# Patient Record
Sex: MALE | Race: WHITE | Employment: OTHER | URBAN - METROPOLITAN AREA
[De-identification: names, ages, dates, MRNs, and addresses within clinical notes are randomized per-mention and may not be internally consistent; named-entity substitution may affect disease eponyms.]

---

## 2017-01-03 ENCOUNTER — OFFICE VISIT (OUTPATIENT)
Dept: INTERNAL MEDICINE CLINIC | Age: 57
End: 2017-01-03

## 2017-01-03 VITALS
OXYGEN SATURATION: 98 % | WEIGHT: 166 LBS | RESPIRATION RATE: 16 BRPM | HEIGHT: 73 IN | SYSTOLIC BLOOD PRESSURE: 137 MMHG | HEART RATE: 88 BPM | BODY MASS INDEX: 22 KG/M2 | TEMPERATURE: 97.5 F | DIASTOLIC BLOOD PRESSURE: 82 MMHG

## 2017-01-03 DIAGNOSIS — E78.5 DYSLIPIDEMIA: ICD-10-CM

## 2017-01-03 DIAGNOSIS — Z12.11 SCREENING FOR COLON CANCER: ICD-10-CM

## 2017-01-03 DIAGNOSIS — R91.8 LUNG NODULES: ICD-10-CM

## 2017-01-03 DIAGNOSIS — N43.2 OTHER HYDROCELE: ICD-10-CM

## 2017-01-03 DIAGNOSIS — J44.9 CHRONIC OBSTRUCTIVE PULMONARY DISEASE, UNSPECIFIED COPD TYPE (HCC): ICD-10-CM

## 2017-01-03 DIAGNOSIS — L08.9 INFECTION OF SKIN OF FINGER: ICD-10-CM

## 2017-01-03 DIAGNOSIS — K40.20 BILATERAL INGUINAL HERNIA WITHOUT OBSTRUCTION OR GANGRENE, RECURRENCE NOT SPECIFIED: ICD-10-CM

## 2017-01-03 DIAGNOSIS — R63.4 WEIGHT LOSS, ABNORMAL: Primary | ICD-10-CM

## 2017-01-03 RX ORDER — SULFAMETHOXAZOLE AND TRIMETHOPRIM 800; 160 MG/1; MG/1
1 TABLET ORAL 2 TIMES DAILY
Qty: 20 TAB | Refills: 0 | Status: SHIPPED | OUTPATIENT
Start: 2017-01-03 | End: 2017-01-13

## 2017-01-03 NOTE — MR AVS SNAPSHOT
Visit Information Date & Time Provider Department Dept. Phone Encounter #  
 1/3/2017  3:45 PM Wu Medina DO [facility] [employer] [phone] Follow-up Instructions Return for 3 month follow up. Upcoming Health Maintenance Date Due Hepatitis C Screening 1960 Pneumococcal 19-64 Medium Risk (1 of 1 - PPSV23) 10/22/1978 DTaP/Tdap/Td series (1 - Tdap) 10/22/1980 FOBT Q 1 YEAR AGE 50-75 10/22/2009 INFLUENZA AGE 9 TO ADULT 8/1/2016 Allergies as of 1/3/2017  Review Complete On: 1/3/2017 By: Ailyn Dominique LPN No Known Allergies Current Immunizations  Never Reviewed No immunizations on file. Not reviewed this visit You Were Diagnosed With   
  
 Codes Comments Weight loss, abnormal    -  Primary ICD-10-CM: R63.4 ICD-9-CM: 783.21 Screening for colon cancer     ICD-10-CM: Z12.11 ICD-9-CM: V76.51 Chronic obstructive pulmonary disease, unspecified [facility] ([facility]     ICD-10-CM: J44.9 ICD-9-CM: 247 Bilateral inguinal hernia without obstruction or gangrene, recurrence not specified     ICD-10-CM: K40.20 ICD-9-CM: 550.92 Other hydrocele     ICD-10-CM: N43.2 ICD-9-CM: 603.8 Infection of skin of finger     ICD-10-CM: L08.9 ICD-9-CM: 178. 9 Dyslipidemia     ICD-10-CM: E78.5 ICD-9-CM: 272.4 Vitals BP Pulse Temp Resp Height(growth percentile) Weight(growth percentile) 137/82 88 97.5 °F (36.4 °C) (Oral) 16 6' 1\" (1.854 m) 166 lb (75.3 kg) SpO2 BMI Smoking Status 98% 21.9 kg/m2 Former Smoker Vitals History BMI and BSA Data Body Mass Index Body Surface Area  
 21.9 kg/m 2 1.97 m 2 Preferred Pharmacy Pharmacy Name Phone [facility] - [address] [phone] Your Updated Medication List  
  
   
This list is accurate as of: 1/3/17  4:34 PM.  Always use your most recent med list.  
  
  
  
  
 aspirin 81 mg tablet Take 81 mg by mouth two (2) times a day. atorvastatin 20 mg tablet Commonly known as:  LIPITOR Take  by mouth daily. hydroCHLOROthiazide 25 mg tablet Commonly known as:  HYDRODIURIL Take 25 mg by mouth daily. Take 1 tab daily  
  
 metFORMIN 1,000 mg tablet Commonly known as:  GLUCOPHAGE Take 1,000 mg by mouth daily. metoprolol tartrate 50 mg tablet Commonly known as:  LOPRESSOR Take  by mouth two (2) times a day. multivitamin tablet Commonly known as:  ONE A DAY Take 1 Tab by mouth daily. nitroglycerin 0.4 mg SL tablet Commonly known as:  NITROSTAT  
1 Tab by SubLINGual route every five (5) minutes as needed for Chest Pain. PriLOSEC 20 mg capsule Generic drug:  omeprazole Take 40 mg by mouth daily. trimethoprim-sulfamethoxazole 160-800 mg per tablet Commonly known as:  BACTRIM DS, SEPTRA DS Take 1 Tab by mouth two (2) times a day for 10 days. Prescriptions Sent to Pharmacy Refills  
 trimethoprim-sulfamethoxazole (BACTRIM DS, SEPTRA DS) 160-800 mg per tablet 0 Sig: Take 1 Tab by mouth two (2) times a day for 10 days. Class: Normal  
 Pharmacy: [id number] [place] [address] #: [phone] Route: Oral  
  
Follow-up Instructions Return for 3 month follow up. To-Do List   
 02/03/2017 Lab:  CBC WITH AUTOMATED DIFF   
  
 02/03/2017 Lab:  LIPID PANEL   
  
 02/03/2017 Lab:  METABOLIC PANEL, COMPREHENSIVE   
  
 02/03/2017 Lab:  TSH 3RD GENERATION Referral Information Referral ID Referred By Referred To  
  
 [id number] Luiz NAVARRO Not Available Visits Status Start Date End Date 1 New Request 1/3/17 1/3/18 If your referral has a status of pending review or denied, additional information will be sent to support the outcome of this decision. Referral ID Referred By Referred To [id number] Pam NAVARRO Not Available Visits Status Start Date End Date 1 New Request 1/3/17 1/3/18 If your referral has a status of pending review or denied, additional information will be sent to support the outcome of this decision. Patient Instructions A Healthy Lifestyle: Care Instructions Your Care Instructions A healthy lifestyle can help you feel good, stay at a healthy weight, and have plenty of energy for both work and play. A healthy lifestyle is something you can share with your whole family. A healthy lifestyle also can lower your risk for serious health problems, such as high blood pressure, heart disease, and diabetes. You can follow a few steps listed below to improve your health and the health of your family. Follow-up care is a key part of your treatment and safety. Be sure to make and go to all appointments, and call your doctor if you are having problems. Its also a good idea to know your test results and keep a list of the medicines you take. How can you care for yourself at home? · Do not eat too much sugar, fat, or fast foods. You can still have dessert and treats now and then. The goal is moderation. · Start small to improve your eating habits. Pay attention to portion sizes, drink less juice and soda pop, and eat more fruits and vegetables. ¨ Eat a healthy amount of food. A 3-ounce serving of meat, for example, is about the size of a deck of cards. Fill the rest of your plate with vegetables and whole grains. ¨ Limit the amount of soda and sports drinks you have every day. Drink more water when you are thirsty. ¨ Eat at least 5 servings of fruits and vegetables every day. It may seem like a lot, but it is not hard to reach this goal. A serving or helping is 1 piece of fruit, 1 cup of vegetables, or 2 cups of leafy, raw vegetables.  Have an apple or some carrot sticks as an afternoon snack instead of a candy bar. Try to have fruits and/or vegetables at every meal. 
· Make exercise part of your daily routine. You may want to start with simple activities, such as walking, bicycling, or slow swimming. Try to be active 30 to 60 minutes every day. You do not need to do all 30 to 60 minutes all at once. For example, you can exercise 3 times a day for 10 or 20 minutes. Moderate exercise is safe for most people, but it is always a good idea to talk to your doctor before starting an exercise program. 
· Keep moving. Leatha Boys the lawn, work in the garden, or [employer]. Take the stairs instead of the elevator at work. · If you smoke, quit. People who smoke have an increased risk for heart attack, stroke, cancer, and other lung illnesses. Quitting is hard, but there are ways to boost your chance of quitting tobacco for good. ¨ Use nicotine gum, patches, or lozenges. ¨ Ask your doctor about stop-smoking programs and medicines. ¨ Keep trying. In addition to reducing your risk of diseases in the future, you will notice some benefits soon after you stop using tobacco. If you have shortness of breath or asthma symptoms, they will likely get better within a few weeks after you quit. · Limit how much alcohol you drink. Moderate amounts of alcohol (up to 2 drinks a day for men, 1 drink a day for women) are okay. But drinking too much can lead to liver problems, high blood pressure, and other health problems. Family health If you have a family, there are many things you can do together to improve your health. · Eat meals together as a family as often as possible. · Eat healthy foods. This includes fruits, vegetables, lean meats and dairy, and whole grains. · Include your family in your fitness plan. Most people think of activities such as jogging or tennis as the way to fitness, but there are many ways you and your family can be more active.  Anything that makes you breathe hard and gets your heart pumping is exercise. Here are some tips: 
¨ Walk to do errands or to take your child to school or the bus. ¨ Go for a family bike ride after dinner instead of watching TV. Where can you learn more? Go to http://ramos-meghan.info/. Enter L383 in the search box to learn more about \"A Healthy Lifestyle: Care Instructions. \" Current as of: July 26, 2016 Content Version: 11.1 © 2203-4074 [employer]. Care instructions adapted under license by [employer] (which disclaims liability or warranty for this information). If you have questions about a medical condition or this instruction, always ask your healthcare professional. [address] any warranty or liability for your use of this information. Introducing [facility]! Kelvin Cordero introduces angelcam patient portal. Now you can access parts of your medical record, email your doctor's office, and request medication refills online. 1. In your internet browser, go to [email or web address] 2. Click on the First Time User? Click Here link in the Sign In box. You will see the New Member Sign Up page. 3. Enter your angelcam Access Code exactly as it appears below. You will not need to use this code after youve completed the sign-up process. If you do not sign up before the expiration date, you must request a new code. · angelcam Access Code: [email or web address] Expires: 3/16/2017 11:39 AM 
 
4. Enter the last four digits of your Social Security Number (xxxx) and Date of Birth (mm/dd/yyyy) as indicated and click Submit. You will be taken to the next sign-up page. 5. Create a angelcam ID. This will be your angelcam login ID and cannot be changed, so think of one that is secure and easy to remember. 6. Create a angelcam password. You can change your password at any time. 7. Enter your Password Reset Question and Answer.  This can be used at a later time if you forget your password. 8. Enter your e-mail address. You will receive e-mail notification when new information is available in [address] 9. Click Sign Up. You can now view and download portions of your medical record. 10. Click the Download Summary menu link to download a portable copy of your medical information. If you have questions, please visit the Frequently Asked Questions section of the [employer] website. Remember, [employer] is NOT to be used for urgent needs. For medical emergencies, dial 911. Now available from your iPhone and Android! Please provide this summary of care documentation to your next provider. Your primary care clinician is listed as Linda Arreola. If you have any questions after today's visit, please call [phone].

## 2017-01-03 NOTE — PROGRESS NOTES
Pt is here for 2 week  follow up on CT of lower abdomen. Do you have an advance directive no  Request Pt bring a copy of advance directive for scanning. Do you want information on an advance directive no    Pt  mychart activation pending. 1. Have you been to the ER, urgent care clinic since your last visit? Hospitalized since your last visit? No    2. Have you seen or consulted any other health care providers outside of the [address] [place] [address] since your last visit? Include any pap smears or colon screening. No      Called [facility] & canceled Bactrim & placed Pt's requested drug store [facility] [address].

## 2017-01-03 NOTE — PATIENT INSTRUCTIONS

## 2017-01-06 ENCOUNTER — TELEPHONE (OUTPATIENT)
Dept: INTERNAL MEDICINE CLINIC | Age: 57
End: 2017-01-06

## 2017-01-06 NOTE — PROGRESS NOTES
HISTORY OF PRESENT ILLNESS  Judge Reddy is a 61 y.o. male. HPI  61year old established female patient in today today to follow up CT     He also has a new concern of right 2nd digit hit by a hammer, now swollen with yellow drainage. Patient lanced injury himself to facilitate drainage    He denies F/C/S    Reviewd CT Chest  with patient: \"Virtually complete resolution of the left lingular findings. No change focal  residual scar/fibrosis posteromedial right upper lobe from the previous  cavitary lesion or the small subpleural nodules in the anterior left lower  lobe. The lungs are otherwise clear. There is no change in the prominent  right hilar lymph node.     Hepatic steatosis without focal abnormality. The remainder of the study is  unremarkable and unchanged. \"    Reviewed CT abd and pelvis with patient: \"1. There are small indirect inguinal hernias bilaterally containing fat without  incarceration. There is a small left-sided hydrocele. 2. Atrophic right kidney. Parenchymal scarring is present involving both  kidneys. No renal stones are seen. There is no hydronephrosis. 3. Arterial atherosclerotic disease. Additional chronic changes as described  Above. \"     Recap from 12/16/16:  His wife is present and give some hx. He reports hx of arthritis, DM, HTN, kidney stones and pna    His main concern today was significant weight loss per him and his wife. He reports >40 lbs weight loss in the past year    He also reports concerns regarding testicular size/tenderness and possible b/l inguinal enlargement/ hernia    he is taking his medications with no adverse side effects, he is requesting refills today. He denies CP, SOB, dyspnea, edema, N/T or myalgias.       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 on 1/26/06    Arthritis      in hands    ASHD (arteriosclerotic heart disease)     Benign hypertensive heart disease without heart failure     CAD (coronary artery disease)      chronic underlying    Calculus of kidney     Chronic cough      coughing up sputum    COPD (chronic obstructive pulmonary disease) ([facility])      moderate to severe    Diabetes ([facility]     GARCIA (dyspnea on exertion)     GERD (gastroesophageal reflux disease)     H/O: pneumonia     Heartburn     Hypertension      essential systemic    Indigestion     Normal nuclear stress test 08/30/2006     No ischemia or infarct. EF 59%. Negative max. stress test.    Pneumonia 8/12     \"walking\"    Pure hypercholesterolemia     S/P cardiac cath 01/26/2006     LM mild. RCA mild. LAD tortuous. CX mild. CX angela 100%. S/P balloon angioplasty of CX angela. Residual 40%. LVEDP 20.  EF 65%.  Tobacco abuse        Family History   Problem Relation Age of Onset    COPD Mother      cause of death    Asthma Father     Cancer Father     Colon Polyps Father        Social History   Substance Use Topics    Smoking status: Former Smoker     Packs/day: 1.25     Years: 20.00     Quit date: 1/1/2014    Smokeless tobacco: Never Used    Alcohol use Yes      Comment: on special occasions only        Current Outpatient Prescriptions   Medication Sig    trimethoprim-sulfamethoxazole (BACTRIM DS, SEPTRA DS) 160-800 mg per tablet Take 1 Tab by mouth two (2) times a day for 10 days.  metFORMIN (GLUCOPHAGE) 1,000 mg tablet Take 1,000 mg by mouth daily.  atorvastatin (LIPITOR) 20 mg tablet Take  by mouth daily.  hydroCHLOROthiazide (HYDRODIURIL) 25 mg tablet Take 25 mg by mouth daily. Take 1 tab daily    multivitamin (ONE A DAY) tablet Take 1 Tab by mouth daily.  metoprolol tartrate (LOPRESSOR) 50 mg tablet Take  by mouth two (2) times a day.  omeprazole (PRILOSEC) 20 mg capsule Take 40 mg by mouth daily.     aspirin 81 mg tablet Take 81 mg by mouth two (2) times a day.  nitroglycerin (NITROSTAT) 0.4 mg SL tablet 1 Tab by SubLINGual route every five (5) minutes as needed for Chest Pain. No current facility-administered medications for this visit. Past Surgical History   Procedure Laterality Date    Pr tongue to lip surgery  04/04    Hx ptca       of anomalous circumflex marginal artery    Pr tongue and mouth surg unlisted  04/04     tongue surgery    Hx heart catheterization  01/26/06       Review of Systems   Constitutional: Negative for chills and fever. C/o night cramps   Respiratory:        C/o chronic cough, SOB and GARCIA. He says he sees cardiology   Gastrointestinal:        C/o early satiety and loss of appetite and change in stools on metformin   Genitourinary:        C/o decrease in urine flow, urgency, hesitancy, incomplete bladder emptying and frequency   Musculoskeletal: Positive for back pain and neck pain. C/o muscle weakness   Neurological: Positive for tingling. Visit Vitals    /82    Pulse 88    Temp 97.5 °F (36.4 °C) (Oral)    Resp 16    Ht 6' 1\" (1.854 m)    Wt 166 lb (75.3 kg)    SpO2 98%    BMI 21.9 kg/m2     Physical Exam   Constitutional: he is oriented to person, place, and time and well-developed, well-nourished, and in no distress. Head: Normocephalic and atraumatic. Musculoskeletal: right 2nd digit with erythema, swelling, warmth and yellow thin discharge. Normal range of motion. he exhibits no edema. Neurological: he is alert and oriented to person, place, and time. Gait normal.   Skin: Skin is warm and dry. Psychiatric: Mood, memory, affect and judgment normal.     ASSESSMENT and PLAN    ICD-10-CM ICD-9-CM    1.  Weight loss, abnormal R63.4 783.21 CBC WITH AUTOMATED DIFF      METABOLIC PANEL, COMPREHENSIVE      TSH 3RD GENERATION   2. Screening for colon cancer Z12.11 V76.51 REFERRAL FOR COLONOSCOPY   3. Chronic obstructive pulmonary disease, unspecified COPD type (HCC) J44.9 496 REFERRAL TO PULMONARY DISEASE   4. Bilateral inguinal hernia without obstruction or gangrene, recurrence not specified K40.20 550.92    5. Other hydrocele N43.2 603.8 REFERRAL TO UROLOGY   6. Infection of skin of finger L08.9 686.9 trimethoprim-sulfamethoxazole (BACTRIM DS, SEPTRA DS) 160-800 mg per tablet   7. Dyslipidemia E78.5 272.4 LIPID PANEL   8. Lung nodules R91.8 793.19 REFERRAL TO PULMONARY DISEASE     -Findings of CT reviewed, morbidity and mortality for hernias reviewed. Also provided education on hydrocele, patient desires specialist follow up.  -Hx CAD with aortic arthrosclerosis seen on CT, strongly recommend compliance with cardiology appointment, statin, ASA and plavix  -Lung CT shows scarring and nodule, patient with hx of smoking and weight loss, will refer to pulm for management. -RTC 3 months  -Advised close follow up    Additional Instructions: The patient understands that they should contact the office at any time if any questions or concerns develop. They are also aware that they can call our main office number at [phone] at any time if they would like to address any concerns with the physician. They also understand that they should dial 911 if any acute emergency arises. The patient understands that they should give us a minimum of 48 hours to complete prescription refills once they are requested. The patient has also been instructed to contact us by calling the main office number if they have not received feedback within 2 weeks of having any tests completed. The patient is a aware that they should read all package insert information when picking up the medications and that they should consult the pharmacist of a physician if they have any questions or concerns regarding the prescribed medications. Discussed with the patient new medications given and patient instructed to read pharmacy literature regarding side effects and drug interactions.   Instructions for taking the medications were provided to the patient and the consequences of not taking it. Follow-up Disposition:   Return if symptoms worsen or fail to improve. Risk and benefits of new medication discussed in detail when indicated, patient was given the opportunity to ask questions   AVS provided  reviewed diet, exercise and weight control when indicated  Alarm signals discussed. ER precautions reviewed when indicated  Plan of care reviewed with patient. Understanding verbalized and they are in agreement with plan of care.      Irene Menesesr, DO

## 2017-01-06 NOTE — TELEPHONE ENCOUNTER
Pt calling had OV 01/03/2017.  He thought Dr Nila Casey said she was sending Celebrex to the pharmacy to replace Lipitor    Please advise    Pharmacy is [address] [facility], [address] [facility]

## 2017-01-09 RX ORDER — ROSUVASTATIN CALCIUM 20 MG/1
20 TABLET, COATED ORAL
Qty: 30 TAB | Refills: 2 | Status: SHIPPED | OUTPATIENT
Start: 2017-01-09 | End: 2017-05-08 | Stop reason: SDUPTHER

## 2017-01-16 DIAGNOSIS — J44.9 CHRONIC OBSTRUCTIVE PULMONARY DISEASE, UNSPECIFIED COPD TYPE (HCC): Primary | ICD-10-CM

## 2017-01-16 NOTE — PROGRESS NOTES
Verbal Order with read back per Dr. Bob Aranda MD  For PFT smart panel. AMB POC PFT complete w/ bronchodilator  AMB POC PFT complete w/o bronchodilator    Dr. Bob Aranda MD will co-sign the orders.

## 2017-01-23 ENCOUNTER — CLINICAL SUPPORT (OUTPATIENT)
Dept: PULMONOLOGY | Age: 57
End: 2017-01-23

## 2017-01-23 VITALS — BODY MASS INDEX: 22.4 KG/M2 | HEIGHT: 73 IN | WEIGHT: 169 LBS

## 2017-01-23 DIAGNOSIS — J44.9 CHRONIC OBSTRUCTIVE PULMONARY DISEASE, UNSPECIFIED COPD TYPE (HCC): ICD-10-CM

## 2017-01-23 RX ORDER — ATORVASTATIN CALCIUM 20 MG/1
TABLET, FILM COATED ORAL DAILY
COMMUNITY
End: 2017-01-30

## 2017-01-24 ENCOUNTER — OFFICE VISIT (OUTPATIENT)
Dept: UROLOGY | Age: 57
End: 2017-01-24

## 2017-01-24 ENCOUNTER — HOSPITAL ENCOUNTER (OUTPATIENT)
Dept: LAB | Age: 57
Discharge: HOME | End: 2017-01-24
Payer: SELF-PAY

## 2017-01-24 VITALS
WEIGHT: 169 LBS | SYSTOLIC BLOOD PRESSURE: 121 MMHG | OXYGEN SATURATION: 96 % | TEMPERATURE: 97.4 F | HEART RATE: 73 BPM | DIASTOLIC BLOOD PRESSURE: 76 MMHG | HEIGHT: 73 IN | BODY MASS INDEX: 22.4 KG/M2

## 2017-01-24 DIAGNOSIS — R35.1 BPH ASSOCIATED WITH NOCTURIA: ICD-10-CM

## 2017-01-24 DIAGNOSIS — N40.1 BPH ASSOCIATED WITH NOCTURIA: ICD-10-CM

## 2017-01-24 DIAGNOSIS — F52.4 PREMATURE EJACULATION: ICD-10-CM

## 2017-01-24 DIAGNOSIS — N52.02 CORPORO-VENOUS OCCLUSIVE ERECTILE DYSFUNCTION: ICD-10-CM

## 2017-01-24 DIAGNOSIS — R10.30 LOWER ABDOMINAL PAIN: ICD-10-CM

## 2017-01-24 DIAGNOSIS — N45.1 CHRONIC EPIDIDYMITIS: Primary | ICD-10-CM

## 2017-01-24 DIAGNOSIS — N43.3 HYDROCELE, UNSPECIFIED HYDROCELE TYPE: ICD-10-CM

## 2017-01-24 LAB
BILIRUB UR QL STRIP: NEGATIVE
GLUCOSE UR-MCNC: NEGATIVE MG/DL
KETONES P FAST UR STRIP-MCNC: NEGATIVE MG/DL
PH UR STRIP: 6.5 [PH] (ref 4.6–8)
PROT UR QL STRIP: NEGATIVE MG/DL
PSA SERPL-MCNC: 0.9 NG/ML (ref 0–4)
SP GR UR STRIP: 1.01 (ref 1–1.03)
UA UROBILINOGEN AMB POC: NORMAL (ref 0.2–1)
URINALYSIS CLARITY POC: CLEAR
URINALYSIS COLOR POC: YELLOW
URINE BLOOD POC: NEGATIVE
URINE LEUKOCYTES POC: NEGATIVE
URINE NITRITES POC: NEGATIVE

## 2017-01-24 PROCEDURE — 84153 ASSAY OF PSA TOTAL: CPT | Performed by: UROLOGY

## 2017-01-24 RX ORDER — HYDROCODONE BITARTRATE AND ACETAMINOPHEN 10; 300 MG/1; MG/1
TABLET ORAL
COMMUNITY
End: 2017-01-30

## 2017-01-24 RX ORDER — CIPROFLOXACIN 500 MG/1
500 TABLET ORAL 2 TIMES DAILY
Qty: 42 TAB | Refills: 0 | Status: SHIPPED | OUTPATIENT
Start: 2017-01-24 | End: 2017-01-30

## 2017-01-24 NOTE — MR AVS SNAPSHOT
Visit Information Date & Time Provider Department Dept. Phone Encounter #  
 1/24/2017  3:00 PM Dank Vásquez, [facility] [phone] Your Appointments 1/31/2017  1:40 PM  
Follow Up with Alem Li DO [facility] ([address]) Appt Note: CT ABDOMEN AND PELVIS WITHOUT CONTRAST/ ABN/ refer by Kaye Blanchard to see [place]/appt. made with the pt's spouse/ see CC; $ 75. copay/ $0 balance; wife requested earlier appt. Ni Sorenson [phone] Leonardo Zarate  
  
    
 2/9/2017 10:00 AM  
New Patient with Larry Reyes MD  
[address] ([address]) Appt Note: Dr Chaparro Ryan/copd, lung nodule; ct mv 12/23;crista 1/18; last seen vap 2013; .  
 [address] [place] [address], [address] [address]  
[phone]  
  
   
 [address] [place] [address],  [address] [address]  
  
    
 4/4/2017  8:10 AM  
LAB with Linda Arreola DO [facility] (--) Appt Note: 3 mo f/u lab [address] [address]  
[phone]  
  
   
 [address],[facility] [address] [address] 4/11/2017  3:45 PM  
ROUTINE CARE with Linda Arreola DO [facility] (--) Appt Note: 3 mo f/u  
 [address] [address]  
[phone]  
  
   
 [address],[facility] [address] [address] Upcoming Health Maintenance Date Due Hepatitis C Screening 1960 Pneumococcal 19-64 Medium Risk (1 of 1 - PPSV23) 10/22/1978 DTaP/Tdap/Td series (1 - Tdap) 10/22/1980 FOBT Q 1 YEAR AGE 50-75 10/22/2009 INFLUENZA AGE 9 TO ADULT 8/1/2016 Allergies as of 1/24/2017  Review Complete On: 1/24/2017 By: Dank Vásquez MD  
 No Known Allergies Current Immunizations  Never Reviewed No immunizations on file. Not reviewed this visit You Were Diagnosed With   
  
 Codes Comments Chronic epididymitis    -  Primary ICD-10-CM: N45.1 ICD-9-CM: 604.90 Hydrocele, unspecified hydrocele type     ICD-10-CM: N43.3 ICD-9-CM: 603.9 Lower abdominal pain     ICD-10-CM: R10.30 ICD-9-CM: 789.09   
 BPH associated with nocturia     ICD-10-CM: N40.1, R35.1 ICD-9-CM: 600.01, 788.43 Vitals BP Pulse Temp Height(growth percentile) Weight(growth percentile) SpO2  
 121/76 (BP 1 Location: Left arm, BP Patient Position: Sitting) 73 97.4 °F (36.3 °C) 6' 1\" (1.854 m) 169 lb (76.7 kg) 96% BMI Smoking Status 22.3 kg/m2 Former Smoker Vitals History BMI and BSA Data Body Mass Index Body Surface Area  
 22.3 kg/m 2 1.99 m 2 Preferred Pharmacy Pharmacy Name [facility], [address] [place] [address],[address] [place] [address] [phone] Your Updated Medication List  
  
   
This list is accurate as of: 1/24/17  3:59 PM.  Always use your most recent med list.  
  
  
  
  
 aspirin 81 mg tablet Take 81 mg by mouth two (2) times a day. atorvastatin 20 mg tablet Commonly known as:  LIPITOR Take  by mouth daily. ciprofloxacin HCl 500 mg tablet Commonly known as:  CIPRO Take 1 Tab by mouth two (2) times a day for 10 days. hydroCHLOROthiazide 25 mg tablet Commonly known as:  HYDRODIURIL Take 25 mg by mouth daily. Take 1 tab daily  
  
 metFORMIN 1,000 mg tablet Commonly known as:  GLUCOPHAGE Take 1,000 mg by mouth daily. metoprolol tartrate 50 mg tablet Commonly known as:  LOPRESSOR Take  by mouth two (2) times a day. multivitamin tablet Commonly known as:  ONE A DAY Take 1 Tab by mouth daily. nitroglycerin 0.4 mg SL tablet Commonly known as:  NITROSTAT  
1 Tab by SubLINGual route every five (5) minutes as needed for Chest Pain. PriLOSEC 20 mg capsule Generic drug:  omeprazole Take 40 mg by mouth daily. rosuvastatin 20 mg tablet Commonly known as:  CRESTOR Take 1 Tab by mouth nightly. VICODIN HP  mg Tab per tablet Generic drug:  HYDROcodone-acetaminophen Take  by mouth. Prescriptions Sent to Pharmacy Refills  
 ciprofloxacin HCl (CIPRO) 500 mg tablet 0 Sig: Take 1 Tab by mouth two (2) times a day for 10 days. Class: Normal  
 [facility] [address] #: [phone] Route: Oral  
  
We Performed the Following AMB POC URINALYSIS DIP STICK AUTO W/O MICRO [57121 CPT(R)] Patient Instructions [employer] Activation Thank you for requesting access to [employer]. Please follow the instructions below to securely access and download your online medical record. [employer] allows you to send messages to your doctor, view your test results, renew your prescriptions, schedule appointments, and more. How Do I Sign Up? 1. In your internet browser, go to [email or web address] 
2. Click on the First Time User? Click Here link in the Sign In box. You will be redirect to the New Member Sign Up page. 3. Enter your [employer] Access Code exactly as it appears below. You will not need to use this code after youve completed the sign-up process. If you do not sign up before the expiration date, you must request a new code. [employer] Access Code: [email or web address] Expires: 3/16/2017 11:39 AM (This is the date your [employer] access code will ) 4. Enter the last four digits of your Social Security Number (xxxx) and Date of Birth (mm/dd/yyyy) as indicated and click Submit. You will be taken to the next sign-up page. 5. Create a [employer] ID. This will be your [employer] login ID and cannot be changed, so think of one that is secure and easy to remember. 6. Create a [employer] password. You can change your password at any time. 7. Enter your Password Reset Question and Answer. This can be used at a later time if you forget your password. 8. Enter your e-mail address. You will receive e-mail notification when new information is available in [address] 9. Click Sign Up. You can now view and download portions of your medical record. 10. Click the Download Summary menu link to download a portable copy of your medical information. Additional Information If you have questions, please visit the Frequently Asked Questions section of the [email or web address] website at [email or web address] Remember, [email or web address] is NOT to be used for urgent needs. For medical emergencies, dial 911. Urine Test: About This Test 
What is it? A urine test checks the color, clarity (clear or cloudy), odor, concentration, and acidity (pH) of your urine. It also checks your levels of protein, sugar, blood cells, or other substances in your urine. This test is sometimes called a urinalysis. Why is this test done? A urine test may be done: · To check for a disease or infection of the urinary tract. The urinary tract includes the kidneys, the tubes that carry urine from the kidneys to the bladder (ureters), and the bladder. It also includes the tube that carries urine from the bladder to outside the body (urethra). · To check the treatment of conditions such as diabetes, kidney stones, a urinary tract infection (UTI), high blood pressure, or some kidney or liver diseases. How can you prepare for the test? 
· Before the test, don't eat foods that can change the color of your urine. Examples of these include blackberries, beets, and rhubarb. · Don't do heavy exercise before the test. 
· Tell your doctor if you are menstruating or close to starting your period. Your doctor may want to wait to do the test. 
· Tell your doctor about all the nonprescription and prescription medicines and herbs or other supplements you take.  Some of these can affect the results of this test. 
What happens during the test? 
A urine test can be done in your doctor's office, clinic, or lab. Or you may be asked to collect a urine sample at home. Then you can take it to the office or lab for testing. Clean-catch midstream urine collection · Wash your hands before you start. · If the cup you are given has a lid, remove it carefully. Set it down with the inner surface up. Don't touch the inside of the cup with your fingers. · Clean the area around your genitals. ¨ For men: Pull back the foreskin, if present. Clean the head of your penis with medicated towelettes or swabs. ¨ For women: Spread open the genital folds of skin with one hand. Then use medicated towelettes or swabs in your other hand to clean the area where urine comes out (the urethra). Wipe the area from front to back. · Start urinating into the toilet or urinal. A woman should hold apart the genital folds of skin while she urinates. · After the urine has flowed for several seconds, place the cup into the urine stream. Collect about 2 ounces of urine without stopping your flow of urine. · Don't touch the rim of the cup to your genital area. Don't get toilet paper, pubic hair, stool (feces), menstrual blood, or anything else in the urine sample. · Finish urinating into the toilet or urinal. 
· Carefully replace and tighten the lid on the cup, and then return it to the lab. If you are collecting the urine at home and can't get it to the lab in an hour, refrigerate it. Double-voided urine sample collection This method collects the urine your body is making right now. · Urinate into the toilet or urinal. Don't collect any of this urine. · Drink a large glass of water, and wait about 30 to 40 minutes. · Then get a urine sample. Follow the instructions above for collecting a clean-catch urine sample. · Take the urine sample to the lab.  If you are collecting the urine at home and can't get it to the lab in an hour, refrigerate it. Follow-up care is a key part of your treatment and safety. Be sure to make and go to all appointments, and call your doctor if you are having problems. It's also a good idea to keep a list of the medicines you take. Ask your doctor when you can expect to have your test results. Where can you learn more? Go to http://ramos-meghan.info/. Enter R266 in the search box to learn more about \"Urine Test: About This Test.\" Current as of: February 19, 2016 Content Version: 11.1 © 2187-5314 AUTOFACT. Care instructions adapted under license by Oculus VR (which disclaims liability or warranty for this information). If you have questions about a medical condition or this instruction, always ask your healthcare professional. [address] any warranty or liability for your use of this information. Introducing [facility]! Shantell Gutiérrez introduces Pegastech patient portal. Now you can access parts of your medical record, email your doctor's office, and request medication refills online. 1. In your internet browser, go to [email or web address] 2. Click on the First Time User? Click Here link in the Sign In box. You will see the New Member Sign Up page. 3. Enter your Pegastech Access Code exactly as it appears below. You will not need to use this code after youve completed the sign-up process. If you do not sign up before the expiration date, you must request a new code. · Peg[employer]ch Access Code: [email or web address] Expires: 3/16/2017 11:39 AM 
 
4. Enter the last four digits of your Social Security Number (xxxx) and Date of Birth (mm/dd/yyyy) as indicated and click Submit. You will be taken to the next sign-up page. 5. Create a Pegastech ID. This will be your Pegastech login ID and cannot be changed, so think of one that is secure and easy to remember. 6. Create a Notable Limited password. You can change your password at any time. 7. Enter your Password Reset Question and Answer. This can be used at a later time if you forget your password. 8. Enter your e-mail address. You will receive e-mail notification when new information is available in [address] 9. Click Sign Up. You can now view and download portions of your medical record. 10. Click the Download Summary menu link to download a portable copy of your medical information. If you have questions, please visit the Frequently Asked Questions section of the Notable Limited website. Remember, Not[employer] Limited is NOT to be used for urgent needs. For medical emergencies, dial 911. Now available from your iPhone and Android! Please provide this summary of care documentation to your next provider. Your primary care clinician is listed as Johny Nicholas. If you have any questions after today's visit, please call [phone].

## 2017-01-24 NOTE — PATIENT INSTRUCTIONS
"[employer] Activation    Thank you for requesting access to "[employer]. Please follow the instructions below to securely access and download your online medical record. "[employer] allows you to send messages to your doctor, view your test results, renew your prescriptions, schedule appointments, and more. How Do I Sign Up? 1. In your internet browser, go to [email or web address]  2. Click on the First Time User? Click Here link in the Sign In box. You will be redirect to the New Member Sign Up page. 3. Enter your "[employer] Access Code exactly as it appears below. You will not need to use this code after youve completed the sign-up process. If you do not sign up before the expiration date, you must request a new code. "[employer] Access Code: [email or web address]  Expires: 3/16/2017 11:39 AM (This is the date your "[employer] access code will )    4. Enter the last four digits of your Social Security Number (xxxx) and Date of Birth (mm/dd/yyyy) as indicated and click Submit. You will be taken to the next sign-up page. 5. Create a "[employer] ID. This will be your "[employer] login ID and cannot be changed, so think of one that is secure and easy to remember. 6. Create a "[employer] password. You can change your password at any time. 7. Enter your Password Reset Question and Answer. This can be used at a later time if you forget your password. 8. Enter your e-mail address. You will receive e-mail notification when new information is available in [address] 9. Click Sign Up. You can now view and download portions of your medical record. 10. Click the Download Summary menu link to download a portable copy of your medical information. Additional Information    If you have questions, please visit the Frequently Asked Questions section of the "[employer] website at [email or web address] Remember, "[employer] is NOT to be used for urgent needs. For medical emergencies, dial 911.           Urine Test: About This Test  What is it? A urine test checks the color, clarity (clear or cloudy), odor, concentration, and acidity (pH) of your urine. It also checks your levels of protein, sugar, blood cells, or other substances in your urine. This test is sometimes called a urinalysis. Why is this test done? A urine test may be done:  · To check for a disease or infection of the urinary tract. The urinary tract includes the kidneys, the tubes that carry urine from the kidneys to the bladder (ureters), and the bladder. It also includes the tube that carries urine from the bladder to outside the body (urethra). · To check the treatment of conditions such as diabetes, kidney stones, a urinary tract infection (UTI), high blood pressure, or some kidney or liver diseases. How can you prepare for the test?  · Before the test, don't eat foods that can change the color of your urine. Examples of these include blackberries, beets, and rhubarb. · Don't do heavy exercise before the test.  · Tell your doctor if you are menstruating or close to starting your period. Your doctor may want to wait to do the test.  · Tell your doctor about all the nonprescription and prescription medicines and herbs or other supplements you take. Some of these can affect the results of this test.  What happens during the test?  A urine test can be done in your doctor's office, clinic, or lab. Or you may be asked to collect a urine sample at home. Then you can take it to the office or lab for testing. Clean-catch midstream urine collection  · Wash your hands before you start. · If the cup you are given has a lid, remove it carefully. Set it down with the inner surface up. Don't touch the inside of the cup with your fingers. · Clean the area around your genitals. ¨ For men: Pull back the foreskin, if present. Clean the head of your penis with medicated towelettes or swabs. ¨ For women: Spread open the genital folds of skin with one hand.  Then use medicated towelettes or swabs in your other hand to clean the area where urine comes out (the urethra). Wipe the area from front to back. · Start urinating into the toilet or urinal. A woman should hold apart the genital folds of skin while she urinates. · After the urine has flowed for several seconds, place the cup into the urine stream. Collect about 2 ounces of urine without stopping your flow of urine. · Don't touch the rim of the cup to your genital area. Don't get toilet paper, pubic hair, stool (feces), menstrual blood, or anything else in the urine sample. · Finish urinating into the toilet or urinal.  · Carefully replace and tighten the lid on the cup, and then return it to the lab. If you are collecting the urine at home and can't get it to the lab in an hour, refrigerate it. Double-voided urine sample collection  This method collects the urine your body is making right now. · Urinate into the toilet or urinal. Don't collect any of this urine. · Drink a large glass of water, and wait about 30 to 40 minutes. · Then get a urine sample. Follow the instructions above for collecting a clean-catch urine sample. · Take the urine sample to the lab. If you are collecting the urine at home and can't get it to the lab in an hour, refrigerate it. Follow-up care is a key part of your treatment and safety. Be sure to make and go to all appointments, and call your doctor if you are having problems. It's also a good idea to keep a list of the medicines you take. Ask your doctor when you can expect to have your test results. Where can you learn more? Go to http://ramos-meghan.info/. Enter R266 in the search box to learn more about \"Urine Test: About This Test.\"  Current as of: February 19, 2016  Content Version: 11.1  © 7167-9667 RefferedAgent.com. Care instructions adapted under license by Bizak (which disclaims liability or warranty for this information).  If you have questions about a medical condition or this instruction, always ask your healthcare professional. [place] [address] any warranty or liability for your use of this information.

## 2017-01-24 NOTE — PROGRESS NOTES
Mr. Osiel Walls has a reminder for a \"due or due soon\" health maintenance. I have asked that he contact his primary care provider for follow-up on this health maintenance. RBV per Dr. Antonio Elizabeth blood drawn in office today for PSA for BPH with Nocturia.

## 2017-01-25 NOTE — PROGRESS NOTES
Sherry Primrose 61 y.o. male     Mr. Matt Smith seen today for evaluation of pain and swelling in left scrotum referred as a hydrocele-patient also complains of erectile dysfunction and premature ejaculation  No history of  tract disease, trauma, or surgery- +symptoms of irritable bladder without  dysuria-no episodes of hematuria-  Patient complains of sudden loss of turgidity erections prior to ejaculation-also has episodes of premature ejaculation with loss of erection following that event  Patient has had positive response to erectile rigidity when taking [place] Viagra but still experiences sudden loss of erection    Review of Systems:   CNS: No seizures syncope headaches dizziness or visual changes  Respiratory: No wheezing no shortness of breath  Cardiovascular: Hypertension-no chest pain no palpitations  Intestinal: No dyspepsia diarrhea or constipation  Urinary: Urinary urgency frequency  Skeletal: Large joint arthritis  Endocrine: Diabetes  Other:    Allergies: No Known Allergies   Medications:    Current Outpatient Prescriptions   Medication Sig Dispense Refill    HYDROcodone-acetaminophen (VICODIN HP)  mg tab per tablet Take  by mouth.  ciprofloxacin HCl (CIPRO) 500 mg tablet Take 1 Tab by mouth two (2) times a day for 10 days. 42 Tab 0    rosuvastatin (CRESTOR) 20 mg tablet Take 1 Tab by mouth nightly. 30 Tab 2    metFORMIN (GLUCOPHAGE) 1,000 mg tablet Take 1,000 mg by mouth daily.  hydroCHLOROthiazide (HYDRODIURIL) 25 mg tablet Take 25 mg by mouth daily. Take 1 tab daily      multivitamin (ONE A DAY) tablet Take 1 Tab by mouth daily.  metoprolol tartrate (LOPRESSOR) 50 mg tablet Take  by mouth two (2) times a day.  omeprazole (PRILOSEC) 20 mg capsule Take 40 mg by mouth daily.  aspirin 81 mg tablet Take 81 mg by mouth two (2) times a day.  atorvastatin (LIPITOR) 20 mg tablet Take  by mouth daily.       nitroglycerin (NITROSTAT) 0.4 mg SL tablet 1 Tab by SubLINGual route every five (5) minutes as needed for Chest Pain. 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/06    Arthritis      in hands    ASHD (arteriosclerotic heart disease)     Benign hypertensive heart disease without heart failure     CAD (coronary artery disease)      chronic underlying    Calculus of kidney     Chronic cough      coughing up sputum    Chronic lung disease     COPD (chronic obstructive pulmonary disease) (formerly [facility])      moderate to severe    Coronary artery disease     [facility] [address]     Diabetes mellitus ([facility] [address]     GARCIA (dyspnea on exertion)     GERD (gastroesophageal reflux disease)     H/O: pneumonia     Heartburn     Hypertension      essential systemic    Indigestion     Normal nuclear stress test 08/30/2006     No ischemia or infarct. EF 59%. Negative max. stress test.    Pneumonia 8/12     \"walking\"    Pure hypercholesterolemia     S/P cardiac cath 01/26/2006     LM mild. RCA mild. LAD tortuous. CX mild. CX angela 100%. S/P balloon angioplasty of CX angela. Residual 40%. LVEDP 20.  EF 65%.     Tobacco abuse       Past Surgical History   Procedure Laterality Date    Pr tongue to lip surgery  04/04    Hx ptca       of anomalous circumflex marginal artery    Pr tongue and mouth surg unlisted  04/04     tongue surgery    Hx heart catheterization  01/26/06     Family History   Problem Relation Age of Onset    COPD Mother      cause of death    Asthma Father     Cancer Father     Colon Polyps Father       Physical Examination: Well-nourished mature male in no apparent distress    Abdomen is nontender no palpable masses no organomegaly  Back-no percussion CVA tenderness on either side  No inguinal hernia or adenopathy  Penis is normal  Testes: Large indurated tender left epididymis-this is palpably normal/testes are normal in size shape and consistency and nontender  Spermatic  enlarged and indurated left spermatic cord  Scrotum is normal  Prostate by PROSPER is rounded, smooth, benign in consistency and nontender-no induration no nodularity  No rectal masses tenderness or induration    CT scan imaging of the abdomen and pelvis on 23 December 2016 shows atrophy of the right kidney stones or urinary tract obstruction evident on either side. Small left hydrocele is evident-images have reviewed on PACS today    Urinalysis: Negative dipstick/nitrite negativePhysical Examination:          Impression: Chronic prostatitis with left epididymitis                        -Erectile dysfunction-venous leak mechanism                        -Premature ejaculation      Plan: Cipro 500 mg twice daily ×3 weeks    Discussed prospect of ED treatment by vacuum device-also discussed measures for minimizing premature ejaculation-implantation of penile prosthesis described as treatment most likely to correct erectile dysfunction but not likely to favorably reflect premature ejaculation    rtc 4 weeks-ultrasound imaging of the scrotum      More than 1/2 of this 40 minute visit was spent in counselling and coordination of care, as described above. Annie Johnson MD  -electronically signed-    PLEASE NOTE:  This document has been produced using voice recognition software. Unrecognized errors in transcription may be present.

## 2017-01-30 ENCOUNTER — APPOINTMENT (OUTPATIENT)
Dept: GENERAL RADIOLOGY | Age: 57
End: 2017-01-30
Attending: EMERGENCY MEDICINE
Payer: SELF-PAY

## 2017-01-30 ENCOUNTER — HOSPITAL ENCOUNTER (EMERGENCY)
Age: 57
Discharge: HOME | End: 2017-01-30
Attending: EMERGENCY MEDICINE
Payer: SELF-PAY

## 2017-01-30 VITALS
DIASTOLIC BLOOD PRESSURE: 84 MMHG | BODY MASS INDEX: 22.75 KG/M2 | TEMPERATURE: 97.3 F | HEART RATE: 80 BPM | SYSTOLIC BLOOD PRESSURE: 145 MMHG | HEIGHT: 72 IN | OXYGEN SATURATION: 99 % | WEIGHT: 168 LBS | RESPIRATION RATE: 18 BRPM

## 2017-01-30 DIAGNOSIS — L03.115 CELLULITIS OF RIGHT LOWER EXTREMITY: Primary | ICD-10-CM

## 2017-01-30 LAB
ANION GAP BLD CALC-SCNC: 6 MMOL/L (ref 3–18)
BASOPHILS # BLD AUTO: 0.1 K/UL (ref 0–0.06)
BASOPHILS # BLD: 1 % (ref 0–2)
BUN SERPL-MCNC: 15 MG/DL (ref 7–18)
BUN/CREAT SERPL: 16 (ref 12–20)
CALCIUM SERPL-MCNC: 9.6 MG/DL (ref 8.5–10.1)
CHLORIDE SERPL-SCNC: 99 MMOL/L (ref 100–108)
CO2 SERPL-SCNC: 34 MMOL/L (ref 21–32)
CREAT SERPL-MCNC: 0.93 MG/DL (ref 0.6–1.3)
DIFFERENTIAL METHOD BLD: ABNORMAL
EOSINOPHIL # BLD: 0.1 K/UL (ref 0–0.4)
EOSINOPHIL NFR BLD: 1 % (ref 0–5)
ERYTHROCYTE [DISTWIDTH] IN BLOOD BY AUTOMATED COUNT: 13.3 % (ref 11.6–14.5)
GLUCOSE SERPL-MCNC: 109 MG/DL (ref 74–99)
HCT VFR BLD AUTO: 39.6 % (ref 36–48)
HGB BLD-MCNC: 12.6 G/DL (ref 13–16)
LYMPHOCYTES # BLD AUTO: 30 % (ref 21–52)
LYMPHOCYTES # BLD: 3.3 K/UL (ref 0.9–3.6)
MCH RBC QN AUTO: 28.8 PG (ref 24–34)
MCHC RBC AUTO-ENTMCNC: 31.8 G/DL (ref 31–37)
MCV RBC AUTO: 90.4 FL (ref 74–97)
MONOCYTES # BLD: 0.8 K/UL (ref 0.05–1.2)
MONOCYTES NFR BLD AUTO: 7 % (ref 3–10)
NEUTS SEG # BLD: 6.8 K/UL (ref 1.8–8)
NEUTS SEG NFR BLD AUTO: 61 % (ref 40–73)
PLATELET # BLD AUTO: 343 K/UL (ref 135–420)
PMV BLD AUTO: 9.1 FL (ref 9.2–11.8)
POTASSIUM SERPL-SCNC: 3.3 MMOL/L (ref 3.5–5.5)
RBC # BLD AUTO: 4.38 M/UL (ref 4.7–5.5)
SODIUM SERPL-SCNC: 139 MMOL/L (ref 136–145)
WBC # BLD AUTO: 11 K/UL (ref 4.6–13.2)

## 2017-01-30 PROCEDURE — 93971 EXTREMITY STUDY: CPT

## 2017-01-30 PROCEDURE — 80048 BASIC METABOLIC PNL TOTAL CA: CPT

## 2017-01-30 PROCEDURE — 74011250636 HC RX REV CODE- 250/636: Performed by: PHYSICIAN ASSISTANT

## 2017-01-30 PROCEDURE — 74011250637 HC RX REV CODE- 250/637: Performed by: PHYSICIAN ASSISTANT

## 2017-01-30 PROCEDURE — 99283 EMERGENCY DEPT VISIT LOW MDM: CPT

## 2017-01-30 PROCEDURE — 85025 COMPLETE CBC W/AUTO DIFF WBC: CPT

## 2017-01-30 PROCEDURE — 73564 X-RAY EXAM KNEE 4 OR MORE: CPT

## 2017-01-30 PROCEDURE — 74011000258 HC RX REV CODE- 258: Performed by: PHYSICIAN ASSISTANT

## 2017-01-30 PROCEDURE — 96365 THER/PROPH/DIAG IV INF INIT: CPT

## 2017-01-30 RX ORDER — HYDROCODONE BITARTRATE AND ACETAMINOPHEN 5; 325 MG/1; MG/1
1 TABLET ORAL
Qty: 12 TAB | Refills: 0 | Status: SHIPPED | OUTPATIENT
Start: 2017-01-30 | End: 2017-01-31 | Stop reason: ALTCHOICE

## 2017-01-30 RX ORDER — HYDROCODONE BITARTRATE AND ACETAMINOPHEN 5; 325 MG/1; MG/1
1 TABLET ORAL
Status: COMPLETED | OUTPATIENT
Start: 2017-01-30 | End: 2017-01-30

## 2017-01-30 RX ORDER — CEPHALEXIN 500 MG/1
500 CAPSULE ORAL 4 TIMES DAILY
Qty: 28 CAP | Refills: 0 | Status: SHIPPED | OUTPATIENT
Start: 2017-01-30 | End: 2017-02-01 | Stop reason: SINTOL

## 2017-01-30 RX ORDER — SULFAMETHOXAZOLE AND TRIMETHOPRIM 800; 160 MG/1; MG/1
1 TABLET ORAL 2 TIMES DAILY
Qty: 14 TAB | Refills: 0 | Status: SHIPPED | OUTPATIENT
Start: 2017-01-30 | End: 2017-02-01

## 2017-01-30 RX ADMIN — CEFTRIAXONE 1 G: 1 INJECTION, POWDER, FOR SOLUTION INTRAMUSCULAR; INTRAVENOUS at 12:58

## 2017-01-30 RX ADMIN — HYDROCODONE BITARTRATE AND ACETAMINOPHEN 1 TABLET: 5; 325 TABLET ORAL at 11:56

## 2017-01-30 NOTE — ED PROVIDER NOTES
Patient is a 61 y.o. male presenting with knee pain. The history is provided by the patient. Knee Pain    This is a new problem. The current episode started more than 1 week ago (01/18/2017). The problem occurs constantly. The problem has been gradually worsening. The pain is present in the right knee, right lower leg and right ankle. The quality of the pain is described as aching. The pain is at a severity of 10/10. Associated symptoms include numbness. Pertinent negatives include full range of motion, no stiffness, no tingling, no itching, no back pain and no neck pain. Associated symptoms comments: Per pt he has had bilateral LE numbness ongoing since DM dx. The symptoms are aggravated by contact, movement and palpation. He has tried nothing for the symptoms. There has been no history of extremity trauma. 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 on 1/26/06    Arthritis      in hands    ASHD (arteriosclerotic heart disease)     Benign hypertensive heart disease without heart failure     CAD (coronary artery disease)      chronic underlying    Calculus of kidney     Chronic cough      coughing up sputum    Chronic lung disease     COPD (chronic obstructive pulmonary disease) (HCC)      moderate to severe    Coronary artery disease     Diabetes ([address]     Diabetes mellitus ([address]     GARCIA (dyspnea on exertion)     GERD (gastroesophageal reflux disease)     H/O: pneumonia     Heartburn     Hypertension      essential systemic    Indigestion     Normal nuclear stress test 08/30/2006     No ischemia or infarct. EF 59%. Negative max. stress test.    Pneumonia 8/12     \"walking\"    Pure hypercholesterolemia     S/P cardiac cath 01/26/2006     LM mild. RCA mild. LAD tortuous. CX mild. CX angela 100%.   S/P balloon angioplasty of CX angela. Residual 40%. LVEDP 20.  EF 65%.  Tobacco abuse        Past Surgical History:   Procedure Laterality Date    Pr tongue to lip surgery  04/04    Hx ptca       of anomalous circumflex marginal artery    Pr tongue and mouth surg unlisted  04/04     tongue surgery    Hx heart catheterization  01/26/06         Family History:   Problem Relation Age of Onset    COPD Mother      cause of death    Asthma Father     Cancer Father     Colon Polyps Father        Social History     Social History    Marital status:      Spouse name: N/A    Number of children: N/A    Years of education: N/A     Occupational History    Not on file. Social History Main Topics    Smoking status: Former Smoker     Packs/day: 1.25     Years: 20.00     Types: Cigarettes     Quit date: 1/1/2014    Smokeless tobacco: Never Used    Alcohol use Yes      Comment: on special occasions only    Drug use: No    Sexual activity: Yes     Other Topics Concern    Not on file     Social History Narrative         ALLERGIES: Review of patient's allergies indicates no known allergies. Review of Systems   Constitutional: Negative for chills and fever. HENT: Negative for ear pain, rhinorrhea and sore throat. Eyes: Negative for pain and redness. Respiratory: Negative for cough and shortness of breath. Cardiovascular: Negative for chest pain. Gastrointestinal: Negative for abdominal pain, constipation, diarrhea, nausea and vomiting. Genitourinary: Negative for dysuria. Musculoskeletal: Positive for arthralgias, gait problem, joint swelling and myalgias. Negative for back pain, neck pain, neck stiffness and stiffness. Skin: Negative. Negative for itching. Neurological: Positive for numbness. Negative for dizziness, tingling, light-headedness and headaches. Psychiatric/Behavioral: Negative.         Vitals:    01/30/17 1010 01/30/17 1012   BP: 145/84    Pulse: 80    Resp: 18    Temp: 97.3 °F (36.3 °C)    SpO2: 99%    Weight:  76.2 kg (168 lb)   Height:  6' (1.829 m)            Physical Exam   Constitutional: He is oriented to person, place, and time. He appears well-developed and well-nourished. No distress. HENT:   Head: Normocephalic and atraumatic. Right Ear: Tympanic membrane, external ear and ear canal normal.   Left Ear: Tympanic membrane, external ear and ear canal normal.   Nose: Nose normal.   Mouth/Throat: Oropharynx is clear and moist and mucous membranes are normal.   Eyes: Conjunctivae and EOM are normal. Pupils are equal, round, and reactive to light. Right eye exhibits no discharge. Left eye exhibits no discharge. Neck: Normal range of motion. Cardiovascular: Normal rate, regular rhythm, normal heart sounds and intact distal pulses. Exam reveals no gallop and no friction rub. No murmur heard. Pulmonary/Chest: Effort normal and breath sounds normal. No respiratory distress. He has no wheezes. He has no rales. He exhibits no tenderness. Abdominal: Soft. Bowel sounds are normal. There is no tenderness. Musculoskeletal:        Right knee: He exhibits decreased range of motion, swelling and erythema. He exhibits no effusion, no ecchymosis, no deformity, no laceration, normal alignment, no LCL laxity, normal patellar mobility, no bony tenderness, normal meniscus and no MCL laxity. Tenderness found. Right ankle: He exhibits swelling. He exhibits normal range of motion, no ecchymosis, no deformity, no laceration and normal pulse. No tenderness. Achilles tendon exhibits no pain, no defect and normal Cifuentes's test results. Right lower leg: He exhibits tenderness and swelling. He exhibits no bony tenderness, no edema, no deformity and no laceration. Right foot: There is swelling. There is normal range of motion, no tenderness, no bony tenderness, normal capillary refill, no crepitus, no deformity and no laceration.    Diffuse tenderness to palpation in right knee, lower leg, ankle. Lymphadenopathy:     He has no cervical adenopathy. Neurological: He is alert and oriented to person, place, and time. Skin: Skin is warm and dry. He is not diaphoretic. Psychiatric: He has a normal mood and affect. His behavior is normal. Judgment and thought content normal.   Nursing note and vitals reviewed. MDM  Number of Diagnoses or Management Options  Diagnosis management comments: DDx: knee strain/sprain, tear/strain (ACL/PCL , med/lat collateral ligament), DVT, med/lat meniscus tear, patella dislocation/Fx, septic knee, gout, arthritis, osteomyelitis, Osgood-Schlatter's dz, Baker's cyst, chondromalacia patella, bursitis (prepatellar/superficial infrapatellar, deep infrapatellar and anserine), overuse injury, cellulitis, neuropathy    X-ray unremarkable. Given swelling, will order labs and PVL. Kaya Valiente PA-C .12:06 PM     No elevated WBC. Preliminary PVL results do not show DVT. Will give one dose of IV antibiotics and discharge with oral antibiotics. Kaya Valiente PA-C 1:41 PM     IMPRESSION AND MEDICAL DECISION MAKING:  Based upon the patient's presentation with noted HPI and PE, along with the work up done in the emergency department, I believe that the patient is having noted cellulitis. Will treat with antibiotics which include coverage for possible MRSA. DIAGNOSIS:  1. Cellulitis. SPECIFIC PATIENT INSTRUCTIONS FROM THE PHYSICIAN WHO TREATED YOU IN THE ER TODAY:  1. Return if any concerns or worsening of condition(s)  2. Keflex and Bactrim DS as prescribed until finished. 3. IF Norco was prescribed, take the vicodin for pain not controlled by over the counter ibuprofen. 4. Follow up with your primary doctor in 2 days or reevaluation in the ER in 48 hours. Pt results have been reviewed with them. They have been counseled regarding diagnosis, treatment, and plan.  Pt verbally conveys understanding and agreement of the signs, symptoms, diagnosis, treatment and prognosis and additionally agrees to follow up as discussed. Pt also agrees with the care-plan and conveys that all of their questions have been answered. I have also provided discharge instructions for them that include: educational information regarding their diagnosis and treatment, and list of reasons why they would want to return to the ED prior to their follow-up appointment, should their condition change. Maria De Jesus Gonzalez PA-C 1:41 PM            Amount and/or Complexity of Data Reviewed  Clinical lab tests: ordered and reviewed  Tests in the radiology section of CPT®: ordered and reviewed  Tests in the medicine section of CPT®: ordered and reviewed  Discussion of test results with the performing providers: yes  Decide to obtain previous medical records or to obtain history from someone other than the patient: yes  Obtain history from someone other than the patient: yes  Review and summarize past medical records: yes  Discuss the patient with other providers: yes  Independent visualization of images, tracings, or specimens: yes    Risk of Complications, Morbidity, and/or Mortality  Presenting problems: moderate  Diagnostic procedures: moderate  Management options: moderate    Patient Progress  Patient progress: stable    ED Course       Procedures    Labs Reviewed   CBC WITH AUTOMATED DIFF - Abnormal; Notable for the following:        Result Value    RBC 4.38 (*)     HGB 12.6 (*)     MPV 9.1 (*)     ABS. BASOPHILS 0.1 (*)     All other components within normal limits   METABOLIC PANEL, BASIC - Abnormal; Notable for the following:     Potassium 3.3 (*)     Chloride 99 (*)     CO2 34 (*)     Glucose 109 (*)     All other components within normal limits     Diagnosis:   1.  Cellulitis of right lower extremity          Disposition: home    Follow-up Information     Follow up With Details Comments [place] [id number] EMERGENCY DEPT  As needed, If symptoms worsen [address] [facility] [address] [id number]  [address] Selena Clancy, DO In 2 days  [address]  [phone]            Patient's Medications   Start Taking    CEPHALEXIN (KEFLEX) 500 MG CAPSULE    Take 1 Cap by mouth four (4) times daily for 7 days. HYDROCODONE-ACETAMINOPHEN (NORCO) 5-325 MG PER TABLET    Take 1 Tab by mouth every four (4) hours as needed for Pain. Max Daily Amount: 6 Tabs. TRIMETHOPRIM-SULFAMETHOXAZOLE (BACTRIM DS) 160-800 MG PER TABLET    Take 1 Tab by mouth two (2) times a day for 7 days. Continue Taking    ASPIRIN 81 MG TABLET    Take 81 mg by mouth two (2) times a day. HYDROCHLOROTHIAZIDE (HYDRODIURIL) 25 MG TABLET    Take 25 mg by mouth daily. Take 1 tab daily    METFORMIN (GLUCOPHAGE) 1,000 MG TABLET    Take 1,000 mg by mouth daily. METOPROLOL TARTRATE (LOPRESSOR) 50 MG TABLET    Take  by mouth two (2) times a day. MULTIVITAMIN (ONE A DAY) TABLET    Take 1 Tab by mouth daily. NITROGLYCERIN (NITROSTAT) 0.4 MG SL TABLET    1 Tab by SubLINGual route every five (5) minutes as needed for Chest Pain. OMEPRAZOLE (PRILOSEC) 20 MG CAPSULE    Take 40 mg by mouth daily. ROSUVASTATIN (CRESTOR) 20 MG TABLET    Take 1 Tab by mouth nightly. These Medications have changed    No medications on file   Stop Taking    ATORVASTATIN (LIPITOR) 20 MG TABLET    Take  by mouth daily. CIPROFLOXACIN HCL (CIPRO) 500 MG TABLET    Take 1 Tab by mouth two (2) times a day for 10 days. HYDROCODONE-ACETAMINOPHEN (VICODIN HP)  MG TAB PER TABLET    Take  by mouth.

## 2017-01-30 NOTE — PROCEDURES
[facility]  *** FINAL REPORT ***    Name: Sara Dominique  MRN: [id number]  : 22 Oct 1960  HIS Order #: [id number]  [address] [facility] Visit #: [id number]  Date: 2017    TYPE OF TEST: Peripheral Venous Testing    REASON FOR TEST  Limb swelling    Right Leg:-  Deep venous thrombosis:           No  Superficial venous thrombosis:    No  Deep venous insufficiency:        Not examined  Superficial venous insufficiency: Not examined      INTERPRETATION/FINDINGS  Duplex images were obtained using 2-D gray scale, color flow, and  spectral Doppler analysis. Right leg :  1. Deep vein(s) visualized include the common femoral, proximal  femoral, mid femoral, distal femoral, popliteal(above knee),  popliteal(fossa), popliteal(below knee), posterior tibial and peroneal   veins. 2. No evidence of deep venous thrombosis detected in the veins  visualized. 3. No evidence of deep vein thrombosis in the contralateral common  femoral vein. 4. Superficial vein(s) visualized include the great saphenous vein. 5. No evidence of superficial thrombosis detected. ADDITIONAL COMMENTS    I have personally reviewed the data relevant to the interpretation of  this  study.     TECHNOLOGIST: Sergei Victoria, [facility], RVT/  Signed: 2017 12:13 PM    PHYSICIAN: Suha Walton MD  Signed: 2017 03:09 PM

## 2017-01-30 NOTE — Clinical Note
SPECIFIC PATIENT INSTRUCTIONS FROM THE PHYSICIAN WHO TREATED YOU IN THE ER TODAY: 
1. Return if any concerns or worsening of condition(s) 2. Keflex and Bactrim DS as prescribed until finished. 3. IF Norco was prescribed, take the vicodin for pain not c ontrolled by over the counter ibuprofen. 4. Follow up with your primary doctor in 2 days or reevaluation in the ER in 48 hours.

## 2017-01-30 NOTE — ED NOTES
I have reviewed discharge instructions with the patient. The patient verbalized understanding. Current Discharge Medication List      START taking these medications    Details   trimethoprim-sulfamethoxazole (BACTRIM DS) 160-800 mg per tablet Take 1 Tab by mouth two (2) times a day for 7 days. Qty: 14 Tab, Refills: 0      cephALEXin (KEFLEX) 500 mg capsule Take 1 Cap by mouth four (4) times daily for 7 days. Qty: 28 Cap, Refills: 0      HYDROcodone-acetaminophen (NORCO) 5-325 mg per tablet Take 1 Tab by mouth every four (4) hours as needed for Pain. Max Daily Amount: 6 Tabs. Qty: 12 Tab, Refills: 0         CONTINUE these medications which have NOT CHANGED    Details   rosuvastatin (CRESTOR) 20 mg tablet Take 1 Tab by mouth nightly. Qty: 30 Tab, Refills: 2      metFORMIN (GLUCOPHAGE) 1,000 mg tablet Take 1,000 mg by mouth daily. hydroCHLOROthiazide (HYDRODIURIL) 25 mg tablet Take 25 mg by mouth daily. Take 1 tab daily      multivitamin (ONE A DAY) tablet Take 1 Tab by mouth daily. metoprolol tartrate (LOPRESSOR) 50 mg tablet Take  by mouth two (2) times a day. omeprazole (PRILOSEC) 20 mg capsule Take 40 mg by mouth daily. aspirin 81 mg tablet Take 81 mg by mouth two (2) times a day. nitroglycerin (NITROSTAT) 0.4 mg SL tablet 1 Tab by SubLINGual route every five (5) minutes as needed for Chest Pain.   Qty: 25 Tab, Refills: 3         Patient armband removed and shredded

## 2017-01-30 NOTE — DISCHARGE INSTRUCTIONS

## 2017-01-31 ENCOUNTER — HOSPITAL ENCOUNTER (OUTPATIENT)
Dept: GENERAL RADIOLOGY | Age: 57
Discharge: HOME | End: 2017-01-31
Payer: SELF-PAY

## 2017-01-31 ENCOUNTER — OFFICE VISIT (OUTPATIENT)
Dept: CARDIOLOGY CLINIC | Age: 57
End: 2017-01-31

## 2017-01-31 VITALS
BODY MASS INDEX: 22.89 KG/M2 | WEIGHT: 169 LBS | SYSTOLIC BLOOD PRESSURE: 178 MMHG | HEART RATE: 75 BPM | OXYGEN SATURATION: 95 % | HEIGHT: 72 IN | DIASTOLIC BLOOD PRESSURE: 94 MMHG

## 2017-01-31 DIAGNOSIS — R06.09 DOE (DYSPNEA ON EXERTION): ICD-10-CM

## 2017-01-31 DIAGNOSIS — I11.9 BENIGN HYPERTENSIVE HEART DISEASE WITHOUT HEART FAILURE: Primary | ICD-10-CM

## 2017-01-31 DIAGNOSIS — R63.4 WEIGHT LOSS, NON-INTENTIONAL: ICD-10-CM

## 2017-01-31 DIAGNOSIS — R01.1 HEART MURMUR, SYSTOLIC: ICD-10-CM

## 2017-01-31 DIAGNOSIS — I25.10 ATHEROSCLEROSIS OF NATIVE CORONARY ARTERY OF NATIVE HEART WITHOUT ANGINA PECTORIS: ICD-10-CM

## 2017-01-31 DIAGNOSIS — E78.5 DYSLIPIDEMIA: ICD-10-CM

## 2017-01-31 DIAGNOSIS — L03.115 CELLULITIS OF RIGHT LOWER EXTREMITY: ICD-10-CM

## 2017-01-31 PROCEDURE — 71020 XR CHEST PA LAT: CPT

## 2017-01-31 RX ORDER — CIPROFLOXACIN 500 MG/1
500 TABLET ORAL 2 TIMES DAILY
COMMUNITY
End: 2017-05-08 | Stop reason: ALTCHOICE

## 2017-01-31 RX ORDER — HYDROCODONE BITARTRATE AND ACETAMINOPHEN 5; 325 MG/1; MG/1
1 TABLET ORAL
COMMUNITY
End: 2017-02-01 | Stop reason: ALTCHOICE

## 2017-01-31 NOTE — PROGRESS NOTES
1. Have you been to the ER, urgent care clinic since your last visit? Hospitalized since your last visit? No     2. Have you seen or consulted any other health care providers outside of the [address] [place] [address] since your last visit? Include any pap smears or colon screening.   No

## 2017-01-31 NOTE — PROGRESS NOTES
Review of Systems   Constitutional: Positive for weight loss. Negative for chills, diaphoresis, fever and malaise/fatigue. Respiratory: Negative. Cardiovascular: Positive for claudication, leg swelling and PND. Negative for chest pain, palpitations and orthopnea. Gastrointestinal: Positive for diarrhea and heartburn. Negative for abdominal pain, blood in stool, melena, nausea and vomiting. Musculoskeletal: Positive for back pain, joint pain, myalgias and neck pain. Negative for falls. Neurological: Negative for weakness.

## 2017-01-31 NOTE — MR AVS SNAPSHOT
Visit Information Date & Time Provider Department Dept. Phone Encounter #  
 1/31/2017  1:40 PM Herve Quiroz DO [facility] [address] [id number] Follow-up Instructions Return in about 1 month (around 2/28/2017), or if symptoms worsen or fail to improve. Your Appointments 2/1/2017  2:45 PM  
Office Visit with Yehuda Cooper DO [facility] (--) Appt Note: POST ER FU FOR CELLULITIS, PT DISCHARGED FROM 1/30/17  
 [address] [address]  
[phone]  
  
   
 [address] [place] [address]  
  
    
 2/9/2017 10:00 AM  
New Patient with Maritza Santa MD  
[address] ([facility]) Appt Note: Dr Yissel Ryan/copd, lung nodule; ct mv 12/23;crista 1/18; last seen vap 2013; .  
 [address] [place] [address], [address] [address]  
[phone]  
  
   
 [address] [place] [address], [address] [place] [address],[address]  [address] [address]  
  
    
 2/21/2017  3:00 PM  
Office Visit with Ivon Caba MD  
[facility]) Appt Note: check up [address] [facility] [address] [address]  
[phone] [address] [phone]  
  
    
 4/4/2017  8:10 AM  
LAB with Yehuda Cooper DO [facility] (--) Appt Note: 3 mo f/u lab [address] [address]  
[phone]  
  
   
 [address] [place] [address] 4/11/2017  3:45 PM  
ROUTINE CARE with Yehuda Cooper DO [facility] (--) Appt Note: 3 mo f/u  
 [address] [address]  
[phone]  
  
   
 [address] [place] [address] Upcoming Health Maintenance Date Due Hepatitis C Screening 1960 Pneumococcal 19-64 Medium Risk (1 of 1 - PPSV23) 10/22/1978 DTaP/Tdap/Td series (1 - Tdap) 10/22/1980 FOBT Q 1 YEAR AGE 50-75 10/22/2009 INFLUENZA AGE 9 TO ADULT 8/1/2016 Allergies as of 1/31/2017  Review Complete On: 1/31/2017 By: Natalie Manzanares DO No Known Allergies Current Immunizations  Never Reviewed No immunizations on file. Not reviewed this visit You Were Diagnosed With   
  
 Codes Comments Benign hypertensive heart disease without heart failure    -  Primary ICD-10-CM: I11.9 ICD-9-CM: 402.10 Atherosclerosis of native coronary artery of native heart without angina pectoris     ICD-10-CM: I25.10 ICD-9-CM: 414.01 Dyslipidemia     ICD-10-CM: E78.5 ICD-9-CM: 272.4 GARCIA (dyspnea on exertion)     ICD-10-CM: R06.09 
ICD-9-CM: 786.09 S/P cardiac catheterization     ICD-10-CM: Z98.890 ICD-9-CM: V45.89 Vitals BP Pulse Height(growth percentile) Weight(growth percentile) SpO2 BMI  
 (!) 178/94 75 6' (1.829 m) 169 lb (76.7 kg) 95% 22.92 kg/m2 Smoking Status Former Smoker Vitals History BMI and BSA Data Body Mass Index Body Surface Area  
 22.92 kg/m 2 1.97 m 2 Preferred Pharmacy Pharmacy Name [facility] [address] [place] [address],[address] [phone] Your Updated Medication List  
  
   
This list is accurate as of: 1/31/17  3:38 PM.  Always use your most recent med list.  
  
  
  
  
 aspirin 81 mg tablet Take 81 mg by mouth two (2) times a day. cephALEXin 500 mg capsule Commonly known as:  Tarry Jefferson Take 1 Cap by mouth four (4) times daily for 7 days. CIPRO 500 mg tablet Generic drug:  ciprofloxacin HCl Take 500 mg by mouth two (2) times a day. hydroCHLOROthiazide 25 mg tablet Commonly known as:  HYDRODIURIL Take 25 mg by mouth daily. Take 1 tab daily  
  
 metFORMIN 1,000 mg tablet Commonly known as:  GLUCOPHAGE Take 1,000 mg by mouth daily. metoprolol tartrate 50 mg tablet Commonly known as:  LOPRESSOR Take 50 mg by mouth two (2) times a day. multivitamin tablet Commonly known as:  ONE A DAY Take 1 Tab by mouth daily. nitroglycerin 0.4 mg SL tablet Commonly known as:  NITROSTAT  
1 Tab by SubLINGual route every five (5) minutes as needed for Chest Pain. NORCO 5-325 mg per tablet Generic drug:  HYDROcodone-acetaminophen Take 1 Tab by mouth every four (4) hours as needed for Pain. PriLOSEC 20 mg capsule Generic drug:  omeprazole Take 40 mg by mouth daily. rosuvastatin 20 mg tablet Commonly known as:  CRESTOR Take 1 Tab by mouth nightly. trimethoprim-sulfamethoxazole 160-800 mg per tablet Commonly known as:  BACTRIM DS Take 1 Tab by mouth two (2) times a day for 7 days. We Performed the Following AMB POC EKG ROUTINE W/ 12 LEADS, INTER & REP [25253 CPT(R)] Follow-up Instructions Return in about 1 month (around 2/28/2017), or if symptoms worsen or fail to improve. To-Do List   
 01/31/2017 Imaging:  XR CHEST PA LAT Introducing [facility]! [facility] introduces [email or web address] patient portal. Now you can access parts of your medical record, email your doctor's office, and request medication refills online. 1. In your internet browser, go to [email or web address]. [email or web address] 2. Click on the First Time User? Click Here link in the Sign In box. You will see the New Member Sign Up page. 3. Enter your [email or web address] Access Code exactly as it appears below. You will not need to use this code after youve completed the sign-up process. If you do not sign up before the expiration date, you must request a new code. · [email or web address] Access Code: [email or web address] Expires: 3/16/2017 11:39 AM 
 
4. Enter the last four digits of your Social Security Number (xxxx) and Date of Birth (mm/dd/yyyy) as indicated and click Submit. You will be taken to the next sign-up page. 5. Create a Tap[employer] ID. This will be your [employer] login ID and cannot be changed, so think of one that is secure and easy to remember. 6. Create a [employer] password. You can change your password at any time. 7. Enter your Password Reset Question and Answer. This can be used at a later time if you forget your password. 8. Enter your e-mail address. You will receive e-mail notification when new information is available in [address] 9. Click Sign Up. You can now view and download portions of your medical record. 10. Click the Download Summary menu link to download a portable copy of your medical information. If you have questions, please visit the Frequently Asked Questions section of the [employer] website. Remember, [employer] is NOT to be used for urgent needs. For medical emergencies, dial 911. Now available from your iPhone and Android! Please provide this summary of care documentation to your next provider. Your primary care clinician is listed as Miguel Adan. If you have any questions after today's visit, please call [phone].

## 2017-01-31 NOTE — PROGRESS NOTES
HPI: I saw Nannette Nieto in my office today in cardiovascular evaluation regarding his chronic underlying coronary artery disease in the setting of past cigarette abuse and marked weight loss recently. Mr. Daron Manuel is a pleasant 61year-old  male who presented to [facility] with chest tightness and acute coronary syndrome, for which we stabilized him medically and did an urgent cardiac catheterization on January 26, 2006. 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 This was opened with a wire and balloon, but we were unable to stent it. There was a residual stenosis of 40% with JB grade 3 flow following the procedure. Left ventricular function at that time was grossly normal with an ejection fraction in the 60% range.     He has done well on medical therapy over the years without any recurrent cardiovascular issues. He unfortunately continued to smoke 2 packs of cigarettes per day over the years and has developed moderately severe COPD with chronic shortness of breath. Fortunately, he finally did quit smoking completely in January of 2014 and has been able to stay off cigarettes now for 4 years. He comes in today for the first time in over 3 years and relates that he has really had significant problem with weight loss in the last 6 months and he is down on his bathroom scale from 209 to as low as 157. He has had some problems with greenish sputum production, but denies any other pulmonary issues although he is also complaining of large clots of blood coming from his right nares at times. He denies any chest pain but he has developed some swelling in his right leg particularly and some cellulitis which is now being treated after recent ER visit. He is also complaining of some pain in his right leg.   He really does not have any orthopnea but seems to have occasional PND. He is also been off of his blood pressure medication his blood pressure is somewhat elevated today. Encounter Diagnoses   Name Primary?  Weight loss, marked non-intentional     GARCIA (dyspnea on exertion)     Hypertensive heart disease  Yes    Atherosclerosis of native coronary artery of native heart without angina pectoris     Dyslipidemia     Heart murmur, systolic     Cellulitis of right lower extremity         Discussion: This patient appears to be doing reasonably well clinically except for his rather marked unintentional weight loss of over 40 pounds in the past 6 months according to his scales and certainly this is quite concerning for either a chronic infection or more likely an occult malignancy. He has not had a chest x-ray in some time so I am going to get that completed. He does tell me that he did have a pulmonary function test recently and he is supposed to be seeing Dr. Telma Horton early in February 2017. He does have a rather harsh systolic heart murmur which is certainly much louder than the murmur I heard a few years ago so I would consider getting an echocardiogram particularly if his chest x-ray does not show any significant abnormalities to point to a reason for his weight loss issues. He is to be getting a lipid profile in the near future and I will get a copy of that for my records. He has been on Crestor 20 mg daily for over a month and hopefully his cholesterol when checked in a few weeks will be well-controlled. His blood pressure is quite high today. However, he tells me that it has not been significantly elevated recently and in fact was only mildly elevated when completed yesterday and was normal the day before in the records so I would have him attempt to check his blood pressure himself if possible and I am going to try to contact him to be sure he does that and then follows up with Dr. Alba Aponte.     He is on Keflex for his cellulitis and I will certainly leave management of that problem to Dr. Idalia Bustos with plans on seeing the patient within a month to be sure that we have completed any cardiac workup necessary to sort out his weight loss issues. PCP: Steve Earl MD      Past Medical History   Diagnosis Date    Acute coronary syndrome [facility])      with non-ST-elevation myocardial infarction, s/p direct angioplasty of anomolous circumflex marginal coming off the right coronary cusp on 1/26/06    Arthritis      in hands    ASHD (arteriosclerotic heart disease)     Benign hypertensive heart disease without heart failure     CAD (coronary artery disease)      chronic underlying    Calculus of kidney     Chronic cough      coughing up sputum    Chronic lung disease     COPD (chronic obstructive pulmonary disease) (HCC)      moderate to severe    Coronary artery disease     Diabetes ([address]     Diabetes mellitus ([address]     GARCIA (dyspnea on exertion)     GERD (gastroesophageal reflux disease)     H/O: pneumonia     Heartburn     Hypertension      essential systemic    Indigestion     Normal nuclear stress test 08/30/2006     No ischemia or infarct. EF 59%. Negative max. stress test.    Pneumonia 8/12     \"walking\"    Pure hypercholesterolemia     S/P cardiac cath 01/26/2006     LM mild. RCA mild. LAD tortuous. CX mild. CX angela 100%. S/P balloon angioplasty of CX angela. Residual 40%. LVEDP 20.  EF 65%.  Tobacco abuse          Past Surgical History   Procedure Laterality Date    Pr tongue to lip surgery  04/04    Hx ptca       of anomalous circumflex marginal artery    Pr tongue and mouth surg unlisted  04/04     tongue surgery    Hx heart catheterization  01/26/06         Current Outpatient Rx   Name  Route  Sig  Dispense  Refill    omeprazole (PRILOSEC) 20 mg capsule    Oral    Take 40 mg by mouth daily.               metformin (GLUCOPHAGE) 500 mg tablet    Oral    Take 500 mg by mouth three (3) times daily (with meals).  hydrochlorothiazide (HYDRODIURIL) 25 mg tablet    Oral    Take 12.5 mg by mouth two (2) times a day.  HYDROcodone-acetaminophen (LORTAB)  mg per tablet    Oral    Take 1 Tab by mouth three (3) times daily.  budesonide-formoterol (SYMBICORT) 160-4.5 mcg/actuation HFA inhaler    Inhalation    Take 2 Puffs by inhalation two (2) times a day. Using PRM              albuterol (PROAIR HFA) 90 mcg/actuation inhaler    Inhalation    Take 1 Puff by inhalation as needed.  nitroglycerin (NITROSTAT) 0.4 mg SL tablet    SubLINGual    1 Tab by SubLINGual route every five (5) minutes as needed for Chest Pain. 25 Tab    3      lisinopril (PRINIVIL, ZESTRIL) 5 mg tablet    Oral    Take 40 mg by mouth daily.  aspirin 81 mg tablet    Oral    Take 162 mg by mouth daily.  clopidogrel (PLAVIX) 75 mg tablet    Oral    Take 75 mg by mouth daily.  metoprolol (LOPRESSOR) 100 mg tablet    Oral    Take 50 mg by mouth two (2) times a day.  atorvastatin (LIPITOR) 40 mg tablet    Oral    Take 40 mg by mouth daily. No Known Allergies    Social History:   Social History   Substance Use Topics    Smoking status: Former Smoker     Packs/day: 1.25     Years: 20.00     Types: Cigarettes     Quit date: 1/1/2014    Smokeless tobacco: Never Used    Alcohol use Yes      Comment: on special occasions only           Family history: family history includes Asthma in his father; COPD in his mother; Cancer in his father; Colon Polyps in his father. Review of Systems:   Constitutional: Positive for weight loss. Negative for chills, diaphoresis, fever and malaise/fatigue. Respiratory: Negative. Cardiovascular: Positive for claudication, leg swelling and PND. Negative for chest pain, palpitations and orthopnea. Gastrointestinal: Positive for diarrhea and heartburn.  Negative for abdominal pain, blood in stool, melena, nausea and vomiting. Musculoskeletal: Positive for back pain, joint pain, myalgias and neck pain. Negative for falls. Neurological: Negative for weakness. Physical Exam:   The patient is an alert, oriented, well developed, well nourished 61 y.o.  male who was in no acute distress at the time of my examination. Visit Vitals    BP (!) 178/94    Pulse 75    Ht 6' (1.829 m)    Wt 76.7 kg (169 lb)    SpO2 95%    BMI 22.92 kg/m2        BP Readings from Last 3 Encounters:   01/31/17 (!) 178/94   01/30/17 145/84   01/24/17 121/76        Wt Readings from Last 3 Encounters:   01/31/17 76.7 kg (169 lb)   01/30/17 76.2 kg (168 lb)   01/24/17 76.7 kg (169 lb)       HEENT: Conjunctivae white, mucosa moist, no pallor or cyanosis. Neck: Supple without masses, tenderness or thyromegaly. No jugular venous distention. Carotid upstrokes are full bilaterally with soft bilateral bruits. Cardiovascular: Chest is symmetrical with good excursion. [place] is not displaced. No lifts, heaves or thrills. S1 and S2 are normal, without appreciable rubs, clicks or gallops. There is a grade III/VI apical systolic murmur with poor radiation, without diastolic murmurs. Lungs: Moderate to severe decreased breath sounds throughout with a lot of diffuse expiratory wheezes throughout. Abdomen: Soft. No masses, tenderness or organomegaly. Extremities: 1 + edema with erythema on the right leg and no edema on the left with 1-2 + peripheral pulses. Review of Data:  See PMH and Cardiology and Imaging sections for cardiac testing      Results for orders placed or performed in visit on 01/31/17   AMB POC EKG ROUTINE W/ 12 LEADS, INTER & REP     Status: None    Narrative    Normal sinus rhythm, rate 75. Complete right bundle branch block. One PAC on the tracing. Compared to the EKG of September 15, 2014 there was no significant interval change.            Kurtis Farley D.O., [facility]. [facility]  [facility] [place]  [address]    PLEASE NOTE:  This document has been produced using voice recognition software. Unrecognized errors in transcription may be present.

## 2017-02-01 ENCOUNTER — OFFICE VISIT (OUTPATIENT)
Dept: INTERNAL MEDICINE CLINIC | Age: 57
End: 2017-02-01

## 2017-02-01 VITALS
BODY MASS INDEX: 22.75 KG/M2 | RESPIRATION RATE: 16 BRPM | WEIGHT: 168 LBS | TEMPERATURE: 97.7 F | SYSTOLIC BLOOD PRESSURE: 133 MMHG | OXYGEN SATURATION: 98 % | DIASTOLIC BLOOD PRESSURE: 87 MMHG | HEIGHT: 72 IN | HEART RATE: 78 BPM

## 2017-02-01 DIAGNOSIS — L03.119 CELLULITIS AND ABSCESS OF LOWER EXTREMITY: Primary | ICD-10-CM

## 2017-02-01 DIAGNOSIS — L02.419 CELLULITIS AND ABSCESS OF LOWER EXTREMITY: Primary | ICD-10-CM

## 2017-02-01 RX ORDER — CLINDAMYCIN HYDROCHLORIDE 300 MG/1
300 CAPSULE ORAL 3 TIMES DAILY
Qty: 30 CAP | Refills: 0 | Status: SHIPPED | OUTPATIENT
Start: 2017-02-01 | End: 2017-02-11

## 2017-02-01 RX ORDER — OXYCODONE AND ACETAMINOPHEN 5; 325 MG/1; MG/1
1 TABLET ORAL
Qty: 60 TAB | Refills: 0 | Status: SHIPPED | OUTPATIENT
Start: 2017-02-01 | End: 2017-02-06 | Stop reason: DRUGHIGH

## 2017-02-01 RX ORDER — CEPHALEXIN 500 MG/1
500 CAPSULE ORAL 4 TIMES DAILY
Qty: 28 CAP | Refills: 0 | COMMUNITY
Start: 2017-02-01 | End: 2017-05-08 | Stop reason: ALTCHOICE

## 2017-02-01 NOTE — PATIENT INSTRUCTIONS

## 2017-02-01 NOTE — MR AVS SNAPSHOT
Visit Information Date & Time Provider Department Dept. Phone Encounter #  
 2/1/2017  2:45 PM Evan Nash DO [facility] [id number] Follow-up Instructions Return if symptoms worsen or fail to improve. Your Appointments 2/9/2017 10:00 AM  
New Patient with Rg Stubbs MD  
[address] ([facility] Appt Note: Dr Antonio Ryan/copd, lung nodule; ct mv 12/23;crista 1/18; last seen vap 2013; .  
 [address] [facility], [address] [address]  
[phone]  
  
   
 [address] [place] [address], [address] [facility],[address] [place] [address]  
  
    
 2/21/2017  1:40 PM  
Any with Candace Zarate DO [facility] ([facility] [place]) [place] [id number] [address] [address] [phone] [address] [place] [address]  
  
    
 2/21/2017  3:00 PM  
Office Visit with Geovanna Armendariz MD  
[facility]) Appt Note: check up [address] [facility] [address] [address]  
[phone] [address] [id number]  
  
    
 4/4/2017  8:10 AM  
LAB with Evan Nash DO [facility] (--) Appt Note: 3 mo f/u lab [address] [address]  
[phone]  
  
   
 [address] [address] 4/11/2017  3:45 PM  
ROUTINE CARE with Evan Nash DO [facility] (--) Appt Note: 3 mo f/u  
 [address] [address]  
[phone]  
  
   
 [address] [address] Upcoming Health Maintenance Date Due Hepatitis C Screening 1960 Pneumococcal 19-64 Medium Risk (1 of 1 - PPSV23) 10/22/1978 DTaP/Tdap/Td series (1 - Tdap) 10/22/1980 FOBT Q 1 YEAR AGE 50-75 10/22/2009 INFLUENZA AGE 9 TO ADULT 8/1/2016 Allergies as of 2/1/2017  Review Complete On: 2/1/2017 By: Stephanie Currie, DO No Known Allergies Current Immunizations  Never Reviewed No immunizations on file. Not reviewed this visit You Were Diagnosed With   
  
 Codes Comments Cellulitis and abscess of lower extremity    -  Primary ICD-10-CM: L02.419, L03.119 ICD-9-CM: 713. 6 Vitals BP Pulse Temp Resp Height(growth percentile) Weight(growth percentile) 133/87 (BP 1 Location: Left arm) 78 97.7 °F (36.5 °C) (Oral) 16 6' (1.829 m) 168 lb (76.2 kg) SpO2 BMI Smoking Status 98% 22.78 kg/m2 Former Smoker Vitals History BMI and BSA Data Body Mass Index Body Surface Area 22.78 kg/m 2 1.97 m 2 Preferred Pharmacy Pharmacy Name [facility]  [address] [facility], [address] [place] [address],[address] [place] [address] [phone] Your Updated Medication List  
  
   
This list is accurate as of: 2/1/17  4:04 PM.  Always use your most recent med list.  
  
  
  
  
 aspirin 81 mg tablet Take 81 mg by mouth two (2) times a day. cephALEXin 500 mg capsule Commonly known as:  Larayne Elizabeth Take 1 Cap by mouth four (4) times daily. CIPRO 500 mg tablet Generic drug:  ciprofloxacin HCl Take 500 mg by mouth two (2) times a day. clindamycin 300 mg capsule Commonly known as:  CLEOCIN Take 1 Cap by mouth three (3) times daily for 10 days. hydroCHLOROthiazide 25 mg tablet Commonly known as:  HYDRODIURIL Take 25 mg by mouth daily. Take 1 tab daily  
  
 metFORMIN 1,000 mg tablet Commonly known as:  GLUCOPHAGE Take 1,000 mg by mouth daily. metoprolol tartrate 50 mg tablet Commonly known as:  LOPRESSOR Take 50 mg by mouth two (2) times a day. multivitamin tablet Commonly known as:  ONE A DAY Take 1 Tab by mouth daily. nitroglycerin 0.4 mg SL tablet Commonly known as:  NITROSTAT  
1 Tab by SubLINGual route every five (5) minutes as needed for Chest Pain. oxyCODONE-acetaminophen 5-325 mg per tablet Commonly known as:  PERCOCET Take 1 Tab by mouth every four (4) hours as needed for Pain. Max Daily Amount: 6 Tabs. PriLOSEC 20 mg capsule Generic drug:  omeprazole Take 40 mg by mouth daily. rosuvastatin 20 mg tablet Commonly known as:  CRESTOR Take 1 Tab by mouth nightly. Prescriptions Printed Refills  
 oxyCODONE-acetaminophen (PERCOCET) 5-325 mg per tablet 0 Sig: Take 1 Tab by mouth every four (4) hours as needed for Pain. Max Daily Amount: 6 Tabs. Class: Print Route: Oral  
  
Prescriptions Sent to Pharmacy Refills  
 clindamycin (CLEOCIN) 300 mg capsule 0 Sig: Take 1 Cap by mouth three (3) times daily for 10 days. Class: Normal  
 Pharmacy: [facility] [address] #: [phone] Route: Oral  
  
Follow-up Instructions Return if symptoms worsen or fail to improve. Patient Instructions Cellulitis: Care Instructions Your Care Instructions Cellulitis is a skin infection. It often occurs after a break in the skin from a scrape, cut, bite, or puncture, or after a rash. The doctor has checked you carefully, but problems can develop later. If you notice any problems or new symptoms, get medical treatment right away. Follow-up care is a key part of your treatment and safety. Be sure to make and go to all appointments, and call your doctor if you are having problems. It's also a good idea to know your test results and keep a list of the medicines you take. How can you care for yourself at home? · Take your antibiotics as directed. Do not stop taking them just because you feel better. You need to take the full course of antibiotics. · Prop up the infected area on pillows to reduce pain and swelling. Try to keep the area above the level of your heart as often as you can. · If your doctor told you how to care for your wound, follow your doctor's instructions. If you did not get instructions, follow this general advice: ¨ Wash the wound with clean water 2 times a day. Don't use hydrogen peroxide or alcohol, which can slow healing. ¨ You may cover the wound with a thin layer of petroleum jelly, such as Vaseline, and a nonstick bandage. ¨ Apply more petroleum jelly and replace the bandage as needed. · Be safe with medicines. Take pain medicines exactly as directed. ¨ If the doctor gave you a prescription medicine for pain, take it as prescribed. ¨ If you are not taking a prescription pain medicine, ask your doctor if you can take an over-the-counter medicine. To prevent cellulitis in the future · Try to prevent cuts, scrapes, or other injuries to your skin. Cellulitis most often occurs where there is a break in the skin. · If you get a scrape, cut, mild burn, or bite, wash the wound with clean water as soon as you can to help avoid infection. Don't use hydrogen peroxide or alcohol, which can slow healing. · If you have swelling in your legs (edema), support stockings and good skin care may help prevent leg sores and cellulitis. · Take care of your feet, especially if you have diabetes or other conditions that increase the risk of infection. Wear shoes and socks. Do not go barefoot. If you have athlete's foot or other skin problems on your feet, talk to your doctor about how to treat them. When should you call for help? Call your doctor now or seek immediate medical care if: 
· You have signs that your infection is getting worse, such as: 
¨ Increased pain, swelling, warmth, or redness. ¨ Red streaks leading from the area. ¨ Pus draining from the area. ¨ A fever. · You get a rash. Watch closely for changes in your health, and be sure to contact your doctor if: 
· You are not getting better after 1 day (24 hours). · You do not get better as expected. Where can you learn more? Go to http://ramos-meghan.info/. Smith Nilaygodwin in the search box to learn more about \"Cellulitis: Care Instructions. \" Current as of: February 5, 2016 Content Version: 11.1 © 0205-6157 [employer]. Care instructions adapted under license by [employer] (which disclaims liability or warranty for this information). If you have questions about a medical condition or this instruction, always ask your healthcare professional. [address] any warranty or liability for your use of this information. Introducing [facility]! Kelvin Cordero introduces [employer] patient portal. Now you can access parts of your medical record, email your doctor's office, and request medication refills online. 1. In your internet browser, go to [email or web address] 2. Click on the First Time User? Click Here link in the Sign In box. You will see the New Member Sign Up page. 3. Enter your [employer] Access Code exactly as it appears below. You will not need to use this code after youve completed the sign-up process. If you do not sign up before the expiration date, you must request a new code. · [employer] Access Code: [email or web address] Expires: 3/16/2017 11:39 AM 
 
4. Enter the last four digits of your Social Security Number (xxxx) and Date of Birth (mm/dd/yyyy) as indicated and click Submit. You will be taken to the next sign-up page. 5. Create a [employer] ID. This will be your [employer] login ID and cannot be changed, so think of one that is secure and easy to remember. 6. Create a [employer] password. You can change your password at any time. 7. Enter your Password Reset Question and Answer. This can be used at a later time if you forget your password. 8. Enter your e-mail address. You will receive e-mail notification when new information is available in [address] 9. Click Sign Up. You can now view and download portions of your medical record. 10. Click the Download Summary menu link to download a portable copy of your medical information. If you have questions, please visit the Frequently Asked Questions section of the [employer] website. Remember, [employer] is NOT to be used for urgent needs. For medical emergencies, dial 911. Now available from your iPhone and Android! Please provide this summary of care documentation to your next provider. Your primary care clinician is listed as Stephanie Currie. If you have any questions after today's visit, please call [phone].

## 2017-02-01 NOTE — PROGRESS NOTES
Per your last note \" This patient appears to be doing reasonably well clinically except for his rather marked unintentional weight loss of over 40 pounds in the past 6 months according to his scales and certainly this is quite concerning for either a chronic infection or more likely an occult malignancy. He has not had a chest x-ray in some time so I am going to get that completed. He does tell me that he did have a pulmonary function test recently and he is supposed to be seeing Dr. Jonathan Winter early in February 2017.     He does have a rather harsh systolic heart murmur which is certainly much louder than the murmur I heard a few years ago so I would consider getting an echocardiogram particularly if his chest x-ray does not show any significant abnormalities to point to a reason for his weight loss issues.

## 2017-02-01 NOTE — PROGRESS NOTES
Pt is here for ED f/u from [facility] ED on 1/30/17 for cellultis on R leg. BP yesterday at Dr Clary Dawson office was 178/94  Do you have an advance directive no  Request Pt bring a copy of advance directive for scanning. Do you want information on an advance directive no    Pt  mychart activation is pending. 1. Have you been to the ER, urgent care clinic since your last visit? Hospitalized since your last visit? Yes [facility] ED 1/30/17 R leg cellulitis. 2. Have you seen or consulted any other health care providers outside of the [address] [place] [address] since your last visit? Include any pap smears or colon screening.  No

## 2017-02-03 ENCOUNTER — TELEPHONE (OUTPATIENT)
Dept: INTERNAL MEDICINE CLINIC | Age: 57
End: 2017-02-03

## 2017-02-03 NOTE — TELEPHONE ENCOUNTER
Pt spouse Kaylene Dominguez called to notify doc that she will be taking the patient to [facility] today for the celluitis on his leg. Ms. Kaylene Dominguez did mention that she's not going to [facility] because she feels he may need to be admitted, because it's not getting any better.

## 2017-02-03 NOTE — TELEPHONE ENCOUNTER
Thank you for the update, I agree that patient should be seen emergently as he has failed 3 po abx for cellulitis

## 2017-02-05 NOTE — PROGRESS NOTES
Please let this gentleman know that he is chest x-ray essentially normal, so the reason for his weight loss remains unknown. He did have a rather loud heart murmur and plan had been to go ahead and get an echocardiogram for completeness but I do not think that was ordered and I think you can go ahead and get that completed at this time if he is willing to proceed with that procedure.   ES

## 2017-02-06 ENCOUNTER — OFFICE VISIT (OUTPATIENT)
Dept: INTERNAL MEDICINE CLINIC | Age: 57
End: 2017-02-06

## 2017-02-06 VITALS
HEIGHT: 72 IN | TEMPERATURE: 98.1 F | WEIGHT: 168 LBS | SYSTOLIC BLOOD PRESSURE: 147 MMHG | BODY MASS INDEX: 22.75 KG/M2 | DIASTOLIC BLOOD PRESSURE: 88 MMHG | RESPIRATION RATE: 16 BRPM | OXYGEN SATURATION: 97 %

## 2017-02-06 DIAGNOSIS — M79.89 LEG SWELLING: ICD-10-CM

## 2017-02-06 DIAGNOSIS — Z11.59 NEED FOR HEPATITIS C SCREENING TEST: ICD-10-CM

## 2017-02-06 DIAGNOSIS — L03.115 CELLULITIS OF RIGHT LOWER EXTREMITY: Primary | ICD-10-CM

## 2017-02-06 RX ORDER — POTASSIUM CHLORIDE 750 MG/1
20 TABLET, EXTENDED RELEASE ORAL
COMMUNITY
Start: 2017-02-03 | End: 2017-02-08

## 2017-02-06 RX ORDER — OXYCODONE AND ACETAMINOPHEN 5; 325 MG/1; MG/1
TABLET ORAL
COMMUNITY
Start: 2017-02-03 | End: 2017-02-06 | Stop reason: DRUGHIGH

## 2017-02-06 RX ORDER — MUPIROCIN 20 MG/G
OINTMENT TOPICAL 3 TIMES DAILY
COMMUNITY
Start: 2017-02-03 | End: 2017-05-08 | Stop reason: ALTCHOICE

## 2017-02-06 RX ORDER — OXYCODONE AND ACETAMINOPHEN 7.5; 325 MG/1; MG/1
1 TABLET ORAL
Qty: 60 TAB | Refills: 0 | Status: SHIPPED | OUTPATIENT
Start: 2017-02-06 | End: 2017-05-08 | Stop reason: SDUPTHER

## 2017-02-06 RX ORDER — PREDNISONE 5 MG/1
TABLET ORAL
Qty: 21 TAB | Refills: 0 | Status: SHIPPED | OUTPATIENT
Start: 2017-02-06 | End: 2017-05-08 | Stop reason: ALTCHOICE

## 2017-02-06 NOTE — MR AVS SNAPSHOT
Visit Information Date & Time Provider Department Dept. Phone Encounter #  
 2/6/2017 11:00 AM Estefania Douglas DO [facility] [place] [address] [phone] Follow-up Instructions Return if symptoms worsen or fail to improve. Your Appointments 2/9/2017 10:00 AM  
New Patient with Nga Howell MD  
[address] ([address]) Appt Note: Dr Dorie Ryan/copd, lung nodule; ct mv 12/23;crista 1/18; last seen vap 2013; .  
 [address] [facility], [address] [address]  
[phone]  
  
   
 [address] [place] [address], [address] [facility],[address] [place] [address]  
  
    
 2/21/2017  1:40 PM  
Any with Clare Villa DO [facility] ([address]) [address] [address]  
[phone] [address] [place] [address]  
  
    
 2/21/2017  3:00 PM  
Office Visit with Rafaela Alcala MD  
[facility] [address]) Appt Note: check up [address] [facility] Jaleel A [address] [address]  
[phone] [address] [phone]  
  
    
 2/24/2017  9:00 AM  
COLON SCREEN with [facility] NURSE VISIT [place] [address] ([address]) Appt Note: Dr Dionte Woodson referring [address] [place] [address] [address] [place] [address]  
  
    
 4/4/2017  8:10 AM  
LAB with Estefania Douglas DO [facility] [place] [address] (--) Appt Note: 3 mo f/u lab [address] [id number] [phone]  
  
   
 [address] [place] [phone] 4/11/2017  3:45 PM  
ROUTINE CARE with Estefania Douglas DO [facility] [place] [address] (--) Appt Note: 3 mo f/u  
 [address] [address]  
[phone] [address] . Branden Pinzon [address] [address] Upcoming Health Maintenance Date Due Hepatitis C Screening 1960 Pneumococcal 19-64 Medium Risk (1 of 1 - PPSV23) 10/22/1978 DTaP/Tdap/Td series (1 - Tdap) 10/22/1980 FOBT Q 1 YEAR AGE 50-75 10/22/2009 INFLUENZA AGE 9 TO ADULT 8/1/2016 Allergies as of 2/6/2017  Review Complete On: 2/6/2017 By: Emily Duke,  No Known Allergies Current Immunizations  Never Reviewed No immunizations on file. Not reviewed this visit You Were Diagnosed With   
  
 Codes Comments Cellulitis of right lower extremity    -  Primary ICD-10-CM: F59.176 ICD-9-CM: 682.6 Need for hepatitis C screening test     ICD-10-CM: Z11.59 
ICD-9-CM: V73.89 Leg swelling     ICD-10-CM: M79.89 ICD-9-CM: 729.81 Vitals BP Temp Resp Height(growth percentile) Weight(growth percentile) SpO2  
 147/88 (BP 1 Location: Right arm) 98.1 °F (36.7 °C) (Oral) 16 6' (1.829 m) 168 lb (76.2 kg) 97% BMI Smoking Status 22.78 kg/m2 Former Smoker Vitals History BMI and BSA Data Body Mass Index Body Surface Area 22.78 kg/m 2 1.97 m 2 Preferred Pharmacy Pharmacy Name [facility] [address] [place] [address],[address] [place] [address] [phone] Your Updated Medication List  
  
   
This list is accurate as of: 2/6/17 12:13 PM.  Always use your most recent med list.  
  
  
  
  
 aspirin 81 mg tablet Take 81 mg by mouth two (2) times a day. BACTROBAN 2 % ointment Generic drug:  mupirocin Apply  to affected area three (3) times daily. 2/3/17  
  
 cephALEXin 500 mg capsule Commonly known as:  Olivia Nageotte Take 1 Cap by mouth four (4) times daily. CIPRO 500 mg tablet Generic drug:  ciprofloxacin HCl Take 500 mg by mouth two (2) times a day. clindamycin 300 mg capsule Commonly known as:  CLEOCIN Take 1 Cap by mouth three (3) times daily for 10 days. hydroCHLOROthiazide 25 mg tablet Commonly known as:  HYDRODIURIL Take 25 mg by mouth daily. Take 1 tab daily  
  
 metFORMIN 1,000 mg tablet Commonly known as:  GLUCOPHAGE Take 1,000 mg by mouth daily. metoprolol tartrate 50 mg tablet Commonly known as:  LOPRESSOR Take 50 mg by mouth two (2) times a day. multivitamin tablet Commonly known as:  ONE A DAY Take 1 Tab by mouth daily. nitroglycerin 0.4 mg SL tablet Commonly known as:  NITROSTAT  
1 Tab by SubLINGual route every five (5) minutes as needed for Chest Pain. oxyCODONE-acetaminophen 7.5-325 mg per tablet Commonly known as:  PERCOCET 7.5 Take 1 Tab by mouth every four (4) hours as needed for Pain. Max Daily Amount: 6 Tabs. potassium chloride 10 mEq tablet Commonly known as:  K-DUR, KLOR-CON 20 mEq. 2/3/17  
  
 predniSONE 5 mg dose pack Commonly known as:  STERAPRED See administration instruction per 5mg dose pack PriLOSEC 20 mg capsule Generic drug:  omeprazole Take 40 mg by mouth daily. rosuvastatin 20 mg tablet Commonly known as:  CRESTOR Take 1 Tab by mouth nightly. Prescriptions Printed Refills  
 oxyCODONE-acetaminophen (PERCOCET 7.5) 7.5-325 mg per tablet 0 Sig: Take 1 Tab by mouth every four (4) hours as needed for Pain. Max Daily Amount: 6 Tabs. Class: Print Route: Oral  
  
Prescriptions Sent to Pharmacy Refills  
 predniSONE (STERAPRED) 5 mg dose pack 0 Sig: See administration instruction per 5mg dose pack Class: Normal  
 Pharmacy: [facility] Ph #: [phone] We Performed the Following REFERRAL TO INFECTIOUS DISEASE [REF37 Custom] Follow-up Instructions Return if symptoms worsen or fail to improve. To-Do List   
 02/06/2017 Lab:  HEPATITIS C AB Referral Information Referral ID Referred By Referred To [id number] Not Available Visits Status Start Date End Date 1 New Request 2/6/17 2/6/18 If your referral has a status of pending review or denied, additional information will be sent to support the outcome of this decision. Patient Instructions A Healthy Lifestyle: Care Instructions Your Care Instructions A healthy lifestyle can help you feel good, stay at a healthy weight, and have plenty of energy for both work and play. A healthy lifestyle is something you can share with your whole family. A healthy lifestyle also can lower your risk for serious health problems, such as high blood pressure, heart disease, and diabetes. You can follow a few steps listed below to improve your health and the health of your family. Follow-up care is a key part of your treatment and safety. Be sure to make and go to all appointments, and call your doctor if you are having problems. Its also a good idea to know your test results and keep a list of the medicines you take. How can you care for yourself at home? · Do not eat too much sugar, fat, or fast foods. You can still have dessert and treats now and then. The goal is moderation. · Start small to improve your eating habits. Pay attention to portion sizes, drink less juice and soda pop, and eat more fruits and vegetables. ¨ Eat a healthy amount of food. A 3-ounce serving of meat, for example, is about the size of a deck of cards. Fill the rest of your plate with vegetables and whole grains. ¨ Limit the amount of soda and sports drinks you have every day. Drink more water when you are thirsty. ¨ Eat at least 5 servings of fruits and vegetables every day. It may seem like a lot, but it is not hard to reach this goal. A serving or helping is 1 piece of fruit, 1 cup of vegetables, or 2 cups of leafy, raw vegetables.  Have an apple or some carrot sticks as an afternoon snack instead of a candy bar. Try to have fruits and/or vegetables at every meal. 
· Make exercise part of your daily routine. You may want to start with simple activities, such as walking, bicycling, or slow swimming. Try to be active 30 to 60 minutes every day. You do not need to do all 30 to 60 minutes all at once. For example, you can exercise 3 times a day for 10 or 20 minutes. Moderate exercise is safe for most people, but it is always a good idea to talk to your doctor before starting an exercise program. 
· Keep moving. Sania Parody the lawn, work in the garden, or [employer]. Take the stairs instead of the elevator at work. · If you smoke, quit. People who smoke have an increased risk for heart attack, stroke, cancer, and other lung illnesses. Quitting is hard, but there are ways to boost your chance of quitting tobacco for good. ¨ Use nicotine gum, patches, or lozenges. ¨ Ask your doctor about stop-smoking programs and medicines. ¨ Keep trying. In addition to reducing your risk of diseases in the future, you will notice some benefits soon after you stop using tobacco. If you have shortness of breath or asthma symptoms, they will likely get better within a few weeks after you quit. · Limit how much alcohol you drink. Moderate amounts of alcohol (up to 2 drinks a day for men, 1 drink a day for women) are okay. But drinking too much can lead to liver problems, high blood pressure, and other health problems. Family health If you have a family, there are many things you can do together to improve your health. · Eat meals together as a family as often as possible. · Eat healthy foods. This includes fruits, vegetables, lean meats and dairy, and whole grains. · Include your family in your fitness plan. Most people think of activities such as jogging or tennis as the way to fitness, but there are many ways you and your family can be more active.  Anything that makes you breathe hard and gets your heart pumping is exercise. Here are some tips: 
¨ Walk to do errands or to take your child to school or the bus. ¨ Go for a family bike ride after dinner instead of watching TV. Where can you learn more? Go to http://ramos-meghan.info/. Enter Q145 in the search box to learn more about \"A Healthy Lifestyle: Care Instructions. \" Current as of: July 26, 2016 Content Version: 11.1 © 3108-8771 [employer]ed. Care instructions adapted under license by [employer] (which disclaims liability or warranty for this information). If you have questions about a medical condition or this instruction, always ask your healthcare professional. [address] any warranty or liability for your use of this information. Cellulitis: Care Instructions Your Care Instructions Cellulitis is a skin infection. It often occurs after a break in the skin from a scrape, cut, bite, or puncture, or after a rash. The doctor has checked you carefully, but problems can develop later. If you notice any problems or new symptoms, get medical treatment right away. Follow-up care is a key part of your treatment and safety. Be sure to make and go to all appointments, and call your doctor if you are having problems. It's also a good idea to know your test results and keep a list of the medicines you take. How can you care for yourself at home? · Take your antibiotics as directed. Do not stop taking them just because you feel better. You need to take the full course of antibiotics. · Prop up the infected area on pillows to reduce pain and swelling. Try to keep the area above the level of your heart as often as you can. · If your doctor told you how to care for your wound, follow your doctor's instructions. If you did not get instructions, follow this general advice: ¨ Wash the wound with clean water 2 times a day.  Don't use hydrogen peroxide or alcohol, which can slow healing. ¨ You may cover the wound with a thin layer of petroleum jelly, such as Vaseline, and a nonstick bandage. ¨ Apply more petroleum jelly and replace the bandage as needed. · Be safe with medicines. Take pain medicines exactly as directed. ¨ If the doctor gave you a prescription medicine for pain, take it as prescribed. ¨ If you are not taking a prescription pain medicine, ask your doctor if you can take an over-the-counter medicine. To prevent cellulitis in the future · Try to prevent cuts, scrapes, or other injuries to your skin. Cellulitis most often occurs where there is a break in the skin. · If you get a scrape, cut, mild burn, or bite, wash the wound with clean water as soon as you can to help avoid infection. Don't use hydrogen peroxide or alcohol, which can slow healing. · If you have swelling in your legs (edema), support stockings and good skin care may help prevent leg sores and cellulitis. · Take care of your feet, especially if you have diabetes or other conditions that increase the risk of infection. Wear shoes and socks. Do not go barefoot. If you have athlete's foot or other skin problems on your feet, talk to your doctor about how to treat them. When should you call for help? Call your doctor now or seek immediate medical care if: 
· You have signs that your infection is getting worse, such as: 
¨ Increased pain, swelling, warmth, or redness. ¨ Red streaks leading from the area. ¨ Pus draining from the area. ¨ A fever. · You get a rash. Watch closely for changes in your health, and be sure to contact your doctor if: 
· You are not getting better after 1 day (24 hours). · You do not get better as expected. Where can you learn more? Go to http://ramos-meghan.info/. Analy Cruz in the search box to learn more about \"Cellulitis: Care Instructions. \" Current as of: February 5, 2016 Content Version: 11.1 © 0239-5935 Healthwise, Incorporated. Care instructions adapted under license by Future Domain (which disclaims liability or warranty for this information). If you have questions about a medical condition or this instruction, always ask your healthcare professional. [address] any warranty or liability for your use of this information. Introducing [facility]! Derek Ivan introduces [employer] patient portal. Now you can access parts of your medical record, email your doctor's office, and request medication refills online. 1. In your internet browser, go to https://[employer]. [employer] 2. Click on the First Time User? Click Here link in the Sign In box. You will see the New Member Sign Up page. 3. Enter your [employer] Access Code exactly as it appears below. You will not need to use this code after youve completed the sign-up process. If you do not sign up before the expiration date, you must request a new code. · [employer] Access Code: [email or web address] Expires: 3/16/2017 11:39 AM 
 
4. Enter the last four digits of your Social Security Number (xxxx) and Date of Birth (mm/dd/yyyy) as indicated and click Submit. You will be taken to the next sign-up page. 5. Create a [employer] ID. This will be your [employer] login ID and cannot be changed, so think of one that is secure and easy to remember. 6. Create a [employer] password. You can change your password at any time. 7. Enter your Password Reset Question and Answer. This can be used at a later time if you forget your password. 8. Enter your e-mail address. You will receive e-mail notification when new information is available in [address] 9. Click Sign Up. You can now view and download portions of your medical record. 10. Click the Download Summary menu link to download a portable copy of your medical information.  
 
If you have questions, please visit the Frequently Asked Questions section of the [employer]. Remember, [employer] is NOT to be used for urgent needs. For medical emergencies, dial 911. Now available from your iPhone and Android! Please provide this summary of care documentation to your next provider. Your primary care clinician is listed as Ag Santa. If you have any questions after today's visit, please call [phone].

## 2017-02-06 NOTE — PATIENT INSTRUCTIONS
A Healthy Lifestyle: Care Instructions  Your Care Instructions  A healthy lifestyle can help you feel good, stay at a healthy weight, and have plenty of energy for both work and play. A healthy lifestyle is something you can share with your whole family. A healthy lifestyle also can lower your risk for serious health problems, such as high blood pressure, heart disease, and diabetes. You can follow a few steps listed below to improve your health and the health of your family. Follow-up care is a key part of your treatment and safety. Be sure to make and go to all appointments, and call your doctor if you are having problems. Its also a good idea to know your test results and keep a list of the medicines you take. How can you care for yourself at home? · Do not eat too much sugar, fat, or fast foods. You can still have dessert and treats now and then. The goal is moderation. · Start small to improve your eating habits. Pay attention to portion sizes, drink less juice and soda pop, and eat more fruits and vegetables. ¨ Eat a healthy amount of food. A 3-ounce serving of meat, for example, is about the size of a deck of cards. Fill the rest of your plate with vegetables and whole grains. ¨ Limit the amount of soda and sports drinks you have every day. Drink more water when you are thirsty. ¨ Eat at least 5 servings of fruits and vegetables every day. It may seem like a lot, but it is not hard to reach this goal. A serving or helping is 1 piece of fruit, 1 cup of vegetables, or 2 cups of leafy, raw vegetables. Have an apple or some carrot sticks as an afternoon snack instead of a candy bar. Try to have fruits and/or vegetables at every meal.  · Make exercise part of your daily routine. You may want to start with simple activities, such as walking, bicycling, or slow swimming. Try to be active 30 to 60 minutes every day. You do not need to do all 30 to 60 minutes all at once.  For example, you can exercise 3 times a day for 10 or 20 minutes. Moderate exercise is safe for most people, but it is always a good idea to talk to your doctor before starting an exercise program.  · Keep moving. Ramesh Lyn the lawn, work in the garden, or [employer].. Take the stairs instead of the elevator at work. · If you smoke, quit. People who smoke have an increased risk for heart attack, stroke, cancer, and other lung illnesses. Quitting is hard, but there are ways to boost your chance of quitting tobacco for good. ¨ Use nicotine gum, patches, or lozenges. ¨ Ask your doctor about stop-smoking programs and medicines. ¨ Keep trying. In addition to reducing your risk of diseases in the future, you will notice some benefits soon after you stop using tobacco. If you have shortness of breath or asthma symptoms, they will likely get better within a few weeks after you quit. · Limit how much alcohol you drink. Moderate amounts of alcohol (up to 2 drinks a day for men, 1 drink a day for women) are okay. But drinking too much can lead to liver problems, high blood pressure, and other health problems. Family health  If you have a family, there are many things you can do together to improve your health. · Eat meals together as a family as often as possible. · Eat healthy foods. This includes fruits, vegetables, lean meats and dairy, and whole grains. · Include your family in your fitness plan. Most people think of activities such as jogging or tennis as the way to fitness, but there are many ways you and your family can be more active. Anything that makes you breathe hard and gets your heart pumping is exercise. Here are some tips:  ¨ Walk to do errands or to take your child to school or the bus. ¨ Go for a family bike ride after dinner instead of watching TV. Where can you learn more? Go to http://ramos-meghan.info/. Enter S123 in the search box to learn more about \"A Healthy Lifestyle: Care Instructions. \"  Current as of: July 26, 2016  Content Version: 11.1  © 2190-2160 [employer]. Care instructions adapted under license by [employer] (which disclaims liability or warranty for this information). If you have questions about a medical condition or this instruction, always ask your healthcare professional. [address] any warranty or liability for your use of this information. Cellulitis: Care Instructions  Your Care Instructions    Cellulitis is a skin infection. It often occurs after a break in the skin from a scrape, cut, bite, or puncture, or after a rash. The doctor has checked you carefully, but problems can develop later. If you notice any problems or new symptoms, get medical treatment right away. Follow-up care is a key part of your treatment and safety. Be sure to make and go to all appointments, and call your doctor if you are having problems. It's also a good idea to know your test results and keep a list of the medicines you take. How can you care for yourself at home? · Take your antibiotics as directed. Do not stop taking them just because you feel better. You need to take the full course of antibiotics. · Prop up the infected area on pillows to reduce pain and swelling. Try to keep the area above the level of your heart as often as you can. · If your doctor told you how to care for your wound, follow your doctor's instructions. If you did not get instructions, follow this general advice:  ¨ Wash the wound with clean water 2 times a day. Don't use hydrogen peroxide or alcohol, which can slow healing. ¨ You may cover the wound with a thin layer of petroleum jelly, such as Vaseline, and a nonstick bandage. ¨ Apply more petroleum jelly and replace the bandage as needed. · Be safe with medicines. Take pain medicines exactly as directed. ¨ If the doctor gave you a prescription medicine for pain, take it as prescribed.   ¨ If you are not taking a prescription pain medicine, ask your doctor if you can take an over-the-counter medicine. To prevent cellulitis in the future  · Try to prevent cuts, scrapes, or other injuries to your skin. Cellulitis most often occurs where there is a break in the skin. · If you get a scrape, cut, mild burn, or bite, wash the wound with clean water as soon as you can to help avoid infection. Don't use hydrogen peroxide or alcohol, which can slow healing. · If you have swelling in your legs (edema), support stockings and good skin care may help prevent leg sores and cellulitis. · Take care of your feet, especially if you have diabetes or other conditions that increase the risk of infection. Wear shoes and socks. Do not go barefoot. If you have athlete's foot or other skin problems on your feet, talk to your doctor about how to treat them. When should you call for help? Call your doctor now or seek immediate medical care if:  · You have signs that your infection is getting worse, such as:  ¨ Increased pain, swelling, warmth, or redness. ¨ Red streaks leading from the area. ¨ Pus draining from the area. ¨ A fever. · You get a rash. Watch closely for changes in your health, and be sure to contact your doctor if:  · You are not getting better after 1 day (24 hours). · You do not get better as expected. Where can you learn more? Go to http://ramos-meghan.info/. Matthew Car in the search box to learn more about \"Cellulitis: Care Instructions. \"  Current as of: February 5, 2016  Content Version: 11.1  © 6457-3661 [employer]u. Care instructions adapted under license by [employer] (which disclaims liability or warranty for this information). If you have questions about a medical condition or this instruction, always ask your healthcare professional. [address] any warranty or liability for your use of this information.

## 2017-02-06 NOTE — PROGRESS NOTES
Pt is here for ED follow up Shanel White for cellulitis R leg 2/3/17    Do you have an advance directive no  Request Pt bring a copy of advance directive for scanning. Do you want information on an advance directive no    Pt  mychart activation pending. 1. Have you been to the ER, urgent care clinic since your last visit? Hospitalized since your last visit? Yes [facility] ED 2/3/16 R leg cellultis. 2. Have you seen or consulted any other health care providers outside of the [address] [place] [address] since your last visit? Include any pap smears or colon screening.  No

## 2017-02-07 NOTE — PROGRESS NOTES
HISTORY OF PRESENT ILLNESS  Unique Reyes is a 61 y.o. male. HPI   61year old established male patient in today for ED follow up. He was seen at the ED on 1/30/17 and dx with cellulitis and was started on bactrim and keflex. Per him he has had no improvement in his symptoms. He is also on cipro for his prostate. He tells me he has had a difficult time with the abx (keflex) since it made him sick to his stomach. 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 on 1/26/06    Arthritis      in hands    ASHD (arteriosclerotic heart disease)     Benign hypertensive heart disease without heart failure     CAD (coronary artery disease)      chronic underlying    Calculus of kidney     Chronic cough      coughing up sputum    Chronic lung disease     [facility])      moderate to severe    Coronary artery disease     Diabetes ([address]     Diabetes mellitus ([address]     GARCIA (dyspnea on exertion)     GERD (gastroesophageal reflux disease)     H/O: pneumonia     Heartburn     Hypertension      essential systemic    Indigestion     Normal nuclear stress test 08/30/2006     No ischemia or infarct. EF 59%. Negative max. stress test.    Pneumonia 8/12     \"walking\"    Pure hypercholesterolemia     S/P cardiac cath 01/26/2006     LM mild. RCA mild. LAD tortuous. CX mild. CX angela 100%. S/P balloon angioplasty of CX angela. Residual 40%. LVEDP 20.  EF 65%.     Tobacco abuse        Family History   Problem Relation Age of Onset    COPD Mother      cause of death   [facility] Asthma Father     Cancer Father     Colon Polyps Father        Social History   Substance Use Topics    Smoking status: Former Smoker     Packs/day: 1.25     Years: 20.00     Types: Cigarettes     Quit date: 1/1/2014    Smokeless tobacco: Never Used    Alcohol use Yes      Comment: on special occasions only        Past Surgical History   Procedure Laterality Date    Pr tongue to lip surgery  04/04    Hx ptca       of anomalous circumflex marginal artery    Pr tongue and mouth surg unlisted  04/04     tongue surgery    Hx heart catheterization  01/26/06       ROS  See HPI  Visit Vitals    /87 (BP 1 Location: Left arm)    Pulse 78    Temp 97.7 °F (36.5 °C) (Oral)    Resp 16    Ht 6' (1.829 m)    Wt 168 lb (76.2 kg)    SpO2 98%    BMI 22.78 kg/m2     Physical Exam   Skin:   Right knee with erythema, warmth, swelling with extension in to the right lower extremity. Multiple right lateral healed abrasions     ASSESSMENT and PLAN    ICD-10-CM ICD-9-CM    1. Cellulitis and abscess of lower extremity L02.419 682.6 clindamycin (CLEOCIN) 300 mg capsule    L03.119  cephALEXin (KEFLEX) 500 mg capsule      DISCONTINUED: oxyCODONE-acetaminophen (PERCOCET) 5-325 mg per tablet     -Emergent ED precautions provided  -RTC prn    Additional Instructions: The patient understands that they should contact the office at any time if any questions or concerns develop. They are also aware that they can call our main office number at [phone] at any time if they would like to address any concerns with the physician. They also understand that they should dial 911 if any acute emergency arises. The patient understands that they should give us a minimum of 48 hours to complete prescription refills once they are requested. The patient has also been instructed to contact us by calling the main office number if they have not received feedback within 2 weeks of having any tests completed.   The patient is a aware that they should read all package insert information when picking up the medications and that they should consult the pharmacist of a physician if they have any questions or concerns regarding the prescribed medications. Discussed with the patient new medications given and patient instructed to read pharmacy literature regarding side effects and drug interactions. Instructions for taking the medications were provided to the patient and the consequences of not taking it. Follow-up Disposition:   Return if symptoms worsen or fail to improve. Risk and benefits of new medication discussed in detail when indicated, patient was given the opportunity to ask questions   AVS provided  reviewed diet, exercise and weight control when indicated  Alarm signals discussed. ER precautions reviewed when indicated  Plan of care reviewed with patient. Understanding verbalized and they are in agreement with plan of care.      Darrell Srinivasan DO

## 2017-02-08 NOTE — PROGRESS NOTES
HISTORY OF PRESENT ILLNESS  Peace Arvizu is a 61 y.o. male. HPI   61year old established male patient in today for follow up on cellulitis    Patient in reports some improvement in leg swelling and redness. Pain remains. He denies fevers, chills and sweats    He has been seen at [facility] recently and has blood cx pending. He was given IV rocephen    Recap 2/1/17: He was seen at the ED on 1/30/17 and dx with cellulitis and was started on bactrim and keflex. Per him he has had no improvement in his symptoms. He is also on cipro for his prostate. He tells me he has had a difficult time with the abx (keflex) since it made him sick to his stomach. 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 on 1/26/06    Arthritis      in hands    ASHD (arteriosclerotic heart disease)     Benign hypertensive heart disease without heart failure     CAD (coronary artery disease)      chronic underlying    Calculus of kidney     Chronic cough      coughing up sputum    Chronic lung disease     [facility]) ([facility])      moderate to severe    Coronary artery disease     Diabetes ([address]     Diabetes mellitus ([address]     GARCIA (dyspnea on exertion)     GERD (gastroesophageal reflux disease)     H/O: pneumonia     Heartburn     Hypertension      essential systemic    Indigestion     Normal nuclear stress test 08/30/2006     No ischemia or infarct. EF 59%. Negative max. stress test.    Pneumonia 8/12     \"walking\"    Pure hypercholesterolemia     S/P cardiac cath 01/26/2006     LM mild. RCA mild. LAD tortuous. CX mild. CX angela 100%. S/P balloon angioplasty of CX angela. Residual 40%.   LVEDP 20.  EF 65%.  Tobacco abuse        Family History   Problem Relation Age of Onset    COPD Mother      cause of death    Asthma Father     Cancer Father     Colon Polyps Father        Social History   Substance Use Topics    Smoking status: Former Smoker     Packs/day: 1.25     Years: 20.00     Types: Cigarettes     Quit date: 1/1/2014    Smokeless tobacco: Never Used    Alcohol use Yes      Comment: on special occasions only        Past Surgical History   Procedure Laterality Date    Pr tongue to lip surgery  04/04    Hx ptca       of anomalous circumflex marginal artery    Pr tongue and mouth surg unlisted  04/04     tongue surgery    Hx heart catheterization  01/26/06       ROS  See HPI    Visit Vitals    /88 (BP 1 Location: Right arm)    Temp 98.1 °F (36.7 °C) (Oral)    Resp 16    Ht 6' (1.829 m)    Wt 168 lb (76.2 kg)    SpO2 97%    BMI 22.78 kg/m2     Physical Exam   Skin:   Right knee with erythema, warmth, swelling with extension in to the right lower extremity. Multiple right lateral healed abrasions     ASSESSMENT and PLAN    ICD-10-CM ICD-9-CM    1. Cellulitis of right lower extremity L03.115 682.6 REFERRAL TO INFECTIOUS DISEASE   2. Need for hepatitis C screening test Z11.59 V73.89 HEPATITIS C AB   3. Leg swelling M79.89 729.81 predniSONE (STERAPRED) 5 mg dose pack     -Emergent ED precautions provided  -RTC prn    Additional Instructions: The patient understands that they should contact the office at any time if any questions or concerns develop. They are also aware that they can call our main office number at [phone] at any time if they would like to address any concerns with the physician. They also understand that they should dial 911 if any acute emergency arises. The patient understands that they should give us a minimum of 48 hours to complete prescription refills once they are requested.   The patient has also been instructed to contact us by calling the main office number if they have not received feedback within 2 weeks of having any tests completed. The patient is a aware that they should read all package insert information when picking up the medications and that they should consult the pharmacist of a physician if they have any questions or concerns regarding the prescribed medications. Discussed with the patient new medications given and patient instructed to read pharmacy literature regarding side effects and drug interactions. Instructions for taking the medications were provided to the patient and the consequences of not taking it. Follow-up Disposition:   Return if symptoms worsen or fail to improve. Risk and benefits of new medication discussed in detail when indicated, patient was given the opportunity to ask questions   AVS provided  reviewed diet, exercise and weight control when indicated  Alarm signals discussed. ER precautions reviewed when indicated  Plan of care reviewed with patient. Understanding verbalized and they are in agreement with plan of care.      Esthela Roche,

## 2017-02-09 ENCOUNTER — OFFICE VISIT (OUTPATIENT)
Dept: PULMONOLOGY | Age: 57
End: 2017-02-09

## 2017-02-09 VITALS
HEART RATE: 98 BPM | TEMPERATURE: 97.8 F | DIASTOLIC BLOOD PRESSURE: 100 MMHG | SYSTOLIC BLOOD PRESSURE: 184 MMHG | WEIGHT: 164 LBS | BODY MASS INDEX: 22.21 KG/M2 | RESPIRATION RATE: 16 BRPM | HEIGHT: 72 IN | OXYGEN SATURATION: 98 %

## 2017-02-09 DIAGNOSIS — R91.1 LUNG NODULE: ICD-10-CM

## 2017-02-09 DIAGNOSIS — R63.4 WEIGHT LOSS OF MORE THAN 10% BODY WEIGHT: ICD-10-CM

## 2017-02-09 DIAGNOSIS — J44.1 COPD EXACERBATION (HCC): Primary | ICD-10-CM

## 2017-02-09 RX ORDER — FLUTICASONE FUROATE AND VILANTEROL 100; 25 UG/1; UG/1
1 POWDER RESPIRATORY (INHALATION) DAILY
Qty: 2 INHALER | Refills: 0 | Status: SHIPPED | COMMUNITY
Start: 2017-02-09 | End: 2019-05-20

## 2017-02-09 NOTE — PATIENT INSTRUCTIONS
COPD and Asthma: Care Instructions  Your Care Instructions  Some people who have chronic obstructive pulmonary disease (COPD) also have asthma. Both of these problems can damage your lungs. This makes it very important to control them. Asthma causes the airways that lead to the lungs to swell and become narrow. This makes it hard to breathe. You may wheeze or cough. If you have a bad attack, you may need emergency care. There are two parts to treating asthma. · Controlling asthma over the long term. · Treating attacks when they occur. You and your doctor can make an asthma treatment plan that will help. This plan tells you the medicines you take every day to reduce the swelling in your airways and prevent attacks. It also tells you what to do if you have an asthma attack. Follow-up care is a key part of your treatment and safety. Be sure to make and go to all appointments, and call your doctor if you are having problems. It's also a good idea to know your test results and keep a list of the medicines you take. How can you care for yourself at home? To control asthma over the long term  Medicines  Controller medicines reduce swelling in your lungs. They also prevent asthma attacks. Take your controller medicine exactly as prescribed. Talk to your doctor if you have any problems with your medicine. · Inhaled corticosteroid is a common and effective controller medicine. Using it the right way can prevent or reduce most side effects. · Take your controller medicine every day, not just when you have symptoms. This helps prevent problems before they occur. · Always bring your asthma medicine with you when you travel. · Your doctor may prescribe long-acting medicine that combines a corticosteroid with a beta2-agonist. Follow your doctor's instructions exactly about how to take a long-acting medicine. Examples include:  ¨ Fluticasone and salmeterol (Advair). ¨ Budesonide and formoterol (Symbicort).   · Do not depend on your controller medicines to stop an asthma attack that has already started. They do not work fast enough to help. · Your doctor may also prescribe anticholinergic inhalers. These include ipratropium (Atrovent) and tiotropium (Spiriva). Education  · Learn what sets off an asthma attack. Avoid these triggers when you can. Common triggers include smoke, air pollution, pollen, animal dander, colds, stress, and cold air. · Do not smoke. Smoking can make COPD and asthma worse. If you need help quitting, talk to your doctor about stop-smoking programs and medicines. These can increase your chances of quitting for good. · Check yourself for symptoms to know which step to follow in your action plan. Watch for things like being short of breath, having chest tightness, coughing, and wheezing. Also notice if symptoms wake you up at night or if you get tired quickly when you exercise. · You may want to learn how to use a peak flow meter. This measures how open your airways are. It may help you know when you will have an asthma attack. To treat attacks when they occur  Use your asthma action plan when you have an attack. Your quick-relief medicine, such as albuterol, will stop an asthma attack. It relaxes the muscles that get tight around the airways. · Take your quick-relief medicine exactly as prescribed. Talk with your doctor if you have any problems with your medicine. · Keep this medicine with you at all times. · You may need to use this medicine before you exercise. If your doctor prescribed corticosteroid pills to use during an attack, take them as directed. They may take hours to work, but they may shorten the attack and help you breathe better. When should you call for help? Call 911 anytime you think you may need emergency care. For example, call if:  · You have severe trouble breathing.   Call your doctor now or seek immediate medical care if:  · You have new or worse shortness of breath. · You are coughing more deeply or more often, especially if you notice more mucus or a change in the color of your mucus. · You cough up blood. · You have new or increased swelling in your legs or belly. · You have a fever. · You have used your quick-relief medicine but you are still short of breath. Watch closely for changes in your health, and be sure to contact your doctor if you have any problems. Where can you learn more? Go to http://ramos-meghan.info/. Enter A350 in the search box to learn more about \"COPD and Asthma: Care Instructions. \"  Current as of: May 23, 2016  Content Version: 11.1  © 9421-1486 [employer]. Care instructions adapted under license by [employer] (which disclaims liability or warranty for this information). If you have questions about a medical condition or this instruction, always ask your healthcare professional. [address] any warranty or liability for your use of this information.

## 2017-02-09 NOTE — MR AVS SNAPSHOT
Visit Information Date & Time Provider Department Dept. Phone Encounter #  
 2/9/2017 10:00 AM Lucía Kowalski MD [facility] [address] [id number] Follow-up Instructions Return in about 4 weeks (around 3/9/2017). Your Appointments 2/21/2017  1:40 PM  
Any with Arvilla Lefort, DO [facility] ([facility]) [place] [phone] [address]  
  
    
 2/21/2017  3:00 PM  
Office Visit with Sandra Wolf MD  
[facility]) Appt Note: check up [address] [facility] Jaleel A [address] [address]  
[phone] [phone]  
  
    
 2/24/2017  9:00 AM  
COLON SCREEN with TSS HBV NURSE VISIT [place] [address] ([facility]) Appt Note: Dr Martha Zacarias referring [address] [facility] [address] [place] [address]  
  
    
 4/4/2017  8:10 AM  
LAB with Anup Valdez DO [facility] [employer] (--) Appt Note: 3 mo f/u lab [address] [address]  
[phone]  
  
   
 [address] [address] 4/11/2017  3:45 PM  
ROUTINE CARE with Anup Valdez DO [facility] [employer] (--) Appt Note: 3 mo f/u  
 [address] [id number] [phone]  
  
   
 [address] [phone] Upcoming Health Maintenance Date Due Hepatitis C Screening 1960 DTaP/Tdap/Td series (1 - Tdap) 10/22/1980 FOBT Q 1 YEAR AGE 50-75 10/22/2009 INFLUENZA AGE 9 TO ADULT 8/1/2016 Pneumococcal 19-64 Medium Risk (1 of 1 - PPSV23) 3/9/2017* *Topic was postponed. The date shown is not the original due date. Allergies as of 2/9/2017  Review Complete On: 2/9/2017 By: Enio Arriola RN No Known Allergies Current Immunizations  Never Reviewed No immunizations on file. Not reviewed this visit You Were Diagnosed With   
  
 Codes Comments COPD exacerbation ([facility]    -  Primary ICD-10-CM: J44.1 ICD-9-CM: 491.21 Lung nodule     ICD-10-CM: R91.1 ICD-9-CM: 793.11 Weight loss of more than 10% body weight     ICD-10-CM: R63.4 ICD-9-CM: 783.21 Vitals BP Pulse Temp Resp Height(growth percentile) Weight(growth percentile) (!) 184/100 (BP 1 Location: Left arm, BP Patient Position: Sitting) 98 97.8 °F (36.6 °C) (Oral) 16 6' (1.829 m) 164 lb (74.4 kg) SpO2 BMI Smoking Status 98% 22.24 kg/m2 Former Smoker Vitals History BMI and BSA Data Body Mass Index Body Surface Area  
 22.24 kg/m 2 1.94 m 2 Preferred Pharmacy Pharmacy Name [facility] [address] [place] [address], [address] [place] [address],[address] [place] [address] [phone] Your Updated Medication List  
  
   
This list is accurate as of: 2/9/17 10:39 AM.  Always use your most recent med list.  
  
  
  
  
 aspirin 81 mg tablet Take 81 mg by mouth two (2) times a day. BACTROBAN 2 % ointment Generic drug:  mupirocin Apply  to affected area three (3) times daily. 2/3/17  
  
 cephALEXin 500 mg capsule Commonly known as:  Sean Fogo Take 1 Cap by mouth four (4) times daily. CIPRO 500 mg tablet Generic drug:  ciprofloxacin HCl Take 500 mg by mouth two (2) times a day. clindamycin 300 mg capsule Commonly known as:  CLEOCIN Take 1 Cap by mouth three (3) times daily for 10 days. fluticasone-vilanterol 100-25 mcg/dose inhaler Commonly known as:  BREO ELLIPTA Take 1 Puff by inhalation daily. hydroCHLOROthiazide 25 mg tablet Commonly known as:  HYDRODIURIL  
 Take 25 mg by mouth daily. Take 1 tab daily  
  
 metFORMIN 1,000 mg tablet Commonly known as:  GLUCOPHAGE Take 1,000 mg by mouth daily. metoprolol tartrate 50 mg tablet Commonly known as:  LOPRESSOR Take 50 mg by mouth two (2) times a day. multivitamin tablet Commonly known as:  ONE A DAY Take 1 Tab by mouth daily. nitroglycerin 0.4 mg SL tablet Commonly known as:  NITROSTAT  
1 Tab by SubLINGual route every five (5) minutes as needed for Chest Pain. oxyCODONE-acetaminophen 7.5-325 mg per tablet Commonly known as:  PERCOCET 7.5 Take 1 Tab by mouth every four (4) hours as needed for Pain. Max Daily Amount: 6 Tabs. predniSONE 5 mg dose pack Commonly known as:  STERAPRED See administration instruction per 5mg dose pack PriLOSEC 20 mg capsule Generic drug:  omeprazole Take 40 mg by mouth daily. rosuvastatin 20 mg tablet Commonly known as:  CRESTOR Take 1 Tab by mouth nightly. Follow-up Instructions Return in about 4 weeks (around 3/9/2017). To-Do List   
 02/09/2017 Imaging:  CT CHEST W CONT   
  
 02/09/2017 Lab:  T3 UPTAKE PROFILE   
  
 02/09/2017 Lab:  T4   
  
 02/09/2017 Lab:  TSH 3RD GENERATION Patient Instructions COPD and Asthma: Care Instructions Your Care Instructions Some people who have chronic obstructive pulmonary disease (COPD) also have asthma. Both of these problems can damage your lungs. This makes it very important to control them. Asthma causes the airways that lead to the lungs to swell and become narrow. This makes it hard to breathe. You may wheeze or cough. If you have a bad attack, you may need emergency care. There are two parts to treating asthma. · Controlling asthma over the long term. · Treating attacks when they occur. You and your doctor can make an asthma treatment plan that will help.  This plan tells you the medicines you take every day to reduce the swelling in your airways and prevent attacks. It also tells you what to do if you have an asthma attack. Follow-up care is a key part of your treatment and safety. Be sure to make and go to all appointments, and call your doctor if you are having problems. It's also a good idea to know your test results and keep a list of the medicines you take. How can you care for yourself at home? To control asthma over the long term Medicines Controller medicines reduce swelling in your lungs. They also prevent asthma attacks. Take your controller medicine exactly as prescribed. Talk to your doctor if you have any problems with your medicine. · Inhaled corticosteroid is a common and effective controller medicine. Using it the right way can prevent or reduce most side effects. · Take your controller medicine every day, not just when you have symptoms. This helps prevent problems before they occur. · Always bring your asthma medicine with you when you travel. · Your doctor may prescribe long-acting medicine that combines a corticosteroid with a beta2-agonist. Follow your doctor's instructions exactly about how to take a long-acting medicine. Examples include: ¨ Fluticasone and salmeterol (Advair). ¨ Budesonide and formoterol (Symbicort). · Do not depend on your controller medicines to stop an asthma attack that has already started. They do not work fast enough to help. · Your doctor may also prescribe anticholinergic inhalers. These include ipratropium (Atrovent) and tiotropium (Spiriva). Education · Learn what sets off an asthma attack. Avoid these triggers when you can. Common triggers include smoke, air pollution, pollen, animal dander, colds, stress, and cold air. · Do not smoke. Smoking can make COPD and asthma worse. If you need help quitting, talk to your doctor about stop-smoking programs and medicines. These can increase your chances of quitting for good. · Check yourself for symptoms to know which step to follow in your action plan. Watch for things like being short of breath, having chest tightness, coughing, and wheezing. Also notice if symptoms wake you up at night or if you get tired quickly when you exercise. · You may want to learn how to use a peak flow meter. This measures how open your airways are. It may help you know when you will have an asthma attack. To treat attacks when they occur Use your asthma action plan when you have an attack. Your quick-relief medicine, such as albuterol, will stop an asthma attack. It relaxes the muscles that get tight around the airways. · Take your quick-relief medicine exactly as prescribed. Talk with your doctor if you have any problems with your medicine. · Keep this medicine with you at all times. · You may need to use this medicine before you exercise. If your doctor prescribed corticosteroid pills to use during an attack, take them as directed. They may take hours to work, but they may shorten the attack and help you breathe better. When should you call for help? Call 911 anytime you think you may need emergency care. For example, call if: 
· You have severe trouble breathing. Call your doctor now or seek immediate medical care if: 
· You have new or worse shortness of breath. · You are coughing more deeply or more often, especially if you notice more mucus or a change in the color of your mucus. · You cough up blood. · You have new or increased swelling in your legs or belly. · You have a fever. · You have used your quick-relief medicine but you are still short of breath. Watch closely for changes in your health, and be sure to contact your doctor if you have any problems. Where can you learn more? Go to http://ramos-meghan.info/. Enter A350 in the search box to learn more about \"COPD and Asthma: Care Instructions. \" 
 Current as of: May 23, 2016 Content Version: 11.1 © 2859-5572 [employer]. Care instructions adapted under license by [employer] (which disclaims liability or warranty for this information). If you have questions about a medical condition or this instruction, always ask your healthcare professional. [address] any warranty or liability for your use of this information. Introducing \[facility]\"" & HEALTH SERVICES! [employer] introduces [employer] patient portal. Now you can access parts of your medical record, email your doctor's office, and request medication refills online. 1. In your internet browser, go to [email or web address] 2. Click on the First Time User? Click Here link in the Sign In box. You will see the New Member Sign Up page. 3. Enter your [employer] Access Code exactly as it appears below. You will not need to use this code after youve completed the sign-up process. If you do not sign up before the expiration date, you must request a new code. · [employer] Access Code: [email or web address] Expires: 3/16/2017 11:39 AM 
 
4. Enter the last four digits of your Social Security Number (xxxx) and Date of Birth (mm/dd/yyyy) as indicated and click Submit. You will be taken to the next sign-up page. 5. Create a [employer] ID. This will be your [employer] login ID and cannot be changed, so think of one that is secure and easy to remember. 6. Create a [employer] password. You can change your password at any time. 7. Enter your Password Reset Question and Answer. This can be used at a later time if you forget your password. 8. Enter your e-mail address. You will receive e-mail notification when new information is available in [address] 9. Click Sign Up. You can now view and download portions of your medical record. 10. Click the Download Summary menu link to download a portable copy of your medical information. If you have questions, please visit the Frequently Asked Questions section of the [employer] website. Remember, [employer] is NOT to be used for urgent needs. For medical emergencies, dial 911. Now available from your iPhone and Android! Please provide this summary of care documentation to your next provider. Your primary care clinician is listed as Brandy Wilcox. If you have any questions after today's visit, please call [phone].

## 2017-02-09 NOTE — PROGRESS NOTES
[facility]  Pulmonary, Critical Care, and Sleep Medicine      Pulmonary Office Initial Consultation    Name: Noe Suarez     : 1960     Date: 2017        Subjective:     Patient is a 61 y.o. male is here for evaluation for COPD. He comes in today for the first time in over 3 years and relates that he has really had significant problem with weight loss in the last 6 months and he is down on his bathroom scale from 209 to as low as 157. He has had some problems with greenish sputum production, but denies any other pulmonary issues although he is also complaining of large clots of blood coming from his right nares at times. denies any hemoptysis. Denies any SOB or limitation in his ADL's or work from SOB. He denies any chest pain but he has developed some swelling in his right leg particularly and some cellulitis which is now being treated after recent ER visit. He is also complaining of some pain in his right leg. He really does not have any orthopnea but seems to have occasional PND. He reports chronic diarrhea since starting Metformin- he was on 2000 mg which he cut back to 1000 mg and still has diarrhea. He has a GI evaluation upcoming. Mr. 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 This was opened with a wire and balloon, but we were unable to stent it. There was a residual stenosis of 40% with JB grade 3 flow following the procedure. Left ventricular function at that time was grossly normal with an ejection fraction in the 60% range. He has done well on medical therapy over the years without any recurrent cardiovascular issues.     He did quit smoking completely in 2014 and has been able to stay off cigarettes now for 4 years.           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)      moderate to severe    Coronary artery disease     Diabetes ([facility]     Diabetes mellitus ([facility]     GARCIA (dyspnea on exertion)     GERD (gastroesophageal reflux disease)     H/O: pneumonia     Heartburn     Hypertension      essential systemic    Indigestion     Normal nuclear stress test 08/30/2006     No ischemia or infarct. EF 59%. Negative max. stress test.    Pneumonia 8/12     \"walking\"    Pure hypercholesterolemia     S/P cardiac cath 01/26/2006     LM mild. RCA mild. LAD tortuous. CX mild. CX angela 100%. S/P balloon angioplasty of CX angela. Residual 40%. LVEDP 20.  EF 65%.     Tobacco abuse        Past Surgical History   Procedure Laterality Date    Pr tongue to lip surgery  04/04    Hx ptca       of anomalous circumflex marginal artery    Pr tongue and mouth surg unlisted  04/04     tongue surgery    Hx heart catheterization  01/26/06       Social History     Social History    Marital status:      Spouse name: N/A    Number of children: N/A    Years of education: N/A     Social History Main Topics    Smoking status: Former Smoker     Packs/day: 1.25     Years: 20.00     Types: Cigarettes     Quit date: 1/1/2014    Smokeless tobacco: Never Used    Alcohol use Yes      Comment: on special occasions only    Drug use: No    Sexual activity: Yes     Other Topics Concern    None     Social History Narrative       Family History   Problem Relation Age of Onset    COPD Mother      cause of death   [facility] Asthma Father     Cancer Father     Colon Polyps Father        No Known Allergies    . Current Outpatient Prescriptions   Medication Sig Dispense Refill    fluticasone-vilanterol (BREO ELLIPTA) 100-25 mcg/dose inhaler Take 1 Puff by inhalation daily. 2 Inhaler 0    mupirocin (BACTROBAN) 2 % ointment Apply  to affected area three (3) times daily. 2/3/17      predniSONE (STERAPRED) 5 mg dose pack See administration instruction per 5mg dose pack 21 Tab 0    oxyCODONE-acetaminophen (PERCOCET 7.5) 7.5-325 mg per tablet Take 1 Tab by mouth every four (4) hours as needed for Pain. Max Daily Amount: 6 Tabs. 60 Tab 0    clindamycin (CLEOCIN) 300 mg capsule Take 1 Cap by mouth three (3) times daily for 10 days. 30 Cap 0    cephALEXin (KEFLEX) 500 mg capsule Take 1 Cap by mouth four (4) times daily. 28 Cap 0    ciprofloxacin HCl (CIPRO) 500 mg tablet Take 500 mg by mouth two (2) times a day.  rosuvastatin (CRESTOR) 20 mg tablet Take 1 Tab by mouth nightly. 30 Tab 2    metFORMIN (GLUCOPHAGE) 1,000 mg tablet Take 1,000 mg by mouth daily.  hydroCHLOROthiazide (HYDRODIURIL) 25 mg tablet Take 25 mg by mouth daily. Take 1 tab daily      multivitamin (ONE A DAY) tablet Take 1 Tab by mouth daily.  metoprolol tartrate (LOPRESSOR) 50 mg tablet Take 50 mg by mouth two (2) times a day.  omeprazole (PRILOSEC) 20 mg capsule Take 40 mg by mouth daily.  nitroglycerin (NITROSTAT) 0.4 mg SL tablet 1 Tab by SubLINGual route every five (5) minutes as needed for Chest Pain. 25 Tab 3    aspirin 81 mg tablet Take 81 mg by mouth two (2) times a day.            Review of Systems:  HEENT: No epistaxis, no nasal drainage, no difficulty in swallowing, no redness in eyes  Respiratory: as above  Cardiovascular: no chest pain, no palpitations, no chronic leg edema, no syncope  Gastrointestinal: no abd pain, no vomiting, no diarrhea, no bleeding symptoms  Genitourinary: No urinary symptoms or hematuria  Integument/breast: No ulcers or rashes  Musculoskeletal:Neg  Neurological: No focal weakness, no seizures, no headaches  Behvioral/Psych: No anxiety, no depression  Constitutional: No fever, no chills, no weight loss, no night sweats     Objective:     Visit Vitals    BP (!) 184/100 (BP 1 Location: Left arm, BP Patient Position: Sitting)  Comment: pain    Pulse 98    Temp 97.8 °F (36.6 °C) (Oral)    Resp 16    Ht 6' (1.829 m)    Wt 74.4 kg (164 lb)    SpO2 98%    BMI 22.24 kg/m2        Physical Exam:   General: comfortable, no acute distress  HEENT: pupils reactive, sclera anicteric, EOM intact  Neck: No adenopathy or thyroid swelling, no lymphadenopathy or JVD, supple  CVS: S1S2 no murmurs  RS: Mod AE bilaterally, no tactile fremitus or egophony, no accessory muscle use  Abd: soft, non tender, no hepatosplenomegaly  Neuro: non focal, awake, alert  Extrm: no leg edema, clubbing or cyanosis  Skin: no rash    Data review:   PFT 1/23/2017:  Flows:  Maximal Mid Expiratory Flow rate is reduced to 23 % predicted  Forced Expiratory Volume in one second is reduced to 45 % predicted  FEV 1% is reduced  Flow Volume Loop:  Nonspecific obstructive pattern in Flow Volume Loop  Bronchodilator:  Significant improvement with bronchodilator  Impression:  Moderate to Severe obstructive defect    Imaging:  I have personally reviewed the patients radiographs and have reviewed the reports:  CXR: 1/31/2017:  COMPARISON: May 22, 2009.     FINDINGS: PA and lateral radiographs of the chest demonstrate clear lungs. The  cardiac and mediastinal contours and pulmonary vascularity are normal. The  bones and soft tissues are within normal limits.      IMPRESSION: No acute cardiopulmonary process    CT abd/pelvis: 12/23/2016:    1. There are small indirect inguinal hernias bilaterally containing fat without  incarceration.  There is a small left-sided hydrocele. 2. Atrophic right kidney. Parenchymal scarring is present involving both  kidneys. No renal stones are seen. There is no hydronephrosis. 3. Arterial atherosclerotic disease. Additional chronic changes as described  above. CT scan of chest :12/2013  Virtually complete resolution of the left lingular findings. No change focal  residual scar/fibrosis posteromedial right upper lobe from the previous  cavitary lesion or the small subpleural nodules in the anterior left lower  lobe. The lungs are otherwise clear. There is no change in the prominent  right hilar lymph node.     Hepatic steatosis without focal abnormality. The remainder of the study is  unremarkable and unchanged. IMPRESSION:   · COPD/asthma: FEV1 45% predicted. GOLD 3 and Functional class B  · Unexplained weight loss- 40 lbs without obvious cause so far. ? Metformin/diabetes/enteropathy- diarrhea. Concern for occult malignancy ? Lung , colon. ? Hyperthyroid. · H/o Abnormal Ct scan of chest- lingular pneumonia with clinical and radiologic resolution. Presented in 2009 with hemoptysis- had bronchoscopy. · Ex- smoker- quit 4 years back  · CAD  · HTN  · GERD  · Cellulitis , no DVT      RECOMMENDATIONS:   · Will initiate treatment for COPD- GOLD 3 with functional class-B  · Start Breo ellipta - samples provided  · Will obtain CT scan of chest- concern for bronchogenic carcinoma  · Needs further evaluation for etiology of unexplained weight loss- GI evaluation pending  · Thyroid profile ordered  · Will follow up after Ct scan completed  · Will follow for COPD management  · Discussed with patient and spouse. Health maintenance screens deferred to Primary care provider.      Kat Elizabeth MD

## 2017-02-13 NOTE — TELEPHONE ENCOUNTER
Requested Prescriptions     Pending Prescriptions Disp Refills    oxyCODONE-acetaminophen (PERCOCET 7.5) 7.5-325 mg per tablet 60 Tab 0     Sig: Take 1 Tab by mouth every four (4) hours as needed for Pain. Max Daily Amount: 6 Tabs.       Out of medication

## 2017-02-13 NOTE — TELEPHONE ENCOUNTER
Requested Prescriptions     Pending Prescriptions Disp Refills    oxyCODONE-acetaminophen (PERCOCET 7.5) 7.5-325 mg per tablet 60 Tab 0     Sig: Take 1 Tab by mouth every four (4) hours as needed for Pain. Max Daily Amount: 6 Tabs.        Last office visit was  2/6/17  Next office visit is     4/11/17    60 tabs prescribed 2/6/17  Please assist.

## 2017-02-16 ENCOUNTER — TELEPHONE (OUTPATIENT)
Dept: INTERNAL MEDICINE CLINIC | Age: 57
End: 2017-02-16

## 2017-02-16 RX ORDER — OXYCODONE AND ACETAMINOPHEN 7.5; 325 MG/1; MG/1
1 TABLET ORAL
Qty: 60 TAB | Refills: 0 | OUTPATIENT
Start: 2017-02-16

## 2017-02-16 NOTE — TELEPHONE ENCOUNTER
Dr. Katy Polo  is the name of his Orthopedist and he's with his [address] [phone]. Patient also notified that you would like to see him for a follow up appointment in two weeks. Patient says he sees Dr. Juan Lizama on 2/20 and he will call us after that to schedule his follow up appointment.

## 2017-02-16 NOTE — TELEPHONE ENCOUNTER
Patient says he was getting prescriptions filled at [facility]. Patient says the Orthopedist cut his leg to cut the infection out and he was in a lot of pain from that but the pain medicine wasn't working. Patient wanted you to know. The pharmacist switched to a different  to see if that would help based off of the prescription that the Orthopedist just wrote. Patient no longer needs prescription from you but wants to know if you need to see him.

## 2017-02-16 NOTE — TELEPHONE ENCOUNTER
Niece called [facility] drug center and verified patient gets his scripts filled there. Last controlled substance script filled today, prescribed by ortho. Patient to follow up with ortho is pain persists.

## 2017-02-16 NOTE — TELEPHONE ENCOUNTER
Called patient today to review which pharmacy he gets his controlled substances filled at.  shows no med refills this year.   Will call pharmacy to verify

## 2017-02-20 ENCOUNTER — HOSPITAL ENCOUNTER (EMERGENCY)
Age: 57
Discharge: HOME | End: 2017-02-20
Attending: EMERGENCY MEDICINE
Payer: SELF-PAY

## 2017-02-20 VITALS
WEIGHT: 154 LBS | SYSTOLIC BLOOD PRESSURE: 131 MMHG | RESPIRATION RATE: 18 BRPM | HEIGHT: 73 IN | HEART RATE: 95 BPM | OXYGEN SATURATION: 96 % | TEMPERATURE: 98.1 F | DIASTOLIC BLOOD PRESSURE: 83 MMHG | BODY MASS INDEX: 20.41 KG/M2

## 2017-02-20 DIAGNOSIS — L03.115 CELLULITIS OF LEG, RIGHT: Primary | ICD-10-CM

## 2017-02-20 PROCEDURE — 93971 EXTREMITY STUDY: CPT

## 2017-02-20 PROCEDURE — 99282 EMERGENCY DEPT VISIT SF MDM: CPT

## 2017-02-20 NOTE — ED PROVIDER NOTES
HPI Comments:   5:19 PM   61 y.o. male presents to ED C/O right leg pain. Patient has a  HX of COPD, pneumonia, CAD, diabetes, acute coronary syndrome, heart cathertization. Patient reports 6 days ago his knee cap was cut and infection was drained out of the region. Dr Jose Jade with orthopedics saw patient for a follow-up today when packing was removed. Patient's orthopedist is concerned that patient's leg swelling has not improved. Patient reports lower leg swelling x 1 month, no improvement after infection treatment. Orthopedist is concerned that swelling has not improved, he is concerned that patient may have developed a DVT, sent in for stat PVL study. Former smoker. Patient continues to take clindamycin QID. Pt denies any other sxs or complaints. Written by Martha BAIRD      The history is provided by the patient. History limited by: No language barrier. 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 RCA mild. LAD tortuous. CX mild. CX angela 100%. S/P balloon angioplasty of CX angela. Residual 40%. LVEDP 20.  EF 65%.  Cardiac nuclear imaging test 08/30/2006     No ischemia or infarct. EF 59%. Negative max.  stress test.    Chronic cough      coughing up sputum    Chronic lung disease     COPD (chronic obstructive pulmonary disease) (HCC)      moderate to severe    Coronary artery disease     Diabetes ([address]     Diabetes mellitus ([address]     GARCIA (dyspnea on exertion)     GERD (gastroesophageal reflux disease)     H/O: pneumonia     Heartburn     Hypertension      essential systemic    Indigestion     Pneumonia 8/12     \"walking\"    Pure hypercholesterolemia     Tobacco abuse        Past Surgical History:   Procedure Laterality Date    Pr tongue to lip surgery  04/04    Hx ptca       of anomalous circumflex marginal artery    Pr tongue and mouth surg unlisted  04/04     tongue surgery    Hx heart catheterization  01/26/06         Family History:   Problem Relation Age of Onset    COPD Mother      cause of death    Asthma Father     Cancer Father     Colon Polyps Father        Social History     Social History    Marital status:      Spouse name: N/A    Number of children: N/A    Years of education: N/A     Occupational History    Not on file. Social History Main Topics    Smoking status: Former Smoker     Packs/day: 1.25     Years: 20.00     Types: Cigarettes     Quit date: 1/1/2014    Smokeless tobacco: Never Used    Alcohol use Yes      Comment: on special occasions only    Drug use: No    Sexual activity: Yes     Other Topics Concern    Not on file     Social History Narrative         ALLERGIES: Review of patient's allergies indicates no known allergies. Review of Systems   Constitutional: Negative for activity change, appetite change, chills, fatigue and fever. HENT: Negative for congestion, dental problem, ear pain, rhinorrhea, sinus pressure, sneezing, sore throat, trouble swallowing and voice change. Eyes: Negative for pain, discharge, redness and itching. Respiratory: Negative for cough, chest tightness, shortness of breath and wheezing. Cardiovascular: Positive for leg swelling. Negative for chest pain and palpitations. Gastrointestinal: Negative for abdominal distention, abdominal pain, blood in stool, constipation, diarrhea, nausea, rectal pain and vomiting. Endocrine: Negative for polyuria. Genitourinary: Negative for discharge, dysuria, flank pain, hematuria, penile pain and testicular pain. Musculoskeletal: Negative for arthralgias, back pain, joint swelling, neck pain and neck stiffness. Skin: Positive for color change. Negative for rash and wound. Allergic/Immunologic: Negative for immunocompromised state. Neurological: Negative for dizziness, weakness, light-headedness, numbness and headaches. Hematological: Negative for adenopathy. Psychiatric/Behavioral: Negative for agitation and behavioral problems. The patient is not nervous/anxious. Vitals:    02/20/17 1556   BP: 131/83   Pulse: 95   Resp: 18   Temp: 98.1 °F (36.7 °C)   SpO2: 96%   Weight: 69.9 kg (154 lb)   Height: 6' 1\" (1.854 m)            Physical Exam   Constitutional: He is oriented to person, place, and time. He appears well-developed and well-nourished. No distress. HENT:   Head: Normocephalic. Right Ear: External ear normal.   Left Ear: External ear normal.   Nose: Nose normal.   Eyes: Conjunctivae and EOM are normal.   Cardiovascular: Normal rate, regular rhythm and normal heart sounds. Pulses:       Dorsalis pedis pulses are 2+ on the right side, and 2+ on the left side. +2 nonpitting edema right lower leg, entire leg distal to knee, including ankle and foot. Pulmonary/Chest: Effort normal and breath sounds normal. No respiratory distress. He has no wheezes. He has no rales. Musculoskeletal: He exhibits edema. Diffuse swelling to right lower leg, TTP, mildly erythema and warmth to palpation. No decrease in rom of right foot toes, ankle or knee, increased right ankle pain with flexion and extension. Neurological: He is alert and oriented to person, place, and time. He exhibits normal muscle tone. Coordination normal.   Skin: He is not diaphoretic. There is erythema. 2 horizontal mattress sutures right knee cap, no drainage or erythema at site. Psychiatric: He has a normal mood and affect. His behavior is normal. Judgment and thought content normal.   Nursing note and vitals reviewed. MDM  Number of Diagnoses or Management Options  Cellulitis of leg, right:   Diagnosis management comments: DDX:  Cellulitis, DVT    MDM:  Plan - duplex study  5:49 PM patient to vascular for study  Duplex study - Right Leg:-  Deep venous thrombosis: No  Superficial venous thrombosis: No  Deep venous insufficiency: Not examined  Superficial venous insufficiency: Not examined   6:25 PM patient informed of results, no evidence of DVT. Patient is already taking clindamycin for cellulitis, educated to continue medication at home and follow-up with PCP and orthopedics. Patient educated to return to the ED for any new or worsening symptoms. patient denies questions. Amount and/or Complexity of Data Reviewed  Tests in the radiology section of CPT®: ordered and reviewed      ED Course       Procedures             RESULTS:    DUPLEX LOWER EXT VENOUS RIGHT             Labs Reviewed - No data to display    No results found for this or any previous visit (from the past 12 hour(s)). PROGRESS NOTE:   5:19 PM   Initial assessment completed. Written by Olvin BAIRD     DISCHARGE NOTE:  6:27 PM   Oc Haynes  results have been reviewed with him. He has been counseled regarding his diagnosis, treatment, and plan. He verbally conveys understanding and agreement of the signs, symptoms, diagnosis, treatment and prognosis and additionally agrees to follow up as discussed. He also agrees with the care-plan and conveys that all of his questions have been answered. I have also provided discharge instructions for him that include: educational information regarding their diagnosis and treatment, and list of reasons why they would want to return to the ED prior to their follow-up appointment, should his condition change. CLINICAL IMPRESSION:    1.  Cellulitis of leg, right        AFTER VISIT PLAN:    Current Discharge Medication List      CONTINUE these medications which have NOT CHANGED    Details fluticasone-vilanterol (BREO ELLIPTA) 100-25 mcg/dose inhaler Take 1 Puff by inhalation daily. Qty: 2 Inhaler, Refills: 0      mupirocin (BACTROBAN) 2 % ointment Apply  to affected area three (3) times daily. 2/3/17      predniSONE (STERAPRED) 5 mg dose pack See administration instruction per 5mg dose pack  Qty: 21 Tab, Refills: 0    Associated Diagnoses: Leg swelling      oxyCODONE-acetaminophen (PERCOCET 7.5) 7.5-325 mg per tablet Take 1 Tab by mouth every four (4) hours as needed for Pain. Max Daily Amount: 6 Tabs. Qty: 60 Tab, Refills: 0      cephALEXin (KEFLEX) 500 mg capsule Take 1 Cap by mouth four (4) times daily. Qty: 28 Cap, Refills: 0    Associated Diagnoses: Cellulitis and abscess of lower extremity      ciprofloxacin HCl (CIPRO) 500 mg tablet Take 500 mg by mouth two (2) times a day. rosuvastatin (CRESTOR) 20 mg tablet Take 1 Tab by mouth nightly. Qty: 30 Tab, Refills: 2      metFORMIN (GLUCOPHAGE) 1,000 mg tablet Take 1,000 mg by mouth daily. hydroCHLOROthiazide (HYDRODIURIL) 25 mg tablet Take 25 mg by mouth daily. Take 1 tab daily      multivitamin (ONE A DAY) tablet Take 1 Tab by mouth daily. metoprolol tartrate (LOPRESSOR) 50 mg tablet Take 50 mg by mouth two (2) times a day. omeprazole (PRILOSEC) 20 mg capsule Take 40 mg by mouth daily. nitroglycerin (NITROSTAT) 0.4 mg SL tablet 1 Tab by SubLINGual route every five (5) minutes as needed for Chest Pain. Qty: 25 Tab, Refills: 3      aspirin 81 mg tablet Take 81 mg by mouth two (2) times a day.               Follow-up Information     Follow up With Details Comments [address], DO Schedule an appointment as soon as possible for a visit in 2 days Further evaluation [address]  [phone]       Schedule an appointment as soon as possible for a visit in 3 days Further evaluation Dr Guillermo Jasso Call in 1 day Further evaluation [address]  [phone]           Written by Virgil BAIRD

## 2017-02-20 NOTE — PROCEDURES
[facility]  *** FINAL REPORT ***    Name: Evangelina Cee  MRN: [id number]  : 22 Oct 1960  HIS Order #: [id number]  [address] [facility] Visit #: [id number]  Date: 2017    TYPE OF TEST: Peripheral Venous Testing    REASON FOR TEST  Pain in limb    Right Leg:-  Deep venous thrombosis:           No  Superficial venous thrombosis:    No  Deep venous insufficiency:        Not examined  Superficial venous insufficiency: Not examined      INTERPRETATION/FINDINGS  Right leg :  Duplex images were obtained using 2-D gray scale, color flow, and  spectral Doppler analysis. 1. No evidence of deep venous thrombosis detected in the veins  visualized. 2. Deep veins visualized include the common femoral, femoral,  popliteal, posterior tibial and peroneal veins. 3. No evidence of superficial thrombosis detected. 4. Superficial veins visualized include the proximal great saphenous  vein. 5. No evidence of deep vein thrombosis in the contralateral common  femoral vein. ADDITIONAL COMMENTS  No change when compared to previous exam on 17. I have personally reviewed the data relevant to the interpretation of  this  study.     TECHNOLOGIST: Samara Olson RVT  Signed: 2017 06:18 PM    PHYSICIAN: Hafsa Mancini MD  Signed: 2017 07:47 AM

## 2017-02-20 NOTE — ED NOTES
Aminah Schilling is a 61 y.o. male that was discharged in stable. Pt was accompanied by spouse. Pt is not driving. The patients diagnosis, condition and treatment were explained to  patient and aftercare instructions were given. The patient verbalized understanding. Patient armband removed and shredded.

## 2017-02-20 NOTE — DISCHARGE INSTRUCTIONS
Cellulitis: Care Instructions  Your Care Instructions    Cellulitis is a skin infection. It often occurs after a break in the skin from a scrape, cut, bite, or puncture, or after a rash. The doctor has checked you carefully, but problems can develop later. If you notice any problems or new symptoms, get medical treatment right away. Follow-up care is a key part of your treatment and safety. Be sure to make and go to all appointments, and call your doctor if you are having problems. It's also a good idea to know your test results and keep a list of the medicines you take. How can you care for yourself at home? · Take your antibiotics as directed. Do not stop taking them just because you feel better. You need to take the full course of antibiotics. · Prop up the infected area on pillows to reduce pain and swelling. Try to keep the area above the level of your heart as often as you can. · If your doctor told you how to care for your wound, follow your doctor's instructions. If you did not get instructions, follow this general advice:  ¨ Wash the wound with clean water 2 times a day. Don't use hydrogen peroxide or alcohol, which can slow healing. ¨ You may cover the wound with a thin layer of petroleum jelly, such as Vaseline, and a nonstick bandage. ¨ Apply more petroleum jelly and replace the bandage as needed. · Be safe with medicines. Take pain medicines exactly as directed. ¨ If the doctor gave you a prescription medicine for pain, take it as prescribed. ¨ If you are not taking a prescription pain medicine, ask your doctor if you can take an over-the-counter medicine. To prevent cellulitis in the future  · Try to prevent cuts, scrapes, or other injuries to your skin. Cellulitis most often occurs where there is a break in the skin. · If you get a scrape, cut, mild burn, or bite, wash the wound with clean water as soon as you can to help avoid infection.  Don't use hydrogen peroxide or alcohol, which can slow healing. · If you have swelling in your legs (edema), support stockings and good skin care may help prevent leg sores and cellulitis. · Take care of your feet, especially if you have diabetes or other conditions that increase the risk of infection. Wear shoes and socks. Do not go barefoot. If you have athlete's foot or other skin problems on your feet, talk to your doctor about how to treat them. When should you call for help? Call your doctor now or seek immediate medical care if:  · You have signs that your infection is getting worse, such as:  ¨ Increased pain, swelling, warmth, or redness. ¨ Red streaks leading from the area. ¨ Pus draining from the area. ¨ A fever. · You get a rash. Watch closely for changes in your health, and be sure to contact your doctor if:  · You are not getting better after 1 day (24 hours). · You do not get better as expected. Where can you learn more? Go to http://ramos-meghan.info/. Jamin Suresh in the search box to learn more about \"Cellulitis: Care Instructions. \"  Current as of: February 5, 2016  Content Version: 11.1  © 8198-3765 [employer]. Care instructions adapted under license by [employer] (which disclaims liability or warranty for this information). If you have questions about a medical condition or this instruction, always ask your healthcare professional. [address] any warranty or liability for your use of this information. WeatherBug Activation    Thank you for requesting access to WeatherBug. Please follow the instructions below to securely access and download your online medical record. WeatherBug allows you to send messages to your doctor, view your test results, renew your prescriptions, schedule appointments, and more. How Do I Sign Up? 1. In your internet browser, go to [email or web address]  2. Click on the First Time User? Click Here link in the Sign In box.  You will be redirect to the New Member Sign Up page. 3. Enter your Sounder Access Code exactly as it appears below. You will not need to use this code after youve completed the sign-up process. If you do not sign up before the expiration date, you must request a new code. Sounder Access Code: [email or web address]  Expires: 3/16/2017 11:39 AM (This is the date your Sounder access code will )    4. Enter the last four digits of your Social Security Number (xxxx) and Date of Birth (mm/dd/yyyy) as indicated and click Submit. You will be taken to the next sign-up page. 5. Create a inContactt ID. This will be your Sounder login ID and cannot be changed, so think of one that is secure and easy to remember. 6. Create a Sounder password. You can change your password at any time. 7. Enter your Password Reset Question and Answer. This can be used at a later time if you forget your password. 8. Enter your e-mail address. You will receive e-mail notification when new information is available in [address] 9. Click Sign Up. You can now view and download portions of your medical record. 10. Click the Download Summary menu link to download a portable copy of your medical information. Additional Information    If you have questions, please visit the Frequently Asked Questions section of the Sounder website at [email or web address] Remember, Sounder is NOT to be used for urgent needs. For medical emergencies, dial 911.

## 2017-02-24 ENCOUNTER — OFFICE VISIT (OUTPATIENT)
Dept: SURGERY | Age: 57
End: 2017-02-24

## 2017-02-24 VITALS
WEIGHT: 164 LBS | TEMPERATURE: 98.2 F | HEIGHT: 73 IN | HEART RATE: 92 BPM | BODY MASS INDEX: 21.74 KG/M2 | OXYGEN SATURATION: 92 % | RESPIRATION RATE: 17 BRPM

## 2017-02-24 DIAGNOSIS — Z12.11 COLON CANCER SCREENING: Primary | ICD-10-CM

## 2017-02-24 RX ORDER — CLINDAMYCIN HYDROCHLORIDE 300 MG/1
300 CAPSULE ORAL 3 TIMES DAILY
COMMUNITY
End: 2017-05-08 | Stop reason: ALTCHOICE

## 2017-02-24 NOTE — MR AVS SNAPSHOT
Visit Information Date & Time Provider Department Dept. Phone Encounter #  
 2/24/2017  9:00 AM TSS [facility] NURSE VISIT [facility] [phone] [id number] Your Appointments 3/24/2017  1:45 PM  
Follow Up with Caterina Victoria MD  
[address] ([facility]) Appt Note: 4WK FU FROM 2/9/17 W/CT PRIOR  
 [address] [place] [address], [address] [address]  
[phone]  
  
   
 [address] [place] [address], [address] [address]  
  
    
 4/4/2017  8:10 AM  
LAB with Lara Parmar DO [facility] (--) Appt Note: 3 mo f/u lab [address] [address]  
[phone]  
  
   
 [address] [address] 4/11/2017  3:45 PM  
ROUTINE CARE with Lara Parmar DO [facility] (--) Appt Note: 3 mo f/u  
 [address] [id number] [phone]  
  
   
 [address] [address]  
  
    
 4/26/2017  9:20 AM  
Follow Up with Andrew Andersen DO [facility] ([facility]) Appt Note: 1 month f/up; 2/20 LMOM to confirm appt. ..sb; 1 month f/up [place] [address] [address] [address] [phone] [address] [facility] [address] Upcoming Health Maintenance Date Due Hepatitis C Screening 1960 DTaP/Tdap/Td series (1 - Tdap) 10/22/1980 FOBT Q 1 YEAR AGE 50-75 10/22/2009 INFLUENZA AGE 9 TO ADULT 8/1/2016 Pneumococcal 19-64 Medium Risk (1 of 1 - PPSV23) 3/9/2017* *Topic was postponed. The date shown is not the original due date. Allergies as of 2/24/2017  Review Complete On: 2/24/2017 By: Constance Schneider. Early, LPN No Known Allergies Current Immunizations  Never Reviewed No immunizations on file. Not reviewed this visit Vitals Pulse 92       
  
BMI and BSA Data Body Mass Index Body Surface Area  
 21.64 kg/m 2 1.96 m 2 Preferred Pharmacy Pharmacy Name [facility], [address] [place] [address] [phone] Your Updated Medication List  
  
   
This list is accurate as of: 2/24/17  9:12 AM.  Always use your most recent med list.  
  
  
  
  
 aspirin 81 mg tablet Take 81 mg by mouth two (2) times a day. BACTROBAN 2 % ointment Generic drug:  mupirocin Apply  to affected area three (3) times daily. 2/3/17  
  
 cephALEXin 500 mg capsule Commonly known as:  Frankie Frohlich Take 1 Cap by mouth four (4) times daily. CIPRO 500 mg tablet Generic drug:  ciprofloxacin HCl Take 500 mg by mouth two (2) times a day. clindamycin 300 mg capsule Commonly known as:  CLEOCIN Take 300 mg by mouth three (3) times daily. fluticasone-vilanterol 100-25 mcg/dose inhaler Commonly known as:  BREO ELLIPTA Take 1 Puff by inhalation daily. hydroCHLOROthiazide 25 mg tablet Commonly known as:  HYDRODIURIL Take 25 mg by mouth daily. Take 1 tab daily  
  
 metFORMIN 1,000 mg tablet Commonly known as:  GLUCOPHAGE Take 1,000 mg by mouth daily. metoprolol tartrate 50 mg tablet Commonly known as:  LOPRESSOR Take 50 mg by mouth two (2) times a day. multivitamin tablet Commonly known as:  ONE A DAY Take 1 Tab by mouth daily. nitroglycerin 0.4 mg SL tablet Commonly known as:  NITROSTAT  
1 Tab by SubLINGual route every five (5) minutes as needed for Chest Pain. oxyCODONE-acetaminophen 7.5-325 mg per tablet Commonly known as:  PERCOCET 7.5 Take 1 Tab by mouth every four (4) hours as needed for Pain. Max Daily Amount: 6 Tabs. predniSONE 5 mg dose pack Commonly known as:  STERAPRED See administration instruction per 5mg dose pack PriLOSEC 20 mg capsule Generic drug:  omeprazole Take 40 mg by mouth daily. rosuvastatin 20 mg tablet Commonly known as:  CRESTOR Take 1 Tab by mouth nightly. To-Do List   
 03/06/2017 6:30 PM  
  Appointment with HBV CT RM 1 at [facility] RAD CT ([phone]) GENERAL INSTRUCTIONS  If you were given medications to take for a contrast allergy prior to having this study, please arrange to have someone drive you to your appointment. If you have had a creatinine level drawn within the past 30 days, please bring most recent results to your appt. This study does not require you to drink contrast prior to your study. MEDICATIONS  Bring a complete list of all medications you are currently taking to include prescriptions, over-the-counter meds, herbals, vitamins & any dietary supplements. OUTSIDE FILMS  Bring outside films, CDs, and reports related to the study with you on the day of your exam.  QUESTIONS  Notify the CT Department if you have any questions concerning your study. Mauro Sleek - [phone] [facility] - [phone] Introducing [facility]! Daniella Garnica introduces Jiujiuweikang patient portal. Now you can access parts of your medical record, email your doctor's office, and request medication refills online. 1. In your internet browser, go to [email or web address] Market 2. Click on the First Time User? Click Here link in the Sign In box. You will see the New Member Sign Up page. 3. Enter your Jiujiuweikang Access Code exactly as it appears below. You will not need to use this code after youve completed the sign-up process. If you do not sign up before the expiration date, you must request a new code. · Jiujiuweikang Access Code: [email or web address] Expires: 3/16/2017 11:39 AM 
 
4. Enter the last four digits of your Social Security Number (xxxx) and Date of Birth (mm/dd/yyyy) as indicated and click Submit. You will be taken to the next sign-up page. 5. Create a [employer] ID. This will be your [employer] login ID and cannot be changed, so think of one that is secure and easy to remember. 6. Create a [employer] password. You can change your password at any time. 7. Enter your Password Reset Question and Answer. This can be used at a later time if you forget your password. 8. Enter your e-mail address. You will receive e-mail notification when new information is available in [address] 9. Click Sign Up. You can now view and download portions of your medical record. 10. Click the Download Summary menu link to download a portable copy of your medical information. If you have questions, please visit the Frequently Asked Questions section of the [employer] website. Remember, [employer] is NOT to be used for urgent needs. For medical emergencies, dial 911. Now available from your iPhone and Android! Please provide this summary of care documentation to your next provider. Your primary care clinician is listed as Abiodun Shanks. If you have any questions after today's visit, please call [phone].

## 2017-02-24 NOTE — PROGRESS NOTES
Review of Systems   Constitutional: Positive for fever and weight loss. Negative for chills, diaphoresis and malaise/fatigue. HENT: Negative. Eyes: Negative. Respiratory: Negative. Cardiovascular: Positive for leg swelling. Negative for chest pain, palpitations, orthopnea, claudication and PND. Right knee   Gastrointestinal: Negative. Loose stool due to metformin   Genitourinary: Negative. Musculoskeletal: Positive for back pain, joint pain and neck pain. Negative for falls and myalgias. Right knee and leg   Skin: Negative. Neurological: Positive for tingling. Negative for dizziness, tremors, sensory change, speech change, focal weakness, seizures, loss of consciousness and weakness. Tingling and numbness in feet   Endo/Heme/Allergies: Negative for environmental allergies and polydipsia. Bruises/bleeds easily. Psychiatric/Behavioral: Negative for depression, hallucinations, memory loss, substance abuse and suicidal ideas. The patient is nervous/anxious and has insomnia. Colon Screen    Patient: Joycelyn Baca MRN: [id number]  SSN: [id number]    YOB: 1960  Age: 61 y.o. Sex: male        Subjective:   Joycelyn Baca was referred by his PCP, Aaliyah Robin DO. Patient referred for colonoscopy for   Screening colonoscopy. Patient denies rectal pain or bleeding. Abdominal surgeries as described below, specifically none. Family history as described below, specifically none. Patient has never had a colonoscopy. He is being treated now for MRSA and cellulitis in his right leg with antibiotics and is seeing infectious disease. He will be given a 2 day prep due to his daily use of narcotics for this issue. Hopefully his leg will be healed by the time his colonoscopy is done.     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 RCA mild. LAD tortuous. CX mild. CX angela 100%. S/P balloon angioplasty of CX angela. Residual 40%. LVEDP 20.  EF 65%.  Cardiac nuclear imaging test 08/30/2006    No ischemia or infarct. EF 59%. Negative max. stress test.    Chronic cough     coughing up sputum    Chronic lung disease     COPD (chronic obstructive pulmonary disease) ([facility])     moderate to severe    Coronary artery disease     Diabetes ([address]     Diabetes mellitus ([address]     GARCIA (dyspnea on exertion)     GERD (gastroesophageal reflux disease)     H/O: pneumonia     Heartburn     Hypertension     essential systemic    Indigestion     Pneumonia 8/12    \"walking\"    Pure hypercholesterolemia     Tobacco abuse      Past Surgical History:   Procedure Laterality Date    HX HEART CATHETERIZATION  01/26/06    HX ORTHOPAEDIC  1989    fracture of bilateral hands     HX PTCA      of anomalous circumflex marginal artery    TONGUE AND MOUTH SURG UNLISTED  04/04    tongue surgery    TONGUE TO LIP SURGERY  04/04      Family History   Problem Relation Age of Onset    COPD Mother      cause of death    Asthma Father     Cancer Father     Colon Polyps Father      Social History   Substance Use Topics    Smoking status: Former Smoker     Packs/day: 1.25     Years: 20.00     Types: Cigarettes     Quit date: 1/1/2014    Smokeless tobacco: Never Used    Alcohol use Yes      Comment: on special occasions only      Prior to Admission medications    Medication Sig Start Date End Date Taking? Authorizing Provider   clindamycin (CLEOCIN) 300 mg capsule Take 300 mg by mouth three (3) times daily.    Yes Historical Provider oxyCODONE-acetaminophen (PERCOCET 7.5) 7.5-325 mg per tablet Take 1 Tab by mouth every four (4) hours as needed for Pain. Max Daily Amount: 6 Tabs. 2/6/17  Yes Jose Mercado DO   rosuvastatin (CRESTOR) 20 mg tablet Take 1 Tab by mouth nightly. 1/9/17  Yes Jose Mercado DO   metFORMIN (GLUCOPHAGE) 1,000 mg tablet Take 1,000 mg by mouth daily. Yes Historical Provider   hydroCHLOROthiazide (HYDRODIURIL) 25 mg tablet Take 25 mg by mouth daily. Take 1 tab daily   Yes Historical Provider   multivitamin (ONE A DAY) tablet Take 1 Tab by mouth daily. Yes Historical Provider   omeprazole (PRILOSEC) 20 mg capsule Take 40 mg by mouth daily. Yes Historical Provider   aspirin 81 mg tablet Take 81 mg by mouth two (2) times a day. Yes Historical Provider   fluticasone-vilanterol (BREO ELLIPTA) 100-25 mcg/dose inhaler Take 1 Puff by inhalation daily. 2/9/17   Kat Elizabeth MD   mupirocin (BACTROBAN) 2 % ointment Apply  to affected area three (3) times daily. 2/3/17 2/3/17   Historical Provider   predniSONE (STERAPRED) 5 mg dose pack See administration instruction per 5mg dose pack 2/6/17   Jose Mercado DO   cephALEXin [facility]) 500 mg capsule Take 1 Cap by mouth four (4) times daily. 2/1/17   Jose Mercado DO   ciprofloxacin HCl (CIPRO) 500 mg tablet Take 500 mg by mouth two (2) times a day. Historical Provider   metoprolol tartrate (LOPRESSOR) 50 mg tablet Take 50 mg by mouth two (2) times a day. Historical Provider   nitroglycerin (NITROSTAT) 0.4 mg SL tablet 1 Tab by SubLINGual route every five (5) minutes as needed for Chest Pain. 8/25/11   Nichol Link DO          Review of Systems:      Risks colonoscopy described- colon injury, missed lesion, anesthesia problems, bleeding       Rosi Arevalo, IZABELAN  February 24, 0505  9:21 AM

## 2017-03-06 ENCOUNTER — HOSPITAL ENCOUNTER (OUTPATIENT)
Dept: CT IMAGING | Age: 57
Discharge: HOME | End: 2017-03-06
Attending: INTERNAL MEDICINE
Payer: SELF-PAY

## 2017-03-06 ENCOUNTER — HOSPITAL ENCOUNTER (OUTPATIENT)
Dept: LAB | Age: 57
Discharge: HOME | End: 2017-03-06
Attending: INTERNAL MEDICINE
Payer: SELF-PAY

## 2017-03-06 DIAGNOSIS — R91.1 LUNG NODULE: ICD-10-CM

## 2017-03-06 DIAGNOSIS — J44.1 COPD EXACERBATION (HCC): ICD-10-CM

## 2017-03-06 LAB
CREAT UR-MCNC: 1.2 MG/DL (ref 0.6–1.3)
T4 SERPL-MCNC: 10.2 UG/DL (ref 4.5–10.9)
TSH SERPL DL<=0.05 MIU/L-ACNC: 0.65 UIU/ML (ref 0.36–3.74)

## 2017-03-06 PROCEDURE — 84436 ASSAY OF TOTAL THYROXINE: CPT | Performed by: INTERNAL MEDICINE

## 2017-03-06 PROCEDURE — 84443 ASSAY THYROID STIM HORMONE: CPT | Performed by: INTERNAL MEDICINE

## 2017-03-06 PROCEDURE — 71260 CT THORAX DX C+: CPT

## 2017-03-06 PROCEDURE — 82565 ASSAY OF CREATININE: CPT

## 2017-03-06 PROCEDURE — 36415 COLL VENOUS BLD VENIPUNCTURE: CPT | Performed by: INTERNAL MEDICINE

## 2017-03-06 PROCEDURE — 74011636320 HC RX REV CODE- 636/320: Performed by: INTERNAL MEDICINE

## 2017-03-06 RX ADMIN — IOPAMIDOL 80 ML: 612 INJECTION, SOLUTION INTRAVENOUS at 16:00

## 2017-03-24 ENCOUNTER — OFFICE VISIT (OUTPATIENT)
Dept: PULMONOLOGY | Age: 57
End: 2017-03-24

## 2017-03-24 VITALS
BODY MASS INDEX: 21.74 KG/M2 | SYSTOLIC BLOOD PRESSURE: 140 MMHG | TEMPERATURE: 98 F | RESPIRATION RATE: 16 BRPM | HEART RATE: 89 BPM | HEIGHT: 73 IN | DIASTOLIC BLOOD PRESSURE: 80 MMHG | OXYGEN SATURATION: 96 % | WEIGHT: 164 LBS

## 2017-03-24 DIAGNOSIS — J44.9 COPD WITH ASTHMA (HCC): Primary | ICD-10-CM

## 2017-03-24 DIAGNOSIS — R63.4 WEIGHT LOSS, NON-INTENTIONAL: ICD-10-CM

## 2017-03-24 RX ORDER — FLUTICASONE FUROATE AND VILANTEROL 100; 25 UG/1; UG/1
1 POWDER RESPIRATORY (INHALATION) DAILY
Qty: 3 INHALER | Refills: 0 | Status: SHIPPED | COMMUNITY
Start: 2017-03-24 | End: 2017-11-03 | Stop reason: SDUPTHER

## 2017-03-24 NOTE — PROGRESS NOTES
[facility]  Pulmonary, Critical Care, and Sleep Medicine      Pulmonary Office visit    Name: Joycelyn Baca     : 1960     Date: 3/24/2017        Subjective:     Patient is a 61 y.o. male is here for evaluation for COPD.  17   Feels better after starting Breo and having his knee infection treated. Appetite better and has gained 10 lbs. Awaiting colonoscopy. Had Thyroid function and CT scan completed. Denies any new concerns. HPI:  Seen after 3 years and relates that he has really had significant problem with weight loss in the last 6 months and he is down on his bathroom scale from 209 to as low as 157. He has had some problems with greenish sputum production, but denies any other pulmonary issues although he is also complaining of large clots of blood coming from his right nares at times. denies any hemoptysis. Denies any SOB or limitation in his ADL's or work from SOB. He denies any chest pain but he has developed some swelling in his right leg particularly and some cellulitis which is now being treated after recent ER visit. He is also complaining of some pain in his right leg. He really does not have any orthopnea but seems to have occasional PND. He reports chronic diarrhea since starting Metformin- he was on 2000 mg which he cut back to 1000 mg and still has diarrhea. He has a GI evaluation upcoming. Mr. 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 This was opened with a wire and balloon, but we were unable to stent it. There was a residual stenosis of 40% with JB grade 3 flow following the procedure. Left ventricular function at that time was grossly normal with an ejection fraction in the 60% range.   He has done well on medical therapy over the years without any recurrent cardiovascular issues. He did quit smoking completely in January of 2014 and has been able to stay off cigarettes now for 4 years.     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 RCA mild. LAD tortuous. CX mild. CX angela 100%. S/P balloon angioplasty of CX angela. Residual 40%. LVEDP 20.  EF 65%.  Cardiac nuclear imaging test 08/30/2006    No ischemia or infarct. EF 59%. Negative max.  stress test.    Chronic cough     coughing up sputum    Chronic lung disease     COPD [facility] obstructive [facility]) ([facility])     moderate to severe    Coronary artery disease     Diabetes ([address]     Diabetes mellitus ([address]     GARCIA (dyspnea on exertion)     GERD (gastroesophageal reflux disease)     H/O: pneumonia     Heartburn     Hypertension     essential systemic    Indigestion     Pneumonia 8/12    \"walking\"    Pure hypercholesterolemia     Tobacco abuse        Past Surgical History:   Procedure Laterality Date    HX HEART CATHETERIZATION  01/26/06    HX ORTHOPAEDIC  1989    fracture of bilateral hands     HX PTCA      of anomalous circumflex marginal artery    TONGUE AND MOUTH SURG UNLISTED  04/04    tongue surgery    TONGUE TO LIP SURGERY  04/04       Social History     Social History    Marital status:      Spouse name: N/A    Number of children: N/A    Years of education: N/A     Social History Main Topics    Smoking status: Former Smoker     Packs/day: 1.25     Years: 20.00     Types: Cigarettes     Quit date: 1/1/2014  Smokeless tobacco: Never Used    Alcohol use Yes      Comment: on special occasions only    Drug use: No    Sexual activity: Yes     Other Topics Concern    None     Social History Narrative       Family History   Problem Relation Age of Onset    COPD Mother      cause of death   [facility] Asthma Father     Cancer Father     Colon Polyps Father        No Known Allergies    . Current Outpatient Prescriptions   Medication Sig Dispense Refill    clindamycin (CLEOCIN) 300 mg capsule Take 300 mg by mouth three (3) times daily.  fluticasone-vilanterol (BREO ELLIPTA) 100-25 mcg/dose inhaler Take 1 Puff by inhalation daily. 2 Inhaler 0    mupirocin (BACTROBAN) 2 % ointment Apply  to affected area three (3) times daily. 2/3/17      predniSONE (STERAPRED) 5 mg dose pack See administration instruction per 5mg dose pack 21 Tab 0    oxyCODONE-acetaminophen (PERCOCET 7.5) 7.5-325 mg per tablet Take 1 Tab by mouth every four (4) hours as needed for Pain. Max Daily Amount: 6 Tabs. 60 Tab 0    cephALEXin (KEFLEX) 500 mg capsule Take 1 Cap by mouth four (4) times daily. 28 Cap 0    ciprofloxacin HCl (CIPRO) 500 mg tablet Take 500 mg by mouth two (2) times a day.  rosuvastatin (CRESTOR) 20 mg tablet Take 1 Tab by mouth nightly. 30 Tab 2    metFORMIN (GLUCOPHAGE) 1,000 mg tablet Take 1,000 mg by mouth daily.  hydroCHLOROthiazide (HYDRODIURIL) 25 mg tablet Take 25 mg by mouth daily. Take 1 tab daily      multivitamin (ONE A DAY) tablet Take 1 Tab by mouth daily.  metoprolol tartrate (LOPRESSOR) 50 mg tablet Take 50 mg by mouth two (2) times a day.  omeprazole (PRILOSEC) 20 mg capsule Take 40 mg by mouth daily.  nitroglycerin (NITROSTAT) 0.4 mg SL tablet 1 Tab by SubLINGual route every five (5) minutes as needed for Chest Pain. 25 Tab 3    aspirin 81 mg tablet Take 81 mg by mouth two (2) times a day.            Review of Systems:  HEENT: No epistaxis, no nasal drainage, no difficulty in swallowing, no redness in eyes  Respiratory: as above  Cardiovascular: no chest pain, no palpitations, no chronic leg edema, no syncope  Gastrointestinal: no abd pain, no vomiting, no diarrhea, no bleeding symptoms  Genitourinary: No urinary symptoms or hematuria  Integument/breast: No ulcers or rashes  Musculoskeletal:Neg  Neurological: No focal weakness, no seizures, no headaches  Behvioral/Psych: No anxiety, no depression  Constitutional: No fever, no chills, no weight loss, no night sweats     Objective:     Visit Vitals    /80 (BP 1 Location: Left arm, BP Patient Position: Sitting)    Pulse 89    Temp 98 °F (36.7 °C) (Oral)    Resp 16    Ht 6' 1\" (1.854 m)    Wt 74.4 kg (164 lb)    SpO2 96%    BMI 21.64 kg/m2        Physical Exam:   General: comfortable, no acute distress  HEENT: pupils reactive, sclera anicteric, EOM intact  Neck: No adenopathy or thyroid swelling, no lymphadenopathy or JVD, supple  CVS: S1S2 no murmurs  RS: Mod AE bilaterally, no tactile fremitus or egophony, no accessory muscle use  Abd: soft, non tender, no hepatosplenomegaly  Neuro: non focal, awake, alert  Extrm: no leg edema, clubbing or cyanosis  Skin: no rash    Data review:   PFT 1/23/2017:  Flows:  Maximal Mid Expiratory Flow rate is reduced to 23 % predicted  Forced Expiratory Volume in one second is reduced to 45 % predicted  FEV 1% is reduced  Flow Volume Loop:  Nonspecific obstructive pattern in Flow Volume Loop  Bronchodilator:  Significant improvement with bronchodilator  Impression:  Moderate to Severe obstructive defect    Imaging:  I have personally reviewed the patients radiographs and have reviewed the reports:  CT scan of chest: 3/6/2017  1. Mild emphysema. 2. No suspicious pulmonary nodules or mass. 3. Chronic medial right upper lobe parenchymal scarring. 4. Decrease in size of right hilar mildly enlarged lymph node.     CXR: 1/31/2017:  COMPARISON: May 22, 2009.     FINDINGS: PA and lateral radiographs of the chest demonstrate clear lungs. The  cardiac and mediastinal contours and pulmonary vascularity are normal. The  bones and soft tissues are within normal limits.      IMPRESSION: No acute cardiopulmonary process    CT abd/pelvis: 12/23/2016:    1. There are small indirect inguinal hernias bilaterally containing fat without  incarceration. There is a small left-sided hydrocele. 2. Atrophic right kidney. Parenchymal scarring is present involving both  kidneys. No renal stones are seen. There is no hydronephrosis. 3. Arterial atherosclerotic disease. Additional chronic changes as described  above. CT scan of chest :12/2013  Virtually complete resolution of the left lingular findings. No change focal  residual scar/fibrosis posteromedial right upper lobe from the previous  cavitary lesion or the small subpleural nodules in the anterior left lower  lobe. The lungs are otherwise clear. There is no change in the prominent  right hilar lymph node.     Hepatic steatosis without focal abnormality. The remainder of the study is  unremarkable and unchanged. IMPRESSION:   · COPD/asthma: FEV1 45% predicted. GOLD 3 and Functional class B  · Unexplained weight loss- 40 lbs without obvious cause so far. ? Metformin/diabetes/enteropathy- diarrhea. Concern for occult malignancy - CT Lung no evident lesions. , colon. ? Awaiting colonoscopy  · H/o Abnormal Ct scan of chest- lingular pneumonia with clinical and radiologic resolution. Presented in 2009 with hemoptysis- had bronchoscopy.   · Ex- smoker- quit 4 years back  · CAD  · HTN  · GERD  · Cellulitis and knee infection, no DVT      RECOMMENDATIONS:   · Continue treatment for COPD- GOLD 3 with functional class-B  · Breo ellipta - samples provided  · CT scan of chest- discussed results with patient  · Needs further evaluation for etiology of unexplained weight loss- GI evaluation pending  · Thyroid profile normal  · Will follow for COPD management  · Discussed with patient and spouse. Health maintenance screens deferred to Primary care provider.      Cleo Hinojosa MD

## 2017-03-24 NOTE — PROGRESS NOTES
Chief Complaint   Patient presents with    Lung Nodule    COPD     Patient here for follow up for Lung nodule and COPD. Patient had CT 3/6/17.

## 2017-03-24 NOTE — MR AVS SNAPSHOT
Visit Information Date & Time Provider Department Dept. Phone Encounter #  
 3/24/2017  1:45 PM Steve Sahni MD [facility] [place]  Follow-up Instructions Return in about 6 months (around 9/24/2017). Your Appointments 4/4/2017  8:10 AM  
LAB with Kim Garber DO [facility] (--) Appt Note: 3 mo f/u lab [address] [id number] [phone]  
  
   
 [address] [phone] 4/11/2017  3:45 PM  
ROUTINE CARE with Kim Garber DO [facility] (--) Appt Note: 3 mo f/u  
 [address] [address]  
[phone]  
  
   
 [address] [phone]  
  
    
 4/26/2017  9:20 AM  
Follow Up with Justyna Lane DO [facility] ([facility]) Appt Note: 1 month f/up; 2/20 LMOM to confirm appt. ..sb; 1 month f/up [address] [place] [id number] [phone] [address] [place] [address] [address] Upcoming Health Maintenance Date Due Hepatitis C Screening 1960 Pneumococcal 19-64 Medium Risk (1 of 1 - PPSV23) 10/22/1978 DTaP/Tdap/Td series (1 - Tdap) 10/22/1980 FOBT Q 1 YEAR AGE 50-75 10/22/2009 INFLUENZA AGE 9 TO ADULT 8/1/2016 Allergies as of 3/24/2017  Review Complete On: 3/24/2017 By: Belinda Flores LPN No Known Allergies Current Immunizations  Never Reviewed No immunizations on file. Not reviewed this visit Vitals BP Pulse Temp Resp Height(growth percentile) Weight(growth percentile) 140/80 (BP 1 Location: Left arm, BP Patient Position: Sitting) 89 98 °F (36.7 °C) (Oral) 16 6' 1\" (1.854 m) 164 lb (74.4 kg) SpO2 BMI Smoking Status 96% 21.64 kg/m2 Former Smoker BMI and BSA Data Body Mass Index Body Surface Area  
 21.64 kg/m 2 1.96 m 2 Preferred Pharmacy Pharmacy Name [facility] - [place] Pham, [address] [place] [address] [phone] Your Updated Medication List  
  
   
This list is accurate as of: 3/24/17  2:17 PM.  Always use your most recent med list.  
  
  
  
  
 aspirin 81 mg tablet Take 81 mg by mouth two (2) times a day. BACTROBAN 2 % ointment Generic drug:  mupirocin Apply  to affected area three (3) times daily. 2/3/17  
  
 cephALEXin 500 mg capsule Commonly known as:  Shi Alvarez Take 1 Cap by mouth four (4) times daily. CIPRO 500 mg tablet Generic drug:  ciprofloxacin HCl Take 500 mg by mouth two (2) times a day. clindamycin 300 mg capsule Commonly known as:  CLEOCIN Take 300 mg by mouth three (3) times daily. fluticasone-vilanterol 100-25 mcg/dose inhaler Commonly known as:  BREO ELLIPTA Take 1 Puff by inhalation daily. hydroCHLOROthiazide 25 mg tablet Commonly known as:  HYDRODIURIL Take 25 mg by mouth daily. Take 1 tab daily  
  
 metFORMIN 1,000 mg tablet Commonly known as:  GLUCOPHAGE Take 1,000 mg by mouth daily. metoprolol tartrate 50 mg tablet Commonly known as:  LOPRESSOR Take 50 mg by mouth two (2) times a day. multivitamin tablet Commonly known as:  ONE A DAY Take 1 Tab by mouth daily. nitroglycerin 0.4 mg SL tablet Commonly known as:  NITROSTAT  
1 Tab by SubLINGual route every five (5) minutes as needed for Chest Pain. oxyCODONE-acetaminophen 7.5-325 mg per tablet Commonly known as:  PERCOCET 7.5 Take 1 Tab by mouth every four (4) hours as needed for Pain. Max Daily Amount: 6 Tabs. predniSONE 5 mg dose pack Commonly known as:  STERAPRED See administration instruction per 5mg dose pack PriLOSEC 20 mg capsule Generic drug:  omeprazole Take 40 mg by mouth daily. rosuvastatin 20 mg tablet Commonly known as:  CRESTOR Take 1 Tab by mouth nightly. Follow-up Instructions Return in about 6 months (around 9/24/2017). Introducing [facility]! Vincenzo Livingston introduces [employer] patient portal. Now you can access parts of your medical record, email your doctor's office, and request medication refills online. 1. In your internet browser, go to [email or web address] 2. Click on the First Time User? Click Here link in the Sign In box. You will see the New Member Sign Up page. 3. Enter your [employer] Access Code exactly as it appears below. You will not need to use this code after youve completed the sign-up process. If you do not sign up before the expiration date, you must request a new code. · [employer] Access Code: [email or web address] Expires: 6/17/2017  8:52 AM 
 
4. Enter the last four digits of your Social Security Number (xxxx) and Date of Birth (mm/dd/yyyy) as indicated and click Submit. You will be taken to the next sign-up page. 5. Create a [employer] ID. This will be your [employer] login ID and cannot be changed, so think of one that is secure and easy to remember. 6. Create a [employer] password. You can change your password at any time. 7. Enter your Password Reset Question and Answer. This can be used at a later time if you forget your password. 8. Enter your e-mail address. You will receive e-mail notification when new information is available in [address] 9. Click Sign Up. You can now view and download portions of your medical record. 10. Click the Download Summary menu link to download a portable copy of your medical information. If you have questions, please visit the Frequently Asked Questions section of the [employer] website. Remember, [employer] is NOT to be used for urgent needs. For medical emergencies, dial 911. Now available from your iPhone and Android! Please provide this summary of care documentation to your next provider. Your primary care clinician is listed as Antoinette Alcala. If you have any questions after today's visit, please call [phone].

## 2017-04-04 ENCOUNTER — HOSPITAL ENCOUNTER (OUTPATIENT)
Dept: LAB | Age: 57
Discharge: HOME | End: 2017-04-04
Payer: SELF-PAY

## 2017-04-04 ENCOUNTER — LAB ONLY (OUTPATIENT)
Dept: INTERNAL MEDICINE CLINIC | Age: 57
End: 2017-04-04

## 2017-04-04 DIAGNOSIS — Z11.59 NEED FOR HEPATITIS C SCREENING TEST: ICD-10-CM

## 2017-04-04 DIAGNOSIS — E78.5 DYSLIPIDEMIA: ICD-10-CM

## 2017-04-04 DIAGNOSIS — R63.4 WEIGHT LOSS, ABNORMAL: ICD-10-CM

## 2017-04-04 LAB
ALBUMIN SERPL BCP-MCNC: 4.1 G/DL (ref 3.4–5)
ALBUMIN/GLOB SERPL: 1.2 {RATIO} (ref 0.8–1.7)
ALP SERPL-CCNC: 91 U/L (ref 45–117)
ALT SERPL-CCNC: 21 U/L (ref 16–61)
ANION GAP BLD CALC-SCNC: 14 MMOL/L (ref 3–18)
AST SERPL W P-5'-P-CCNC: 18 U/L (ref 15–37)
BASOPHILS # BLD AUTO: 0.1 K/UL (ref 0–0.06)
BASOPHILS # BLD: 1 % (ref 0–2)
BILIRUB SERPL-MCNC: 0.4 MG/DL (ref 0.2–1)
BUN SERPL-MCNC: 13 MG/DL (ref 7–18)
BUN/CREAT SERPL: 13 (ref 12–20)
CALCIUM SERPL-MCNC: 9.5 MG/DL (ref 8.5–10.1)
CHLORIDE SERPL-SCNC: 100 MMOL/L (ref 100–108)
CHOLEST SERPL-MCNC: 157 MG/DL
CO2 SERPL-SCNC: 25 MMOL/L (ref 21–32)
CREAT SERPL-MCNC: 1.02 MG/DL (ref 0.6–1.3)
DIFFERENTIAL METHOD BLD: ABNORMAL
EOSINOPHIL # BLD: 0.1 K/UL (ref 0–0.4)
EOSINOPHIL NFR BLD: 1 % (ref 0–5)
ERYTHROCYTE [DISTWIDTH] IN BLOOD BY AUTOMATED COUNT: 14.8 % (ref 11.6–14.5)
GLOBULIN SER CALC-MCNC: 3.3 G/DL (ref 2–4)
GLUCOSE SERPL-MCNC: 145 MG/DL (ref 74–99)
HCT VFR BLD AUTO: 43.6 % (ref 36–48)
HDLC SERPL-MCNC: 65 MG/DL (ref 40–60)
HDLC SERPL: 2.4 {RATIO} (ref 0–5)
HGB BLD-MCNC: 13.6 G/DL (ref 13–16)
LDLC SERPL CALC-MCNC: 62.2 MG/DL (ref 0–100)
LIPID PROFILE,FLP: ABNORMAL
LYMPHOCYTES # BLD AUTO: 27 % (ref 21–52)
LYMPHOCYTES # BLD: 2.6 K/UL (ref 0.9–3.6)
MCH RBC QN AUTO: 29.6 PG (ref 24–34)
MCHC RBC AUTO-ENTMCNC: 31.2 G/DL (ref 31–37)
MCV RBC AUTO: 95 FL (ref 74–97)
MONOCYTES # BLD: 0.5 K/UL (ref 0.05–1.2)
MONOCYTES NFR BLD AUTO: 5 % (ref 3–10)
NEUTS SEG # BLD: 6.4 K/UL (ref 1.8–8)
NEUTS SEG NFR BLD AUTO: 66 % (ref 40–73)
PLATELET # BLD AUTO: 330 K/UL (ref 135–420)
PMV BLD AUTO: 9 FL (ref 9.2–11.8)
POTASSIUM SERPL-SCNC: 3.7 MMOL/L (ref 3.5–5.5)
PROT SERPL-MCNC: 7.4 G/DL (ref 6.4–8.2)
RBC # BLD AUTO: 4.59 M/UL (ref 4.7–5.5)
SODIUM SERPL-SCNC: 139 MMOL/L (ref 136–145)
TRIGL SERPL-MCNC: 149 MG/DL (ref ?–150)
VLDLC SERPL CALC-MCNC: 29.8 MG/DL
WBC # BLD AUTO: 9.6 K/UL (ref 4.6–13.2)

## 2017-04-04 PROCEDURE — 80061 LIPID PANEL: CPT | Performed by: FAMILY MEDICINE

## 2017-04-04 PROCEDURE — 80053 COMPREHEN METABOLIC PANEL: CPT | Performed by: FAMILY MEDICINE

## 2017-04-04 PROCEDURE — 36415 COLL VENOUS BLD VENIPUNCTURE: CPT | Performed by: FAMILY MEDICINE

## 2017-04-04 PROCEDURE — 85025 COMPLETE CBC W/AUTO DIFF WBC: CPT | Performed by: FAMILY MEDICINE

## 2017-04-04 PROCEDURE — 86803 HEPATITIS C AB TEST: CPT | Performed by: FAMILY MEDICINE

## 2017-04-05 LAB
HCV AB SER IA-ACNC: 0.1 INDEX
HCV AB SERPL QL IA: NEGATIVE
HCV COMMENT,HCGAC: NORMAL

## 2017-04-26 ENCOUNTER — OFFICE VISIT (OUTPATIENT)
Dept: CARDIOLOGY CLINIC | Age: 57
End: 2017-04-26

## 2017-04-26 VITALS
HEART RATE: 80 BPM | DIASTOLIC BLOOD PRESSURE: 80 MMHG | SYSTOLIC BLOOD PRESSURE: 140 MMHG | BODY MASS INDEX: 21.6 KG/M2 | HEIGHT: 73 IN | OXYGEN SATURATION: 96 % | WEIGHT: 163 LBS

## 2017-04-26 DIAGNOSIS — I11.9 BENIGN HYPERTENSIVE HEART DISEASE WITHOUT HEART FAILURE: ICD-10-CM

## 2017-04-26 DIAGNOSIS — E78.5 DYSLIPIDEMIA: ICD-10-CM

## 2017-04-26 DIAGNOSIS — I25.10 ATHEROSCLEROSIS OF NATIVE CORONARY ARTERY OF NATIVE HEART WITHOUT ANGINA PECTORIS: Primary | ICD-10-CM

## 2017-04-26 DIAGNOSIS — J44.9 CHRONIC OBSTRUCTIVE PULMONARY DISEASE, UNSPECIFIED COPD TYPE (HCC): ICD-10-CM

## 2017-04-26 NOTE — PROGRESS NOTES
Review of Systems   Constitutional: Positive for weight loss. Negative for chills, diaphoresis, fever and malaise/fatigue. Respiratory: Negative. Cardiovascular: Positive for claudication, leg swelling and PND. Negative for chest pain, palpitations and orthopnea. Gastrointestinal: Negative. Musculoskeletal: Positive for back pain, joint pain, myalgias and neck pain. Negative for falls. Neurological: Negative for weakness.

## 2017-04-26 NOTE — PROGRESS NOTES
1. Have you been to the ER, urgent care clinic since your last visit? Hospitalized since your last visit? No     2. Have you seen or consulted any other health care providers outside of the [address] [place] [address] since your last visit? Include any pap smears or colon screening.  No

## 2017-04-26 NOTE — MR AVS SNAPSHOT
Visit Information Date & Time Provider Department Dept. Phone Encounter #  
 4/26/2017  9:20 AM David Lynch DO [facility] [id number] Follow-up Instructions Return in about 7 months (around 11/26/2017), or if symptoms worsen or fail to improve. Upcoming Health Maintenance Date Due DTaP/Tdap/Td series (1 - Tdap) 10/22/1980 FOBT Q 1 YEAR AGE 50-75 10/22/2009 INFLUENZA AGE 9 TO ADULT 8/1/2016 Pneumococcal 19-64 Medium Risk (1 of 1 - PPSV23) 9/24/2017* *Topic was postponed. The date shown is not the original due date. Allergies as of 4/26/2017  Review Complete On: 4/26/2017 By: David Lynch DO No Known Allergies Current Immunizations  Never Reviewed No immunizations on file. Not reviewed this visit You Were Diagnosed With   
  
 Codes Comments Atherosclerosis of native coronary artery of native heart without angina pectoris    -  Primary ICD-10-CM: I25.10 ICD-9-CM: 414.01 Dyslipidemia     ICD-10-CM: E78.5 ICD-9-CM: 272.4 Benign hypertensive heart disease without heart failure     ICD-10-CM: I11.9 ICD-9-CM: 402.10 Chronic obstructive pulmonary disease, unspecified COPD type ([facility] 75.)     ICD-10-CM: J44.9 ICD-9-CM: 726 Vitals BP Pulse Height(growth percentile) Weight(growth percentile) SpO2 BMI  
 140/80 80 6' 1\" (1.854 m) 163 lb (73.9 kg) 96% 21.51 kg/m2 Smoking Status Former Smoker Vitals History BMI and BSA Data Body Mass Index Body Surface Area  
 21.51 kg/m 2 1.95 m 2 Preferred Pharmacy Pharmacy Name Phone [facility] - Yulissa Kelley, [facility] [place] [address] [phone] Your Updated Medication List  
  
   
This list is accurate as of: 4/26/17 10:36 AM.  Always use your most recent med list.  
  
  
  
  
 aspirin 81 mg tablet Take 81 mg by mouth two (2) times a day. BACTROBAN 2 % ointment Generic drug:  mupirocin Apply  to affected area three (3) times daily. 2/3/17  
  
 cephALEXin 500 mg capsule Commonly known as:  Azzie Severs Take 1 Cap by mouth four (4) times daily. CIPRO 500 mg tablet Generic drug:  ciprofloxacin HCl Take 500 mg by mouth two (2) times a day. clindamycin 300 mg capsule Commonly known as:  CLEOCIN Take 300 mg by mouth three (3) times daily. * fluticasone-vilanterol 100-25 mcg/dose inhaler Commonly known as:  BREO ELLIPTA Take 1 Puff by inhalation daily. * fluticasone-vilanterol 100-25 mcg/dose inhaler Commonly known as:  BREO ELLIPTA Take 1 Puff by inhalation daily. hydroCHLOROthiazide 25 mg tablet Commonly known as:  HYDRODIURIL Take 25 mg by mouth daily. Take 1 tab daily  
  
 metFORMIN 1,000 mg tablet Commonly known as:  GLUCOPHAGE Take 1,000 mg by mouth daily. metoprolol tartrate 50 mg tablet Commonly known as:  LOPRESSOR Take 50 mg by mouth two (2) times a day. multivitamin tablet Commonly known as:  ONE A DAY Take 1 Tab by mouth daily. nitroglycerin 0.4 mg SL tablet Commonly known as:  NITROSTAT  
1 Tab by SubLINGual route every five (5) minutes as needed for Chest Pain. oxyCODONE-acetaminophen 7.5-325 mg per tablet Commonly known as:  PERCOCET 7.5 Take 1 Tab by mouth every four (4) hours as needed for Pain. Max Daily Amount: 6 Tabs. predniSONE 5 mg dose pack Commonly known as:  STERAPRED See administration instruction per 5mg dose pack PriLOSEC 20 mg capsule Generic drug:  omeprazole Take 40 mg by mouth daily. rosuvastatin 20 mg tablet Commonly known as:  CRESTOR Take 1 Tab by mouth nightly. * Notice: This list has 2 medication(s) that are the same as other medications prescribed for you. Read the directions carefully, and ask your doctor or other care provider to review them with you. We Performed the Following AMB POC EKG ROUTINE W/ 12 LEADS, INTER & REP [53642 CPT(R)] Follow-up Instructions Return in about 7 months (around 11/26/2017), or if symptoms worsen or fail to improve. Introducing [facility] & HEALTH SERVICES! [employer] introduces [employer] patient portal. Now you can access parts of your medical record, email your doctor's office, and request medication refills online. 1. In your internet browser, go to [email or web address] 2. Click on the First Time User? Click Here link in the Sign In box. You will see the New Member Sign Up page. 3. Enter your [employer] Access Code exactly as it appears below. You will not need to use this code after youve completed the sign-up process. If you do not sign up before the expiration date, you must request a new code. · [employer] Access Code: [email or web address] Expires: 6/17/2017  8:52 AM 
 
4. Enter the last four digits of your Social Security Number (xxxx) and Date of Birth (mm/dd/yyyy) as indicated and click Submit. You will be taken to the next sign-up page. 5. Create a [employer] ID. This will be your [employer] login ID and cannot be changed, so think of one that is secure and easy to remember. 6. Create a [employer] password. You can change your password at any time. 7. Enter your Password Reset Question and Answer. This can be used at a later time if you forget your password. 8. Enter your e-mail address. You will receive e-mail notification when new information is available in [address] 9. Click Sign Up. You can now view and download portions of your medical record. 10. Click the Download Summary menu link to download a portable copy of your medical information. If you have questions, please visit the Frequently Asked Questions section of the [employer] website. Remember, [employer] is NOT to be used for urgent needs. For medical emergencies, dial 911. Now available from your iPhone and Android! Please provide this summary of care documentation to your next provider. Your primary care clinician is listed as Maikol Posadas. If you have any questions after today's visit, please call [phone].

## 2017-05-08 ENCOUNTER — OFFICE VISIT (OUTPATIENT)
Dept: INTERNAL MEDICINE CLINIC | Age: 57
End: 2017-05-08

## 2017-05-08 VITALS
OXYGEN SATURATION: 96 % | WEIGHT: 173.6 LBS | BODY MASS INDEX: 23.01 KG/M2 | HEIGHT: 73 IN | TEMPERATURE: 97.1 F | SYSTOLIC BLOOD PRESSURE: 147 MMHG | HEART RATE: 94 BPM | RESPIRATION RATE: 16 BRPM | DIASTOLIC BLOOD PRESSURE: 88 MMHG

## 2017-05-08 DIAGNOSIS — J44.9 CHRONIC OBSTRUCTIVE PULMONARY DISEASE, UNSPECIFIED COPD TYPE (HCC): Primary | ICD-10-CM

## 2017-05-08 DIAGNOSIS — R63.4 UNINTENTIONAL WEIGHT LOSS: ICD-10-CM

## 2017-05-08 DIAGNOSIS — M19.90 ARTHRITIS: ICD-10-CM

## 2017-05-08 DIAGNOSIS — I25.10 ATHEROSCLEROSIS OF NATIVE CORONARY ARTERY OF NATIVE HEART WITHOUT ANGINA PECTORIS: ICD-10-CM

## 2017-05-08 DIAGNOSIS — E11.42 DIABETIC POLYNEUROPATHY ASSOCIATED WITH TYPE 2 DIABETES MELLITUS (HCC): ICD-10-CM

## 2017-05-08 DIAGNOSIS — E78.5 DYSLIPIDEMIA: ICD-10-CM

## 2017-05-08 DIAGNOSIS — K21.9 GASTROESOPHAGEAL REFLUX DISEASE WITHOUT ESOPHAGITIS: ICD-10-CM

## 2017-05-08 DIAGNOSIS — I10 ESSENTIAL HYPERTENSION: ICD-10-CM

## 2017-05-08 RX ORDER — ROSUVASTATIN CALCIUM 20 MG/1
20 TABLET, COATED ORAL
Qty: 30 TAB | Refills: 5 | Status: SHIPPED | OUTPATIENT
Start: 2017-05-08 | End: 2017-11-22 | Stop reason: SDUPTHER

## 2017-05-08 RX ORDER — GABAPENTIN 300 MG/1
300 CAPSULE ORAL 3 TIMES DAILY
Qty: 90 CAP | Refills: 1 | Status: SHIPPED | OUTPATIENT
Start: 2017-05-08 | End: 2017-08-09

## 2017-05-08 RX ORDER — OXYCODONE AND ACETAMINOPHEN 7.5; 325 MG/1; MG/1
1 TABLET ORAL
Qty: 60 TAB | Refills: 0 | Status: SHIPPED | OUTPATIENT
Start: 2017-05-08 | End: 2017-05-25 | Stop reason: SDUPTHER

## 2017-05-08 RX ORDER — AMLODIPINE BESYLATE 10 MG/1
10 TABLET ORAL DAILY
Qty: 30 TAB | Refills: 1 | Status: SHIPPED | OUTPATIENT
Start: 2017-05-08 | End: 2017-07-03 | Stop reason: SDUPTHER

## 2017-05-08 NOTE — PROGRESS NOTES
Pt is here for 3 month  follow up Cholesterol, COPD    Do you have an advance directive no  Request Pt bring a copy of advance directive for scanning. Do you want information on an advance directive no    Pt mychart activation pending     1. Have you been to the ER, urgent care clinic since your last visit? Hospitalized since your last visit? [facility] 2/20/17 leg pain, [facility]    2. Have you seen or consulted any other health care providers outside of the [address] [place] [address] since your last visit? Include any pap smears or colon screening.  Yes [facility] 3/17 leg pain

## 2017-05-08 NOTE — MR AVS SNAPSHOT
Visit Information Date & Time Provider Department Dept. Phone Encounter #  
 5/8/2017  3:00 PM Maria Luz Lovett DO [facility] [employer] [id number] Follow-up Instructions Return for RTC 1 month follmarvel wutraci with BP or call in result. Your Appointments 12/5/2017  3:20 PM  
Follow Up with Yassine Hamilton DO [facility] ([address]) Appt Note: 6-8 month f/up [place] [id number] [phone] [address] [place] [address] [address] Upcoming Health Maintenance Date Due DTaP/Tdap/Td series (1 - Tdap) 10/22/1980 FOBT Q 1 YEAR AGE 50-75 10/22/2009 Pneumococcal 19-64 Medium Risk (1 of 1 - PPSV23) 9/24/2017* INFLUENZA AGE 9 TO ADULT 8/1/2017 *Topic was postponed. The date shown is not the original due date. Allergies as of 5/8/2017  Review Complete On: 5/8/2017 By: Maria Luz Lovett DO No Known Allergies Current Immunizations  Never Reviewed No immunizations on file. Not reviewed this visit You Were Diagnosed With   
  
 Codes Comments Chronic obstructive pulmonary disease, unspecified COPD type ([facility] 75.)    -  Primary ICD-10-CM: J44.9 ICD-9-CM: 059 Arthritis     ICD-10-CM: M19.90 ICD-9-CM: 716.90 Dyslipidemia     ICD-10-CM: E78.5 ICD-9-CM: 272.4 Gastroesophageal reflux disease without esophagitis     ICD-10-CM: K21.9 ICD-9-CM: 530.81 Atherosclerosis of native coronary artery of native heart without angina pectoris     ICD-10-CM: I25.10 ICD-9-CM: 414.01 Unintentional weight loss     ICD-10-CM: R63.4 ICD-9-CM: 783.21 Diabetic polyneuropathy associated with type 2 diabetes mellitus ([facility]     ICD-10-CM: E11.42 
ICD-9-CM: 250.60, 357.2 Essential hypertension     ICD-10-CM: I10 
ICD-9-CM: 401.9 Vitals BP Pulse Temp Resp Height(growth percentile) Weight(growth percentile) 147/88 94 97.1 °F (36.2 °C) (Oral) 16 6' 1\" (1.854 m) 173 lb 9.6 oz (78.7 kg) SpO2 BMI Smoking Status 96% 22.9 kg/m2 Former Smoker Vitals History BMI and BSA Data Body Mass Index Body Surface Area  
 22.9 kg/m 2 2.01 m 2 Preferred Pharmacy Pharmacy Name [facility] #[facility] Avi Manuel, [address] [place] [address],[address] [phone] Your Updated Medication List  
  
   
This list is accurate as of: 5/8/17  3:52 PM.  Always use your most recent med list. amLODIPine 10 mg tablet Commonly known as:  Kaelyn Arnt Take 1 Tab by mouth daily. aspirin 81 mg tablet Take 81 mg by mouth two (2) times a day. * fluticasone-vilanterol 100-25 mcg/dose inhaler Commonly known as:  BREO ELLIPTA Take 1 Puff by inhalation daily. * fluticasone-vilanterol 100-25 mcg/dose inhaler Commonly known as:  BREO ELLIPTA Take 1 Puff by inhalation daily. gabapentin 300 mg capsule Commonly known as:  NEURONTIN Take 1 Cap by mouth three (3) times daily. hydroCHLOROthiazide 25 mg tablet Commonly known as:  HYDRODIURIL Take 25 mg by mouth daily. Take 1 tab daily  
  
 metFORMIN 1,000 mg tablet Commonly known as:  GLUCOPHAGE Take 1,000 mg by mouth daily. metoprolol tartrate 50 mg tablet Commonly known as:  LOPRESSOR Take 50 mg by mouth two (2) times a day. multivitamin tablet Commonly known as:  ONE A DAY Take 1 Tab by mouth daily. nitroglycerin 0.4 mg SL tablet Commonly known as:  NITROSTAT  
1 Tab by SubLINGual route every five (5) minutes as needed for Chest Pain. oxyCODONE-acetaminophen 7.5-325 mg per tablet Commonly known as:  PERCOCET 7.5 Take 1 Tab by mouth every four (4) hours as needed for Pain. Max Daily Amount: 6 Tabs. PriLOSEC 20 mg capsule Generic drug:  omeprazole Take 40 mg by mouth daily. rosuvastatin 20 mg tablet Commonly known as:  CRESTOR  
 Take 1 Tab by mouth nightly. * Notice: This list has 2 medication(s) that are the same as other medications prescribed for you. Read the directions carefully, and ask your doctor or other care provider to review them with you. Prescriptions Printed Refills  
 oxyCODONE-acetaminophen (PERCOCET 7.5) 7.5-325 mg per tablet 0 Sig: Take 1 Tab by mouth every four (4) hours as needed for Pain. Max Daily Amount: 6 Tabs. Class: Print Route: Oral  
  
Prescriptions Sent to Pharmacy Refills  
 gabapentin (NEURONTIN) 300 mg capsule 1 Sig: Take 1 Cap by mouth three (3) times daily. Class: Normal  
 [facility]acy: [facility] [address] Ph #: [phone] Route: Oral  
 amLODIPine (NORVASC) 10 mg tablet 1 Sig: Take 1 Tab by mouth daily. Class: Normal  
 Pharmacy: [facility] [address] Ph #: [phone] Route: Oral  
  
Follow-up Instructions Return for RTC 1 month follo wup with BP or call in result. Patient Instructions Low Sodium Diet (2,000 Milligram): Care Instructions Your Care Instructions Too much sodium causes your body to hold on to extra water. This can raise your blood pressure and force your heart and kidneys to work harder. In very serious cases, this could cause you to be put in the hospital. It might even be life-threatening. By limiting sodium, you will feel better and lower your risk of serious problems. The most common source of sodium is salt. People get most of the salt in their diet from canned, prepared, and packaged foods. Fast food and restaurant meals also are very high in sodium. Your doctor will probably limit your sodium to less than 2,000 milligrams (mg) a day. This limit counts all the sodium in prepared and packaged foods and any salt you add to your food. Follow-up care is a key part of your treatment and safety.  Be sure to make and go to all appointments, and call your doctor if you are having problems. It's also a good idea to know your test results and keep a list of the medicines you take. How can you care for yourself at home? Read food labels · Read labels on cans and food packages. The labels tell you how much sodium is in each serving. Make sure that you look at the serving size. If you eat more than the serving size, you have eaten more sodium. · Food labels also tell you the Percent Daily Value for sodium. Choose products with low Percent Daily Values for sodium. · Be aware that sodium can come in forms other than salt, including monosodium glutamate (MSG), sodium citrate, and sodium bicarbonate (baking soda). MSG is often added to Asian food. When you eat out, you can sometimes ask for food without MSG or added salt. Buy low-sodium foods · Buy foods that are labeled \"unsalted\" (no salt added), \"sodium-free\" (less than 5 mg of sodium per serving), or \"low-sodium\" (less than 140 mg of sodium per serving). Foods labeled \"reduced-sodium\" and \"light sodium\" may still have too much sodium. Be sure to read the label to see how much sodium you are getting. · Buy fresh vegetables, or frozen vegetables without added sauces. Buy low-sodium versions of canned vegetables, soups, and other canned goods. Prepare low-sodium meals · Cut back on the amount of salt you use in cooking. This will help you adjust to the taste. Do not add salt after cooking. One teaspoon of salt has about 2,300 mg of sodium. · Take the salt shaker off the table. · Flavor your food with garlic, lemon juice, onion, vinegar, herbs, and spices. Do not use soy sauce, lite soy sauce, steak sauce, onion salt, garlic salt, celery salt, mustard, or ketchup on your food. · Use low-sodium salad dressings, sauces, and ketchup. Or make your own salad dressings and sauces without adding salt. · Use less salt (or none) when recipes call for it.  You can often use half the salt a recipe calls for without losing flavor. Other foods such as rice, pasta, and grains do not need added salt. · Rinse canned vegetables, and cook them in fresh water. This removes somebut not allof the salt. · Avoid water that is naturally high in sodium or that has been treated with water softeners, which add sodium. Call your local water company to find out the sodium content of your water supply. If you buy bottled water, read the label and choose a sodium-free brand. Avoid high-sodium foods · Avoid eating: ¨ Smoked, cured, salted, and canned meat, fish, and poultry. ¨ Ham, caro, hot dogs, and luncheon meats. ¨ Regular, hard, and processed cheese and regular peanut butter. ¨ Crackers with salted tops, and other salted snack foods such as pretzels, chips, and salted popcorn. ¨ Frozen prepared meals, unless labeled low-sodium. ¨ Canned and dried soups, broths, and bouillon, unless labeled sodium-free or low-sodium. ¨ Canned vegetables, unless labeled sodium-free or low-sodium. ¨ [employer] fries, pizza, tacos, and other fast foods. ¨ Pickles, olives, ketchup, and other condiments, especially soy sauce, unless labeled sodium-free or low-sodium. Where can you learn more? Go to http://ramos-meghan.info/. Enter T624 in the search box to learn more about \"Low Sodium Diet (2,000 Milligram): Care Instructions. \" Current as of: July 26, 2016 Content Version: 11.2 © 1024-4286 [employer]. Care instructions adapted under license by [employer] (which disclaims liability or warranty for this information). If you have questions about a medical condition or this instruction, always ask your healthcare professional. [place] [address] any warranty or liability for your use of this information. Introducing [facility]!    
 Diana Lawrence introduces [employer] patient portal. Now you can access parts of your medical record, email your doctor's office, and request medication refills online. 1. In your internet browser, go to [email or web address] 2. Click on the First Time User? Click Here link in the Sign In box. You will see the New Member Sign Up page. 3. Enter your [employer] Access Code exactly as it appears below. You will not need to use this code after youve completed the sign-up process. If you do not sign up before the expiration date, you must request a new code. · [employer] Access Code: [email or web address] Expires: 6/17/2017  8:52 AM 
 
4. Enter the last four digits of your Social Security Number (xxxx) and Date of Birth (mm/dd/yyyy) as indicated and click Submit. You will be taken to the next sign-up page. 5. Create a [employer] ID. This will be your [employer] login ID and cannot be changed, so think of one that is secure and easy to remember. 6. Create a [employer] password. You can change your password at any time. 7. Enter your Password Reset Question and Answer. This can be used at a later time if you forget your password. 8. Enter your e-mail address. You will receive e-mail notification when new information is available in [address] 9. Click Sign Up. You can now view and download portions of your medical record. 10. Click the Download Summary menu link to download a portable copy of your medical information. If you have questions, please visit the Frequently Asked Questions section of the [employer] website. Remember, [employer] is NOT to be used for urgent needs. For medical emergencies, dial 911. Now available from your iPhone and Android! Please provide this summary of care documentation to your next provider. Your primary care clinician is listed as Kash Coy. If you have any questions after today's visit, please call [phone].

## 2017-05-08 NOTE — PATIENT INSTRUCTIONS

## 2017-05-08 NOTE — PROGRESS NOTES
HISTORY OF PRESENT ILLNESS  Gem Gallegos is a 61 y.o. male. HPI  61year old established male patient in today for follow up on CAD s/p cath in 2006, GERD, moderate COPD, arthritis, dyslipidemia, and hx tobacco abuse. Since his last visit he has had a significant right knee infection s/p I and D and multiple abx. This has since resolved with the help of ortho. His main concern over the past few months was significant unintentional weight loss per him. He had been reports >40 lbs weight loss in the past year. His weight has since began to improve since his knee infection has resolved. He had also had chronic diarrhea which he had attributed to metformin (he had decrease metformin from 2000 mg for this reason) but this to has resolved with resolution of joint infection. He has seen pulm and cards to help work up weight loss (no etiology found). He has a colonoscopy scheduled. He also reports concerns regarding chronic \"pins and needles\" worse in right leg, leg pain and joint pain (multiple). he is taking his medications with no adverse side effects, he is requesting refills today. He denies CP or myalgias. 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 RCA mild. LAD tortuous. CX mild. CX angela 100%.   S/P balloon angioplasty of CX angela. Residual 40%. LVEDP 20.  EF 65%.  Cardiac nuclear imaging test 08/30/2006    No ischemia or infarct. EF 59%. Negative max. stress test.    Chronic cough     coughing up sputum    Chronic lung disease     COPD (chronic obstructive pulmonary disease) (HCC)     moderate to severe    Coronary artery disease     [facility]     Diabetes mellitus ([facility] [facility]     GARCIA (dyspnea on exertion)     GERD (gastroesophageal reflux disease)     H/O: pneumonia     Heartburn     Hypertension     essential systemic    Indigestion     Pneumonia 8/12    \"walking\"    Pure hypercholesterolemia     Tobacco abuse        Family History   Problem Relation Age of Onset    COPD Mother      cause of death    Asthma Father     Cancer Father     Colon Polyps Father        Social History   Substance Use Topics    Smoking status: Former Smoker     Packs/day: 1.25     Years: 20.00     Types: Cigarettes     Quit date: 1/1/2014    Smokeless tobacco: Never Used    Alcohol use Yes      Comment: on special occasions only        Current Outpatient Prescriptions   Medication Sig    rosuvastatin (CRESTOR) 20 mg tablet Take 1 Tab by mouth nightly.  gabapentin (NEURONTIN) 300 mg capsule Take 1 Cap by mouth three (3) times daily.  oxyCODONE-acetaminophen (PERCOCET 7.5) 7.5-325 mg per tablet Take 1 Tab by mouth every four (4) hours as needed for Pain. Max Daily Amount: 6 Tabs.  amLODIPine (NORVASC) 10 mg tablet Take 1 Tab by mouth daily.  fluticasone-vilanterol (BREO ELLIPTA) 100-25 mcg/dose inhaler Take 1 Puff by inhalation daily.  metFORMIN (GLUCOPHAGE) 1,000 mg tablet Take 1,000 mg by mouth daily.  hydroCHLOROthiazide (HYDRODIURIL) 25 mg tablet Take 25 mg by mouth daily. Take 1 tab daily    multivitamin (ONE A DAY) tablet Take 1 Tab by mouth daily.  omeprazole (PRILOSEC) 20 mg capsule Take 40 mg by mouth daily.  aspirin 81 mg tablet Take 81 mg by mouth two (2) times a day.     fluticasone-vilanterol (BREO ELLIPTA) 100-25 mcg/dose inhaler Take 1 Puff by inhalation daily.  metoprolol tartrate (LOPRESSOR) 50 mg tablet Take 50 mg by mouth two (2) times a day.  nitroglycerin (NITROSTAT) 0.4 mg SL tablet 1 Tab by SubLINGual route every five (5) minutes as needed for Chest Pain. No current facility-administered medications for this visit. Past Surgical History:   Procedure Laterality Date    HX HEART CATHETERIZATION  01/26/06    HX ORTHOPAEDIC  1989    fracture of bilateral hands     HX PTCA      of anomalous circumflex marginal artery    TONGUE AND MOUTH SURG UNLISTED  04/04    tongue surgery    TONGUE TO LIP SURGERY  04/04       Review of Systems   Constitutional: Negative for chills and fever. Eyes: Negative for blurred vision. Respiratory:        C/o chronic cough, SOB and GARCIA. Takes his breo QOD   Cardiovascular: Positive for leg swelling. Negative for chest pain and palpitations. Gastrointestinal: Negative for abdominal pain, nausea and vomiting. Musculoskeletal: Positive for back pain, joint pain and neck pain. C/o hurt his left shoulder recently   Neurological: Positive for tingling. Negative for dizziness, sensory change, speech change, focal weakness and headaches. Endo/Heme/Allergies: Bruises/bleeds easily. On ASA   See HPI    Visit Vitals    /88    Pulse 94    Temp 97.1 °F (36.2 °C) (Oral)    Resp 16    Ht 6' 1\" (1.854 m)    Wt 173 lb 9.6 oz (78.7 kg)    SpO2 96%    BMI 22.9 kg/m2     Physical Exam  Constitutional: he is oriented to person, place, and time and well-developed, well-nourished, and in no distress. Head: Normocephalic and atraumatic. Right Ear: External ear normal.   Left Ear: External ear normal.   Eyes: Pupils are equal, round, and reactive to light. Neck: Normal range of motion. Cardiovascular: Normal rate, regular rhythm, normal heart sounds and intact distal pulses.     No murmur heard.  Pulmonary/Chest: Effort normal and breath sounds normal. No respiratory distress. Musculoskeletal: +joints warm. Right knee s/p I and D changes. Normal range of motion. he exhibits no edema. Neurological: he is alert and oriented to person, place, and time. Gait normal.   Skin: +multiple bruises. Skin is warm and dry. Psychiatric: Mood, memory, affect and judgment normal.     Lab Results   Component Value Date/Time    WBC 9.6 04/04/2017 08:30 AM    HGB 13.6 04/04/2017 08:30 AM    HCT 43.6 04/04/2017 08:30 AM    PLATELET 060 12/63/6162 08:30 AM    MCV 95.0 04/04/2017 08:30 AM     Lab Results   Component Value Date/Time    Sodium 139 04/04/2017 08:30 AM    Potassium 3.7 04/04/2017 08:30 AM    Chloride 100 04/04/2017 08:30 AM    CO2 25 04/04/2017 08:30 AM    Anion gap 14 04/04/2017 08:30 AM    Glucose 145 04/04/2017 08:30 AM    BUN 13 04/04/2017 08:30 AM    Creatinine 1.02 04/04/2017 08:30 AM    BUN/Creatinine ratio 13 04/04/2017 08:30 AM    GFR est AA >60 04/04/2017 08:30 AM    GFR est non-AA >60 04/04/2017 08:30 AM    Calcium 9.5 04/04/2017 08:30 AM    Bilirubin, total 0.4 04/04/2017 08:30 AM    AST (SGOT) 18 04/04/2017 08:30 AM    Alk.  phosphatase 91 04/04/2017 08:30 AM    Protein, total 7.4 04/04/2017 08:30 AM    Albumin 4.1 04/04/2017 08:30 AM    Globulin 3.3 04/04/2017 08:30 AM    A-G Ratio 1.2 04/04/2017 08:30 AM    ALT (SGPT) 21 04/04/2017 08:30 AM     Lab Results   Component Value Date/Time    Cholesterol, total 157 04/04/2017 08:30 AM    HDL Cholesterol 65 04/04/2017 08:30 AM    LDL, calculated 62.2 04/04/2017 08:30 AM    VLDL, calculated 29.8 04/04/2017 08:30 AM    Triglyceride 149 04/04/2017 08:30 AM    CHOL/HDL Ratio 2.4 04/04/2017 08:30 AM     Lab Results   Component Value Date/Time    Hemoglobin A1c (POC) 6.5 12/16/2016 12:02 PM     Lab Results   Component Value Date/Time    TSH 0.65 03/06/2017 04:03 PM    T4, Total 10.2 03/06/2017 04:03 PM   Hep C panel: neg    ASSESSMENT and PLAN ICD-10-CM ICD-9-CM    1. Chronic obstructive pulmonary disease, unspecified COPD type ([facility] J44.9 496    2. Arthritis M19.90 716.90 oxyCODONE-acetaminophen (PERCOCET 7.5) 7.5-325 mg per tablet   3. Dyslipidemia E78.5 272.4    4. Gastroesophageal reflux disease without esophagitis K21.9 530.81    5. Atherosclerosis of native coronary artery of native heart without angina pectoris I25.10 414.01    6. Unintentional weight loss R63.4 783.21    7. Diabetic polyneuropathy associated with type 2 diabetes mellitus (HCC) E11.42 250.60 gabapentin (NEURONTIN) 300 mg capsule     357.2    8. Essential hypertension I10 401.9 amLODIPine (NORVASC) 10 mg tablet     -Labs reviewed in detail with patient  -Unintentional Weight loss has stabilized and improved  -Ctn therapeutic medications as prescribed. Norvasc added today  -Send for ortho records  -Cards and pulm most recent notes reviewed today  -Advised close follow up with specialists  -RTC 1 month follow up HTN or call with reading    -Will ctn advised exercise and dietary modifications.    -Patient agrees with assessment and plan    According to the CDC an increased BMI can lead to \"all-causes of death (mortality), High blood pressure (Hypertension), High LDL cholesterol, low HDL cholesterol, or high levels of triglycerides (Dyslipidemia), Type 2 diabetes, Coronary heart disease, Stroke, Gallbladder disease, Osteoarthritis (a breakdown of cartilage and bone within a joint), Sleep apnea and breathing problems, Chronic inflammation and increased oxidative stress, some cancers (endometrial, breast, colon, kidney, gallbladder, and liver), Low quality of life, Mental illness such as clinical depression, anxiety, and other mental disorders and Body pain and difficulty with physical functioning. \"    BMI Weight Status   Below 18.5 Underweight   18.5 - 24.9 Normal or Healthy Weight   25.0 - 29.9 Overweight   30.0 and Above Obese   40.0 and Above Morbid Obesity     Additional Instructions: The patient understands that they should contact the office at any time if any questions or concerns develop. They are also aware that they can call our main office number at [phone] at any time if they would like to address any concerns with the physician. They also understand that they should dial 911 if any acute emergency arises. The patient understands that they should give us a minimum of 48 hours to complete prescription refills once they are requested. The patient has also been instructed to contact us by calling the main office number if they have not received feedback within 2 weeks of having any tests completed. The patient is a aware that they should read all package insert information when picking up the medications and that they should consult the pharmacist of a physician if they have any questions or concerns regarding the prescribed medications. Discussed with the patient new medications given and patient instructed to read pharmacy literature regarding side effects and drug interactions. Instructions for taking the medications were provided to the patient and the consequences of not taking it. Follow-up Disposition:   Return if symptoms worsen or fail to improve. Risk and benefits of new medication discussed in detail when indicated, patient was given the opportunity to ask questions   AVS provided  reviewed diet, exercise and weight control when indicated  Alarm signals discussed. ER precautions reviewed when indicated  Plan of care reviewed with patient. Understanding verbalized and they are in agreement with plan of care.      Jed Lyon,     ==

## 2017-05-10 ENCOUNTER — TELEPHONE (OUTPATIENT)
Dept: INTERNAL MEDICINE CLINIC | Age: 57
End: 2017-05-10

## 2017-05-10 NOTE — TELEPHONE ENCOUNTER
Patient says the gabapentin is making him feel loopy. He took the first dose Monday night and woke up fine. He took the second dose on Tuesday morning and it made him feel loopy. Patient hasn't taken it since then. Please advise on what she should do.

## 2017-05-12 ENCOUNTER — TELEPHONE (OUTPATIENT)
Dept: CARDIOLOGY CLINIC | Age: 57
End: 2017-05-12

## 2017-05-12 NOTE — TELEPHONE ENCOUNTER
Called pt about colonoscopy clearance schedule for 6/8/17-6/14/17, pt stated that he hasn't had any chest pain or SOB, he was informed that if he experience any cardiac symptoms Verbal order and read back per Priscila Lesser, DO   not to proceed with the surgery. Pt verbalize understanding and had no questions or concerns.

## 2017-05-25 ENCOUNTER — OFFICE VISIT (OUTPATIENT)
Dept: INTERNAL MEDICINE CLINIC | Age: 57
End: 2017-05-25

## 2017-05-25 VITALS
OXYGEN SATURATION: 97 % | TEMPERATURE: 98 F | DIASTOLIC BLOOD PRESSURE: 75 MMHG | SYSTOLIC BLOOD PRESSURE: 142 MMHG | RESPIRATION RATE: 16 BRPM | HEIGHT: 73 IN | BODY MASS INDEX: 24.52 KG/M2 | WEIGHT: 185 LBS | HEART RATE: 96 BPM

## 2017-05-25 DIAGNOSIS — M19.90 ARTHRITIS: ICD-10-CM

## 2017-05-25 DIAGNOSIS — M10.9 ACUTE GOUT INVOLVING TOE OF RIGHT FOOT, UNSPECIFIED CAUSE: Primary | ICD-10-CM

## 2017-05-25 RX ORDER — OXYCODONE AND ACETAMINOPHEN 7.5; 325 MG/1; MG/1
1 TABLET ORAL
Qty: 60 TAB | Refills: 0 | Status: SHIPPED | OUTPATIENT
Start: 2017-05-25 | End: 2017-06-05 | Stop reason: SDUPTHER

## 2017-05-25 RX ORDER — PREDNISONE 5 MG/1
TABLET ORAL
Qty: 21 TAB | Refills: 0 | Status: SHIPPED | OUTPATIENT
Start: 2017-05-25 | End: 2017-07-03 | Stop reason: ALTCHOICE

## 2017-05-25 NOTE — PROGRESS NOTES
Pt is here for R great toe pain, redness x 5 days    Pt  mychart activation pending. 1. Have you been to the ER, urgent care clinic since your last visit? Hospitalized since your last visit? No    2. Have you seen or consulted any other health care providers outside of the [address] [place] [address] since your last visit? Include any pap smears or colon screening.  No

## 2017-05-25 NOTE — MR AVS SNAPSHOT
Visit Information Date & Time Provider Department Dept. Phone Encounter #  
 5/25/2017  3:45 PM Nicolette Borja DO [facility] [place] [address] [id number] Follow-up Instructions Return if symptoms worsen or fail to improve. Your Appointments 12/5/2017  3:20 PM  
Follow Up with Nick Warren DO [facility] \[facility]\"" ([facility]) Appt Note: 6-8 month f/up [place] [address] [address] [address] [phone] [address] Upcoming Health Maintenance Date Due DTaP/Tdap/Td series (1 - Tdap) 10/22/1980 FOBT Q 1 YEAR AGE 50-75 10/22/2009 Pneumococcal 19-64 Medium Risk (1 of 1 - PPSV23) 9/24/2017* INFLUENZA AGE 9 TO ADULT 8/1/2017 *Topic was postponed. The date shown is not the original due date. Allergies as of 5/25/2017  Review Complete On: 5/25/2017 By: Iram Nava LPN No Known Allergies Current Immunizations  Never Reviewed No immunizations on file. Not reviewed this visit You Were Diagnosed With   
  
 Codes Comments Acute gout involving toe of right foot, unspecified cause    -  Primary ICD-10-CM: M10.9 ICD-9-CM: 274.01 Arthritis     ICD-10-CM: M19.90 ICD-9-CM: 716.90 Vitals BP Pulse Temp Resp Height(growth percentile) Weight(growth percentile) 142/75 (BP 1 Location: Left arm) 96 98 °F (36.7 °C) (Oral) 16 6' 1\" (1.854 m) 185 lb (83.9 kg) SpO2 BMI Smoking Status 97% 24.41 kg/m2 Former Smoker Vitals History BMI and BSA Data Body Mass Index Body Surface Area  
 24.41 kg/m 2 2.08 m 2 Preferred Pharmacy Pharmacy Name Phone [facility], [address] [place] [address],[address] [phone] Your Updated Medication List  
  
   
This list is accurate as of: 5/25/17  4:12 PM.  Always use your most recent med list.  
  
  
  
  
 amLODIPine 10 mg tablet Commonly known as:  Moschristy Giordanok Take 1 Tab by mouth daily. aspirin 81 mg tablet Take 81 mg by mouth two (2) times a day. * fluticasone-vilanterol 100-25 mcg/dose inhaler Commonly known as:  BREO ELLIPTA Take 1 Puff by inhalation daily. * fluticasone-vilanterol 100-25 mcg/dose inhaler Commonly known as:  BREO ELLIPTA Take 1 Puff by inhalation daily. gabapentin 300 mg capsule Commonly known as:  NEURONTIN Take 1 Cap by mouth three (3) times daily. hydroCHLOROthiazide 25 mg tablet Commonly known as:  HYDRODIURIL Take 25 mg by mouth daily. Take 1 tab daily  
  
 metFORMIN 1,000 mg tablet Commonly known as:  GLUCOPHAGE Take 1,000 mg by mouth daily. multivitamin tablet Commonly known as:  ONE A DAY Take 1 Tab by mouth daily. nitroglycerin 0.4 mg SL tablet Commonly known as:  NITROSTAT  
1 Tab by SubLINGual route every five (5) minutes as needed for Chest Pain. oxyCODONE-acetaminophen 7.5-325 mg per tablet Commonly known as:  PERCOCET 7.5 Take 1 Tab by mouth every four (4) hours as needed for Pain. Max Daily Amount: 6 Tabs. predniSONE 5 mg dose pack Commonly known as:  STERAPRED See administration instruction per 5mg dose pack PriLOSEC 20 mg capsule Generic drug:  omeprazole Take 40 mg by mouth daily. rosuvastatin 20 mg tablet Commonly known as:  CRESTOR Take 1 Tab by mouth nightly. * Notice: This list has 2 medication(s) that are the same as other medications prescribed for you. Read the directions carefully, and ask your doctor or other care provider to review them with you. Prescriptions Printed Refills  
 oxyCODONE-acetaminophen (PERCOCET 7.5) 7.5-325 mg per tablet 0 Sig: Take 1 Tab by mouth every four (4) hours as needed for Pain. Max Daily Amount: 6 Tabs. Class: Print Route: Oral  
  
Prescriptions Sent to Pharmacy Refills predniSONE (STERAPRED) 5 mg dose pack 0 Sig: See administration instruction per 5mg dose pack Class: Normal  
 Pharmacy: [facility] [address] #: [phone] Follow-up Instructions Return if symptoms worsen or fail to improve. Patient Instructions Gout: Care Instructions Your Care Instructions Gout is a form of arthritis caused by a buildup of uric acid crystals in a joint. It causes sudden attacks of pain, swelling, redness, and stiffness, usually in one joint, especially the big toe. Gout usually comes on without a cause. But it can be brought on by drinking alcohol (especially beer) or eating seafood and red meat. Taking certain medicines, such as diuretics or aspirin, also can bring on an attack of gout. Taking your medicines as prescribed and following up with your doctor regularly can help you avoid gout attacks in the future. Follow-up care is a key part of your treatment and safety. Be sure to make and go to all appointments, and call your doctor if you are having problems. Its also a good idea to know your test results and keep a list of the medicines you take. How can you care for yourself at home? · If the joint is swollen, put ice or a cold pack on the area for 10 to 20 minutes at a time. Put a thin cloth between the ice and your skin. · Prop up the sore limb on a pillow when you ice it or anytime you sit or lie down during the next 3 days. Try to keep it above the level of your heart. This will help reduce swelling. · Rest sore joints. Avoid activities that put weight or strain on the joints for a few days. Take short rest breaks from your regular activities during the day. · Take your medicines exactly as prescribed. Call your doctor if you think you are having a problem with your medicine. · Take pain medicines exactly as directed. ¨ If the doctor gave you a prescription medicine for pain, take it as prescribed. ¨ If you are not taking a prescription pain medicine, ask your doctor if you can take an over-the-counter medicine. · Eat less seafood and red meat. · Check with your doctor before drinking alcohol. · Losing weight, if you are overweight, may help reduce attacks of gout. But do not go on a [employer]. \" Losing a lot of weight in a short amount of time can cause a gout attack. When should you call for help? Call your doctor now or seek immediate medical care if: 
· You have a fever. · The joint is so painful you cannot use it. · You have sudden, unexplained swelling, redness, warmth, or severe pain in one or more joints. Watch closely for changes in your health, and be sure to contact your doctor if: 
· You have joint pain. · Your symptoms get worse or are not improving after 2 or 3 days. Where can you learn more? Go to http://ramos-meghan.info/. Enter Z802 in the search box to learn more about \"Gout: Care Instructions. \" Current as of: October 31, 2016 Content Version: 11.2 © 0790-8346 [employer]. Care instructions adapted under license by [employer] (which disclaims liability or warranty for this information). If you have questions about a medical condition or this instruction, always ask your healthcare professional. [address] any warranty or liability for your use of this information. Introducing [facility]! [employer] introduces [employer] patient portal. Now you can access parts of your medical record, email your doctor's office, and request medication refills online. 1. In your internet browser, go to [email or web address] 2. Click on the First Time User? Click Here link in the Sign In box. You will see the New Member Sign Up page. 3. Enter your [employer] Access Code exactly as it appears below. You will not need to use this code after youve completed the sign-up process.  If you do not sign up before the expiration date, you must request a new code. · [employer] Access Code: [email or web address] Expires: 6/17/2017  8:52 AM 
 
4. Enter the last four digits of your Social Security Number (xxxx) and Date of Birth (mm/dd/yyyy) as indicated and click Submit. You will be taken to the next sign-up page. 5. Create a [employer] ID. This will be your [employer] login ID and cannot be changed, so think of one that is secure and easy to remember. 6. Create a [employer] password. You can change your password at any time. 7. Enter your Password Reset Question and Answer. This can be used at a later time if you forget your password. 8. Enter your e-mail address. You will receive e-mail notification when new information is available in [address] 9. Click Sign Up. You can now view and download portions of your medical record. 10. Click the Download Summary menu link to download a portable copy of your medical information. If you have questions, please visit the Frequently Asked Questions section of the [employer] website. Remember, [employer] is NOT to be used for urgent needs. For medical emergencies, dial 911. Now available from your iPhone and Android! Please provide this summary of care documentation to your next provider. Your primary care clinician is listed as Kash Coy. If you have any questions after today's visit, please call [phone].

## 2017-05-25 NOTE — PATIENT INSTRUCTIONS

## 2017-05-25 NOTE — PROGRESS NOTES
HISTORY OF PRESENT ILLNESS  Ela Huntley is a 61 y.o. male. HPI  Patient in today with an acute concern. Right great toe swollen and tender X 5 days. Redness has progressed to the entire toe  No hx of discharge, open wound or toe infection  No trauma. 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 RCA mild. LAD tortuous. CX mild. CX angela 100%. S/P balloon angioplasty of CX angela. Residual 40%. LVEDP 20.  EF 65%.  Cardiac nuclear imaging test 08/30/2006    No ischemia or infarct. EF 59%. Negative max.  stress test.    Chronic cough     coughing up sputum    Chronic lung disease     COPD [facility]chronic obstructive pulmonary disease) ([facility])     moderate to severe    Coronary artery disease     Diabetes ([address]     Diabetes mellitus ([address]     GARCIA (dyspnea on exertion)     GERD (gastroesophageal reflux disease)     H/O: pneumonia     Heartburn     Hypertension     essential systemic    Indigestion     Pneumonia 8/12    \"walking\"    Pure hypercholesterolemia     Tobacco abuse        Family History   Problem Relation Age of Onset    COPD Mother      cause of death    Asthma Father     Cancer Father     Colon Polyps Father        Social History   Substance Use Topics    Smoking status: Former Smoker     Packs/day: 1.25     Years: 20.00     Types: Cigarettes     Quit date: 1/1/2014    Smokeless tobacco: Never Used    Alcohol use Yes      Comment: on special occasions only        Current Outpatient Prescriptions   Medication Sig    predniSONE (STERAPRED) 5 mg dose pack See administration instruction per 5mg dose pack    oxyCODONE-acetaminophen (PERCOCET 7.5) 7.5-325 mg per tablet Take 1 Tab by mouth every four (4) hours as needed for Pain. Max Daily Amount: 6 Tabs.  rosuvastatin (CRESTOR) 20 mg tablet Take 1 Tab by mouth nightly.  gabapentin (NEURONTIN) 300 mg capsule Take 1 Cap by mouth three (3) times daily. (Patient taking differently: Take 300 mg by mouth three (3) times daily. Taking BID)    amLODIPine (NORVASC) 10 mg tablet Take 1 Tab by mouth daily.  fluticasone-vilanterol (BREO ELLIPTA) 100-25 mcg/dose inhaler Take 1 Puff by inhalation daily.  metFORMIN (GLUCOPHAGE) 1,000 mg tablet Take 1,000 mg by mouth daily.  hydroCHLOROthiazide (HYDRODIURIL) 25 mg tablet Take 25 mg by mouth daily. Take 1 tab daily    multivitamin (ONE A DAY) tablet Take 1 Tab by mouth daily.  omeprazole (PRILOSEC) 20 mg capsule Take 40 mg by mouth daily.  aspirin 81 mg tablet Take 81 mg by mouth two (2) times a day.  fluticasone-vilanterol (BREO ELLIPTA) 100-25 mcg/dose inhaler Take 1 Puff by inhalation daily.  nitroglycerin (NITROSTAT) 0.4 mg SL tablet 1 Tab by SubLINGual route every five (5) minutes as needed for Chest Pain. No current facility-administered medications for this visit.          Past Surgical History:   Procedure Laterality Date    HX HEART CATHETERIZATION  01/26/06    HX ORTHOPAEDIC  1989    fracture of bilateral hands     HX PTCA      of anomalous circumflex marginal artery    TONGUE AND MOUTH SURG UNLISTED  04/04    tongue surgery    TONGUE TO LIP SURGERY  04/04     ROS   See HPI    Visit Vitals    /75 (BP 1 Location: Left arm)    Pulse 96    Temp 98 °F (36.7 °C) (Oral)    Resp 16    Ht 6' 1\" (1.854 m)    Wt 185 lb (83.9 kg)    SpO2 97%    BMI 24.41 kg/m2     Physical Exam   Musculoskeletal:   Right great toe TTP with significant redness, warmth, swelling, no discharge. Patient weight bearing but antalgic gait    ASSESSMENT and PLAN    ICD-10-CM ICD-9-CM    1. Acute gout involving toe of right foot, unspecified cause M10.9 274.01 predniSONE (STERAPRED) 5 mg dose pack   2. Arthritis M19.90 716.90 oxyCODONE-acetaminophen (PERCOCET 7.5) 7.5-325 mg per tablet      The nature of gout is fully explained, including dietary relationship, acute and interval phase and treatment of both. Long term complications such as kidney stones, tophi and arthritis are discussed. Avoidance of alcohol recommended, and written literature is given along with a low purine diet. Indications for the use of allopurinol for prophylaxis and the use of colchicine to prevent or treat flare-ups is also discussed. Proper use of indomethacin for acute attacks discussed, and its side effects. Call if further attacks occur, or this one does not resolve promptly. -RTC prn    Additional Instructions: The patient understands that they should contact the office at any time if any questions or concerns develop. They are also aware that they can call our main office number at [phone] at any time if they would like to address any concerns with the physician. They also understand that they should dial 911 if any acute emergency arises. The patient understands that they should give us a minimum of 48 hours to complete prescription refills once they are requested. The patient has also been instructed to contact us by calling the main office number if they have not received feedback within 2 weeks of having any tests completed. The patient is a aware that they should read all package insert information when picking up the medications and that they should consult the pharmacist of a physician if they have any questions or concerns regarding the prescribed medications.   Discussed with the patient new medications given and patient instructed to read pharmacy literature regarding side effects and drug interactions. Instructions for taking the medications were provided to the patient and the consequences of not taking it. Follow-up Disposition:   Return if symptoms worsen or fail to improve. Risk and benefits of new medication discussed in detail when indicated, patient was given the opportunity to ask questions   AVS provided  reviewed diet, exercise and weight control when indicated  Alarm signals discussed. ER precautions reviewed when indicated  Plan of care reviewed with patient. Understanding verbalized and they are in agreement with plan of care.      Steven Adams, DO

## 2017-06-01 DIAGNOSIS — M19.90 ARTHRITIS: ICD-10-CM

## 2017-06-01 RX ORDER — OXYCODONE AND ACETAMINOPHEN 7.5; 325 MG/1; MG/1
1 TABLET ORAL
Qty: 60 TAB | Refills: 0 | OUTPATIENT
Start: 2017-06-01

## 2017-06-01 NOTE — TELEPHONE ENCOUNTER
Requested Prescriptions     Pending Prescriptions Disp Refills    oxyCODONE-acetaminophen (PERCOCET 7.5) 7.5-325 mg per tablet 60 Tab 0     Sig: Take 1 Tab by mouth every four (4) hours as needed for Pain. Max Daily Amount: 6 Tabs.        Last office visit was  5/25/17  Next office visit is     None     60 tabs prescribed 5/25/17    Please assist.

## 2017-06-05 DIAGNOSIS — M19.90 ARTHRITIS: ICD-10-CM

## 2017-06-05 NOTE — TELEPHONE ENCOUNTER
Pt called in requesting refill of his   Requested Prescriptions     Pending Prescriptions Disp Refills    oxyCODONE-acetaminophen (PERCOCET 7.5) 7.5-325 mg per tablet 60 Tab 0     Sig: Take 1 Tab by mouth every four (4) hours as needed for Pain. Max Daily Amount: 6 Tabs. Renetta Sharpe

## 2017-06-05 NOTE — TELEPHONE ENCOUNTER
Called and spoke with the pt and informed him that his refill request is too soon. He stated that he is aware and only requested the refill because his other providers request that he contact them one week before he runs out. Stated that he will not be out until the 8th and is willing to wait until then for the refill.

## 2017-06-05 NOTE — TELEPHONE ENCOUNTER
Requested Prescriptions     Pending Prescriptions Disp Refills    oxyCODONE-acetaminophen (PERCOCET 7.5) 7.5-325 mg per tablet 60 Tab 0     Sig: Take 1 Tab by mouth every four (4) hours as needed for Pain. Max Daily Amount: 6 Tabs. Last office visit was  5/25/17  Next office visit is     None     60 tabs prescribed 5/25/17.     Please assist.

## 2017-06-09 RX ORDER — OXYCODONE AND ACETAMINOPHEN 7.5; 325 MG/1; MG/1
1 TABLET ORAL
Qty: 60 TAB | Refills: 0 | Status: SHIPPED | OUTPATIENT
Start: 2017-06-09 | End: 2017-07-03 | Stop reason: SDUPTHER

## 2017-07-03 ENCOUNTER — OFFICE VISIT (OUTPATIENT)
Dept: INTERNAL MEDICINE CLINIC | Age: 57
End: 2017-07-03

## 2017-07-03 ENCOUNTER — HOSPITAL ENCOUNTER (OUTPATIENT)
Dept: LAB | Age: 57
Discharge: HOME | End: 2017-07-03
Payer: SELF-PAY

## 2017-07-03 VITALS
HEART RATE: 94 BPM | RESPIRATION RATE: 16 BRPM | OXYGEN SATURATION: 97 % | DIASTOLIC BLOOD PRESSURE: 83 MMHG | WEIGHT: 176 LBS | BODY MASS INDEX: 25.2 KG/M2 | TEMPERATURE: 98.1 F | SYSTOLIC BLOOD PRESSURE: 137 MMHG | HEIGHT: 70 IN

## 2017-07-03 DIAGNOSIS — M19.90 ARTHRITIS: ICD-10-CM

## 2017-07-03 DIAGNOSIS — M79.671 RIGHT FOOT PAIN: ICD-10-CM

## 2017-07-03 DIAGNOSIS — I10 ESSENTIAL HYPERTENSION: ICD-10-CM

## 2017-07-03 DIAGNOSIS — M79.671 RIGHT FOOT PAIN: Primary | ICD-10-CM

## 2017-07-03 PROCEDURE — 80307 DRUG TEST PRSMV CHEM ANLYZR: CPT | Performed by: FAMILY MEDICINE

## 2017-07-03 RX ORDER — OXYCODONE AND ACETAMINOPHEN 7.5; 325 MG/1; MG/1
1 TABLET ORAL
Qty: 60 TAB | Refills: 0 | Status: SHIPPED | OUTPATIENT
Start: 2017-07-03 | End: 2017-08-09

## 2017-07-03 RX ORDER — AMLODIPINE BESYLATE 10 MG/1
10 TABLET ORAL DAILY
Qty: 30 TAB | Refills: 5 | Status: SHIPPED | OUTPATIENT
Start: 2017-07-03 | End: 2017-12-06 | Stop reason: SDUPTHER

## 2017-07-03 NOTE — PROGRESS NOTES
Pt is here for CPE      1. Have you been to the ER, urgent care clinic since your last visit? Hospitalized since your last visit? No    2. Have you seen or consulted any other health care providers outside of the [address] [place] [address] since your last visit? Include any pap smears or colon screening.  No

## 2017-07-03 NOTE — MR AVS SNAPSHOT
Visit Information Date & Time Provider Department Dept. Phone Encounter #  
 7/3/2017  3:00 PM Osiris Robbins DO [facility] [employer] [id number] Follow-up Instructions Return as needed for CPE. Your Appointments 12/5/2017  3:20 PM  
Follow Up with Alli Harris DO [facility] ([facility]) Appt Note: 6-8 month f/up Ni Mccurdy [id number] [phone] [address] [place] [address] [address] Upcoming Health Maintenance Date Due DTaP/Tdap/Td series (1 - Tdap) 10/22/1980 FOBT Q 1 YEAR AGE 50-75 10/22/2009 Pneumococcal 19-64 Medium Risk (1 of 1 - PPSV23) 9/24/2017* INFLUENZA AGE 9 TO ADULT 8/1/2017 *Topic was postponed. The date shown is not the original due date. Allergies as of 7/3/2017  Review Complete On: 7/3/2017 By: Steve Valdivia LPN No Known Allergies Current Immunizations  Never Reviewed No immunizations on file. Not reviewed this visit You Were Diagnosed With   
  
 Codes Comments Right foot pain    -  Primary ICD-10-CM: D93.060 ICD-9-CM: 729.5 Essential hypertension     ICD-10-CM: I10 
ICD-9-CM: 401.9 Arthritis     ICD-10-CM: M19.90 ICD-9-CM: 716.90 Vitals BP Pulse Temp Resp Height(growth percentile) Weight(growth percentile) 137/83 (BP 1 Location: Right arm) 94 98.1 °F (36.7 °C) (Oral) 16 5' 9.6\" (1.768 m) 176 lb (79.8 kg) SpO2 BMI Smoking Status 97% 25.54 kg/m2 Former Smoker Vitals History BMI and BSA Data Body Mass Index Body Surface Area 25.54 kg/m 2 1.98 m 2 Preferred Pharmacy Pharmacy Name Phone [facility] [place], [facility] [address] [facility] [address] [address] [phone] Your Updated Medication List  
  
   
This list is accurate as of: 7/3/17  4:06 PM.  Always use your most recent med list.  
  
  
  
  
 amLODIPine 10 mg tablet Commonly known as:  Rex Gonzáles Take 1 Tab by mouth daily. aspirin 81 mg tablet Take 81 mg by mouth two (2) times a day. * fluticasone-vilanterol 100-25 mcg/dose inhaler Commonly known as:  BREO ELLIPTA Take 1 Puff by inhalation daily. * fluticasone-vilanterol 100-25 mcg/dose inhaler Commonly known as:  BREO ELLIPTA Take 1 Puff by inhalation daily. gabapentin 300 mg capsule Commonly known as:  NEURONTIN Take 1 Cap by mouth three (3) times daily. hydroCHLOROthiazide 25 mg tablet Commonly known as:  HYDRODIURIL Take 25 mg by mouth daily. Take 1 tab daily  
  
 metFORMIN 1,000 mg tablet Commonly known as:  GLUCOPHAGE Take 1,000 mg by mouth daily. multivitamin tablet Commonly known as:  ONE A DAY Take 1 Tab by mouth daily. nitroglycerin 0.4 mg SL tablet Commonly known as:  NITROSTAT  
1 Tab by SubLINGual route every five (5) minutes as needed for Chest Pain. oxyCODONE-acetaminophen 7.5-325 mg per tablet Commonly known as:  PERCOCET 7.5 Take 1 Tab by mouth every four (4) hours as needed for Pain. Max Daily Amount: 6 Tabs. PriLOSEC 20 mg capsule Generic drug:  omeprazole Take 40 mg by mouth daily. rosuvastatin 20 mg tablet Commonly known as:  CRESTOR Take 1 Tab by mouth nightly. * Notice: This list has 2 medication(s) that are the same as other medications prescribed for you. Read the directions carefully, and ask your doctor or other care provider to review them with you. Prescriptions Printed Refills  
 oxyCODONE-acetaminophen (PERCOCET 7.5) 7.5-325 mg per tablet 0 Sig: Take 1 Tab by mouth every four (4) hours as needed for Pain. Max Daily Amount: 6 Tabs. Class: Print Route: Oral  
  
Prescriptions Sent to Pharmacy Refills  
 amLODIPine (NORVASC) 10 mg tablet 5 Sig: Take 1 Tab by mouth daily.   
 Class: Normal  
 Pharmacy: [facility], [facility] #: [phone] Route: Oral  
  
We Performed the Following REFERRAL TO PODIATRY [REF90 Custom] Follow-up Instructions Return as needed for CPE. To-Do List   
 07/03/2017 Lab:  [facility] - W/ CONFIRM Referral Information Referral ID Referred By Referred To  
  
 [id number] Junito NAVARRO Not Available Visits Status Start Date End Date 1 New Request 7/3/17 7/3/18 If your referral has a status of pending review or denied, additional information will be sent to support the outcome of this decision. Patient Instructions A Healthy Lifestyle: Care Instructions Your Care Instructions A healthy lifestyle can help you feel good, stay at a healthy weight, and have plenty of energy for both work and play. A healthy lifestyle is something you can share with your whole family. A healthy lifestyle also can lower your risk for serious health problems, such as high blood pressure, heart disease, and diabetes. You can follow a few steps listed below to improve your health and the health of your family. Follow-up care is a key part of your treatment and safety. Be sure to make and go to all appointments, and call your doctor if you are having problems. Its also a good idea to know your test results and keep a list of the medicines you take. How can you care for yourself at home? · Do not eat too much sugar, fat, or fast foods. You can still have dessert and treats now and then. The goal is moderation. · Start small to improve your eating habits. Pay attention to portion sizes, drink less juice and soda pop, and eat more fruits and vegetables. ¨ Eat a healthy amount of food. A 3-ounce serving of meat, for example, is about the size of a deck of cards. Fill the rest of your plate with vegetables and whole grains. ¨ Limit the amount of soda and sports drinks you have every day.  Drink more water when you are thirsty. ¨ Eat at least 5 servings of fruits and vegetables every day. It may seem like a lot, but it is not hard to reach this goal. A serving or helping is 1 piece of fruit, 1 cup of vegetables, or 2 cups of leafy, raw vegetables. Have an apple or some carrot sticks as an afternoon snack instead of a candy bar. Try to have fruits and/or vegetables at every meal. 
· Make exercise part of your daily routine. You may want to start with simple activities, such as walking, bicycling, or slow swimming. Try to be active 30 to 60 minutes every day. You do not need to do all 30 to 60 minutes all at once. For example, you can exercise 3 times a day for 10 or 20 minutes. Moderate exercise is safe for most people, but it is always a good idea to talk to your doctor before starting an exercise program. 
· Keep moving. Mynor Curls the lawn, work in the garden, or [employer]. Take the stairs instead of the elevator at work. · If you smoke, quit. People who smoke have an increased risk for heart attack, stroke, cancer, and other lung illnesses. Quitting is hard, but there are ways to boost your chance of quitting tobacco for good. ¨ Use nicotine gum, patches, or lozenges. ¨ Ask your doctor about stop-smoking programs and medicines. ¨ Keep trying. In addition to reducing your risk of diseases in the future, you will notice some benefits soon after you stop using tobacco. If you have shortness of breath or asthma symptoms, they will likely get better within a few weeks after you quit. · Limit how much alcohol you drink. Moderate amounts of alcohol (up to 2 drinks a day for men, 1 drink a day for women) are okay. But drinking too much can lead to liver problems, high blood pressure, and other health problems. Family health If you have a family, there are many things you can do together to improve your health. · Eat meals together as a family as often as possible. · Eat healthy foods. This includes fruits, vegetables, lean meats and dairy, and whole grains. · Include your family in your fitness plan. Most people think of activities such as jogging or tennis as the way to fitness, but there are many ways you and your family can be more active. Anything that makes you breathe hard and gets your heart pumping is exercise. Here are some tips: 
¨ Walk to do errands or to take your child to school or the bus. ¨ Go for a family bike ride after dinner instead of watching TV. Where can you learn more? Go to http://ramosBladeLogicmeghan.info/. Enter E034 in the search box to learn more about \"A Healthy Lifestyle: Care Instructions. \" Current as of: July 26, 2016 Content Version: 11.3 © 5955-9866 [employer]. Care instructions adapted under license by [employer] (which disclaims liability or warranty for this information). If you have questions about a medical condition or this instruction, always ask your healthcare professional. [place] [address] any warranty or liability for your use of this information. Introducing [facility]! Erin Nix introduces [employer] patient portal. Now you can access parts of your medical record, email your doctor's office, and request medication refills online. 1. In your internet browser, go to [email or web address] 2. Click on the First Time User? Click Here link in the Sign In box. You will see the New Member Sign Up page. 3. Enter your [employer] Access Code exactly as it appears below. You will not need to use this code after youve completed the sign-up process. If you do not sign up before the expiration date, you must request a new code. · [employer] Access Code: [email or web address] Expires: 10/1/2017  4:06 PM 
 
4. Enter the last four digits of your Social Security Number (xxxx) and Date of Birth (mm/dd/yyyy) as indicated and click Submit.  You will be taken to the next sign-up page. 5. Create a Linek[employer] ID. This will be your [employer] login ID and cannot be changed, so think of one that is secure and easy to remember. 6. Create a [employer] password. You can change your password at any time. 7. Enter your Password Reset Question and Answer. This can be used at a later time if you forget your password. 8. Enter your e-mail address. You will receive e-mail notification when new information is available in [address] 9. Click Sign Up. You can now view and download portions of your medical record. 10. Click the Download Summary menu link to download a portable copy of your medical information. If you have questions, please visit the Frequently Asked Questions section of the [employer] website. Remember, [employer] is NOT to be used for urgent needs. For medical emergencies, dial 911. Now available from your iPhone and Android! Please provide this summary of care documentation to your next provider. Your primary care clinician is listed as Maddi Ayala. If you have any questions after today's visit, please call [phone].

## 2017-07-03 NOTE — PROGRESS NOTES
HISTORY OF PRESENT ILLNESS  Laurie Vazquez is a 61 y.o. male. HPI  Patient in today with an acute concern. He was to have a CPE but asked to change it. C/o Right foot pain and tenderness for the past few weeks. No redness or warmth but he does endorse swelling over a bunion near his great toe. He says pain has progressed to the entire foot  No hx of discharge, open wound or toe infection  No trauma.  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 RCA mild. LAD tortuous. CX mild. CX angela 100%. S/P balloon angioplasty of CX angela. Residual 40%. LVEDP 20.  EF 65%.  Cardiac nuclear imaging test 08/30/2006    No ischemia or infarct. EF 59%. Negative max.  stress test.    Chronic cough     coughing up sputum    Chronic lung disease     COPD [facility]ch[facility] obstructive pulmonary disease) ([facility])     moderate to severe    Coronary artery disease     Diabetes ([address]     Diabetes mellitus ([address]     GARCIA (dyspnea on exertion)     GERD (gastroesophageal reflux disease)     H/O: pneumonia     Heartburn     Hypertension     essential systemic    Indigestion     Pneumonia 8/12    \"walking\"    Pure hypercholesterolemia     Tobacco abuse        Family History   Problem Relation Age of Onset    COPD Mother      cause of death    Asthma Father     Cancer Father     Colon Polyps Father        Social History   Substance Use Topics    Smoking status: Former Smoker     Packs/day: 1.25     Years: 20.00     Types: Cigarettes     Quit date: 1/1/2014    Smokeless tobacco: Never Used    Alcohol use Yes      Comment: on special occasions only        Current Outpatient Prescriptions   Medication Sig    amLODIPine (NORVASC) 10 mg tablet Take 1 Tab by mouth daily.  oxyCODONE-acetaminophen (PERCOCET 7.5) 7.5-325 mg per tablet Take 1 Tab by mouth every four (4) hours as needed for Pain. Max Daily Amount: 6 Tabs.  rosuvastatin (CRESTOR) 20 mg tablet Take 1 Tab by mouth nightly.  gabapentin (NEURONTIN) 300 mg capsule Take 1 Cap by mouth three (3) times daily. (Patient taking differently: Take 300 mg by mouth three (3) times daily. Taking BID)    fluticasone-vilanterol (BREO ELLIPTA) 100-25 mcg/dose inhaler Take 1 Puff by inhalation daily.  metFORMIN (GLUCOPHAGE) 1,000 mg tablet Take 1,000 mg by mouth daily.  hydroCHLOROthiazide (HYDRODIURIL) 25 mg tablet Take 25 mg by mouth daily. Take 1 tab daily    multivitamin (ONE A DAY) tablet Take 1 Tab by mouth daily.  omeprazole (PRILOSEC) 20 mg capsule Take 40 mg by mouth daily.  aspirin 81 mg tablet Take 81 mg by mouth two (2) times a day.  fluticasone-vilanterol (BREO ELLIPTA) 100-25 mcg/dose inhaler Take 1 Puff by inhalation daily.  nitroglycerin (NITROSTAT) 0.4 mg SL tablet 1 Tab by SubLINGual route every five (5) minutes as needed for Chest Pain. No current facility-administered medications for this visit.          Past Surgical History:   Procedure Laterality Date    HX HEART CATHETERIZATION  01/26/06    HX ORTHOPAEDIC  1989    fracture of bilateral hands     HX PTCA      of anomalous circumflex marginal artery    TONGUE AND MOUTH SURG UNLISTED  04/04    tongue surgery    TONGUE TO LIP SURGERY  04/04     ROS   See HPI    Visit Vitals    /83 (BP 1 Location: Right arm)    Pulse 94    Temp 98.1 °F (36.7 °C) (Oral)    Resp 16    Ht 5' 9.6\" (1.768 m)    Wt 176 lb (79.8 kg)  SpO2 97%    BMI 25.54 kg/m2     Physical Exam   Musculoskeletal:        Right ankle: He exhibits decreased range of motion and deformity. He exhibits no swelling. Achilles tendon exhibits pain. Achilles tendon exhibits no defect and normal Cifuentes's test results. Feet:    Right foot diffusely TTP with out redness, warmth, swelling, no discharge. +Bunion formation and callous formation as indicated in diagram   Patient weight bearing but antalgic gait    ASSESSMENT and PLAN    ICD-10-CM ICD-9-CM    1. Right foot pain M79.671 729.5 REFERRAL TO PODIATRY      13-DRUG SCREEN, UR      CANCELED: DRUG SCREEN UR - W/ CONFIRM   2. Essential hypertension I10 401.9 amLODIPine (NORVASC) 10 mg tablet      13-DRUG SCREEN, UR   3. Arthritis M19.90 716.90 oxyCODONE-acetaminophen (PERCOCET 7.5) 7.5-325 mg per tablet      13-DRUG SCREEN, UR      -Etiology of foot pain is unclear to me as it is diffusely tender, will have him evaluated by podiatry, he has no insurance and would like to minimize his cost by having everything done in one setting if possible.  -Percocet has been provided over the past few months for acute and chronic pain.  pulled today and no red flags found.  -Random UDS and Controlled substances agreement completed today  -RTC prn for CPE as previously discussed    Additional Instructions: The patient understands that they should contact the office at any time if any questions or concerns develop. They are also aware that they can call our main office number at [phone] at any time if they would like to address any concerns with the physician. They also understand that they should dial 911 if any acute emergency arises. The patient understands that they should give us a minimum of 48 hours to complete prescription refills once they are requested.   The patient has also been instructed to contact us by calling the main office number if they have not received feedback within 2 weeks of having any tests completed. The patient is a aware that they should read all package insert information when picking up the medications and that they should consult the pharmacist of a physician if they have any questions or concerns regarding the prescribed medications. Discussed with the patient new medications given and patient instructed to read pharmacy literature regarding side effects and drug interactions. Instructions for taking the medications were provided to the patient and the consequences of not taking it. Follow-up Disposition:   Return if symptoms worsen or fail to improve. Risk and benefits of new medication discussed in detail when indicated, patient was given the opportunity to ask questions   AVS provided  reviewed diet, exercise and weight control when indicated  Alarm signals discussed. ER precautions reviewed when indicated  Plan of care reviewed with patient. Understanding verbalized and they are in agreement with plan of care.      Callum Olivas, DO

## 2017-07-03 NOTE — PATIENT INSTRUCTIONS

## 2017-07-12 LAB
AMPHET CTO UR CFM-MCNC: 2740 NG/ML
AMPHET+METHAMPHET UR QL: POSITIVE
AMPHETAMINES UR QL SCN: NORMAL NG/ML
AMPHETAMINES UR QL: POSITIVE
BARBITURATES UR QL: NEGATIVE NG/ML
BENZODIAZ UR QL: NEGATIVE NG/ML
BZE UR QL: NEGATIVE NG/ML
CANNABINOIDS UR QL SCN: NEGATIVE NG/ML
CODEINE UR QL: NEGATIVE
CREAT UR-MCNC: 130.1 MG/DL (ref 20–300)
ETHANOL UR-MCNC: NEGATIVE %
HYDROCODONE UR CFM-MCNC: >3000 NG/ML
HYDROCODONE UR QL: POSITIVE
HYDROMORPHONE UR CFM-MCNC: 2750 NG/ML
HYDROMORPHONE UR QL: POSITIVE
MEPERIDINE UR QL: NEGATIVE NG/ML
METHADONE UR QL: NEGATIVE NG/ML
METHAMPHET UR QL: NEGATIVE
MORPHINE UR CFM-MCNC: 1890 NG/ML
MORPHINE UR QL: POSITIVE
OPIATES UR QL SCN: NORMAL NG/ML
OPIATES UR QL: POSITIVE NG/ML
OXYCODONE+OXYMORPHONE UR QL SCN: NEGATIVE NG/ML
PCP UR QL: NEGATIVE NG/ML
PROPOXYPH UR QL: NEGATIVE NG/ML
TRAMADOL UR QL SCN: NEGATIVE NG/ML

## 2017-07-20 DIAGNOSIS — M19.90 ARTHRITIS: ICD-10-CM

## 2017-07-20 NOTE — TELEPHONE ENCOUNTER
Requested Prescriptions     Pending Prescriptions Disp Refills    oxyCODONE-acetaminophen (PERCOCET 7.5) 7.5-325 mg per tablet 60 Tab 0     Sig: Take 1 Tab by mouth every four (4) hours as needed for Pain. Max Daily Amount: 6 Tabs.

## 2017-07-21 RX ORDER — OXYCODONE AND ACETAMINOPHEN 7.5; 325 MG/1; MG/1
1 TABLET ORAL
Qty: 60 TAB | Refills: 0 | OUTPATIENT
Start: 2017-07-21

## 2017-07-28 DIAGNOSIS — M19.90 ARTHRITIS: ICD-10-CM

## 2017-07-30 RX ORDER — OXYCODONE AND ACETAMINOPHEN 7.5; 325 MG/1; MG/1
1 TABLET ORAL
Qty: 60 TAB | Refills: 0 | OUTPATIENT
Start: 2017-07-30

## 2017-07-31 ENCOUNTER — TELEPHONE (OUTPATIENT)
Dept: INTERNAL MEDICINE CLINIC | Age: 57
End: 2017-07-31

## 2017-07-31 NOTE — TELEPHONE ENCOUNTER
Pt called back to inquire what was in his UDS. Pt aware I am not able to say what the UDS showed. Pt would like to make an appointment with STEPHANIE Garcia.     Appt made for 8/9/17 with STEPHANIE Garcia

## 2017-08-03 DIAGNOSIS — E11.42 DIABETIC POLYNEUROPATHY ASSOCIATED WITH TYPE 2 DIABETES MELLITUS (HCC): ICD-10-CM

## 2017-08-04 RX ORDER — GABAPENTIN 300 MG/1
300 CAPSULE ORAL 3 TIMES DAILY
Qty: 90 CAP | Refills: 1 | OUTPATIENT
Start: 2017-08-04

## 2017-08-04 NOTE — TELEPHONE ENCOUNTER
This medication has already been filled. Please follow up to make sure patient received. Please let patient know that he should schedule with Dr. Smith Like if appointment needed for chronic pain.  checked, no red flags.

## 2017-08-07 ENCOUNTER — TELEPHONE (OUTPATIENT)
Dept: INTERNAL MEDICINE CLINIC | Age: 57
End: 2017-08-07

## 2017-08-07 RX ORDER — GABAPENTIN 300 MG/1
300 CAPSULE ORAL 3 TIMES DAILY
Qty: 90 CAP | Refills: 1 | OUTPATIENT
Start: 2017-08-07

## 2017-08-07 NOTE — TELEPHONE ENCOUNTER
I had a patient call the office today and stated that someone called him from the office and wanted to know about getting a pain medication refill. After looking in the patient chart I saw where documentation was in that a medication was refilled and that if the patient wanted to be seen for chronic pain he had to schedule with Dr. Cesar Quinn and that the  was clear and no red flags Per STEPHANIE Peralta.  I think that this was another medication that the message was about

## 2017-08-07 NOTE — TELEPHONE ENCOUNTER
Appt re-scheduled with Dr Thania Schumacher on 8/9/17 @ 9:15am. Pt aware. Pt also aware that STEPHANIE Crowe declined to refill the gabapentin as it was just filled at the pharmacy on 8/3/17. Pt states he just picked up the refill & the pharmacy automaticlly requests refills.

## 2017-08-07 NOTE — TELEPHONE ENCOUNTER
Made Pt aware it is too early to get the neurotin filled as it shows he just filled it 8/3/17.  See other telephone note from 8/7/17

## 2017-08-09 ENCOUNTER — OFFICE VISIT (OUTPATIENT)
Dept: INTERNAL MEDICINE CLINIC | Age: 57
End: 2017-08-09

## 2017-08-09 VITALS
TEMPERATURE: 97.8 F | WEIGHT: 183 LBS | RESPIRATION RATE: 16 BRPM | SYSTOLIC BLOOD PRESSURE: 140 MMHG | OXYGEN SATURATION: 99 % | HEIGHT: 69 IN | BODY MASS INDEX: 27.11 KG/M2 | HEART RATE: 95 BPM | DIASTOLIC BLOOD PRESSURE: 98 MMHG

## 2017-08-09 DIAGNOSIS — E78.5 DYSLIPIDEMIA: ICD-10-CM

## 2017-08-09 DIAGNOSIS — G89.29 OTHER CHRONIC PAIN: ICD-10-CM

## 2017-08-09 DIAGNOSIS — E11.9 TYPE 2 DIABETES MELLITUS WITHOUT COMPLICATION, WITHOUT LONG-TERM CURRENT USE OF INSULIN (HCC): ICD-10-CM

## 2017-08-09 DIAGNOSIS — I10 ESSENTIAL HYPERTENSION: Primary | ICD-10-CM

## 2017-08-09 DIAGNOSIS — J43.2 CENTRILOBULAR EMPHYSEMA (HCC): ICD-10-CM

## 2017-08-09 NOTE — PROGRESS NOTES
Clearence Gaucher is a 61 y.o.  male and presents with Arthritis; Diabetes; Coronary Artery Disease; Pain (Chronic); and Hypertension      SUBJECTIVE:  Pt presents for f/u on drug screen. Pt was told that since his UDS had medications in it that were not on his  this office could no longer give him narcotic prescriptions for his chronic pain. He admits he has problems with other offices that would not give him narcotics for one reason or another. Pt said he was willing to come to this office for his other medical problems. Pt is on Norvasc and HCTZ for his HTN. BP does not seem well controlled. Would consider adding ARB in the future if needed. Pt's BS are up to 140's. He is currently on metformin 1 gm daily. He says when he increased it in the past he had problems. He is not sure if it was diarrhea     Pt's LDL is well controlled on Crestor. Pt is followed by cardiology for his CAD. Pt's COPD is controlled on Breo. Respiratory ROS: negative for - shortness of breath  Cardiovascular ROS: negative for - chest pain    Current Outpatient Prescriptions   Medication Sig    amLODIPine (NORVASC) 10 mg tablet Take 1 Tab by mouth daily.  rosuvastatin (CRESTOR) 20 mg tablet Take 1 Tab by mouth nightly.  fluticasone-vilanterol (BREO ELLIPTA) 100-25 mcg/dose inhaler Take 1 Puff by inhalation daily.  fluticasone-vilanterol (BREO ELLIPTA) 100-25 mcg/dose inhaler Take 1 Puff by inhalation daily.  metFORMIN (GLUCOPHAGE) 1,000 mg tablet Take 1,000 mg by mouth daily.  hydroCHLOROthiazide (HYDRODIURIL) 25 mg tablet Take 25 mg by mouth daily. Take 1 tab daily    multivitamin (ONE A DAY) tablet Take 1 Tab by mouth daily.  omeprazole (PRILOSEC) 20 mg capsule Take 40 mg by mouth daily.  nitroglycerin (NITROSTAT) 0.4 mg SL tablet 1 Tab by SubLINGual route every five (5) minutes as needed for Chest Pain.  aspirin 81 mg tablet Take 81 mg by mouth two (2) times a day.      No current facility-administered medications for this visit. OBJECTIVE:  alert, well appearing, and in no distress  Visit Vitals    BP (!) 140/98 (BP 1 Location: Left arm, BP Patient Position: Sitting)    Pulse 95    Temp 97.8 °F (36.6 °C) (Oral)    Resp 16    Ht 5' 9\" (1.753 m)    Wt 183 lb (83 kg)    SpO2 99%    BMI 27.02 kg/m2      well developed and well nourished  Chest - clear to auscultation, no wheezes, rales or rhonchi, symmetric air entry  Heart - normal rate, regular rhythm, normal S1, S2, no murmurs, rubs, clicks or gallops  Extremities - peripheral pulses normal, no pedal edema, no clubbing or cyanosis    Labs:   Lab Results   Component Value Date/Time    Cholesterol, total 157 04/04/2017 08:30 AM    HDL Cholesterol 65 04/04/2017 08:30 AM    LDL, calculated 62.2 04/04/2017 08:30 AM    Triglyceride 149 04/04/2017 08:30 AM    CHOL/HDL Ratio 2.4 04/04/2017 08:30 AM     Lab Results   Component Value Date/Time    GFR est non-AA >60 04/04/2017 08:30 AM    GFRNA, POC >60 03/06/2017 04:09 PM    GFR est AA >60 04/04/2017 08:30 AM    GFRAA, POC >60 03/06/2017 04:09 PM    Creatinine 1.02 04/04/2017 08:30 AM    Creatinine, POC 1.2 03/06/2017 04:09 PM    BUN 13 04/04/2017 08:30 AM    Sodium 139 04/04/2017 08:30 AM    Potassium 3.7 04/04/2017 08:30 AM    Chloride 100 04/04/2017 08:30 AM    CO2 25 04/04/2017 08:30 AM     Lab Results   Component Value Date/Time    Prostate Specific Ag 0.9 01/24/2017 04:30 PM     Lab Results   Component Value Date/Time    Hemoglobin A1c (POC) 6.5 12/16/2016 12:02 PM                        Assessment/Plan      ICD-10-CM ICD-9-CM    1. Essential hypertension I10 401.9 Uncontrolled. If not improving would add ARB METABOLIC PANEL, COMPREHENSIVE   2. Other chronic pain G89.29 338.29 Pt will not be treated with narcotics from this office due to abnormal drug screen. Pt cannot go to [facility] since they do not take patients without insurance.     3. Centrilobular emphysema ([facility] J43.2 492.8 Controlled on Breo    4. Dyslipidemia E78.5 272.4 Well controlled on Crestor LIPID PANEL   5. Type 2 diabetes mellitus without complication, without long-term current use of insulin (HCC) E11.9 250.00 Well controlled on metformin MICROALBUMIN, UR, RAND W/ MICROALBUMIN/CREA RATIO      HEMOGLOBIN A1C W/O EAG     Follow-up Disposition:  Return in about 4 months (around 12/9/2017) for labs 1 week before. Reviewed plan of care. Patient has provided input and agrees with goals.

## 2017-08-09 NOTE — MR AVS SNAPSHOT
Visit Information Date & Time Provider Department Dept. Phone Encounter #  
 8/9/2017  9:15 AM Philip Hurd MD [facility] [phone] Follow-up Instructions Return in about 4 months (around 12/9/2017) for labs 1 week before. Your Appointments 12/5/2017  3:20 PM  
Follow Up with Kimball Cranker, DO [facility] ([facility] Appt Note: 6-8 month f/up [place] [address] [address] [address] [phone] [address] Upcoming Health Maintenance Date Due MICROALBUMIN Q1 10/22/1969 EYE EXAM RETINAL OR DILATED Q1 10/22/1969 DTaP/Tdap/Td series (1 - Tdap) 10/22/1980 FOBT Q 1 YEAR AGE 50-75 10/22/2009 HEMOGLOBIN A1C Q6M 6/16/2017 INFLUENZA AGE 9 TO ADULT 8/1/2017 Pneumococcal 19-64 Medium Risk (1 of 1 - PPSV23) 9/24/2017* LIPID PANEL Q1 4/4/2018 FOOT EXAM Q1 7/26/2018 *Topic was postponed. The date shown is not the original due date. Allergies as of 8/9/2017  Review Complete On: 8/9/2017 By: Philip Hurd MD  
 No Known Allergies Current Immunizations  Never Reviewed No immunizations on file. Not reviewed this visit You Were Diagnosed With   
  
 Codes Comments Centrilobular emphysema ([facility] [address]    -  Primary ICD-10-CM: J43.2 ICD-9-CM: 492.8 Dyslipidemia     ICD-10-CM: E78.5 ICD-9-CM: 272.4 Vitals BP Pulse Temp Resp Height(growth percentile) Weight(growth percentile) (!) 140/98 (BP 1 Location: Left arm, BP Patient Position: Sitting) 95 97.8 °F (36.6 °C) (Oral) 16 5' 9\" (1.753 m) 183 lb (83 kg) SpO2 BMI Smoking Status 99% 27.02 kg/m2 Former Smoker Vitals History BMI and BSA Data Body Mass Index Body Surface Area  
 27.02 kg/m 2 2.01 m 2 Preferred Pharmacy Pharmacy Name Phone [facility], [address] [place] [address],[address] [place] [address] [phone] Your Updated Medication List  
  
   
This list is accurate as of: 8/9/17  9:49 AM.  Always use your most recent med list. amLODIPine 10 mg tablet Commonly known as:  Naa Hermes Take 1 Tab by mouth daily. aspirin 81 mg tablet Take 81 mg by mouth two (2) times a day. * fluticasone-vilanterol 100-25 mcg/dose inhaler Commonly known as:  BREO ELLIPTA Take 1 Puff by inhalation daily. * fluticasone-vilanterol 100-25 mcg/dose inhaler Commonly known as:  BREO ELLIPTA Take 1 Puff by inhalation daily. hydroCHLOROthiazide 25 mg tablet Commonly known as:  HYDRODIURIL Take 25 mg by mouth daily. Take 1 tab daily  
  
 metFORMIN 1,000 mg tablet Commonly known as:  GLUCOPHAGE Take 1,000 mg by mouth daily. multivitamin tablet Commonly known as:  ONE A DAY Take 1 Tab by mouth daily. nitroglycerin 0.4 mg SL tablet Commonly known as:  NITROSTAT  
1 Tab by SubLINGual route every five (5) minutes as needed for Chest Pain. PriLOSEC 20 mg capsule Generic drug:  omeprazole Take 40 mg by mouth daily. rosuvastatin 20 mg tablet Commonly known as:  CRESTOR Take 1 Tab by mouth nightly. * Notice: This list has 2 medication(s) that are the same as other medications prescribed for you. Read the directions carefully, and ask your doctor or other care provider to review them with you. Follow-up Instructions Return in about 4 months (around 12/9/2017) for labs 1 week before. Patient Instructions Foot Pain: Care Instructions Your Care Instructions Foot injuries that cause pain and swelling are fairly common. Almost all sports or home repair projects can cause a misstep that ends up as foot pain. Normal wear and tear, especially as you get older, also can cause foot pain. Most minor foot injuries will heal on their own, and home treatment is usually all you need to do. If you have a severe injury, you may need tests and treatment. Follow-up care is a key part of your treatment and safety. Be sure to make and go to all appointments, and call your doctor if you are having problems. Its also a good idea to know your test results and keep a list of the medicines you take. How can you care for yourself at home? · Take pain medicines exactly as directed. ¨ If the doctor gave you a prescription medicine for pain, take it as prescribed. ¨ If you are not taking a prescription pain medicine, ask your doctor if you can take an over-the-counter medicine. · Rest and protect your foot. Take a break from any activity that may cause pain. · Put ice or a cold pack on your foot for 10 to 20 minutes at a time. Put a thin cloth between the ice and your skin. · Prop up the sore foot on a pillow when you ice it or anytime you sit or lie down during the next 3 days. Try to keep it above the level of your heart. This will help reduce swelling. · Your doctor may recommend that you wrap your foot with an elastic bandage. Keep your foot wrapped for as long as your doctor advises. · If your doctor recommends crutches, use them as directed. · Wear roomy footwear. · As soon as pain and swelling end, begin gentle exercises of your foot. Your doctor can tell you which exercises will help. When should you call for help? Call 911 anytime you think you may need emergency care. For example, call if: 
· Your foot turns pale, white, blue, or cold. Call your doctor now or seek immediate medical care if: 
· You cannot move or stand on your foot. · Your foot looks twisted or out of its normal position. · Your foot is not stable when you step down. · You have signs of infection, such as: 
¨ Increased pain, swelling, warmth, or redness. ¨ Red streaks leading from the sore area. ¨ Pus draining from a place on your foot. ¨ A fever. · Your foot is numb or tingly. Watch closely for changes in your health, and be sure to contact your doctor if: 
· You do not get better as expected. · You have bruises from an injury that last longer than 2 weeks. Where can you learn more? Go to http://ramos-meghan.info/. Enter C419 in the search box to learn more about \"Foot Pain: Care Instructions. \" Current as of: March 21, 2017 Content Version: 11.3 © 9672-9258 [employer]. Care instructions adapted under license by [facility] (which disclaims liability or warranty for this information). If you have questions about a medical condition or this instruction, always ask your healthcare professional. [address] any warranty or liability for your use of this information. Introducing [address] [employer] introduces Pearl.com patient portal. Now you can access parts of your medical record, email your doctor's office, and request medication refills online. 1. In your internet browser, go to [email or web address] 2. Click on the First Time User? Click Here link in the Sign In box. You will see the New Member Sign Up page. 3. Enter your Pearl.com Access Code exactly as it appears below. You will not need to use this code after youve completed the sign-up process. If you do not sign up before the expiration date, you must request a new code. · Pearl.com Access Code: [email or web address] Expires: 10/1/2017  4:06 PM 
 
4. Enter the last four digits of your Social Security Number (xxxx) and Date of Birth (mm/dd/yyyy) as indicated and click Submit. You will be taken to the next sign-up page. 5. Create a Pearl.com ID. This will be your Pearl.com login ID and cannot be changed, so think of one that is secure and easy to remember. 6. Create a [employer] password. You can change your password at any time. 7. Enter your Password Reset Question and Answer. This can be used at a later time if you forget your password. 8. Enter your e-mail address. You will receive e-mail notification when new information is available in [address] 9. Click Sign Up. You can now view and download portions of your medical record. 10. Click the Download Summary menu link to download a portable copy of your medical information. If you have questions, please visit the Frequently Asked Questions section of the [employer] website. Remember, [employer] is NOT to be used for urgent needs. For medical emergencies, dial 911. Now available from your iPhone and Android! Please provide this summary of care documentation to your next provider. Your primary care clinician is listed as Ivett Cheng. If you have any questions after today's visit, please call [phone].

## 2017-08-09 NOTE — PATIENT INSTRUCTIONS

## 2017-08-09 NOTE — PROGRESS NOTES
Gisela Stanford is a  61 y.o. male presents today for office visit for routine follow up. 1. Have you been to the ER, urgent care clinic or hospitalized since your last visit? NO.     2. Have you seen or consulted any other health care providers outside of the [address] [place] [address] since your last visit (Include any pap smears or colon screening)?  NO

## 2017-11-03 ENCOUNTER — OFFICE VISIT (OUTPATIENT)
Dept: PULMONOLOGY | Age: 57
End: 2017-11-03

## 2017-11-03 VITALS
RESPIRATION RATE: 18 BRPM | HEIGHT: 69 IN | OXYGEN SATURATION: 97 % | TEMPERATURE: 98.3 F | BODY MASS INDEX: 28.14 KG/M2 | SYSTOLIC BLOOD PRESSURE: 126 MMHG | DIASTOLIC BLOOD PRESSURE: 70 MMHG | WEIGHT: 190 LBS | HEART RATE: 78 BPM

## 2017-11-03 DIAGNOSIS — J43.2 CENTRILOBULAR EMPHYSEMA (HCC): Primary | ICD-10-CM

## 2017-11-03 DIAGNOSIS — K21.9 GASTROESOPHAGEAL REFLUX DISEASE WITHOUT ESOPHAGITIS: ICD-10-CM

## 2017-11-03 DIAGNOSIS — F32.9 REACTIVE DEPRESSION: ICD-10-CM

## 2017-11-03 DIAGNOSIS — M19.90 ARTHRITIS: ICD-10-CM

## 2017-11-03 RX ORDER — FLUTICASONE FUROATE AND VILANTEROL 100; 25 UG/1; UG/1
1 POWDER RESPIRATORY (INHALATION) DAILY
Qty: 3 INHALER | Refills: 0 | Status: SHIPPED | COMMUNITY
Start: 2017-11-03 | End: 2018-01-26

## 2017-11-03 NOTE — PROGRESS NOTES
[facility]  Pulmonary, Critical Care, and Sleep Medicine      Pulmonary Office visit    Name: Joy Thomas     : 1960     Date: 11/3/2017        Subjective:     Patient is a 61 y.o. male is here for evaluation for COPD.    17   Feels better after starting Breo and having his knee infection treated. Appetite better and has gained 10 lbs. Denies any cough  Denies SOB  Admits to being 'down\" as he is struggling to come off pain meds. Some problems with knee pain- has to crawl for work   Denies any new concerns. HPI:  Seen after 3 years and relates that he has really had significant problem with weight loss in the last 6 months and he is down on his bathroom scale from 209 to as low as 157. He has had some problems with greenish sputum production, but denies any other pulmonary issues although he is also complaining of large clots of blood coming from his right nares at times. denies any hemoptysis. Denies any SOB or limitation in his ADL's or work from SOB. He denies any chest pain but he has developed some swelling in his right leg particularly and some cellulitis which is now being treated after recent ER visit. He is also complaining of some pain in his right leg. He really does not have any orthopnea but seems to have occasional PND. He reports chronic diarrhea since starting Metformin- he was on 2000 mg which he cut back to 1000 mg and still has diarrhea. He has a GI evaluation upcoming. Mr. 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 This was opened with a wire and balloon, but we were unable to stent it. There was a residual stenosis of 40% with JB grade 3 flow following the procedure.  Left ventricular function at that time was grossly normal with an ejection fraction in the 60% range. He has done well on medical therapy over the years without any recurrent cardiovascular issues. He did quit smoking completely in January of 2014 and has been able to stay off cigarettes now for 4 years.     Past Medical History:   Diagnosis Date    Acute coronary syndrome [facility])     with non-ST-elevation myocardial infarction, s/p direct angioplasty of anomolous circumflex marginal coming off the right coronary cusp on 1/26/06    Arthritis     in hands    ASHD (arteriosclerotic heart disease)     Benign hypertensive heart disease without heart failure     CAD (coronary artery disease)     chronic underlying    Calculus of kidney     Cardiac cath 01/26/2006    LM mild. RCA mild. LAD tortuous. CX mild. CX angela 100%. S/P balloon angioplasty of CX angela. Residual 40%. LVEDP 20.  EF 65%.  Cardiac nuclear imaging test 08/30/2006    No ischemia or infarct. EF 59%. Negative max.  stress test.    Chronic cough     coughing up sputum    Chronic lung disease     COPD (chronic obstructive pulmonary disease) ([facility])     moderate to severe    Coronary artery disease     Diabetes ([address]     Diabetes mellitus ([address]     GARCIA (dyspnea on exertion)     GERD (gastroesophageal reflux disease)     H/O: pneumonia     Heartburn     Hypertension     essential systemic    Indigestion     Pneumonia 8/12    \"walking\"    Pure hypercholesterolemia     Tobacco abuse        Past Surgical History:   Procedure Laterality Date    HX HEART CATHETERIZATION  01/26/06    HX ORTHOPAEDIC  1989    fracture of bilateral hands     HX PTCA      of anomalous circumflex marginal artery    TONGUE AND MOUTH SURG UNLISTED  04/04    tongue surgery    TONGUE TO LIP SURGERY  04/04       Social History     Social History    Marital status:      Spouse name: N/A    Number of children: N/A    Years of education: N/A     Social History Main Topics    Smoking status: Former Smoker     Packs/day: 1.25     Years: 20.00     Types: Cigarettes     Quit date: 1/1/2014    Smokeless tobacco: Never Used    Alcohol use Yes      Comment: on special occasions only    Drug use: No    Sexual activity: Yes     Other Topics Concern    Not on file     Social History Narrative       Family History   Problem Relation Age of Onset    COPD Mother      cause of death    Asthma Father     Cancer Father     Colon Polyps Father        No Known Allergies    . Current Outpatient Prescriptions   Medication Sig Dispense Refill    amLODIPine (NORVASC) 10 mg tablet Take 1 Tab by mouth daily. 30 Tab 5    rosuvastatin (CRESTOR) 20 mg tablet Take 1 Tab by mouth nightly. 30 Tab 5    fluticasone-vilanterol (BREO ELLIPTA) 100-25 mcg/dose inhaler Take 1 Puff by inhalation daily. 3 Inhaler 0    fluticasone-vilanterol (BREO ELLIPTA) 100-25 mcg/dose inhaler Take 1 Puff by inhalation daily. 2 Inhaler 0    metFORMIN (GLUCOPHAGE) 1,000 mg tablet Take 1,000 mg by mouth daily.  hydroCHLOROthiazide (HYDRODIURIL) 25 mg tablet Take 25 mg by mouth daily. Take 1 tab daily      multivitamin (ONE A DAY) tablet Take 1 Tab by mouth daily.  omeprazole (PRILOSEC) 20 mg capsule Take 40 mg by mouth daily.  nitroglycerin (NITROSTAT) 0.4 mg SL tablet 1 Tab by SubLINGual route every five (5) minutes as needed for Chest Pain. 25 Tab 3    aspirin 81 mg tablet Take 81 mg by mouth two (2) times a day.            Review of Systems:  HEENT: No epistaxis, no nasal drainage, no difficulty in swallowing, no redness in eyes  Respiratory: as above  Cardiovascular: no chest pain, no palpitations, no chronic leg edema, no syncope  Gastrointestinal: no abd pain, no vomiting, no diarrhea, no bleeding symptoms  Genitourinary: No urinary symptoms or hematuria  Integument/breast: No ulcers or rashes  Musculoskeletal:Neg  Neurological: No focal weakness, no seizures, no headaches  Behvioral/Psych: No anxiety, no depression  Constitutional: No fever, no chills, no weight loss, no night sweats     Objective: There were no vitals taken for this visit. Physical Exam:   General: comfortable, no acute distress  HEENT: pupils reactive, sclera anicteric, EOM intact  Neck: No adenopathy or thyroid swelling, no lymphadenopathy or JVD, supple  CVS: S1S2 no murmurs  RS: Mod AE bilaterally, no tactile fremitus or egophony, no accessory muscle use  Abd: soft, non tender, no hepatosplenomegaly  Neuro: non focal, awake, alert  Extrm: no leg edema, clubbing or cyanosis  Skin: no rash    Data review:   PFT 1/23/2017:  Flows:  Maximal Mid Expiratory Flow rate is reduced to 23 % predicted  Forced Expiratory Volume in one second is reduced to 45 % predicted  FEV 1% is reduced  Flow Volume Loop:  Nonspecific obstructive pattern in Flow Volume Loop  Bronchodilator:  Significant improvement with bronchodilator  Impression:  Moderate to Severe obstructive defect    Imaging:  I have personally reviewed the patients radiographs and have reviewed the reports:  CT scan of chest: 3/6/2017  1. Mild emphysema. 2. No suspicious pulmonary nodules or mass. 3. Chronic medial right upper lobe parenchymal scarring. 4. Decrease in size of right hilar mildly enlarged lymph node. CXR: 1/31/2017:  COMPARISON: May 22, 2009.     FINDINGS: PA and lateral radiographs of the chest demonstrate clear lungs. The  cardiac and mediastinal contours and pulmonary vascularity are normal. The  bones and soft tissues are within normal limits.      IMPRESSION: No acute cardiopulmonary process    CT abd/pelvis: 12/23/2016:    1. There are small indirect inguinal hernias bilaterally containing fat without  incarceration. There is a small left-sided hydrocele. 2. Atrophic right kidney. Parenchymal scarring is present involving both  kidneys. No renal stones are seen. There is no hydronephrosis. 3. Arterial atherosclerotic disease.  Additional chronic changes as described  above. CT scan of chest :12/2013  Virtually complete resolution of the left lingular findings. No change focal  residual scar/fibrosis posteromedial right upper lobe from the previous  cavitary lesion or the small subpleural nodules in the anterior left lower  lobe. The lungs are otherwise clear. There is no change in the prominent  right hilar lymph node.     Hepatic steatosis without focal abnormality. The remainder of the study is  unremarkable and unchanged. IMPRESSION:   · COPD/asthma: FEV1 45% predicted. GOLD 3 and Functional class B  · Unexplained weight loss- 40 lbs - apparently from infected knee- now improved  · H/o Abnormal Ct scan of chest- lingular pneumonia with clinical and radiologic resolution. Presented in 2009 with hemoptysis- had bronchoscopy. · Ex- smoker- quit 4 years back  · Pain meds dependence- verbalized today that he has been on meds x 30 years and is seeking help to get off  · CAD  · HTN  · GERD  · Cellulitis and knee infection, no DVT      RECOMMENDATIONS:   · Continue treatment for COPD- GOLD 3 with functional class-B  · Breo ellipta - samples provided  · Discussed need for taking care of dependence on pain meds- offered resources pain management and weaning off slowly  · Follow up with PCP- health maintainence  ·  COPD management as is. · Declined influenza vaccine  · Healthy weight, active lifestyle   · Discussed with patient and spouse. Health maintenance screens deferred to Primary care provider.      Eva Qureshi MD

## 2017-11-03 NOTE — MR AVS SNAPSHOT
Visit Information Date & Time Provider Department Dept. Phone Encounter #  
 11/3/2017 11:45 AM Caterina Victoria MD [facility] [place] [id number] Follow-up Instructions Return in about 6 months (around 5/3/2018). Your Appointments 11/29/2017  8:10 AM  
LAB with [facility] (GONZALEZ Sarah) Appt Note: 4 mos labs 129 [facility] [address] [address]  
[phone]  
  
   
 [address], [facility]  
  
    
 12/5/2017  3:20 PM  
Follow Up with Andrew Andersen DO [facility] ([address]) Appt Note: 6-8 month f/up [place] [id number]  
[phone] [address] [place] [address]  
  
    
 12/6/2017  4:30 PM  
Office Visit with Tiffanie Petersen MD  
[address] [place] [address] [address] [place] [address]) Appt Note: 4 mos fu per NH; .  
 [address], [facility] [address] [address]  
[phone]  
  
   
 [address], [address] Upcoming Health Maintenance Date Due MICROALBUMIN Q1 10/22/1969 EYE EXAM RETINAL OR DILATED Q1 10/22/1969 Pneumococcal 19-64 Medium Risk (1 of 1 - PPSV23) 10/22/1978 DTaP/Tdap/Td series (1 - Tdap) 10/22/1980 FOBT Q 1 YEAR AGE 50-75 10/22/2009 HEMOGLOBIN A1C Q6M 6/16/2017 INFLUENZA AGE 9 TO ADULT 8/1/2017 LIPID PANEL Q1 4/4/2018 FOOT EXAM Q1 7/26/2018 Allergies as of 11/3/2017  Review Complete On: 11/3/2017 By: Andres Villasenor LPN No Known Allergies Current Immunizations  Reviewed on 11/3/2017 No immunizations on file. Reviewed by Andres Villasenor LPN on 02/6/9965 at 62:70 AM  
You Were Diagnosed With   
  
 Codes Comments Centrilobular emphysema ([facility]    -  Primary ICD-10-CM: J43.2 ICD-9-CM: 492.8 Arthritis     ICD-10-CM: M19.90 ICD-9-CM: 716.90   
 Gastroesophageal reflux disease without esophagitis     ICD-10-CM: K21.9 ICD-9-CM: 530.81 Vitals BP Pulse Temp Resp Height(growth percentile) Weight(growth percentile) 126/70 (BP 1 Location: Left arm, BP Patient Position: Sitting) 78 98.3 °F (36.8 °C) (Oral) 18 5' 9\" (1.753 m) 190 lb (86.2 kg) SpO2 BMI Smoking Status 97% 28.06 kg/m2 Former Smoker BMI and BSA Data Body Mass Index Body Surface Area 28.06 kg/m 2 2.05 m 2 Preferred Pharmacy Pharmacy Name [facility], [address] [place] [address],[address] [place], [place] [address]e [phone] Your Updated Medication List  
  
   
This list is accurate as of: 11/3/17 12:16 PM.  Always use your most recent med list. amLODIPine 10 mg tablet Commonly known as:  Mousie Mend Take 1 Tab by mouth daily. aspirin 81 mg tablet Take 81 mg by mouth two (2) times a day. * fluticasone-vilanterol 100-25 mcg/dose inhaler Commonly known as:  BREO ELLIPTA Take 1 Puff by inhalation daily. * fluticasone-vilanterol 100-25 mcg/dose inhaler Commonly known as:  BREO ELLIPTA Take 1 Puff by inhalation daily. hydroCHLOROthiazide 25 mg tablet Commonly known as:  HYDRODIURIL Take 25 mg by mouth daily. Take 1 tab daily  
  
 metFORMIN 1,000 mg tablet Commonly known as:  GLUCOPHAGE Take 1,000 mg by mouth daily. multivitamin tablet Commonly known as:  ONE A DAY Take 1 Tab by mouth daily. nitroglycerin 0.4 mg SL tablet Commonly known as:  NITROSTAT  
1 Tab by SubLINGual route every five (5) minutes as needed for Chest Pain. PriLOSEC 20 mg capsule Generic drug:  omeprazole Take 40 mg by mouth daily. rosuvastatin 20 mg tablet Commonly known as:  CRESTOR Take 1 Tab by mouth nightly. * Notice: This list has 2 medication(s) that are the same as other medications prescribed for you.  Read the directions carefully, and ask your doctor or other care provider to review them with you. Follow-up Instructions Return in about 6 months (around 5/3/2018). Introducing [facility]! Nigel Haddad introduces [employer] patient portal. Now you can access parts of your medical record, email your doctor's office, and request medication refills online. 1. In your internet browser, go to [email or web address] 2. Click on the First Time User? Click Here link in the Sign In box. You will see the New Member Sign Up page. 3. Enter your [employer] Access Code exactly as it appears below. You will not need to use this code after youve completed the sign-up process. If you do not sign up before the expiration date, you must request a new code. · [employer] Access Code: [email or web address] Expires: 2/1/2018 12:16 PM 
 
4. Enter the last four digits of your Social Security Number (xxxx) and Date of Birth (mm/dd/yyyy) as indicated and click Submit. You will be taken to the next sign-up page. 5. Create a [employer] ID. This will be your [employer] login ID and cannot be changed, so think of one that is secure and easy to remember. 6. Create a [employer] password. You can change your password at any time. 7. Enter your Password Reset Question and Answer. This can be used at a later time if you forget your password. 8. Enter your e-mail address. You will receive e-mail notification when new information is available in [address] 9. Click Sign Up. You can now view and download portions of your medical record. 10. Click the Download Summary menu link to download a portable copy of your medical information. If you have questions, please visit the Frequently Asked Questions section of the [employer] website. Remember, [employer] is NOT to be used for urgent needs. For medical emergencies, dial 911. Now available from your iPhone and Android! Please provide this summary of care documentation to your next provider. Your primary care clinician is listed as Tobi Nevarez. If you have any questions after today's visit, please call [phone].

## 2017-11-03 NOTE — PROGRESS NOTES
Mr. Dianne Car has a reminder for a \"due or due soon\" health maintenance. I have asked that he contact his primary care provider for follow-up on this health maintenance.

## 2017-11-11 DIAGNOSIS — E11.42 DIABETIC POLYNEUROPATHY ASSOCIATED WITH TYPE 2 DIABETES MELLITUS (HCC): ICD-10-CM

## 2017-11-22 RX ORDER — GABAPENTIN 300 MG/1
CAPSULE ORAL
Qty: 90 CAP | Refills: 0 | OUTPATIENT
Start: 2017-11-22

## 2017-11-22 RX ORDER — ROSUVASTATIN CALCIUM 20 MG/1
TABLET, COATED ORAL
Qty: 30 TAB | Refills: 4 | Status: SHIPPED | OUTPATIENT
Start: 2017-11-22 | End: 2017-12-06 | Stop reason: SDUPTHER

## 2017-11-30 ENCOUNTER — HOSPITAL ENCOUNTER (OUTPATIENT)
Dept: LAB | Age: 57
Discharge: HOME | End: 2017-11-30
Payer: SELF-PAY

## 2017-11-30 DIAGNOSIS — I10 ESSENTIAL HYPERTENSION: ICD-10-CM

## 2017-11-30 DIAGNOSIS — E78.5 DYSLIPIDEMIA: ICD-10-CM

## 2017-11-30 DIAGNOSIS — E11.9 TYPE 2 DIABETES MELLITUS WITHOUT COMPLICATION, WITHOUT LONG-TERM CURRENT USE OF INSULIN (HCC): ICD-10-CM

## 2017-11-30 LAB
ALBUMIN SERPL-MCNC: 3.9 G/DL (ref 3.4–5)
ALBUMIN/GLOB SERPL: 1.2 {RATIO} (ref 0.8–1.7)
ALP SERPL-CCNC: 139 U/L (ref 45–117)
ALT SERPL-CCNC: 32 U/L (ref 16–61)
ANION GAP SERPL CALC-SCNC: 9 MMOL/L (ref 3–18)
AST SERPL-CCNC: 20 U/L (ref 15–37)
BILIRUB SERPL-MCNC: 0.2 MG/DL (ref 0.2–1)
BUN SERPL-MCNC: 18 MG/DL (ref 7–18)
BUN/CREAT SERPL: 17 (ref 12–20)
CALCIUM SERPL-MCNC: 9.3 MG/DL (ref 8.5–10.1)
CHLORIDE SERPL-SCNC: 103 MMOL/L (ref 100–108)
CHOLEST SERPL-MCNC: 150 MG/DL
CO2 SERPL-SCNC: 26 MMOL/L (ref 21–32)
CREAT SERPL-MCNC: 1.08 MG/DL (ref 0.6–1.3)
GLOBULIN SER CALC-MCNC: 3.2 G/DL (ref 2–4)
GLUCOSE SERPL-MCNC: 242 MG/DL (ref 74–99)
HBA1C MFR BLD: 7 % (ref 4.2–5.6)
HDLC SERPL-MCNC: 41 MG/DL (ref 40–60)
HDLC SERPL: 3.7 {RATIO} (ref 0–5)
LDLC SERPL CALC-MCNC: 62 MG/DL (ref 0–100)
LIPID PROFILE,FLP: ABNORMAL
POTASSIUM SERPL-SCNC: 3.9 MMOL/L (ref 3.5–5.5)
PROT SERPL-MCNC: 7.1 G/DL (ref 6.4–8.2)
SODIUM SERPL-SCNC: 138 MMOL/L (ref 136–145)
TRIGL SERPL-MCNC: 235 MG/DL (ref ?–150)
VLDLC SERPL CALC-MCNC: 47 MG/DL

## 2017-11-30 PROCEDURE — 83036 HEMOGLOBIN GLYCOSYLATED A1C: CPT | Performed by: INTERNAL MEDICINE

## 2017-11-30 PROCEDURE — 80061 LIPID PANEL: CPT | Performed by: INTERNAL MEDICINE

## 2017-11-30 PROCEDURE — 36415 COLL VENOUS BLD VENIPUNCTURE: CPT | Performed by: INTERNAL MEDICINE

## 2017-11-30 PROCEDURE — 80053 COMPREHEN METABOLIC PANEL: CPT | Performed by: INTERNAL MEDICINE

## 2017-11-30 PROCEDURE — 82043 UR ALBUMIN QUANTITATIVE: CPT | Performed by: INTERNAL MEDICINE

## 2017-12-01 LAB
CREAT UR-MCNC: 149.15 MG/DL (ref 30–125)
MICROALBUMIN UR-MCNC: 11.4 MG/DL (ref 0–3)
MICROALBUMIN/CREAT UR-RTO: 76 MG/G (ref 0–30)

## 2017-12-06 ENCOUNTER — OFFICE VISIT (OUTPATIENT)
Dept: FAMILY MEDICINE CLINIC | Age: 57
End: 2017-12-06

## 2017-12-06 VITALS
BODY MASS INDEX: 29.18 KG/M2 | HEIGHT: 69 IN | HEART RATE: 77 BPM | DIASTOLIC BLOOD PRESSURE: 92 MMHG | SYSTOLIC BLOOD PRESSURE: 154 MMHG | TEMPERATURE: 98.1 F | WEIGHT: 197 LBS | OXYGEN SATURATION: 96 % | RESPIRATION RATE: 22 BRPM

## 2017-12-06 DIAGNOSIS — I10 ESSENTIAL HYPERTENSION: ICD-10-CM

## 2017-12-06 DIAGNOSIS — J43.2 CENTRILOBULAR EMPHYSEMA (HCC): ICD-10-CM

## 2017-12-06 DIAGNOSIS — E11.29 TYPE 2 DIABETES MELLITUS WITH MICROALBUMINURIA, WITHOUT LONG-TERM CURRENT USE OF INSULIN (HCC): Primary | ICD-10-CM

## 2017-12-06 DIAGNOSIS — R80.9 TYPE 2 DIABETES MELLITUS WITH MICROALBUMINURIA, WITHOUT LONG-TERM CURRENT USE OF INSULIN (HCC): Primary | ICD-10-CM

## 2017-12-06 DIAGNOSIS — E78.5 DYSLIPIDEMIA: ICD-10-CM

## 2017-12-06 RX ORDER — ROSUVASTATIN CALCIUM 20 MG/1
TABLET, COATED ORAL
Qty: 90 TAB | Refills: 2 | Status: SHIPPED | OUTPATIENT
Start: 2017-12-06 | End: 2019-07-10 | Stop reason: SDUPTHER

## 2017-12-06 RX ORDER — HYDROCHLOROTHIAZIDE 25 MG/1
25 TABLET ORAL DAILY
Qty: 90 TAB | Refills: 2 | Status: CANCELLED | OUTPATIENT
Start: 2017-12-06

## 2017-12-06 RX ORDER — LISINOPRIL AND HYDROCHLOROTHIAZIDE 12.5; 2 MG/1; MG/1
1 TABLET ORAL DAILY
Qty: 90 TAB | Refills: 2 | Status: SHIPPED | OUTPATIENT
Start: 2017-12-06 | End: 2018-08-17

## 2017-12-06 RX ORDER — GABAPENTIN 300 MG/1
300 CAPSULE ORAL 3 TIMES DAILY
COMMUNITY
End: 2017-12-06 | Stop reason: SDUPTHER

## 2017-12-06 RX ORDER — GABAPENTIN 300 MG/1
300 CAPSULE ORAL 3 TIMES DAILY
Qty: 180 CAP | Refills: 2 | Status: SHIPPED | OUTPATIENT
Start: 2017-12-06 | End: 2018-08-23 | Stop reason: SDUPTHER

## 2017-12-06 RX ORDER — AMLODIPINE BESYLATE 10 MG/1
10 TABLET ORAL DAILY
Qty: 90 TAB | Refills: 2 | Status: SHIPPED | OUTPATIENT
Start: 2017-12-06 | End: 2018-08-17

## 2017-12-06 RX ORDER — METFORMIN HYDROCHLORIDE 1000 MG/1
1000 TABLET ORAL DAILY
Qty: 90 TAB | Refills: 2 | Status: SHIPPED | OUTPATIENT
Start: 2017-12-06 | End: 2018-12-07 | Stop reason: SDUPTHER

## 2017-12-06 NOTE — PROGRESS NOTES
Subjective:       Chief Complaint  The patient presents for follow up of diabetes, hypertension and high cholesterol. SUZY Dotson is a 61 y.o. male seen for follow up of diabetes. Radhika has hypertension and hyperlipidemia. Diabetes well controlled, no significant medication side effects noted, on metformin, hypertension no significant medication side effects noted, borderline controlled, on Norvasc, Zestoretic , hyperlipidemia well controlled, no significant medication side effects noted, on Crestor. Diet and Lifestyle: generally follows a low fat low cholesterol diet, sedentary    Home BP Monitoring: is not measured at home. Diabetic Review of Systems - home glucose monitoring: is performed sporadically. Other symptoms and concerns: COPD Review:  The patient is being seen for follow up of COPD. Oxygen: He currently is not on home oxygen therapy. Symptoms: chronic dyspnea: severity = moderate: course of sx: symptoms have progressed to a point and plateaued. .   Patient uses 1 pillows at night. Patient denies smoke cigarettes. He is using Breo currently. Discussed the patient's BMI with him. The BMI follow up plan is as follows: I have counseled this patient on diet and exercise regimens. Current Outpatient Prescriptions   Medication Sig    gabapentin (NEURONTIN) 300 mg capsule Take 1 Cap by mouth three (3) times daily.  metFORMIN (GLUCOPHAGE) 1,000 mg tablet Take 1 Tab by mouth daily.  amLODIPine (NORVASC) 10 mg tablet Take 1 Tab by mouth daily.  rosuvastatin (CRESTOR) 20 mg tablet TAKE ONE TABLET BY MOUTH NIGHTLY    lisinopril-hydroCHLOROthiazide (PRINZIDE, ZESTORETIC) 20-12.5 mg per tablet Take 1 Tab by mouth daily.  fluticasone-vilanterol (BREO ELLIPTA) 100-25 mcg/dose inhaler Take 1 Puff by inhalation daily.  fluticasone-vilanterol (BREO ELLIPTA) 100-25 mcg/dose inhaler Take 1 Puff by inhalation daily.     multivitamin (ONE A DAY) tablet Take 1 Tab by mouth daily.  aspirin 81 mg tablet Take 81 mg by mouth two (2) times a day.  omeprazole (PRILOSEC) 20 mg capsule Take 40 mg by mouth daily.  nitroglycerin (NITROSTAT) 0.4 mg SL tablet 1 Tab by SubLINGual route every five (5) minutes as needed for Chest Pain. No current facility-administered medications for this visit.               Review of Systems  Respiratory: negative for dyspnea on exertion  Cardiovascular: negative for chest pain    Objective:     Visit Vitals    BP (!) 154/92 (BP 1 Location: Left arm, BP Patient Position: Sitting)    Pulse 77    Temp 98.1 °F (36.7 °C) (Oral)    Resp 22    Ht 5' 9\" (1.753 m)    Wt 197 lb (89.4 kg)    SpO2 96%    BMI 29.09 kg/m2        Chest - clear to auscultation, no wheezes, rales or rhonchi, symmetric air entry  Heart - normal rate, regular rhythm, normal S1, S2, no murmurs, rubs, clicks or gallops  Extremities - peripheral pulses normal, no pedal edema, no clubbing or cyanosis      Labs:   Lab Results  Component Value Date/Time   Hemoglobin A1c 7.0 11/30/2017 09:43 AM   Hemoglobin A1c, External 7.2 02/27/2016   Glucose 242 11/30/2017 09:43 AM   Microalbumin/Creat ratio (mg/g creat) 76 11/30/2017 09:43 AM   Microalbumin,urine random 11.40 11/30/2017 09:43 AM   LDL, calculated 62 11/30/2017 09:43 AM   Creatinine, POC 1.2 03/06/2017 04:09 PM   Creatinine 1.08 11/30/2017 09:43 AM      Lab Results  Component Value Date/Time   Prostate Specific Ag 0.9 01/24/2017 04:30 PM     Lab Results   Component Value Date/Time    Sodium 138 11/30/2017 09:43 AM    Potassium 3.9 11/30/2017 09:43 AM    Chloride 103 11/30/2017 09:43 AM    CO2 26 11/30/2017 09:43 AM    Anion gap 9 11/30/2017 09:43 AM    Glucose 242 11/30/2017 09:43 AM    BUN 18 11/30/2017 09:43 AM    Creatinine 1.08 11/30/2017 09:43 AM    BUN/Creatinine ratio 17 11/30/2017 09:43 AM    GFR est AA >60 11/30/2017 09:43 AM    GFR est non-AA >60 11/30/2017 09:43 AM    Calcium 9.3 11/30/2017 09:43 AM Bilirubin, total 0.2 11/30/2017 09:43 AM    ALT (SGPT) 32 11/30/2017 09:43 AM    AST (SGOT) 20 11/30/2017 09:43 AM    Alk. phosphatase 139 11/30/2017 09:43 AM    Protein, total 7.1 11/30/2017 09:43 AM    Albumin 3.9 11/30/2017 09:43 AM    Globulin 3.2 11/30/2017 09:43 AM    A-G Ratio 1.2 11/30/2017 09:43 AM      Lab Results   Component Value Date/Time    Hemoglobin A1c 7.0 11/30/2017 09:43 AM    Hemoglobin A1c (POC) 6.5 12/16/2016 12:02 PM    Hemoglobin A1c, External 7.2 02/27/2016                      Assessment / Plan     Diabetes well controlled, on metformin  Hypertension borderline controlled, on current meds. Will continue to monitor and adjust meds if needed   Hyperlipidemia well controlled, on Crestor. ICD-10-CM ICD-9-CM    1. Type 2 diabetes mellitus with microalbuminuria, without long-term current use of insulin (HCC) E11.29 250.40 HEMOGLOBIN A1C W/O EAG    R80.9 791.0    2. Essential hypertension I10 401.9 amLODIPine (NORVASC) 10 mg tablet      METABOLIC PANEL, COMPREHENSIVE   3. Dyslipidemia E78.5 272.4 LIPID PANEL   4. Centrilobular emphysema (HCC) J43.2 492.8 Controlled on Breo currently             Diabetic issues reviewed with him: diabetic diet discussed in detail, and low cholesterol diet, weight control and daily exercise discussed. Follow-up Disposition:  Return in about 4 months (around 4/6/2018) for labs 1 week before. Reviewed plan of care. Patient has provided input and agrees with goals.

## 2017-12-06 NOTE — PATIENT INSTRUCTIONS
High Blood Pressure: Care Instructions  Your Care Instructions    If your blood pressure is usually above 140/90, you have high blood pressure, or hypertension. That means the top number is 140 or higher or the bottom number is 90 or higher, or both. Despite what a lot of people think, high blood pressure usually doesn't cause headaches or make you feel dizzy or lightheaded. It usually has no symptoms. But it does increase your risk for heart attack, stroke, and kidney or eye damage. The higher your blood pressure, the more your risk increases. Your doctor will give you a goal for your blood pressure. Your goal will be based on your health and your age. An example of a goal is to keep your blood pressure below 140/90. Lifestyle changes, such as eating healthy and being active, are always important to help lower blood pressure. You might also take medicine to reach your blood pressure goal.  Follow-up care is a key part of your treatment and safety. Be sure to make and go to all appointments, and call your doctor if you are having problems. It's also a good idea to know your test results and keep a list of the medicines you take. How can you care for yourself at home? Medical treatment  · If you stop taking your medicine, your blood pressure will go back up. You may take one or more types of medicine to lower your blood pressure. Be safe with medicines. Take your medicine exactly as prescribed. Call your doctor if you think you are having a problem with your medicine. · Talk to your doctor before you start taking aspirin every day. Aspirin can help certain people lower their risk of a heart attack or stroke. But taking aspirin isn't right for everyone, because it can cause serious bleeding. · See your doctor regularly. You may need to see the doctor more often at first or until your blood pressure comes down.   · If you are taking blood pressure medicine, talk to your doctor before you take decongestants or anti-inflammatory medicine, such as ibuprofen. Some of these medicines can raise blood pressure. · Learn how to check your blood pressure at home. Lifestyle changes  · Stay at a healthy weight. This is especially important if you put on weight around the waist. Losing even 10 pounds can help you lower your blood pressure. · If your doctor recommends it, get more exercise. Walking is a good choice. Bit by bit, increase the amount you walk every day. Try for at least 30 minutes on most days of the week. You also may want to swim, bike, or do other activities. · Avoid or limit alcohol. Talk to your doctor about whether you can drink any alcohol. · Try to limit how much sodium you eat to less than 2,300 milligrams (mg) a day. Your doctor may ask you to try to eat less than 1,500 mg a day. · Eat plenty of fruits (such as bananas and oranges), vegetables, legumes, whole grains, and low-fat dairy products. · Lower the amount of saturated fat in your diet. Saturated fat is found in animal products such as milk, cheese, and meat. Limiting these foods may help you lose weight and also lower your risk for heart disease. · Do not smoke. Smoking increases your risk for heart attack and stroke. If you need help quitting, talk to your doctor about stop-smoking programs and medicines. These can increase your chances of quitting for good. When should you call for help? Call 911 anytime you think you may need emergency care. This may mean having symptoms that suggest that your blood pressure is causing a serious heart or blood vessel problem. Your blood pressure may be over 180/110. ? For example, call 911 if:  ? · You have symptoms of a heart attack. These may include:  ¨ Chest pain or pressure, or a strange feeling in the chest.  ¨ Sweating. ¨ Shortness of breath. ¨ Nausea or vomiting.   ¨ Pain, pressure, or a strange feeling in the back, neck, jaw, or upper belly or in one or both shoulders or arms.  ¨ Lightheadedness or sudden weakness. ¨ A fast or irregular heartbeat. ? · You have symptoms of a stroke. These may include:  ¨ Sudden numbness, tingling, weakness, or loss of movement in your face, arm, or leg, especially on only one side of your body. ¨ Sudden vision changes. ¨ Sudden trouble speaking. ¨ Sudden confusion or trouble understanding simple statements. ¨ Sudden problems with walking or balance. ¨ A sudden, severe headache that is different from past headaches. ? · You have severe back or belly pain. ?Do not wait until your blood pressure comes down on its own. Get help right away. ?Call your doctor now or seek immediate care if:  ? · Your blood pressure is much higher than normal (such as 180/110 or higher), but you don't have symptoms. ? · You think high blood pressure is causing symptoms, such as:  ¨ Severe headache. ¨ Blurry vision. ? Watch closely for changes in your health, and be sure to contact your doctor if:  ? · Your blood pressure measures 140/90 or higher at least 2 times. That means the top number is 140 or higher or the bottom number is 90 or higher, or both. ? · You think you may be having side effects from your blood pressure medicine. ? · Your blood pressure is usually normal, but it goes above normal at least 2 times. Where can you learn more? Go to http://ramos-meghan.info/. Enter Q960 in the search box to learn more about \"High Blood Pressure: Care Instructions. \"  Current as of: September 21, 2016  Content Version: 11.4  © 7855-2100 [employer]. Care instructions adapted under license by [employer] (which disclaims liability or warranty for this information). If you have questions about a medical condition or this instruction, always ask your healthcare professional. [address] any warranty or liability for your use of this information.

## 2017-12-06 NOTE — PROGRESS NOTES
Patient is in the office today for a 4 month follow up. 1. Have you been to the ER, urgent care clinic since your last visit? Hospitalized since your last visit? No    2. Have you seen or consulted any other health care providers outside of the [address] [place] [address] since your last visit?   Include any pap smears or colon screening No.

## 2018-01-26 ENCOUNTER — OFFICE VISIT (OUTPATIENT)
Dept: CARDIOLOGY CLINIC | Age: 58
End: 2018-01-26

## 2018-01-26 VITALS
HEIGHT: 69 IN | BODY MASS INDEX: 27.99 KG/M2 | WEIGHT: 189 LBS | OXYGEN SATURATION: 98 % | HEART RATE: 100 BPM | SYSTOLIC BLOOD PRESSURE: 145 MMHG | DIASTOLIC BLOOD PRESSURE: 86 MMHG

## 2018-01-26 DIAGNOSIS — R06.09 DOE (DYSPNEA ON EXERTION): ICD-10-CM

## 2018-01-26 DIAGNOSIS — I25.10 ATHEROSCLEROSIS OF NATIVE CORONARY ARTERY OF NATIVE HEART WITHOUT ANGINA PECTORIS: Primary | ICD-10-CM

## 2018-01-26 DIAGNOSIS — I11.9 BENIGN HYPERTENSIVE HEART DISEASE WITHOUT HEART FAILURE: ICD-10-CM

## 2018-01-26 DIAGNOSIS — E78.5 DYSLIPIDEMIA: ICD-10-CM

## 2018-01-26 NOTE — PROGRESS NOTES
HPI:  I saw Rocky Gilliam in my office today in cardiovascular evaluation regarding his chronic underlying coronary artery disease. Mr. Kurt Gilliam is a pleasant 61year-old  male who presented to [facility] with chest tightness and acute coronary syndrome, for which we stabilized him medically and did an urgent cardiac catheterization on January 26, 2006. 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 This was opened with a wire and balloon, but we were unable to stent it. There was a residual stenosis of 40% with JB grade 3 flow following the procedure. Left ventricular function at that time was grossly normal with an ejection fraction in the 60% range.      He has done well on medical therapy over the years without any recurrent cardiovascular issues. He unfortunately continued to smoke 2 packs of cigarettes per day over the years and has developed moderately severe COPD with chronic shortness of breath. Fortunately, he finally did quit smoking completely in January of 2014 and has been able to stay off cigarettes now over 4 years ago. He comes in today relates that he is really doing quite well. He is remaining fairly active and denies any cardiovascular symptoms at this time. He is following with Dr. Chang Brown with regard to his pulmonary issues which according to him appear to be stable. Encounter Diagnoses   Name Primary?  Atherosclerosis of native coronary artery of native heart without angina pectoris Yes    Dyslipidemia     Hypertensive heart disease      GARCIA (dyspnea on exertion)        Discussion: This patient appears to be doing reasonably well at this juncture except does have a little bit of elevation of his heart rate and blood pressure today.   He is currently not on a beta-blocker and reason for this is not entirely clear, although I suspect is related to his COPD. I have asked him to change taking his blood pressure medications from bedtime until the morning and take his blood pressure at home couple times a week for a few weeks and if his blood pressure staying above 903 systolic he should follow-up with Dr. Ellwood Epley for further adjustment of his blood pressure medication. If his heart rate was still elevated along with elevated blood pressure I would consider attempting to use a beta-blocker despite his COPD as 1 of his blood pressure agents or switching his Norvasc to Cardizem. His latest lipid profile that have a copy of was completed on November 30, 2017 showed total cholesterol 150 with triglycerides of 235, HDL 41, LDL 62, and VLDL of 47 which is fairly good control on Crestor 20 mg daily. He does have a fairly loud apical systolic murmur but there is no real radiation of the heart murmur and clinically he does not seem to be having any cardiovascular symptomatology so I do not feel that an echo needs to be completed at this time. The patient would like to avoid any testing if possible because he does not have any insurance and is paying for his care of his own pocket, so I would only do testing which I felt was absolutely necessary. Since he is otherwise doing well I will see him again in 6 or 8 months. PCP: Kasia Conklin MD      Past Medical History:   Diagnosis Date    Acute coronary syndrome [facility])     with non-ST-elevation myocardial infarction, s/p direct angioplasty of anomolous circumflex marginal coming off the right coronary cusp on 1/26/06    Arthritis     in hands    ASHD (arteriosclerotic heart disease)     Benign hypertensive heart disease without heart failure     CAD (coronary artery disease)     chronic underlying    Calculus of kidney     Cardiac cath 01/26/2006    LM mild. RCA mild. LAD tortuous. CX mild. CX angela 100%. S/P balloon angioplasty of CX angela.   Residual 40%.  LVEDP 20.  EF 65%.  Cardiac nuclear imaging test 08/30/2006    No ischemia or infarct. EF 59%. Negative max. stress test.    Chronic cough     coughing up sputum    Chronic lung disease     COPD [facility]chronic obstructive pulmonary disease) ([facility])     moderate to severe    Coronary artery disease     [facility] [facility]     Diabetes mellitus [facility] [address] [facility]     GARCIA (dyspnea on exertion)     GERD (gastroesophageal reflux disease)     H/O: pneumonia     Heartburn     Hypertension     essential systemic    Indigestion     Pneumonia 8/12    \"walking\"    Pure hypercholesterolemia     Tobacco abuse          Past Surgical History:   Procedure Laterality Date    HX HEART CATHETERIZATION  01/26/06    HX ORTHOPAEDIC  1989    fracture of bilateral hands     HX PTCA      of anomalous circumflex marginal artery    TONGUE AND MOUTH SURG UNLISTED  04/04    tongue surgery    TONGUE TO LIP SURGERY  04/04         Current Outpatient Rx   Name  Route  Sig  Dispense  Refill    omeprazole (PRILOSEC) 20 mg capsule    Oral    Take 40 mg by mouth daily.  metformin (GLUCOPHAGE) 500 mg tablet    Oral    Take 500 mg by mouth three (3) times daily (with meals).  hydrochlorothiazide (HYDRODIURIL) 25 mg tablet    Oral    Take 12.5 mg by mouth two (2) times a day.  HYDROcodone-acetaminophen (LORTAB)  mg per tablet    Oral    Take 1 Tab by mouth three (3) times daily.  budesonide-formoterol (SYMBICORT) 160-4.5 mcg/actuation HFA inhaler    Inhalation    Take 2 Puffs by inhalation two (2) times a day. Using PRM              albuterol (PROAIR HFA) 90 mcg/actuation inhaler    Inhalation    Take 1 Puff by inhalation as needed.  nitroglycerin (NITROSTAT) 0.4 mg SL tablet    SubLINGual    1 Tab by SubLINGual route every five (5) minutes as needed for Chest Pain. 25 Tab    3      lisinopril (PRINIVIL, ZESTRIL) 5 mg tablet    Oral    Take 40 mg by mouth daily.  aspirin 81 mg tablet    Oral    Take 162 mg by mouth daily.  clopidogrel (PLAVIX) 75 mg tablet    Oral    Take 75 mg by mouth daily.  metoprolol (LOPRESSOR) 100 mg tablet    Oral    Take 50 mg by mouth two (2) times a day.  atorvastatin (LIPITOR) 40 mg tablet    Oral    Take 40 mg by mouth daily. No Known Allergies    Social History:   Social History   Substance Use Topics    Smoking status: Former Smoker     Packs/day: 1.25     Years: 20.00     Types: Cigarettes     Quit date: 1/1/2014    Smokeless tobacco: Never Used    Alcohol use Yes      Comment: on special occasions only           Family history: family history includes Asthma in his father; COPD in his mother; Cancer in his father; Colon Polyps in his father. Review of Systems:    Constitutional: Negative for chills, fever, malaise/fatigue and weight loss. Respiratory: Negative for cough, hemoptysis, shortness of breath and wheezing. Cardiovascular: Negative for chest pain, palpitations, orthopnea and leg swelling. Gastrointestinal: Negative. Musculoskeletal: Negative for falls, joint pain and myalgias. Neurological: Negative for dizziness. Physical Exam:   The patient is an alert, oriented, well developed, well nourished 61 y.o.  male who was in no acute distress at the time of my examination. Visit Vitals    /86    Pulse 100    Ht 5' 9\" (1.753 m)    Wt 85.7 kg (189 lb)    SpO2 98%    BMI 27.91 kg/m2        BP Readings from Last 3 Encounters:   01/26/18 145/86   12/06/17 (!) 154/92   11/03/17 126/70        Wt Readings from Last 3 Encounters:   01/26/18 85.7 kg (189 lb)   12/06/17 89.4 kg (197 lb)   11/03/17 86.2 kg (190 lb)       HEENT: Conjunctivae white, mucosa moist, no pallor or cyanosis. Neck: Supple without masses, tenderness or thyromegaly. No jugular venous distention. Carotid upstrokes are full bilaterally with soft bilateral bruits. Cardiovascular: Chest is symmetrical with good excursion. [place] is not displaced. No lifts, heaves or thrills. S1 and S2 are normal, without appreciable rubs, clicks or gallops. There is a grade III/VI apical systolic murmur with poor radiation, without diastolic murmurs. Lungs: Moderate to severe decreased breath sounds throughout with a lot of diffuse expiratory wheezes throughout. Abdomen: Soft. No masses, tenderness or organomegaly. Extremities: Trace to 1 + edema with 1 + peripheral pulses. Review of Data:  See PMH and Cardiology and Imaging sections for cardiac testing      Results for orders placed or performed in visit on 01/26/18   AMB POC EKG ROUTINE W/ 12 LEADS, INTER & REP     Status: None    Narrative    Sinus tachycardia, rate 100. RSR prime normal variant in V1 and V2. This EKG is within normal limits and similar to the EKG of April 26, 2017 except for somewhat faster rate being 80 at that time. Marisol Rogers D.O., F.A.C.C. [facility]  [facility] [place]  [address] [place] [address]. [address]    PLEASE NOTE:  This document has been produced using voice recognition software. Unrecognized errors in transcription may be present.

## 2018-01-26 NOTE — MR AVS SNAPSHOT
[address] [place] [address] [address] [facility] [id number] [phone] Patient: Malu Rivera 
MRN: [id number] :1960 Visit Information Date & Time Provider Department Dept. Phone Encounter #  
 2018  2:00 PM Flora Dockery, [facility] [address] [id number] Follow-up Instructions Return in about 7 months (around 2018). Your Appointments 2018  7:10 AM  
LAB with [facility] (GONZALEZ Sarah) Appt Note: labs; .  
 [address], [address] [address]  
[phone]  
  
   
 [address], [address] [place] [address]  
  
    
 2018  4:30 PM  
Office Visit with Antonio Barnes MD  
[facility]) Appt Note: 4m fu labs 1 week prior; .  
 [address], [address] [address]  
[phone]  
  
   
 [address], [address] Upcoming Health Maintenance Date Due  
 EYE EXAM RETINAL OR DILATED Q1 10/22/1969 Pneumococcal 19-64 Medium Risk (1 of 1 - PPSV23) 10/22/1978 DTaP/Tdap/Td series (1 - Tdap) 10/22/1980 FOBT Q 1 YEAR AGE 50-75 10/22/2009 Influenza Age 5 to Adult 2017 HEMOGLOBIN A1C Q6M 2018 FOOT EXAM Q1 2018 MICROALBUMIN Q1 2018 LIPID PANEL Q1 2018 Allergies as of 2018  Review Complete On: 2017 By: Antonio Barnes MD  
 No Known Allergies Current Immunizations  Reviewed on 11/3/2017 No immunizations on file. Not reviewed this visit You Were Diagnosed With   
  
 Codes Comments Dyslipidemia    -  Primary ICD-10-CM: E78.5 ICD-9-CM: 272.4 Benign hypertensive heart disease without heart failure     ICD-10-CM: I11.9 ICD-9-CM: 402.10 Atherosclerosis of native coronary artery of native heart without angina pectoris     ICD-10-CM: I25.10 ICD-9-CM: 414.01   
 GARCIA (dyspnea on exertion)     ICD-10-CM: R06.09 
ICD-9-CM: 786.09 Vitals BP Pulse Height(growth percentile) Weight(growth percentile) SpO2 BMI  
 142/86 100 5' 9\" (1.753 m) 189 lb (85.7 kg) 98% 27.91 kg/m2 Smoking Status Former Smoker Vitals History BMI and BSA Data Body Mass Index Body Surface Area  
 27.91 kg/m 2 2.04 m 2 Preferred Pharmacy Pharmacy Name [facility] - [place], [facility] [address] [place] [address] [phone] Your Updated Medication List  
  
   
This list is accurate as of: 1/26/18  2:52 PM.  Always use your most recent med list. amLODIPine 10 mg tablet Commonly known as:  Aspen Pique Take 1 Tab by mouth daily. aspirin 81 mg tablet Take 81 mg by mouth two (2) times a day. fluticasone-vilanterol 100-25 mcg/dose inhaler Commonly known as:  BREO ELLIPTA Take 1 Puff by inhalation daily. gabapentin 300 mg capsule Commonly known as:  NEURONTIN Take 1 Cap by mouth three (3) times daily. lisinopril-hydroCHLOROthiazide 20-12.5 mg per tablet Commonly known as:  Sonjia Aus Take 1 Tab by mouth daily. metFORMIN 1,000 mg tablet Commonly known as:  GLUCOPHAGE Take 1 Tab by mouth daily. multivitamin tablet Commonly known as:  ONE A DAY Take 1 Tab by mouth daily. nitroglycerin 0.4 mg SL tablet Commonly known as:  NITROSTAT  
1 Tab by SubLINGual route every five (5) minutes as needed for Chest Pain. PriLOSEC 20 mg capsule Generic drug:  omeprazole Take 40 mg by mouth daily. rosuvastatin 20 mg tablet Commonly known as:  CRESTOR  
TAKE ONE TABLET BY MOUTH NIGHTLY We Performed the Following AMB POC EKG ROUTINE W/ 12 LEADS, INTER & REP [11447 CPT(R)] Follow-up Instructions Return in about 7 months (around 8/26/2018). Introducing [facility]! [facility] introduces [employer] patient portal. Now you can access parts of your medical record, email your doctor's office, and request medication refills online. 1. In your internet browser, go to [email or web address] 2. Click on the First Time User? Click Here link in the Sign In box. You will see the New Member Sign Up page. 3. Enter your [employer] Access Code exactly as it appears below. You will not need to use this code after youve completed the sign-up process. If you do not sign up before the expiration date, you must request a new code. · [employer] Access Code: [email or web address] Expires: 2/1/2018 11:16 AM 
 
4. Enter the last four digits of your Social Security Number (xxxx) and Date of Birth (mm/dd/yyyy) as indicated and click Submit. You will be taken to the next sign-up page. 5. Create a [employer] ID. This will be your [employer] login ID and cannot be changed, so think of one that is secure and easy to remember. 6. Create a [employer] password. You can change your password at any time. 7. Enter your Password Reset Question and Answer. This can be used at a later time if you forget your password. 8. Enter your e-mail address. You will receive e-mail notification when new information is available in [address] 9. Click Sign Up. You can now view and download portions of your medical record. 10. Click the Download Summary menu link to download a portable copy of your medical information. If you have questions, please visit the Frequently Asked Questions section of the [employer] website. Remember, [employer] is NOT to be used for urgent needs. For medical emergencies, dial 911. Now available from your iPhone and Android! Please provide this summary of care documentation to your next provider. Your primary care clinician is listed as MAREK RED. If you have any questions after today's visit, please call [phone].

## 2018-01-26 NOTE — PROGRESS NOTES
1. Have you been to the ER, urgent care clinic since your last visit? Hospitalized since your last visit?no    2. Have you seen or consulted any other health care providers outside of the [address] [place] [address] since your last visit? Include any pap smears or colon screening.  no

## 2018-04-06 ENCOUNTER — HOSPITAL ENCOUNTER (OUTPATIENT)
Dept: LAB | Age: 58
Discharge: HOME | End: 2018-04-06
Payer: SELF-PAY

## 2018-04-06 DIAGNOSIS — I10 ESSENTIAL HYPERTENSION: ICD-10-CM

## 2018-04-06 DIAGNOSIS — E11.29 TYPE 2 DIABETES MELLITUS WITH MICROALBUMINURIA, WITHOUT LONG-TERM CURRENT USE OF INSULIN (HCC): ICD-10-CM

## 2018-04-06 DIAGNOSIS — E78.5 DYSLIPIDEMIA: ICD-10-CM

## 2018-04-06 DIAGNOSIS — R80.9 TYPE 2 DIABETES MELLITUS WITH MICROALBUMINURIA, WITHOUT LONG-TERM CURRENT USE OF INSULIN (HCC): ICD-10-CM

## 2018-04-06 LAB
ALBUMIN SERPL-MCNC: 4 G/DL (ref 3.4–5)
ALBUMIN/GLOB SERPL: 1.3 {RATIO} (ref 0.8–1.7)
ALP SERPL-CCNC: 128 U/L (ref 45–117)
ALT SERPL-CCNC: 46 U/L (ref 16–61)
ANION GAP SERPL CALC-SCNC: 6 MMOL/L (ref 3–18)
AST SERPL-CCNC: 24 U/L (ref 15–37)
BILIRUB SERPL-MCNC: 0.3 MG/DL (ref 0.2–1)
BUN SERPL-MCNC: 17 MG/DL (ref 7–18)
BUN/CREAT SERPL: 14 (ref 12–20)
CALCIUM SERPL-MCNC: 9.1 MG/DL (ref 8.5–10.1)
CHLORIDE SERPL-SCNC: 100 MMOL/L (ref 100–108)
CHOLEST SERPL-MCNC: 162 MG/DL
CO2 SERPL-SCNC: 30 MMOL/L (ref 21–32)
CREAT SERPL-MCNC: 1.24 MG/DL (ref 0.6–1.3)
GLOBULIN SER CALC-MCNC: 3.2 G/DL (ref 2–4)
GLUCOSE SERPL-MCNC: 191 MG/DL (ref 74–99)
HBA1C MFR BLD: 6.9 % (ref 4.2–5.6)
HDLC SERPL-MCNC: 42 MG/DL (ref 40–60)
HDLC SERPL: 3.9 {RATIO} (ref 0–5)
LDLC SERPL CALC-MCNC: 54 MG/DL (ref 0–100)
LIPID PROFILE,FLP: ABNORMAL
POTASSIUM SERPL-SCNC: 4.6 MMOL/L (ref 3.5–5.5)
PROT SERPL-MCNC: 7.2 G/DL (ref 6.4–8.2)
SODIUM SERPL-SCNC: 136 MMOL/L (ref 136–145)
TRIGL SERPL-MCNC: 330 MG/DL (ref ?–150)
VLDLC SERPL CALC-MCNC: 66 MG/DL

## 2018-04-06 PROCEDURE — 83036 HEMOGLOBIN GLYCOSYLATED A1C: CPT | Performed by: INTERNAL MEDICINE

## 2018-04-06 PROCEDURE — 80061 LIPID PANEL: CPT | Performed by: INTERNAL MEDICINE

## 2018-04-06 PROCEDURE — 36415 COLL VENOUS BLD VENIPUNCTURE: CPT | Performed by: INTERNAL MEDICINE

## 2018-04-06 PROCEDURE — 80053 COMPREHEN METABOLIC PANEL: CPT | Performed by: INTERNAL MEDICINE

## 2018-04-13 ENCOUNTER — OFFICE VISIT (OUTPATIENT)
Dept: FAMILY MEDICINE CLINIC | Age: 58
End: 2018-04-13

## 2018-04-13 VITALS
HEIGHT: 69 IN | WEIGHT: 193 LBS | BODY MASS INDEX: 28.58 KG/M2 | DIASTOLIC BLOOD PRESSURE: 75 MMHG | SYSTOLIC BLOOD PRESSURE: 105 MMHG | TEMPERATURE: 98.6 F | HEART RATE: 89 BPM

## 2018-04-13 DIAGNOSIS — R80.9 MICROALBUMINURIA: ICD-10-CM

## 2018-04-13 DIAGNOSIS — E11.42 TYPE 2 DIABETES MELLITUS WITH DIABETIC POLYNEUROPATHY, WITHOUT LONG-TERM CURRENT USE OF INSULIN (HCC): Primary | ICD-10-CM

## 2018-04-13 DIAGNOSIS — I10 ESSENTIAL HYPERTENSION: ICD-10-CM

## 2018-04-13 DIAGNOSIS — E78.5 DYSLIPIDEMIA: ICD-10-CM

## 2018-04-13 NOTE — MR AVS SNAPSHOT
[address] [facility] 
 
 
 [address] [address] [address] [address] 
[phone] Patient: Laura Mcdowell 
MRN: [id number] :1960 Visit Information Date & Time Provider Department Dept. Phone Encounter #  
 2018  4:30 PM Grzegorz Muniz, [address] [phone] [id number] Follow-up Instructions Return in about 4 months (around 2018) for labs 1 week before. Your Appointments 2018  3:00 PM  
Follow Up with Meghana Rico DO [facility] ([address]) Appt Note: 7 month f/u  
 18[address] [facility] [address] [address]  
[phone] [address] Upcoming Health Maintenance Date Due  
 EYE EXAM RETINAL OR DILATED Q1 10/22/1969 Pneumococcal 19-64 Medium Risk (1 of 1 - PPSV23) 10/22/1978 DTaP/Tdap/Td series (1 - Tdap) 10/22/1980 FOBT Q 1 YEAR AGE 50-75 10/22/2009 Influenza Age 5 to Adult 2018* FOOT EXAM Q1 2018 HEMOGLOBIN A1C Q6M 10/6/2018 MICROALBUMIN Q1 2018 LIPID PANEL Q1 2019 *Topic was postponed. The date shown is not the original due date. Allergies as of 2018  Review Complete On: 2018 By: Grzegorz Muniz MD  
 No Known Allergies Current Immunizations  Reviewed on 11/3/2017 No immunizations on file. Not reviewed this visit You Were Diagnosed With   
  
 Codes Comments Type 2 diabetes mellitus with diabetic polyneuropathy, without long-term current use of insulin (HCC)    -  Primary ICD-10-CM: E11.42 
ICD-9-CM: 250.60, 357.2 Dyslipidemia     ICD-10-CM: E78.5 ICD-9-CM: 272.4 Essential hypertension     ICD-10-CM: I10 
ICD-9-CM: 401.9 Vitals  BP Pulse Temp Height(growth percentile) Weight(growth percentile) BMI  
 105/75 (BP 1 Location: Left arm, BP Patient Position: Sitting) 89 98.6 °F (37 °C) (Oral) 5' 9\" (1.753 m) 193 lb (87.5 kg) 28.5 kg/m2 Smoking Status Former Smoker BMI and BSA Data Body Mass Index Body Surface Area 28.5 kg/m 2 2.06 m 2 Preferred Pharmacy Pharmacy Name [facility]  [place], [address] [place] [address],[address] [place] [address] [phone] Your Updated Medication List  
  
   
This list is accurate as of 4/13/18  5:22 PM.  Always use your most recent med list. amLODIPine 10 mg tablet Commonly known as:  Alexandro Lisette Take 1 Tab by mouth daily. aspirin 81 mg tablet Take 81 mg by mouth two (2) times a day. fluticasone-vilanterol 100-25 mcg/dose inhaler Commonly known as:  BREO ELLIPTA Take 1 Puff by inhalation daily. gabapentin 300 mg capsule Commonly known as:  NEURONTIN Take 1 Cap by mouth three (3) times daily. lisinopril-hydroCHLOROthiazide 20-12.5 mg per tablet Commonly known as:  Katherene Austin Take 1 Tab by mouth daily. metFORMIN 1,000 mg tablet Commonly known as:  GLUCOPHAGE Take 1 Tab by mouth daily. multivitamin tablet Commonly known as:  ONE A DAY Take 1 Tab by mouth daily. nitroglycerin 0.4 mg SL tablet Commonly known as:  NITROSTAT  
1 Tab by SubLINGual route every five (5) minutes as needed for Chest Pain. PriLOSEC 20 mg capsule Generic drug:  omeprazole Take 40 mg by mouth daily. rosuvastatin 20 mg tablet Commonly known as:  CRESTOR  
TAKE ONE TABLET BY MOUTH NIGHTLY Follow-up Instructions Return in about 4 months (around 8/13/2018) for labs 1 week before. Patient Instructions Learning About Diabetes Food Guidelines Your Care Instructions Meal planning is important to manage diabetes. It helps keep your blood sugar at a target level (which you set with your doctor). You don't have to eat special foods.  You can eat what your family eats, including sweets once in a while. But you do have to pay attention to how often you eat and how much you eat of certain foods. You may want to work with a dietitian or a certified diabetes educator (CDE) to help you plan meals and snacks. A dietitian or CDE can also help you lose weight if that is one of your goals. What should you know about eating carbs? Managing the amount of carbohydrate (carbs) you eat is an important part of healthy meals when you have diabetes. Carbohydrate is found in many foods. · Learn which foods have carbs. And learn the amounts of carbs in different foods. ¨ Bread, cereal, pasta, and rice have about 15 grams of carbs in a serving. A serving is 1 slice of bread (1 ounce), ½ cup of cooked cereal, or 1/3 cup of cooked pasta or rice. ¨ Fruits have 15 grams of carbs in a serving. A serving is 1 small fresh fruit, such as an apple or orange; ½ of a banana; ½ cup of cooked or canned fruit; ½ cup of fruit juice; 1 cup of melon or raspberries; or 2 tablespoons of dried fruit. ¨ Milk and no-sugar-added yogurt have 15 grams of carbs in a serving. A serving is 1 cup of milk or 2/3 cup of no-sugar-added yogurt. ¨ Starchy vegetables have 15 grams of carbs in a serving. A serving is ½ cup of mashed potatoes or sweet potato; 1 cup winter squash; ½ of a small baked potato; ½ cup of cooked beans; or ½ cup cooked corn or green peas. · Learn how much carbs to eat each day and at each meal. A dietitian or CDE can teach you how to keep track of the amount of carbs you eat. This is called carbohydrate counting. · If you are not sure how to count carbohydrate grams, use the Plate Method to plan meals. It is a good, quick way to make sure that you have a balanced meal. It also helps you spread carbs throughout the day. ¨ Divide your plate by types of foods.  Put non-starchy vegetables on half the plate, meat or other protein food on one-quarter of the plate, and a grain or starchy vegetable in the final quarter of the plate. To this you can add a small piece of fruit and 1 cup of milk or yogurt, depending on how many carbs you are supposed to eat at a meal. 
· Try to eat about the same amount of carbs at each meal. Do not \"save up\" your daily allowance of carbs to eat at one meal. 
· Proteins have very little or no carbs per serving. Examples of proteins are beef, chicken, turkey, fish, eggs, tofu, cheese, cottage cheese, and peanut butter. A serving size of meat is 3 ounces, which is about the size of a deck of cards. Examples of meat substitute serving sizes (equal to 1 ounce of meat) are 1/4 cup of cottage cheese, 1 egg, 1 tablespoon of peanut butter, and ½ cup of tofu. How can you eat out and still eat healthy? · Learn to estimate the serving sizes of foods that have carbohydrate. If you measure food at home, it will be easier to estimate the amount in a serving of restaurant food. · If the meal you order has too much carbohydrate (such as potatoes, corn, or baked beans), ask to have a low-carbohydrate food instead. Ask for a salad or green vegetables. · If you use insulin, check your blood sugar before and after eating out to help you plan how much to eat in the future. · If you eat more carbohydrate at a meal than you had planned, take a walk or do other exercise. This will help lower your blood sugar. What else should you know? · Limit saturated fat, such as the fat from meat and dairy products. This is a healthy choice because people who have diabetes are at higher risk of heart disease. So choose lean cuts of meat and nonfat or low-fat dairy products. Use olive or canola oil instead of butter or shortening when cooking. · Don't skip meals. Your blood sugar may drop too low if you skip meals and take insulin or certain medicines for diabetes. · Check with your doctor before you drink alcohol.  Alcohol can cause your blood sugar to drop too low. Alcohol can also cause a bad reaction if you take certain diabetes medicines. Follow-up care is a key part of your treatment and safety. Be sure to make and go to all appointments, and call your doctor if you are having problems. It's also a good idea to know your test results and keep a list of the medicines you take. Where can you learn more? Go to http://ramos-meghan.info/. Enter W365 in the search box to learn more about \"Learning About Diabetes Food Guidelines. \" Current as of: March 13, 2017 Content Version: 11.4 © 4241-8975 [employer]. Care instructions adapted under license by [employer] (which disclaims liability or warranty for this information). If you have questions about a medical condition or this instruction, always ask your healthcare professional. [address] any warranty or liability for your use of this information. Introducing [facility] & HEALTH SERVICES! [employer] introduces JumpHawk patient portal. Now you can access parts of your medical record, email your doctor's office, and request medication refills online. 1. In your internet browser, go to [email or web address] 2. Click on the First Time User? Click Here link in the Sign In box. You will see the New Member Sign Up page. 3. Enter your JumpHawk Access Code exactly as it appears below. You will not need to use this code after youve completed the sign-up process. If you do not sign up before the expiration date, you must request a new code. · JumpHawk Access Code: [email or web address] Expires: 7/12/2018  4:51 PM 
 
4. Enter the last four digits of your Social Security Number (xxxx) and Date of Birth (mm/dd/yyyy) as indicated and click Submit. You will be taken to the next sign-up page. 5. Create a JumpHawk ID.  This will be your JumpHawk login ID and cannot be changed, so think of one that is secure and easy to remember. 6. Create a Trunk Show password. You can change your password at any time. 7. Enter your Password Reset Question and Answer. This can be used at a later time if you forget your password. 8. Enter your e-mail address. You will receive e-mail notification when new information is available in [address] 9. Click Sign Up. You can now view and download portions of your medical record. 10. Click the Download Summary menu link to download a portable copy of your medical information. If you have questions, please visit the Frequently Asked Questions section of the Trunk Show website. Remember, Trunk Show is NOT to be used for urgent needs. For medical emergencies, dial 911. Now available from your iPhone and Android! Please provide this summary of care documentation to your next provider. Your primary care clinician is listed as MAREK RED. If you have any questions after today's visit, please call [phone].

## 2018-04-13 NOTE — PROGRESS NOTES
Subjective:       Chief Complaint  The patient presents for follow up of diabetes, hypertension and high cholesterol. HPI Pennie Schilder is a 61 y.o. male seen for follow up of diabetes. Healgray has hypertension and hyperlipidemia. Diabetes well controlled, no significant medication side effects noted, on metformin, hypertension no significant medication side effects noted, well controlled, on Norvasc, Zestoretic , hyperlipidemia well controlled, no significant medication side effects noted, on Crestor. Diet and Lifestyle: generally follows a low fat low cholesterol diet, sedentary    Home BP Monitoring: is not measured at home. Diabetic Review of Systems - home glucose monitoring: is performed sporadically. Other symptoms and concerns: COPD Review:  The patient is being seen for follow up of COPD. Oxygen: He currently is not on home oxygen therapy. Symptoms: chronic dyspnea: severity = moderate: course of sx: symptoms have progressed to a point and plateaued. .   Patient uses 1 pillows at night. Patient denies smoke cigarettes. He is using Breo currently. Current Outpatient Prescriptions   Medication Sig    gabapentin (NEURONTIN) 300 mg capsule Take 1 Cap by mouth three (3) times daily.  metFORMIN (GLUCOPHAGE) 1,000 mg tablet Take 1 Tab by mouth daily.  amLODIPine (NORVASC) 10 mg tablet Take 1 Tab by mouth daily.  rosuvastatin (CRESTOR) 20 mg tablet TAKE ONE TABLET BY MOUTH NIGHTLY    multivitamin (ONE A DAY) tablet Take 1 Tab by mouth daily.  omeprazole (PRILOSEC) 20 mg capsule Take 40 mg by mouth daily.  aspirin 81 mg tablet Take 81 mg by mouth two (2) times a day.  lisinopril-hydroCHLOROthiazide (PRINZIDE, ZESTORETIC) 20-12.5 mg per tablet Take 1 Tab by mouth daily.  fluticasone-vilanterol (BREO ELLIPTA) 100-25 mcg/dose inhaler Take 1 Puff by inhalation daily.     nitroglycerin (NITROSTAT) 0.4 mg SL tablet 1 Tab by SubLINGual route every five (5) minutes as needed for Chest Pain. No current facility-administered medications for this visit. Review of Systems  Respiratory: negative for dyspnea on exertion  Cardiovascular: negative for chest pain    Objective:     Visit Vitals    /75 (BP 1 Location: Left arm, BP Patient Position: Sitting)    Pulse 89    Temp 98.6 °F (37 °C) (Oral)    Ht 5' 9\" (1.753 m)    Wt 193 lb (87.5 kg)    BMI 28.5 kg/m2        Chest - clear to auscultation, no wheezes, rales or rhonchi, symmetric air entry  Heart - normal rate, regular rhythm, normal S1, S2, no murmurs, rubs, clicks or gallops  Extremities - peripheral pulses normal, no pedal edema, no clubbing or cyanosis      Labs:   Lab Results   Component Value Date/Time    Hemoglobin A1c 6.9 (H) 04/06/2018 07:22 AM    Hemoglobin A1c 7.0 (H) 11/30/2017 09:43 AM    Hemoglobin A1c, External 7.2 02/27/2016    Glucose 191 (H) 04/06/2018 07:22 AM    Microalbumin/Creat ratio (mg/g creat) 76 (H) 11/30/2017 09:43 AM    Microalbumin,urine random 11.40 (H) 11/30/2017 09:43 AM    LDL, calculated 54 04/06/2018 07:22 AM    Creatinine, POC 1.2 03/06/2017 04:09 PM    Creatinine 1.24 04/06/2018 07:22 AM      Lab Results   Component Value Date/Time    Prostate Specific Ag 0.9 01/24/2017 04:30 PM     Lab Results   Component Value Date/Time    Sodium 136 04/06/2018 07:22 AM    Potassium 4.6 04/06/2018 07:22 AM    Chloride 100 04/06/2018 07:22 AM    CO2 30 04/06/2018 07:22 AM    Anion gap 6 04/06/2018 07:22 AM    Glucose 191 (H) 04/06/2018 07:22 AM    BUN 17 04/06/2018 07:22 AM    Creatinine 1.24 04/06/2018 07:22 AM    BUN/Creatinine ratio 14 04/06/2018 07:22 AM    GFR est AA >60 04/06/2018 07:22 AM    GFR est non-AA 60 (L) 04/06/2018 07:22 AM    Calcium 9.1 04/06/2018 07:22 AM    Bilirubin, total 0.3 04/06/2018 07:22 AM    ALT (SGPT) 46 04/06/2018 07:22 AM    AST (SGOT) 24 04/06/2018 07:22 AM    Alk.  phosphatase 128 (H) 04/06/2018 07:22 AM    Protein, total 7.2 04/06/2018 07:22 AM Albumin 4.0 04/06/2018 07:22 AM    Globulin 3.2 04/06/2018 07:22 AM    A-G Ratio 1.3 04/06/2018 07:22 AM      Lab Results   Component Value Date/Time    Hemoglobin A1c 6.9 (H) 04/06/2018 07:22 AM    Hemoglobin A1c (POC) 6.5 12/16/2016 12:02 PM    Hemoglobin A1c, External 7.2 02/27/2016            Labs:   Lab Results   Component Value Date/Time    Cholesterol, total 162 04/06/2018 07:22 AM    HDL Cholesterol 42 04/06/2018 07:22 AM    LDL, calculated 54 04/06/2018 07:22 AM    LDL-C, External 132 02/27/2016    Triglyceride 330 (H) 04/06/2018 07:22 AM    CHOL/HDL Ratio 3.9 04/06/2018 07:22 AM               Assessment / Plan     Diabetes well controlled, on metformin  Hypertension well controlled, on current meds. Hyperlipidemia well controlled, on Crestor. Will try to improve high trigs with diet       ICD-10-CM ICD-9-CM    1. Type 2 diabetes mellitus with diabetic polyneuropathy, without long-term current use of insulin (HCC) E11.42 250.60 HEMOGLOBIN A1C W/O EAG     357.2    2. Dyslipidemia E78.5 272.4 LIPID PANEL   3. Essential hypertension V01 845.9 METABOLIC PANEL, COMPREHENSIVE   4. Microalbuminuria R80.9 791.0 Continue lisinopril and try to keep DM well controlled to improve further. Consider FIT test if unable to afford Colonoscopy through a program         Diabetic issues reviewed with him: diabetic diet discussed in detail, and low cholesterol diet, weight control and daily exercise discussed. Follow-up Disposition:  Return in about 4 months (around 8/13/2018) for labs 1 week before. Reviewed plan of care. Patient has provided input and agrees with goals.

## 2018-04-13 NOTE — PATIENT INSTRUCTIONS

## 2018-04-13 NOTE — PROGRESS NOTES
Patient is in the office today for 4 month follow up. 1. Have you been to the ER, urgent care clinic since your last visit? Hospitalized since your last visit? No    2. Have you seen or consulted any other health care providers outside of the [facility] since your last visit? Include any pap smears or colon screening.  No

## 2018-08-10 ENCOUNTER — HOSPITAL ENCOUNTER (OUTPATIENT)
Dept: LAB | Age: 58
Discharge: HOME | End: 2018-08-10
Payer: SELF-PAY

## 2018-08-10 DIAGNOSIS — E78.5 DYSLIPIDEMIA: ICD-10-CM

## 2018-08-10 DIAGNOSIS — I10 ESSENTIAL HYPERTENSION: ICD-10-CM

## 2018-08-10 DIAGNOSIS — E11.42 TYPE 2 DIABETES MELLITUS WITH DIABETIC POLYNEUROPATHY, WITHOUT LONG-TERM CURRENT USE OF INSULIN (HCC): ICD-10-CM

## 2018-08-10 LAB
ALBUMIN SERPL-MCNC: 3.5 G/DL (ref 3.4–5)
ALBUMIN/GLOB SERPL: 0.9 {RATIO} (ref 0.8–1.7)
ALP SERPL-CCNC: 151 U/L (ref 45–117)
ALT SERPL-CCNC: 28 U/L (ref 16–61)
ANION GAP SERPL CALC-SCNC: 8 MMOL/L (ref 3–18)
AST SERPL-CCNC: 17 U/L (ref 15–37)
BILIRUB SERPL-MCNC: 0.2 MG/DL (ref 0.2–1)
BUN SERPL-MCNC: 12 MG/DL (ref 7–18)
BUN/CREAT SERPL: 10 (ref 12–20)
CALCIUM SERPL-MCNC: 8.9 MG/DL (ref 8.5–10.1)
CHLORIDE SERPL-SCNC: 106 MMOL/L (ref 100–108)
CHOLEST SERPL-MCNC: 251 MG/DL
CO2 SERPL-SCNC: 27 MMOL/L (ref 21–32)
CREAT SERPL-MCNC: 1.16 MG/DL (ref 0.6–1.3)
GLOBULIN SER CALC-MCNC: 3.7 G/DL (ref 2–4)
GLUCOSE SERPL-MCNC: 198 MG/DL (ref 74–99)
HBA1C MFR BLD: 6.9 % (ref 4.2–5.6)
HDLC SERPL-MCNC: 36 MG/DL (ref 40–60)
HDLC SERPL: 7 {RATIO} (ref 0–5)
LDLC SERPL CALC-MCNC: ABNORMAL MG/DL (ref 0–100)
LIPID PROFILE,FLP: ABNORMAL
POTASSIUM SERPL-SCNC: 4 MMOL/L (ref 3.5–5.5)
PROT SERPL-MCNC: 7.2 G/DL (ref 6.4–8.2)
SODIUM SERPL-SCNC: 141 MMOL/L (ref 136–145)
TRIGL SERPL-MCNC: 494 MG/DL (ref ?–150)
VLDLC SERPL CALC-MCNC: ABNORMAL MG/DL

## 2018-08-10 PROCEDURE — 80053 COMPREHEN METABOLIC PANEL: CPT | Performed by: INTERNAL MEDICINE

## 2018-08-10 PROCEDURE — 80061 LIPID PANEL: CPT | Performed by: INTERNAL MEDICINE

## 2018-08-10 PROCEDURE — 36415 COLL VENOUS BLD VENIPUNCTURE: CPT | Performed by: INTERNAL MEDICINE

## 2018-08-10 PROCEDURE — 83036 HEMOGLOBIN GLYCOSYLATED A1C: CPT | Performed by: INTERNAL MEDICINE

## 2018-08-17 ENCOUNTER — OFFICE VISIT (OUTPATIENT)
Dept: FAMILY MEDICINE CLINIC | Age: 58
End: 2018-08-17

## 2018-08-17 VITALS
SYSTOLIC BLOOD PRESSURE: 132 MMHG | OXYGEN SATURATION: 95 % | BODY MASS INDEX: 29.18 KG/M2 | WEIGHT: 197 LBS | TEMPERATURE: 98.2 F | HEIGHT: 69 IN | HEART RATE: 95 BPM | RESPIRATION RATE: 20 BRPM | DIASTOLIC BLOOD PRESSURE: 86 MMHG

## 2018-08-17 DIAGNOSIS — I10 ESSENTIAL HYPERTENSION: ICD-10-CM

## 2018-08-17 DIAGNOSIS — R80.9 MICROALBUMINURIA: ICD-10-CM

## 2018-08-17 DIAGNOSIS — E78.5 DYSLIPIDEMIA: ICD-10-CM

## 2018-08-17 DIAGNOSIS — E11.42 TYPE 2 DIABETES MELLITUS WITH DIABETIC POLYNEUROPATHY, WITHOUT LONG-TERM CURRENT USE OF INSULIN (HCC): Primary | ICD-10-CM

## 2018-08-17 NOTE — PROGRESS NOTES
Sabiha Carbone is a 61 y.o. male presented in clinic today for   Chief Complaint   Patient presents with    Diabetes     follow up   1. Have you been to the ER, urgent care clinic since your last visit? Hospitalized since your last visit? No    2. Have you seen or consulted any other health care providers outside of the [address] [place] [address] since your last visit? Include any pap smears or colon screening.  No

## 2018-08-17 NOTE — MR AVS SNAPSHOT
[address] [place] [address] 
 
 
 [address] [address] 
[phone] Patient: Ashly London 
MRN: [id number] :1960 Visit Information Date & Time Provider Department Dept. Phone Encounter #  
 2018  4:30 PM Ellie Gordon, [address] [place] [address] [phone] [id number] Follow-up Instructions Return in about 6 months (around 2019) for labs 1 week before. Your Appointments 2018  3:00 PM  
Follow Up with Emerson Aguiar DO [facility] ([address]) Appt Note: 7 month f/u  
 [address] [address] [address] [place] [address] [phone] [address] [place] [address] [address] Upcoming Health Maintenance Date Due  
 EYE EXAM RETINAL OR DILATED Q1 10/22/1969 Pneumococcal 19-64 Medium Risk (1 of 1 - PPSV23) 10/22/1978 DTaP/Tdap/Td series (1 - Tdap) 10/22/1980 FOBT Q 1 YEAR AGE 50-75 10/22/2009 FOOT EXAM Q1 2018 Influenza Age 5 to Adult 2018* MICROALBUMIN Q1 2018 HEMOGLOBIN A1C Q6M 2/10/2019 LIPID PANEL Q1 8/10/2019 *Topic was postponed. The date shown is not the original due date. Allergies as of 2018  Review Complete On: 2018 By: Ellie Gordon MD  
 No Known Allergies Current Immunizations  Reviewed on 11/3/2017 No immunizations on file. Not reviewed this visit You Were Diagnosed With   
  
 Codes Comments Type 2 diabetes mellitus with diabetic polyneuropathy, without long-term current use of insulin (HCC)    -  Primary ICD-10-CM: E11.42 
ICD-9-CM: 250.60, 357.2 Dyslipidemia     ICD-10-CM: E78.5 ICD-9-CM: 272.4 Vitals BP Pulse Temp Resp Height(growth percentile) Weight(growth percentile) 132/86 (BP 1 Location: Right arm, BP Patient Position: Sitting) 95 98.2 °F (36.8 °C) (Oral) 20 5' 9\" (1.753 m) 197 lb (89.4 kg) SpO2 BMI Smoking Status 95% 29.09 kg/m2 Former Smoker BMI and BSA Data Body Mass Index Body Surface Area  
 29.09 kg/m 2 2.09 m 2 Preferred Pharmacy Pharmacy Name [facility], [address] [place] [address],[address] [place] [address] [phone] Your Updated Medication List  
  
   
This list is accurate as of 8/17/18  5:43 PM.  Always use your most recent med list.  
  
  
  
  
 aspirin 81 mg tablet Take 81 mg by mouth two (2) times a day. fluticasone-vilanterol 100-25 mcg/dose inhaler Commonly known as:  BREO ELLIPTA Take 1 Puff by inhalation daily. gabapentin 300 mg capsule Commonly known as:  NEURONTIN Take 1 Cap by mouth three (3) times daily. metFORMIN 1,000 mg tablet Commonly known as:  GLUCOPHAGE Take 1 Tab by mouth daily. multivitamin tablet Commonly known as:  ONE A DAY Take 1 Tab by mouth daily. nitroglycerin 0.4 mg SL tablet Commonly known as:  NITROSTAT  
1 Tab by SubLINGual route every five (5) minutes as needed for Chest Pain. PriLOSEC 20 mg capsule Generic drug:  omeprazole Take 40 mg by mouth daily. rosuvastatin 20 mg tablet Commonly known as:  CRESTOR  
TAKE ONE TABLET BY MOUTH NIGHTLY Follow-up Instructions Return in about 6 months (around 2/17/2019) for labs 1 week before. Introducing [facility]! Alayna Cruz introduces [employer]str patient portal. Now you can access parts of your medical record, email your doctor's office, and request medication refills online. 1. In your internet browser, go to [email or web address] 2. Click on the First Time User? Click Here link in the Sign In box. You will see the New Member Sign Up page. 3. Enter your SportsCstr Access Code exactly as it appears below. You will not need to use this code after youve completed the sign-up process. If you do not sign up before the expiration date, you must request a new code. · Ziegler Access Code: [email or web address] Expires: 11/15/2018  5:38 PM 
 
4. Enter the last four digits of your Social Security Number (xxxx) and Date of Birth (mm/dd/yyyy) as indicated and click Submit. You will be taken to the next sign-up page. 5. Create a Ziegler ID. This will be your Ziegler login ID and cannot be changed, so think of one that is secure and easy to remember. 6. Create a Ziegler password. You can change your password at any time. 7. Enter your Password Reset Question and Answer. This can be used at a later time if you forget your password. 8. Enter your e-mail address. You will receive e-mail notification when new information is available in [address] 9. Click Sign Up. You can now view and download portions of your medical record. 10. Click the Download Summary menu link to download a portable copy of your medical information. If you have questions, please visit the Frequently Asked Questions section of the Ziegler website. Remember, Ziegler is NOT to be used for urgent needs. For medical emergencies, dial 911. Now available from your iPhone and Android! Please provide this summary of care documentation to your next provider. Your primary care clinician is listed as MAREK RED. If you have any questions after today's visit, please call [phone].

## 2018-08-17 NOTE — PROGRESS NOTES
Subjective:       Chief Complaint  The patient presents for follow up of diabetes, hypertension and high cholesterol. HPI  Marichuy Fernandez is a 61 y.o. male seen for follow up of diabetes. Healso has hypertension and hyperlipidemia. Diabetes well controlled, no significant medication side effects noted, on metformin, hypertension no significant medication side effects noted, well controlled, of medications which pt stopped on his own , hyperlipidemia poorly controlled, no significant medication side effects noted, pt stopped Crestor on his own. Diet and Lifestyle: generally follows a low fat low cholesterol diet, sedentary    Home BP Monitoring: is not measured at home. Diabetic Review of Systems - home glucose monitoring: is performed sporadically. Other symptoms and concerns: COPD Review:  The patient is being seen for follow up of COPD. Oxygen: He currently is not on home oxygen therapy. Symptoms: chronic dyspnea: severity = moderate: course of sx: symptoms have progressed to a point and plateaued. .   Patient uses 1 pillows at night. Patient denies smoke cigarettes. He is using Breo currently. Current Outpatient Prescriptions   Medication Sig    metFORMIN (GLUCOPHAGE) 1,000 mg tablet Take 1 Tab by mouth daily.  gabapentin (NEURONTIN) 300 mg capsule TAKE ONE CAPSULE BY MOUTH 3 TIMES DAILY    rosuvastatin (CRESTOR) 20 mg tablet TAKE ONE TABLET BY MOUTH NIGHTLY    fluticasone-vilanterol (BREO ELLIPTA) 100-25 mcg/dose inhaler Take 1 Puff by inhalation daily.  multivitamin (ONE A DAY) tablet Take 1 Tab by mouth daily.  omeprazole (PRILOSEC) 20 mg capsule Take 40 mg by mouth daily.  nitroglycerin (NITROSTAT) 0.4 mg SL tablet 1 Tab by SubLINGual route every five (5) minutes as needed for Chest Pain.  aspirin 81 mg tablet Take 81 mg by mouth two (2) times a day. No current facility-administered medications for this visit.               Review of Systems  Respiratory: negative for dyspnea on exertion  Cardiovascular: negative for chest pain    Objective:     Visit Vitals    /86 (BP 1 Location: Right arm, BP Patient Position: Sitting)    Pulse 95    Temp 98.2 °F (36.8 °C) (Oral)    Resp 20    Ht 5' 9\" (1.753 m)    Wt 197 lb (89.4 kg)    SpO2 95%    BMI 29.09 kg/m2        Chest - clear to auscultation, no wheezes, rales or rhonchi, symmetric air entry  Heart - normal rate, regular rhythm, normal S1, S2, no murmurs, rubs, clicks or gallops  Extremities - peripheral pulses normal, no pedal edema, no clubbing or cyanosis      Labs:   Lab Results   Component Value Date/Time    Hemoglobin A1c 6.9 (H) 08/10/2018 07:18 AM    Hemoglobin A1c 6.9 (H) 04/06/2018 07:22 AM    Hemoglobin A1c 7.0 (H) 11/30/2017 09:43 AM    Hemoglobin A1c, External 7.2 02/27/2016    Glucose 198 (H) 08/10/2018 07:18 AM    Microalbumin/Creat ratio (mg/g creat) 76 (H) 11/30/2017 09:43 AM    Microalbumin,urine random 11.40 (H) 11/30/2017 09:43 AM    LDL, calculated  08/10/2018 07:18 AM     LDL AND VLDL CHOLESTEROL NOT CALCULATED WHEN TRIGLYCERIDES >400 MG/DL OR HDL CHOLESTEROL <20 MG/DL    Creatinine, POC 1.2 03/06/2017 04:09 PM    Creatinine 1.16 08/10/2018 07:18 AM      Lab Results   Component Value Date/Time    Prostate Specific Ag 0.9 01/24/2017 04:30 PM     Lab Results   Component Value Date/Time    Sodium 141 08/10/2018 07:18 AM    Potassium 4.0 08/10/2018 07:18 AM    Chloride 106 08/10/2018 07:18 AM    CO2 27 08/10/2018 07:18 AM    Anion gap 8 08/10/2018 07:18 AM    Glucose 198 (H) 08/10/2018 07:18 AM    BUN 12 08/10/2018 07:18 AM    Creatinine 1.16 08/10/2018 07:18 AM    BUN/Creatinine ratio 10 (L) 08/10/2018 07:18 AM    GFR est AA >60 08/10/2018 07:18 AM    GFR est non-AA >60 08/10/2018 07:18 AM    Calcium 8.9 08/10/2018 07:18 AM    Bilirubin, total 0.2 08/10/2018 07:18 AM    ALT (SGPT) 28 08/10/2018 07:18 AM    AST (SGOT) 17 08/10/2018 07:18 AM    Alk.  phosphatase 151 (H) 08/10/2018 07:18 AM Protein, total 7.2 08/10/2018 07:18 AM    Albumin 3.5 08/10/2018 07:18 AM    Globulin 3.7 08/10/2018 07:18 AM    A-G Ratio 0.9 08/10/2018 07:18 AM      Lab Results   Component Value Date/Time    Hemoglobin A1c 6.9 (H) 08/10/2018 07:18 AM    Hemoglobin A1c (POC) 6.5 12/16/2016 12:02 PM    Hemoglobin A1c, External 7.2 02/27/2016            Labs:   Lab Results   Component Value Date/Time    Cholesterol, total 251 (H) 08/10/2018 07:18 AM    HDL Cholesterol 36 (L) 08/10/2018 07:18 AM    LDL, calculated  08/10/2018 07:18 AM     LDL AND VLDL CHOLESTEROL NOT CALCULATED WHEN TRIGLYCERIDES >400 MG/DL OR HDL CHOLESTEROL <20 MG/DL    LDL-C, External 132 02/27/2016    Triglyceride 494 (H) 08/10/2018 07:18 AM    CHOL/HDL Ratio 7.0 (H) 08/10/2018 07:18 AM               Assessment / Plan     Diabetes well controlled, on metformin  Hypertension well controlled, of meds. Pt probably needs to restart ACE-I due to h/o proteinuria. He will d/w cardiology   Hyperlipidemia poorly controlled, of Crestor. Pt needs to restart Crestor due to h/o CAD      ICD-10-CM ICD-9-CM    1. Type 2 diabetes mellitus with diabetic polyneuropathy, without long-term current use of insulin ([facility]) E11.42 250.60 LIPID PANEL     187.0 METABOLIC PANEL, COMPREHENSIVE      HEMOGLOBIN A1C W/O EAG      MICROALBUMIN, UR, RAND W/ MICROALB/CREAT RATIO   2. Dyslipidemia E78.5 272.4    3. Microalbuminuria R80.9 791.0 Pt should restart lisinopril due to h/o proteinuria        Consider FIT test if unable to afford Colonoscopy through a program         Diabetic issues reviewed with him: diabetic diet discussed in detail, and low cholesterol diet, weight control and daily exercise discussed. Follow-up Disposition:  Return in about 6 months (around 2/17/2019) for labs 1 week before. Reviewed plan of care. Patient has provided input and agrees with goals.

## 2018-08-20 ENCOUNTER — OFFICE VISIT (OUTPATIENT)
Dept: CARDIOLOGY CLINIC | Age: 58
End: 2018-08-20

## 2018-08-20 VITALS
SYSTOLIC BLOOD PRESSURE: 136 MMHG | OXYGEN SATURATION: 96 % | WEIGHT: 201 LBS | BODY MASS INDEX: 29.77 KG/M2 | DIASTOLIC BLOOD PRESSURE: 76 MMHG | HEIGHT: 69 IN | HEART RATE: 84 BPM

## 2018-08-20 DIAGNOSIS — I25.10 ATHEROSCLEROSIS OF NATIVE CORONARY ARTERY OF NATIVE HEART WITHOUT ANGINA PECTORIS: Primary | ICD-10-CM

## 2018-08-20 DIAGNOSIS — R06.09 DOE (DYSPNEA ON EXERTION): ICD-10-CM

## 2018-08-20 DIAGNOSIS — E11.42 TYPE 2 DIABETES MELLITUS WITH DIABETIC POLYNEUROPATHY, WITHOUT LONG-TERM CURRENT USE OF INSULIN (HCC): ICD-10-CM

## 2018-08-20 DIAGNOSIS — I11.9 BENIGN HYPERTENSIVE HEART DISEASE WITHOUT HEART FAILURE: ICD-10-CM

## 2018-08-20 DIAGNOSIS — E78.5 DYSLIPIDEMIA: ICD-10-CM

## 2018-08-20 PROBLEM — E11.21 TYPE 2 DIABETES WITH NEPHROPATHY (HCC): Status: ACTIVE | Noted: 2018-08-20

## 2018-08-20 NOTE — PROGRESS NOTES
HPI:  I saw Magdalena Frank in my office today in cardiovascular evaluation regarding his chronic underlying coronary artery disease. Mr. Idris Frank is a pleasant 61year-old  male who presented to [facility] with chest tightness and acute coronary syndrome, for which we stabilized him medically and did an urgent cardiac catheterization on January 26, 2006. 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 This was opened with a wire and balloon, but we were unable to stent it. There was a residual stenosis of 40% with JB grade 3 flow following the procedure. Left ventricular function at that time was grossly normal with an ejection fraction in the 60% range.      He has done well on medical therapy over the years without any recurrent cardiovascular issues. He unfortunately continued to smoke 2 packs of cigarettes per day over the years and has developed moderately severe COPD with chronic shortness of breath. Fortunately, he finally did quit smoking completely in January of 2014 and has been able to stay off cigarettes now over 4 years ago. He comes in today relates that he is doing reasonably well. He is continuing to stay quite active and denies any cardiovascular symptomatology at this time. Does relate that he was somewhat fatigued a couple of months ago and stopped taking his blood pressure medications and his Crestor and his cholesterol which was done on August 10, 2018 was quite high with total cholesterol 251, triglycerides of 494, HDL 36, and a course LDL and VLDL could not be measured due to the high triglycerides. Encounter Diagnoses   Name Primary?     GARCIA (dyspnea on exertion)     Atherosclerosis of native coronary artery of native heart without angina pectoris Yes    Hypertensive heart disease      Dyslipidemia  Type 2 diabetes mellitus with diabetic polyneuropathy, without long-term current use of insulin ([facility]        Discussion: This gentleman appears to be doing reasonably well at this juncture. He does have some mild dyspnea on exertion, but this seems to be stable he denies any anginal type chest discomfort. His cholesterol is quite high since he is come off of Crestor which he started on his own just last week. I am going to plan to get a lipid profile in mid October and will make further recommendations after reviewing that information. He does have diabetes but his most recent hemoglobin A1c was 6.9 suggesting that his diabetes is reasonably well-controlled on his current medical regimen.     His blood pressure is well-controlled today and his EKG is stable, so I am simply going plan see him again in several months or sooner if any new cardiovascular symptoms surface in the interim. PCP: Nany [place] MD      Past Medical History:   Diagnosis Date    Acute coronary syndrome [facility])     with non-ST-elevation myocardial infarction, s/p direct angioplasty of anomolous circumflex marginal coming off the right coronary cusp on 1/26/06    Arthritis     in hands    ASHD (arteriosclerotic heart disease)     Benign hypertensive heart disease without heart failure     CAD (coronary artery disease)     chronic underlying    Calculus of kidney     Cardiac cath 01/26/2006    LM mild. RCA mild. LAD tortuous. CX mild. CX angela 100%. S/P balloon angioplasty of CX angela. Residual 40%. LVEDP 20.  EF 65%.  Cardiac nuclear imaging test 08/30/2006    No ischemia or infarct. EF 59%. Negative max.  stress test.    Chronic cough     coughing up sputum    Chronic lung disease     COPD (chronic obstructive pulmonary disease) (HCC)     moderate to severe    Coronary artery disease     Diabetes ([facility]     Diabetes mellitus ([address]     GARCIA (dyspnea on exertion)     GERD (gastroesophageal reflux disease)     H/O: pneumonia     Heartburn     Hypertension     essential systemic    Indigestion     Pneumonia 8/12    \"walking\"    Pure hypercholesterolemia     Tobacco abuse          Past Surgical History:   Procedure Laterality Date    HX HEART CATHETERIZATION  01/26/06    HX ORTHOPAEDIC  1989    fracture of bilateral hands     HX PTCA      of anomalous circumflex marginal artery    TONGUE AND MOUTH SURG UNLISTED  04/04    tongue surgery    TONGUE TO LIP SURGERY  04/04         Current Outpatient Rx   Name  Route  Sig  Dispense  Refill    omeprazole (PRILOSEC) 20 mg capsule    Oral    Take 40 mg by mouth daily.  metformin (GLUCOPHAGE) 500 mg tablet    Oral    Take 500 mg by mouth three (3) times daily (with meals).  hydrochlorothiazide (HYDRODIURIL) 25 mg tablet    Oral    Take 12.5 mg by mouth two (2) times a day.  HYDROcodone-acetaminophen (LORTAB)  mg per tablet    Oral    Take 1 Tab by mouth three (3) times daily.  budesonide-formoterol (SYMBICORT) 160-4.5 mcg/actuation HFA inhaler    Inhalation    Take 2 Puffs by inhalation two (2) times a day. Using PRM              albuterol (PROAIR HFA) 90 mcg/actuation inhaler    Inhalation    Take 1 Puff by inhalation as needed.  nitroglycerin (NITROSTAT) 0.4 mg SL tablet    SubLINGual    1 Tab by SubLINGual route every five (5) minutes as needed for Chest Pain. 25 Tab    3      lisinopril (PRINIVIL, ZESTRIL) 5 mg tablet    Oral    Take 40 mg by mouth daily.  aspirin 81 mg tablet    Oral    Take 162 mg by mouth daily.  clopidogrel (PLAVIX) 75 mg tablet    Oral    Take 75 mg by mouth daily.  metoprolol (LOPRESSOR) 100 mg tablet    Oral    Take 50 mg by mouth two (2) times a day.  atorvastatin (LIPITOR) 40 mg tablet    Oral    Take 40 mg by mouth daily.                    No Known Allergies    Social History:   Social History   Substance Use Topics    Smoking status: Former Smoker     Packs/day: 1.25     Years: 20.00     Types: Cigarettes     Quit date: 1/1/2014    Smokeless tobacco: Never Used    Alcohol use Yes      Comment: on special occasions only           Family history: family history includes Asthma in his father; COPD in his mother; Cancer in his father; Colon Polyps in his father. Review of Systems:    Constitutional: Negative for chills, fever, malaise/fatigue and weight loss. Respiratory: Positive for wheezing. Negative for cough, hemoptysis and shortness of breath. Cardiovascular: Positive for leg swelling. Negative for chest pain, palpitations and orthopnea. Gastrointestinal: Negative for abdominal pain, blood in stool, constipation, diarrhea, heartburn, melena, nausea and vomiting. Musculoskeletal: Negative for falls, joint pain and myalgias. Neurological: Negative for dizziness. Physical Exam:   The patient is an alert, oriented, well developed, well nourished 61 y.o.  male who was in no acute distress at the time of my examination. Visit Vitals    /76    Pulse 84    Ht 5' 9\" (1.753 m)    Wt 91.2 kg (201 lb)    SpO2 96%    BMI 29.68 kg/m2        BP Readings from Last 3 Encounters:   08/20/18 136/76   08/17/18 132/86   04/13/18 105/75        Wt Readings from Last 3 Encounters:   08/20/18 91.2 kg (201 lb)   08/17/18 89.4 kg (197 lb)   04/13/18 87.5 kg (193 lb)       HEENT: Conjunctivae white, mucosa moist, no pallor or cyanosis. Neck: Supple without masses, tenderness or thyromegaly. No jugular venous distention. Carotid upstrokes are full bilaterally with soft bilateral bruits. Cardiovascular: Chest is symmetrical with good excursion. [place] is not displaced. No lifts, heaves or thrills. S1 and S2 are normal, without appreciable rubs, clicks or gallops. There is a grade III/VI apical systolic murmur with poor radiation, without diastolic murmurs. Lungs:  Moderate to severe decreased breath sounds throughout with a lot of diffuse expiratory wheezes throughout. Abdomen: Soft. No masses, tenderness or organomegaly. Extremities: Trace to 1 + edema with 1 + peripheral pulses. Review of Data:  See PMH and Cardiology and Imaging sections for cardiac testing      Results for orders placed or performed in visit on 08/20/18   AMB POC EKG ROUTINE W/ 12 LEADS, INTER & REP     Status: None    Narrative    Normal sinus rhythm, rate 84. Borderline complete right bundle branch block. Early R-wave transition anteriorly. Compared to the EKG of January 26, 2018 was little interval change. Phill Song D.O., F.A.C.C. [facility]  [address] [place] [address] and [facility]  [address] [place] [address]. [address]    PLEASE NOTE:  This document has been produced using voice recognition software. Unrecognized errors in transcription may be present.

## 2018-08-20 NOTE — PROGRESS NOTES
1. Have you been to the ER, urgent care clinic since your last visit? Hospitalized since your last visit?no    2. Have you seen or consulted any other health care providers outside of the [address] [place] [address] since your last visit? Include any pap smears or colon screening.  no

## 2018-08-20 NOTE — PROGRESS NOTES
Review of Systems   Constitutional: Negative for chills, fever, malaise/fatigue and weight loss. Respiratory: Positive for wheezing. Negative for cough, hemoptysis and shortness of breath. Cardiovascular: Positive for leg swelling. Negative for chest pain, palpitations and orthopnea. Gastrointestinal: Negative for abdominal pain, blood in stool, constipation, diarrhea, heartburn, melena, nausea and vomiting. Musculoskeletal: Negative for falls, joint pain and myalgias. Neurological: Negative for dizziness.

## 2018-08-20 NOTE — MR AVS SNAPSHOT
[address] [place] [address] [address] [place] [address] 
[phone] Patient: Pilar Cleveland 
MRN: [id number] :1960 Visit Information Date & Time Provider Department Dept. Phone Encounter #  
 2018  3:00 PM Arvilla Lefort, [address] [facility] [id number] Your Appointments 2019  8:00 AM  
LAB with [facility] (GONZALEZ Sarah) Appt Note: 6 mos labs [address] [place] [address] [address]  
[phone]  
  
   
 [address] [facility] [place]  
  
    
 2019  4:15 PM  
Office Visit with Xu Talbert MD  
[place] [address] [facility] [address] [place] [address] Appt Note: return in 6 months [address] [address]  
[phone]  
  
   
 [address],  [address] Upcoming Health Maintenance Date Due  
 EYE EXAM RETINAL OR DILATED Q1 10/22/1969 Pneumococcal 19-64 Medium Risk (1 of 1 - PPSV23) 10/22/1978 DTaP/Tdap/Td series (1 - Tdap) 10/22/1980 FOBT Q 1 YEAR AGE 50-75 10/22/2009 FOOT EXAM Q1 2018 Influenza Age 5 to Adult 2018* MICROALBUMIN Q1 2018 HEMOGLOBIN A1C Q6M 2/10/2019 LIPID PANEL Q1 8/10/2019 *Topic was postponed. The date shown is not the original due date. Allergies as of 2018  Review Complete On: 2018 By: Arvilla Lefort, DO No Known Allergies Current Immunizations  Reviewed on 11/3/2017 No immunizations on file. Not reviewed this visit You Were Diagnosed With   
  
 Codes Comments Atherosclerosis of native coronary artery of native heart without angina pectoris    -  Primary ICD-10-CM: I25.10 ICD-9-CM: 414.01   
 GARCIA (dyspnea on exertion)     ICD-10-CM: R06.09 
ICD-9-CM: 786.09 Benign hypertensive heart disease without heart failure     ICD-10-CM: I11.9 ICD-9-CM: 402.10 Dyslipidemia     ICD-10-CM: E78.5 ICD-9-CM: 272.4 Vitals BP Pulse Height(growth percentile) Weight(growth percentile) SpO2 BMI  
 136/76 84 5' 9\" (1.753 m) 201 lb (91.2 kg) 96% 29.68 kg/m2 Smoking Status Former Smoker Vitals History BMI and BSA Data Body Mass Index Body Surface Area  
 29.68 kg/m 2 2.11 m 2 Preferred Pharmacy Pharmacy Name [facility]  Sharon Flowers, [facility] [place] [address] [phone] Your Updated Medication List  
  
   
This list is accurate as of 8/20/18  4:32 PM.  Always use your most recent med list.  
  
  
  
  
 aspirin 81 mg tablet Take 81 mg by mouth two (2) times a day. fluticasone-vilanterol 100-25 mcg/dose inhaler Commonly known as:  BREO ELLIPTA Take 1 Puff by inhalation daily. gabapentin 300 mg capsule Commonly known as:  NEURONTIN Take 1 Cap by mouth three (3) times daily. metFORMIN 1,000 mg tablet Commonly known as:  GLUCOPHAGE Take 1 Tab by mouth daily. multivitamin tablet Commonly known as:  ONE A DAY Take 1 Tab by mouth daily. nitroglycerin 0.4 mg SL tablet Commonly known as:  NITROSTAT  
1 Tab by SubLINGual route every five (5) minutes as needed for Chest Pain. PriLOSEC 20 mg capsule Generic drug:  omeprazole Take 40 mg by mouth daily. rosuvastatin 20 mg tablet Commonly known as:  CRESTOR  
TAKE ONE TABLET BY MOUTH NIGHTLY We Performed the Following AMB POC EKG ROUTINE W/ 12 LEADS, INTER & REP [33583 CPT(R)] Introducing hospitals & HEALTH SERVICES! Jason Anguiano introduces [employer] patient portal. Now you can access parts of your medical record, email your doctor's office, and request medication refills online. 1. In your internet browser, go to [email or web address] 2. Click on the First Time User? Click Here link in the Sign In box. You will see the New Member Sign Up page. 3. Enter your [employer] Access Code exactly as it appears below. You will not need to use this code after youve completed the sign-up process. If you do not sign up before the expiration date, you must request a new code. · [employer] Access Code: [email or web address] Expires: 11/15/2018  5:38 PM 
 
4. Enter the last four digits of your Social Security Number (xxxx) and Date of Birth (mm/dd/yyyy) as indicated and click Submit. You will be taken to the next sign-up page. 5. Create a [employer] ID. This will be your [employer] login ID and cannot be changed, so think of one that is secure and easy to remember. 6. Create a [employer] password. You can change your password at any time. 7. Enter your Password Reset Question and Answer. This can be used at a later time if you forget your password. 8. Enter your e-mail address. You will receive e-mail notification when new information is available in [address]. 9. Click Sign Up. You can now view and download portions of your medical record. 10. Click the Download Summary menu link to download a portable copy of your medical information. If you have questions, please visit the Frequently Asked Questions section of the [employer] website. Remember, [employer] is NOT to be used for urgent needs. For medical emergencies, dial 911. Now available from your iPhone and Android! Please provide this summary of care documentation to your next provider. Your primary care clinician is listed as MAREK RED. If you have any questions after today's visit, please call [phone].

## 2018-08-23 RX ORDER — GABAPENTIN 300 MG/1
CAPSULE ORAL
Qty: 180 CAP | Refills: 5 | Status: SHIPPED | OUTPATIENT
Start: 2018-08-23 | End: 2019-02-18 | Stop reason: SDUPTHER

## 2018-12-07 RX ORDER — METFORMIN HYDROCHLORIDE 1000 MG/1
TABLET ORAL
Qty: 90 TAB | Refills: 1 | Status: SHIPPED | OUTPATIENT
Start: 2018-12-07 | End: 2019-02-18 | Stop reason: SDUPTHER

## 2019-02-11 ENCOUNTER — HOSPITAL ENCOUNTER (OUTPATIENT)
Dept: LAB | Age: 59
Discharge: HOME | End: 2019-02-11
Payer: SELF-PAY

## 2019-02-11 DIAGNOSIS — E11.42 TYPE 2 DIABETES MELLITUS WITH DIABETIC POLYNEUROPATHY, WITHOUT LONG-TERM CURRENT USE OF INSULIN (HCC): ICD-10-CM

## 2019-02-11 LAB
ALBUMIN SERPL-MCNC: 3.8 G/DL (ref 3.4–5)
ALBUMIN/GLOB SERPL: 1.1 {RATIO} (ref 0.8–1.7)
ALP SERPL-CCNC: 180 U/L (ref 45–117)
ALT SERPL-CCNC: 51 U/L (ref 16–61)
ANION GAP SERPL CALC-SCNC: 9 MMOL/L (ref 3–18)
AST SERPL-CCNC: 24 U/L (ref 15–37)
BILIRUB SERPL-MCNC: 0.3 MG/DL (ref 0.2–1)
BUN SERPL-MCNC: 12 MG/DL (ref 7–18)
BUN/CREAT SERPL: 11 (ref 12–20)
CALCIUM SERPL-MCNC: 9.3 MG/DL (ref 8.5–10.1)
CHLORIDE SERPL-SCNC: 101 MMOL/L (ref 100–108)
CHOLEST SERPL-MCNC: 233 MG/DL
CO2 SERPL-SCNC: 27 MMOL/L (ref 21–32)
CREAT SERPL-MCNC: 1.07 MG/DL (ref 0.6–1.3)
GLOBULIN SER CALC-MCNC: 3.5 G/DL (ref 2–4)
GLUCOSE SERPL-MCNC: 181 MG/DL (ref 74–99)
HBA1C MFR BLD: 8 % (ref 4.2–5.6)
HDLC SERPL-MCNC: 38 MG/DL (ref 40–60)
HDLC SERPL: 6.1 {RATIO} (ref 0–5)
LDLC SERPL CALC-MCNC: 133.2 MG/DL (ref 0–100)
LIPID PROFILE,FLP: ABNORMAL
POTASSIUM SERPL-SCNC: 4.2 MMOL/L (ref 3.5–5.5)
PROT SERPL-MCNC: 7.3 G/DL (ref 6.4–8.2)
SODIUM SERPL-SCNC: 137 MMOL/L (ref 136–145)
TRIGL SERPL-MCNC: 309 MG/DL (ref ?–150)
VLDLC SERPL CALC-MCNC: 61.8 MG/DL

## 2019-02-11 PROCEDURE — 36415 COLL VENOUS BLD VENIPUNCTURE: CPT

## 2019-02-11 PROCEDURE — 80053 COMPREHEN METABOLIC PANEL: CPT

## 2019-02-11 PROCEDURE — 80061 LIPID PANEL: CPT

## 2019-02-11 PROCEDURE — 83036 HEMOGLOBIN GLYCOSYLATED A1C: CPT

## 2019-02-11 PROCEDURE — 82043 UR ALBUMIN QUANTITATIVE: CPT

## 2019-02-12 LAB
CREAT UR-MCNC: 136 MG/DL (ref 30–125)
MICROALBUMIN UR-MCNC: 27.4 MG/DL (ref 0–3)
MICROALBUMIN/CREAT UR-RTO: 201 MG/G (ref 0–30)

## 2019-02-18 ENCOUNTER — OFFICE VISIT (OUTPATIENT)
Dept: FAMILY MEDICINE CLINIC | Age: 59
End: 2019-02-18

## 2019-02-18 VITALS
TEMPERATURE: 98.6 F | RESPIRATION RATE: 20 BRPM | SYSTOLIC BLOOD PRESSURE: 145 MMHG | WEIGHT: 209 LBS | BODY MASS INDEX: 30.96 KG/M2 | OXYGEN SATURATION: 95 % | HEIGHT: 69 IN | DIASTOLIC BLOOD PRESSURE: 92 MMHG | HEART RATE: 104 BPM

## 2019-02-18 DIAGNOSIS — E78.5 DYSLIPIDEMIA: ICD-10-CM

## 2019-02-18 DIAGNOSIS — G89.29 CHRONIC MIDLINE LOW BACK PAIN WITHOUT SCIATICA: ICD-10-CM

## 2019-02-18 DIAGNOSIS — R80.9 MICROALBUMINURIA: ICD-10-CM

## 2019-02-18 DIAGNOSIS — I25.10 ATHEROSCLEROSIS OF NATIVE CORONARY ARTERY OF NATIVE HEART WITHOUT ANGINA PECTORIS: ICD-10-CM

## 2019-02-18 DIAGNOSIS — E11.42 TYPE 2 DIABETES MELLITUS WITH DIABETIC POLYNEUROPATHY, WITHOUT LONG-TERM CURRENT USE OF INSULIN (HCC): Primary | ICD-10-CM

## 2019-02-18 DIAGNOSIS — M54.50 CHRONIC MIDLINE LOW BACK PAIN WITHOUT SCIATICA: ICD-10-CM

## 2019-02-18 DIAGNOSIS — I10 ESSENTIAL HYPERTENSION: ICD-10-CM

## 2019-02-18 RX ORDER — METFORMIN HYDROCHLORIDE 1000 MG/1
1000 TABLET ORAL 2 TIMES DAILY WITH MEALS
Qty: 180 TAB | Refills: 1 | Status: SHIPPED | OUTPATIENT
Start: 2019-02-18 | End: 2019-10-15 | Stop reason: SDUPTHER

## 2019-02-18 RX ORDER — RAMIPRIL 5 MG/1
5 CAPSULE ORAL DAILY
Qty: 30 CAP | Refills: 3 | Status: SHIPPED | OUTPATIENT
Start: 2019-02-18 | End: 2019-06-19 | Stop reason: SDUPTHER

## 2019-02-18 RX ORDER — GABAPENTIN 300 MG/1
CAPSULE ORAL
Qty: 360 CAP | Refills: 5 | Status: SHIPPED | OUTPATIENT
Start: 2019-02-18 | End: 2019-08-26 | Stop reason: SDUPTHER

## 2019-02-18 NOTE — PROGRESS NOTES
Patient is in the office today for 6 month follow up. 1. Have you been to the ER, urgent care clinic since your last visit? Hospitalized since your last visit? No 
 
2. Have you seen or consulted any other health care providers outside of the [address] [place] [address] since your last visit? Include any pap smears or colon screening.  No

## 2019-02-18 NOTE — PROGRESS NOTES
Subjective: Chief Complaint The patient presents for follow up of diabetes, hypertension and high cholesterol. HPI Lesli Santamaria is a 60 y.o. male seen for follow up of diabetes. Radhika has hypertension and hyperlipidemia. Diabetes poorly controlled, no significant medication side effects noted, on metformin 1 gm every day , hypertension no significant medication side effects noted, poorly controlled, of medications which pt stopped on his own , hyperlipidemia poorly controlled, no significant medication side effects noted, pt stopped Crestor 20 mg on his own because he says it caused fatigue. Diet and Lifestyle: generally follows a low fat low cholesterol diet, sedentary Home BP Monitoring: is not measured at home. Diabetic Review of Systems - home glucose monitoring: is performed sporadically. Other symptoms and concerns: COPD Review: 
The patient is being seen for follow up of COPD. Oxygen: He currently is not on home oxygen therapy. Symptoms: chronic dyspnea: severity = moderate: course of sx: symptoms have progressed to a point and plateaued. Heddie [place] Patient uses 1 pillows at night. Patient denies smoke cigarettes. He is using Breo currently. Pt has chronic back pain and would like to increase Neurontin 300 mg from TID to QID. Pt not on narcotics in this office due to abnormal UDS. Current Outpatient Medications Medication Sig  
 gabapentin (NEURONTIN) 300 mg capsule 1 tab Q 6 hr  
 coenzyme q10 (CO Q-10) 150 mg cap Take  by mouth.  ramipril (ALTACE) 5 mg capsule Take 1 Cap by mouth daily.  metFORMIN (GLUCOPHAGE) 1,000 mg tablet Take 1 Tab by mouth two (2) times daily (with meals).  rosuvastatin (CRESTOR) 20 mg tablet TAKE ONE TABLET BY MOUTH NIGHTLY  aspirin 81 mg tablet Take 81 mg by mouth two (2) times a day.  fluticasone-vilanterol (BREO ELLIPTA) 100-25 mcg/dose inhaler Take 1 Puff by inhalation daily.  multivitamin (ONE A DAY) tablet Take 1 Tab by mouth daily.  omeprazole (PRILOSEC) 20 mg capsule Take 40 mg by mouth daily.  nitroglycerin (NITROSTAT) 0.4 mg SL tablet 1 Tab by SubLINGual route every five (5) minutes as needed for Chest Pain. No current facility-administered medications for this visit. Review of Systems Respiratory: negative for dyspnea on exertion Cardiovascular: negative for chest pain Objective:  
 
Visit Vitals BP (!) 145/92 (BP 1 Location: Left arm, BP Patient Position: Sitting) Pulse (!) 104 Temp 98.6 °F (37 °C) (Oral) Resp 20 Ht 5' 9\" (1.753 m) Wt 209 lb (94.8 kg) SpO2 95% BMI 30.86 kg/m² Chest - clear to auscultation, no wheezes, rales or rhonchi, symmetric air entry Heart - normal rate, regular rhythm, normal S1, S2, no murmurs, rubs, clicks or gallops Extremities - peripheral pulses normal, no pedal edema, no clubbing or cyanosis Labs:  
Lab Results Component Value Date/Time Hemoglobin A1c 8.0 (H) 02/11/2019 08:12 AM  
 Hemoglobin A1c 6.9 (H) 08/10/2018 07:18 AM  
 Hemoglobin A1c 6.9 (H) 04/06/2018 07:22 AM  
 Hemoglobin A1c, External 7.2 02/27/2016 Glucose 181 (H) 02/11/2019 08:12 AM  
 Microalbumin/Creat ratio (mg/g creat) 201 (H) 02/11/2019 08:12 AM  
 Microalbumin,urine random 27.40 (H) 02/11/2019 08:12 AM  
 LDL, calculated 133.2 (H) 02/11/2019 08:12 AM  
 Creatinine, POC 1.2 03/06/2017 04:09 PM  
 Creatinine 1.07 02/11/2019 08:12 AM  
  
Lab Results Component Value Date/Time  
 Prostate Specific Ag 0.9 01/24/2017 04:30 PM  
 
Lab Results Component Value Date/Time  Sodium 137 02/11/2019 08:12 AM  
 Potassium 4.2 02/11/2019 08:12 AM  
 Chloride 101 02/11/2019 08:12 AM  
 CO2 27 02/11/2019 08:12 AM  
 Anion gap 9 02/11/2019 08:12 AM  
 Glucose 181 (H) 02/11/2019 08:12 AM  
 BUN 12 02/11/2019 08:12 AM  
 Creatinine 1.07 02/11/2019 08:12 AM  
 BUN/Creatinine ratio 11 (L) 02/11/2019 08:12 AM  
 GFR est AA >60 02/11/2019 08:12 AM  
 GFR est non-AA >60 02/11/2019 08:12 AM  
 Calcium 9.3 02/11/2019 08:12 AM  
 Bilirubin, total 0.3 02/11/2019 08:12 AM  
 ALT (SGPT) 51 02/11/2019 08:12 AM  
 AST (SGOT) 24 02/11/2019 08:12 AM  
 Alk. phosphatase 180 (H) 02/11/2019 08:12 AM  
 Protein, total 7.3 02/11/2019 08:12 AM  
 Albumin 3.8 02/11/2019 08:12 AM  
 Globulin 3.5 02/11/2019 08:12 AM  
 A-G Ratio 1.1 02/11/2019 08:12 AM  
  
Lab Results Component Value Date/Time Hemoglobin A1c 8.0 (H) 02/11/2019 08:12 AM  
 Hemoglobin A1c (POC) 6.5 12/16/2016 12:02 PM  
 Hemoglobin A1c, External 7.2 02/27/2016 Labs:  
Lab Results Component Value Date/Time Cholesterol, total 233 (H) 02/11/2019 08:12 AM  
 HDL Cholesterol 38 (L) 02/11/2019 08:12 AM  
 LDL, calculated 133.2 (H) 02/11/2019 08:12 AM  
 LDL-C, External 132 02/27/2016 Triglyceride 309 (H) 02/11/2019 08:12 AM  
 CHOL/HDL Ratio 6.1 (H) 02/11/2019 08:12 AM  
 
 
 
 
 
 
Assessment / Plan Diabetes poorly controlled, will increase to  metformin 1 gm BID Hypertension poorly controlled, of meds. He also has proteinuria. Will start on Altace 5 mg every day Hyperlipidemia poorly controlled, of Crestor. Pt needs to restart Crestor 10 mg 3x/week with CoQ 10 due to h/o CAD ICD-10-CM ICD-9-CM 1. Type 2 diabetes mellitus with diabetic polyneuropathy, without long-term current use of insulin (HCC) E11.42 250.60 HEMOGLOBIN A1C W/O EAG  
  357.2 2. Dyslipidemia E78.5 272.4 LIPID PANEL 3. Essential hypertension K61 952.3 METABOLIC PANEL, COMPREHENSIVE 4. Microalbuminuria R80.9 791.0 Will start on ramipril (ALTACE) 5 mg capsule 5. Atherosclerosis of native coronary artery of native heart without angina pectoris I25.10 414.01 Stable on current meds. Pt to f/u with Cardiology 6. Chronic midline low back pain without sciatica M54.5 724.2 Will increase to gabapentin (NEURONTIN) 300 mg capsule 4x/day  G89.29 338.29   
 
 
 Consider FIT test if unable to afford Colonoscopy through a program   
  
 
Diabetic issues reviewed with him: diabetic diet discussed in detail, and low cholesterol diet, weight control and daily exercise discussed. Follow-up Disposition: 
Return in about 3 months (around 5/18/2019) for labs 1 week before. Reviewed plan of care. Patient has provided input and agrees with goals.

## 2019-03-05 ENCOUNTER — OFFICE VISIT (OUTPATIENT)
Dept: CARDIOLOGY CLINIC | Age: 59
End: 2019-03-05

## 2019-03-05 VITALS
HEART RATE: 96 BPM | SYSTOLIC BLOOD PRESSURE: 138 MMHG | DIASTOLIC BLOOD PRESSURE: 96 MMHG | BODY MASS INDEX: 30.81 KG/M2 | OXYGEN SATURATION: 96 % | HEIGHT: 69 IN | WEIGHT: 208 LBS

## 2019-03-05 DIAGNOSIS — R06.09 DOE (DYSPNEA ON EXERTION): ICD-10-CM

## 2019-03-05 DIAGNOSIS — E11.42 TYPE 2 DIABETES MELLITUS WITH DIABETIC POLYNEUROPATHY, WITHOUT LONG-TERM CURRENT USE OF INSULIN (HCC): ICD-10-CM

## 2019-03-05 DIAGNOSIS — J44.9 CHRONIC OBSTRUCTIVE PULMONARY DISEASE, UNSPECIFIED COPD TYPE (HCC): ICD-10-CM

## 2019-03-05 DIAGNOSIS — I11.9 BENIGN HYPERTENSIVE HEART DISEASE WITHOUT HEART FAILURE: ICD-10-CM

## 2019-03-05 DIAGNOSIS — E78.5 DYSLIPIDEMIA: ICD-10-CM

## 2019-03-05 DIAGNOSIS — I25.10 ATHEROSCLEROSIS OF NATIVE CORONARY ARTERY OF NATIVE HEART WITHOUT ANGINA PECTORIS: Primary | ICD-10-CM

## 2019-03-05 NOTE — PROGRESS NOTES
HPI:  I saw Gomez Wild Orlando in my office today in cardiovascular evaluation regarding his chronic underlying coronary artery disease. Mr. Marine Orlando is a pleasant 62-year-old  male who presented to [facility] with chest tightness and acute coronary syndrome, for which we stabilized him medically and did an urgent cardiac catheterization on January 26, 2006. 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 This was opened with a wire and balloon, but we were unable to stent it. There was a residual stenosis of 40% with JB grade 3 flow following the procedure. Left ventricular function at that time was grossly normal with an ejection fraction in the 60% range.      He has done well on medical therapy over the years without any recurrent cardiovascular issues. He unfortunately continued to smoke 2 packs of cigarettes per day over the years and has developed moderately severe COPD with chronic shortness of breath. Fortunately, he finally did quit smoking completely in January of 2014 and has been able to stay off cigarettes now over 4 years ago. He comes in today relates that he is continued to do quite well. He is short of breath with any significant exertion related presumably to his COPD but is really not having any other cardiovascular complaints except for little peripheral edema issue from time to time. Encounter Diagnoses   Name Primary?  Hypertensive heart disease      Atherosclerosis of native coronary artery of native heart without angina pectoris Yes    GARCIA (dyspnea on exertion)     S/P cardiac catheterization     Dyslipidemia        Discussion: This patient appears to be doing about as well as we could expect and I really have no recommendations for change at this time.  He is not having any symptoms to suggest the development of symptomatic obstructive coronary disease or heart failure. His latest lipid profile which was completed on February 11, 2019 was quite abnormal with total cholesterol 233, triglycerides of 309, HDL 38, LDL of 133.2, and VLDL of 61.8 which is obviously quite high. He has been having myalgias with Crestor and stopped that medication so Dr. Alondra Oakley has encouraged him to restart it at Crestor 20 mg a half a tablet every other day and continue to take coenzyme Q10 to see if he can tolerate the medication with that frequency. If he can tolerate it then we will get a lipid profile in a couple of months and if it is still elevated which I suspect it will be then I would recommend adding Zetia 10 mg to his regimen with a repeat a few months later and if his cholesterol is still too high than we should consider a PCSK9 inhibitor    His blood pressure was borderline elevated today and his heart rate was in the 90s and I would leave adjustment of his blood pressure medication to Dr. Alondra Oakley for now but I would encourage consideration of adding a beta-blocker to his regimen despite his underlying lung disease such as Toprol-XL 50 mg daily. Since he otherwise seems to be doing reasonably well I will see him again in several months. PCP: Donny Chaudhry MD      Past Medical History:   Diagnosis Date    Acute coronary syndrome [facility])     with non-ST-elevation myocardial infarction, s/p direct angioplasty of anomolous circumflex marginal coming off the right coronary cusp on 1/26/06    Arthritis     in hands    ASHD (arteriosclerotic heart disease)     Benign hypertensive heart disease without heart failure     CAD (coronary artery disease)     chronic underlying    Calculus of kidney     Cardiac cath 01/26/2006    LM mild. RCA mild. LAD tortuous. CX mild. CX angela 100%. S/P balloon angioplasty of CX angela. Residual 40%. LVEDP 20.  EF 65%.     Cardiac nuclear imaging test 08/30/2006    No ischemia or infarct. EF 59%. Negative max. stress test.    Chronic cough     coughing up sputum    Chronic lung disease     COPD (chronic obstructive pulmonary disease) ([facility])     moderate to severe    Coronary artery disease     Diabetes [address]     Diabetes mellitus [address]     GARCIA (dyspnea on exertion)     GERD (gastroesophageal reflux disease)     H/O: pneumonia     Heartburn     Hypertension     essential systemic    Indigestion     Pneumonia 8/12    \"walking\"    Pure hypercholesterolemia     Tobacco abuse          Past Surgical History:   Procedure Laterality Date    HX HEART CATHETERIZATION  01/26/06    HX ORTHOPAEDIC  1989    fracture of bilateral hands     HX PTCA      of anomalous circumflex marginal artery    TONGUE AND MOUTH SURG UNLISTED  04/04    tongue surgery    TONGUE TO LIP SURGERY  04/04     Current Outpatient Medications   Medication Sig    gabapentin (NEURONTIN) 300 mg capsule 1 tab Q 6 hr    coenzyme q10 (CO Q-10) 150 mg cap Take  by mouth.  ramipril (ALTACE) 5 mg capsule Take 1 Cap by mouth daily.  metFORMIN (GLUCOPHAGE) 1,000 mg tablet Take 1 Tab by mouth two (2) times daily (with meals).  rosuvastatin (CRESTOR) 20 mg tablet TAKE ONE TABLET BY MOUTH NIGHTLY (Patient taking differently: 20 mg. TAKE HALF A TABLET EVERY OTHER DAY)    fluticasone-vilanterol (BREO ELLIPTA) 100-25 mcg/dose inhaler Take 1 Puff by inhalation daily.  multivitamin (ONE A DAY) tablet Take 1 Tab by mouth daily.  omeprazole (PRILOSEC) 20 mg capsule Take 40 mg by mouth daily.  nitroglycerin (NITROSTAT) 0.4 mg SL tablet 1 Tab by SubLINGual route every five (5) minutes as needed for Chest Pain.  aspirin 81 mg tablet Take 81 mg by mouth two (2) times a day. No current facility-administered medications for this visit.               No Known Allergies    Social History:   Social History     Tobacco Use    Smoking status: Former Smoker     Packs/day: 1.25     Years: 20.00     Pack years: 25.00 Types: Cigarettes     Last attempt to quit: 2014     Years since quittin.1    Smokeless tobacco: Never Used   Substance Use Topics    Alcohol use: Yes     Comment: on special occasions only           Family history: family history includes Asthma in his father; COPD in his mother; Cancer in his father; Colon Polyps in his father. Review of Systems:    Constitutional: Negative. Respiratory: Positive for shortness of breath and wheezing. Negative for cough and hemoptysis. Cardiovascular: Positive for leg swelling. Negative for chest pain, palpitations and orthopnea. Gastrointestinal: Negative. Musculoskeletal: Negative. Neurological: Negative for dizziness. Physical Exam:   The patient is an alert, oriented, well developed, well nourished 60 y.o.  male who was in no acute distress at the time of my examination. Visit Vitals  BP (!) 138/96   Pulse 96   Ht 5' 9\" (1.753 m)   Wt 94.3 kg (208 lb)   SpO2 96%   BMI 30.72 kg/m²        BP Readings from Last 3 Encounters:   19 (!) 138/96   19 (!) 145/92   18 136/76        Wt Readings from Last 3 Encounters:   19 94.3 kg (208 lb)   19 94.8 kg (209 lb)   18 91.2 kg (201 lb)       HEENT: Conjunctivae white, mucosa moist, no pallor or cyanosis. Neck: Supple without masses, tenderness or thyromegaly. No jugular venous distention. Carotid upstrokes are full bilaterally with soft bilateral bruits. Cardiovascular: Chest is symmetrical with good excursion. [place] is not displaced. No lifts, heaves or thrills. S1 and S2 are normal, without appreciable rubs, clicks or gallops. There is a grade III/VI apical systolic murmur with poor radiation, without diastolic murmurs. Lungs: Moderate to severe decreased breath sounds throughout with a lot of diffuse expiratory wheezes throughout. Abdomen: Soft. No masses, tenderness or organomegaly. Extremities: Trace to 1 + edema with 1 + peripheral pulses. Review of Data:  See PMH and Cardiology and Imaging sections for cardiac testing      Results for orders placed or performed in visit on 03/05/19   AMB POC EKG ROUTINE W/ 12 LEADS, INTER & REP     Status: None    Narrative    Normal sinus rhythm with rate 96. Complete right bundle branch block. Without acute change. This EKG is similar to the EKG of August 20, 2018. Brit Katz D.O., F.A.C.C. [facility]  [address] [place] [address] [facility]  [place] [id number]. [address] [place] [address]  [phone]    PLEASE NOTE:  This document has been produced using voice recognition software. Unrecognized errors in transcription may be present.

## 2019-05-13 ENCOUNTER — HOSPITAL ENCOUNTER (OUTPATIENT)
Dept: LAB | Age: 59
Discharge: HOME | End: 2019-05-13
Payer: SELF-PAY

## 2019-05-13 DIAGNOSIS — E11.42 TYPE 2 DIABETES MELLITUS WITH DIABETIC POLYNEUROPATHY, WITHOUT LONG-TERM CURRENT USE OF INSULIN (HCC): ICD-10-CM

## 2019-05-13 DIAGNOSIS — I10 ESSENTIAL HYPERTENSION: ICD-10-CM

## 2019-05-13 DIAGNOSIS — E78.5 DYSLIPIDEMIA: ICD-10-CM

## 2019-05-13 LAB
ALBUMIN SERPL-MCNC: 3.8 G/DL (ref 3.4–5)
ALBUMIN/GLOB SERPL: 1.2 {RATIO} (ref 0.8–1.7)
ALP SERPL-CCNC: 164 U/L (ref 45–117)
ALT SERPL-CCNC: 55 U/L (ref 16–61)
ANION GAP SERPL CALC-SCNC: 10 MMOL/L (ref 3–18)
AST SERPL-CCNC: 31 U/L (ref 15–37)
BILIRUB SERPL-MCNC: 0.3 MG/DL (ref 0.2–1)
BUN SERPL-MCNC: 18 MG/DL (ref 7–18)
BUN/CREAT SERPL: 15 (ref 12–20)
CALCIUM SERPL-MCNC: 9.1 MG/DL (ref 8.5–10.1)
CHLORIDE SERPL-SCNC: 99 MMOL/L (ref 100–108)
CHOLEST SERPL-MCNC: 183 MG/DL
CO2 SERPL-SCNC: 24 MMOL/L (ref 21–32)
CREAT SERPL-MCNC: 1.21 MG/DL (ref 0.6–1.3)
GLOBULIN SER CALC-MCNC: 3.1 G/DL (ref 2–4)
GLUCOSE SERPL-MCNC: 378 MG/DL (ref 74–99)
HBA1C MFR BLD: 9 % (ref 4.2–5.6)
HDLC SERPL-MCNC: 37 MG/DL (ref 40–60)
HDLC SERPL: 4.9 {RATIO} (ref 0–5)
LDLC SERPL CALC-MCNC: ABNORMAL MG/DL (ref 0–100)
LIPID PROFILE,FLP: ABNORMAL
POTASSIUM SERPL-SCNC: 4.5 MMOL/L (ref 3.5–5.5)
PROT SERPL-MCNC: 6.9 G/DL (ref 6.4–8.2)
SODIUM SERPL-SCNC: 133 MMOL/L (ref 136–145)
TRIGL SERPL-MCNC: 427 MG/DL (ref ?–150)
VLDLC SERPL CALC-MCNC: ABNORMAL MG/DL

## 2019-05-13 PROCEDURE — 80053 COMPREHEN METABOLIC PANEL: CPT

## 2019-05-13 PROCEDURE — 36415 COLL VENOUS BLD VENIPUNCTURE: CPT

## 2019-05-13 PROCEDURE — 80061 LIPID PANEL: CPT

## 2019-05-13 PROCEDURE — 83036 HEMOGLOBIN GLYCOSYLATED A1C: CPT

## 2019-05-20 ENCOUNTER — OFFICE VISIT (OUTPATIENT)
Dept: FAMILY MEDICINE CLINIC | Age: 59
End: 2019-05-20

## 2019-05-20 VITALS
SYSTOLIC BLOOD PRESSURE: 119 MMHG | OXYGEN SATURATION: 93 % | HEART RATE: 103 BPM | RESPIRATION RATE: 18 BRPM | BODY MASS INDEX: 30.3 KG/M2 | DIASTOLIC BLOOD PRESSURE: 79 MMHG | HEIGHT: 69 IN | TEMPERATURE: 98.4 F | WEIGHT: 204.6 LBS

## 2019-05-20 DIAGNOSIS — J43.2 CENTRILOBULAR EMPHYSEMA (HCC): ICD-10-CM

## 2019-05-20 DIAGNOSIS — I10 ESSENTIAL HYPERTENSION: ICD-10-CM

## 2019-05-20 DIAGNOSIS — E11.42 TYPE 2 DIABETES MELLITUS WITH DIABETIC POLYNEUROPATHY, WITHOUT LONG-TERM CURRENT USE OF INSULIN (HCC): Primary | ICD-10-CM

## 2019-05-20 DIAGNOSIS — E78.5 DYSLIPIDEMIA: ICD-10-CM

## 2019-05-20 DIAGNOSIS — I25.10 ATHEROSCLEROSIS OF NATIVE CORONARY ARTERY OF NATIVE HEART WITHOUT ANGINA PECTORIS: ICD-10-CM

## 2019-05-20 DIAGNOSIS — Z12.5 PROSTATE CANCER SCREENING: ICD-10-CM

## 2019-05-20 DIAGNOSIS — E66.09 CLASS 1 OBESITY DUE TO EXCESS CALORIES WITH SERIOUS COMORBIDITY AND BODY MASS INDEX (BMI) OF 30.0 TO 30.9 IN ADULT: ICD-10-CM

## 2019-05-20 RX ORDER — PHENTERMINE HYDROCHLORIDE 37.5 MG/1
37.5 TABLET ORAL
Qty: 30 TAB | Refills: 0 | Status: SHIPPED | OUTPATIENT
Start: 2019-05-20 | End: 2019-07-02 | Stop reason: SDUPTHER

## 2019-05-20 NOTE — PROGRESS NOTES
Subjective:       Chief Complaint  The patient presents for follow up of diabetes, hypertension and high cholesterol. HPI Dola Holstein is a 60 y.o. male seen for follow up of diabetes. Healso has hypertension and hyperlipidemia. Diabetes poorly controlled, no significant medication side effects noted, on metformin, pt wants to try and use Adipex to lose weight and get his BS under better control. Pt uses soda, hypertension no significant medication side effects noted, well controlled, on Altace 5 mg , hyperlipidemia better controlled, no significant medication side effects noted, pt taking  1/2 tab Crestor 20 mg  Daily. Diet and Lifestyle: generally follows a low fat low cholesterol diet, sedentary    Home BP Monitoring: is not measured at home. Diabetic Review of Systems - home glucose monitoring: is performed sporadically. Other symptoms and concerns: COPD Review:  The patient is being seen for follow up of COPD. Oxygen: He currently is not on home oxygen therapy. Symptoms: chronic dyspnea: severity = moderate: course of sx: symptoms have progressed to a point and plateaued. .   Patient uses 1 pillows at night. Patient denies smoke cigarettes. He is using Breo currently. Pt has chronic back pain and would like to increase Neurontin 300 mg from TID to QID. Pt not on narcotics in this office due to abnormal UDS. Current Outpatient Medications   Medication Sig    phentermine (ADIPEX-P) 37.5 mg tablet Take 1 Tab by mouth every morning. Max Daily Amount: 37.5 mg.    gabapentin (NEURONTIN) 300 mg capsule 1 tab Q 6 hr    coenzyme q10 (CO Q-10) 150 mg cap Take  by mouth.  ramipril (ALTACE) 5 mg capsule Take 1 Cap by mouth daily.  metFORMIN (GLUCOPHAGE) 1,000 mg tablet Take 1 Tab by mouth two (2) times daily (with meals).  rosuvastatin (CRESTOR) 20 mg tablet TAKE ONE TABLET BY MOUTH NIGHTLY (Patient taking differently: 20 mg.  TAKE HALF A TABLET EVERY OTHER DAY)    omeprazole (PRILOSEC) 20 mg capsule Take 40 mg by mouth daily.  nitroglycerin (NITROSTAT) 0.4 mg SL tablet 1 Tab by SubLINGual route every five (5) minutes as needed for Chest Pain.  aspirin 81 mg tablet Take 81 mg by mouth two (2) times a day.  multivitamin (ONE A DAY) tablet Take 1 Tab by mouth daily. No current facility-administered medications for this visit.               Review of Systems  Respiratory: negative for dyspnea on exertion  Cardiovascular: negative for chest pain    Objective:     Visit Vitals  /79   Pulse (!) 103   Temp 98.4 °F (36.9 °C) (Oral)   Resp 18   Ht 5' 9\" (1.753 m)   Wt 204 lb 9.6 oz (92.8 kg)   SpO2 93%   BMI 30.21 kg/m²        Chest - clear to auscultation, no wheezes, rales or rhonchi, symmetric air entry  Heart - normal rate, regular rhythm, normal S1, S2, no murmurs, rubs, clicks or gallops  Extremities - peripheral pulses normal, no pedal edema, no clubbing or cyanosis      Labs:   Lab Results   Component Value Date/Time    Hemoglobin A1c 9.0 (H) 05/13/2019 08:00 AM    Hemoglobin A1c 8.0 (H) 02/11/2019 08:12 AM    Hemoglobin A1c 6.9 (H) 08/10/2018 07:18 AM    Hemoglobin A1c, External 7.2 02/27/2016    Glucose 378 (H) 05/13/2019 08:00 AM    Microalbumin/Creat ratio (mg/g creat) 201 (H) 02/11/2019 08:12 AM    Microalbumin,urine random 27.40 (H) 02/11/2019 08:12 AM    LDL, calculated  05/13/2019 08:00 AM     LDL AND VLDL CHOLESTEROL NOT CALCULATED WHEN TRIGLYCERIDES >400 MG/DL OR HDL CHOLESTEROL <20 MG/DL    Creatinine, POC 1.2 03/06/2017 04:09 PM    Creatinine 1.21 05/13/2019 08:00 AM      Lab Results   Component Value Date/Time    Prostate Specific Ag 0.9 01/24/2017 04:30 PM     Lab Results   Component Value Date/Time    Sodium 133 (L) 05/13/2019 08:00 AM    Potassium 4.5 05/13/2019 08:00 AM    Chloride 99 (L) 05/13/2019 08:00 AM    CO2 24 05/13/2019 08:00 AM    Anion gap 10 05/13/2019 08:00 AM    Glucose 378 (H) 05/13/2019 08:00 AM    BUN 18 05/13/2019 08:00 AM    Creatinine 1.21 05/13/2019 08:00 AM    BUN/Creatinine ratio 15 05/13/2019 08:00 AM    GFR est AA >60 05/13/2019 08:00 AM    GFR est non-AA >60 05/13/2019 08:00 AM    Calcium 9.1 05/13/2019 08:00 AM    Bilirubin, total 0.3 05/13/2019 08:00 AM    ALT (SGPT) 55 05/13/2019 08:00 AM    AST (SGOT) 31 05/13/2019 08:00 AM    Alk. phosphatase 164 (H) 05/13/2019 08:00 AM    Protein, total 6.9 05/13/2019 08:00 AM    Albumin 3.8 05/13/2019 08:00 AM    Globulin 3.1 05/13/2019 08:00 AM    A-G Ratio 1.2 05/13/2019 08:00 AM      Lab Results   Component Value Date/Time    Hemoglobin A1c 9.0 (H) 05/13/2019 08:00 AM    Hemoglobin A1c (POC) 6.5 12/16/2016 12:02 PM    Hemoglobin A1c, External 7.2 02/27/2016            Labs:   Lab Results   Component Value Date/Time    Cholesterol, total 183 05/13/2019 08:00 AM    HDL Cholesterol 37 (L) 05/13/2019 08:00 AM    LDL, calculated  05/13/2019 08:00 AM     LDL AND VLDL CHOLESTEROL NOT CALCULATED WHEN TRIGLYCERIDES >400 MG/DL OR HDL CHOLESTEROL <20 MG/DL    LDL-C, External 132 02/27/2016    Triglyceride 427 (H) 05/13/2019 08:00 AM    CHOL/HDL Ratio 4.9 05/13/2019 08:00 AM               Assessment / Plan     Diabetes poorly controlled, will continue  metformin 1 gm BID and try to lose 10-20 lbs   Hypertension well controlled on Altace 5 mg every day   Hyperlipidemia Better controlled on Crestor 10 mg daily. Pt has h/o CAD and needs to get LDL<70      ICD-10-CM ICD-9-CM    1. Type 2 diabetes mellitus with diabetic polyneuropathy, without long-term current use of insulin (HCC) E11.42 250.60 HEMOGLOBIN A1C W/O EAG     357.2    2. Dyslipidemia E78.5 272.4 LIPID PANEL   3. Essential hypertension B26 825.2 METABOLIC PANEL, COMPREHENSIVE   4. Class 1 obesity due to excess calories with serious comorbidity and body mass index (BMI) of 30.0 to 30.9 in adult E66.09 278.00 Pt will try phentermine (ADIPEX-P) 37.5 mg tablet along with diet and walking for exercise. Z68.30 V85.30    5.  Prostate cancer screening Z12.5 V76.44 PSA, DIAGNOSTIC (PROSTATE SPECIFIC AG)   6. Atherosclerosis of native coronary artery of native heart without angina pectoris I25.10 414.01 Stable on current meds. Pt to f/u with cardiology. Try to get LDL<70   7. Centrilobular emphysema (HCC) J43.2 492.8 Pt unable to afford inhaler. Consider FIT test if unable to afford Colonoscopy through a program         Diabetic issues reviewed with him: diabetic diet discussed in detail, and low cholesterol diet, weight control and daily exercise discussed. Follow-up and Dispositions    · Return in about 3 months (around 8/20/2019) for labs 1 week before. Reviewed plan of care. Patient has provided input and agrees with goals.

## 2019-05-20 NOTE — PATIENT INSTRUCTIONS
Learning About COPD  What is COPD? COPD is a lung disease that makes it hard to breathe. COPD stands for chronic obstructive pulmonary disease. It is caused by damage to the lungs over many years, usually from smoking. COPD is a mix of two diseases: chronic bronchitis and emphysema. Other things that may put you at risk for COPD include breathing chemical fumes, dust, or air pollution over a long period of time. Secondhand smoke is also bad. In chronic bronchitis, the airways that carry air to the lungs (bronchial tubes) get inflamed and make a lot of mucus. This can narrow or block the airways, making it hard for you to breathe. In emphysema, the air sacs in your lungs are damaged and lose their stretch. Less air gets in and out of your lungs, which makes you feel short of breath. What can you expect when you have COPD? COPD gets worse over time. You cannot undo the damage to your lungs. Over time, you may find that:  · You get short of breath even when you do simple things like get dressed or fix a meal.  · It is hard to eat or exercise. · You lose weight and feel weaker. But there are things you can do to prevent more damage and feel better. What are the symptoms? The main symptoms are:  · A cough that will not go away. · Mucus that comes up when you cough. · Shortness of breath that gets worse with activity. At times, your symptoms may suddenly flare up and get much worse. This is a called a COPD exacerbation (say \"egg-KATHERINE--BAY-lakisha\"). When this happens, your usual symptoms quickly get worse and stay bad. This can be dangerous. You may have to go to the hospital.  How can you keep COPD from getting worse? Don't smoke. That is the best way to keep COPD from getting worse. If you already smoke, it is never too late to stop. If you need help quitting, talk to your doctor about stop-smoking programs and medicines. These can increase your chances of quitting for good.   You can do other things to keep COPD from getting worse:  · Avoid bad air. Air pollution, chemical fumes, and dust also can make COPD worse. · Get a flu shot every year. A shot may keep the flu from turning into something more serious, like pneumonia. A flu shot also may lower your chances of having a COPD flare-up. · Get a pneumococcal vaccine shot. If you have had one before, ask your doctor if you need another dose. How is COPD treated? COPD is treated with medicines and oxygen. You also can take steps at home to stay healthy and keep your COPD from getting worse. Medicines and oxygen therapy  · You may be taking medicines such as:  ? Bronchodilators. These help open your airways and make breathing easier. Bronchodilators are either short-acting (work for 6 to 9 hours) or long-acting (work for 24 hours). You inhale most bronchodilators, so they start to act quickly. Always carry your quick-relief inhaler with you in case you need it while you are away from home. ? Corticosteroids. These reduce airway inflammation. They come in pill or inhaled form. You must take these medicines every day for them to work well. · Take your medicines exactly as prescribed. Call your doctor if you think you are having a problem with your medicine. · Oxygen therapy boosts the amount of oxygen in your blood and helps you breathe easier. Use the flow rate your doctor has recommended, and do not change it without talking to your doctor first.  Other care at home  · If your doctor recommends it, get more exercise. Walking is a good choice. Bit by bit, increase the amount you walk every day. Try for at least 30 minutes on most days of the week. · Learn breathing methods--such as breathing through pursed lips--to help you become less short of breath. · If your doctor has not set you up with a pulmonary rehabilitation program, talk to him or her about whether rehab is right for you.  Rehab includes exercise programs, education about your disease and how to manage it, help with diet and other changes, and emotional support. · Eat regular, healthy meals. Use bronchodilators about 1 hour before you eat to make it easier to eat. Eat several small meals instead of three large ones. Drink beverages at the end of the meal. Avoid foods that are hard to chew. Follow-up care is a key part of your treatment and safety. Be sure to make and go to all appointments, and call your doctor if you are having problems. It's also a good idea to know your test results and keep a list of the medicines you take. Where can you learn more? Go to http://ramos-meghan.info/. Enter V314 in the search box to learn more about \"Learning About COPD. \"  Current as of: September 5, 2018  Content Version: 11.9  © 1067-1205 [employer]. Care instructions adapted under license by [employer] (which disclaims liability or warranty for this information). If you have questions about a medical condition or this instruction, always ask your healthcare professional. [address] any warranty or liability for your use of this information.

## 2019-05-20 NOTE — PROGRESS NOTES
Mariah Donis is a  60 y.o. male presents today for office visit for a follow up. 1. Have you been to the ER, urgent care clinic or hospitalized since your last visit? NO    2. Have you seen or consulted any other health care providers outside of the [address] [place] [address] since your last visit (Include any pap smears or colon screening)? NO

## 2019-07-02 ENCOUNTER — TELEPHONE (OUTPATIENT)
Dept: FAMILY MEDICINE CLINIC | Age: 59
End: 2019-07-02

## 2019-07-02 DIAGNOSIS — E66.09 CLASS 1 OBESITY DUE TO EXCESS CALORIES WITH SERIOUS COMORBIDITY AND BODY MASS INDEX (BMI) OF 30.0 TO 30.9 IN ADULT: ICD-10-CM

## 2019-07-02 RX ORDER — PHENTERMINE HYDROCHLORIDE 37.5 MG/1
37.5 TABLET ORAL
Qty: 30 TAB | Refills: 0 | Status: SHIPPED | OUTPATIENT
Start: 2019-07-02 | End: 2019-08-09 | Stop reason: SDUPTHER

## 2019-07-11 RX ORDER — ROSUVASTATIN CALCIUM 20 MG/1
TABLET, COATED ORAL
Qty: 90 TAB | Refills: 1 | Status: SHIPPED | OUTPATIENT
Start: 2019-07-11 | End: 2020-06-20

## 2019-08-09 DIAGNOSIS — E66.09 CLASS 1 OBESITY DUE TO EXCESS CALORIES WITH SERIOUS COMORBIDITY AND BODY MASS INDEX (BMI) OF 30.0 TO 30.9 IN ADULT: ICD-10-CM

## 2019-08-09 RX ORDER — PHENTERMINE HYDROCHLORIDE 37.5 MG/1
37.5 TABLET ORAL
Qty: 30 TAB | Refills: 0 | Status: SHIPPED | OUTPATIENT
Start: 2019-08-09 | End: 2019-08-26 | Stop reason: SDUPTHER

## 2019-08-19 ENCOUNTER — HOSPITAL ENCOUNTER (OUTPATIENT)
Dept: LAB | Age: 59
Discharge: HOME | End: 2019-08-19
Payer: SELF-PAY

## 2019-08-19 DIAGNOSIS — E78.5 DYSLIPIDEMIA: ICD-10-CM

## 2019-08-19 DIAGNOSIS — I10 ESSENTIAL HYPERTENSION: ICD-10-CM

## 2019-08-19 DIAGNOSIS — E11.42 TYPE 2 DIABETES MELLITUS WITH DIABETIC POLYNEUROPATHY, WITHOUT LONG-TERM CURRENT USE OF INSULIN (HCC): ICD-10-CM

## 2019-08-19 DIAGNOSIS — Z12.5 PROSTATE CANCER SCREENING: ICD-10-CM

## 2019-08-19 LAB
ALBUMIN SERPL-MCNC: 3.9 G/DL (ref 3.4–5)
ALBUMIN/GLOB SERPL: 1.3 {RATIO} (ref 0.8–1.7)
ALP SERPL-CCNC: 116 U/L (ref 45–117)
ALT SERPL-CCNC: 28 U/L (ref 16–61)
ANION GAP SERPL CALC-SCNC: 6 MMOL/L (ref 3–18)
AST SERPL-CCNC: 24 U/L (ref 10–38)
BILIRUB SERPL-MCNC: 0.2 MG/DL (ref 0.2–1)
BUN SERPL-MCNC: 14 MG/DL (ref 7–18)
BUN/CREAT SERPL: 13 (ref 12–20)
CALCIUM SERPL-MCNC: 9.6 MG/DL (ref 8.5–10.1)
CHLORIDE SERPL-SCNC: 102 MMOL/L (ref 100–111)
CHOLEST SERPL-MCNC: 175 MG/DL
CO2 SERPL-SCNC: 30 MMOL/L (ref 21–32)
CREAT SERPL-MCNC: 1.04 MG/DL (ref 0.6–1.3)
GLOBULIN SER CALC-MCNC: 2.9 G/DL (ref 2–4)
GLUCOSE SERPL-MCNC: 186 MG/DL (ref 74–99)
HDLC SERPL-MCNC: 40 MG/DL (ref 40–60)
HDLC SERPL: 4.4 {RATIO} (ref 0–5)
LDLC SERPL CALC-MCNC: ABNORMAL MG/DL (ref 0–100)
LIPID PROFILE,FLP: ABNORMAL
POTASSIUM SERPL-SCNC: 4.4 MMOL/L (ref 3.5–5.5)
PROT SERPL-MCNC: 6.8 G/DL (ref 6.4–8.2)
PSA SERPL-MCNC: 0.7 NG/ML (ref 0–4)
SODIUM SERPL-SCNC: 138 MMOL/L (ref 136–145)
TRIGL SERPL-MCNC: 429 MG/DL (ref ?–150)
VLDLC SERPL CALC-MCNC: ABNORMAL MG/DL

## 2019-08-19 PROCEDURE — 80061 LIPID PANEL: CPT

## 2019-08-19 PROCEDURE — 36415 COLL VENOUS BLD VENIPUNCTURE: CPT

## 2019-08-19 PROCEDURE — 84153 ASSAY OF PSA TOTAL: CPT

## 2019-08-19 PROCEDURE — 80053 COMPREHEN METABOLIC PANEL: CPT

## 2019-08-19 PROCEDURE — 83036 HEMOGLOBIN GLYCOSYLATED A1C: CPT

## 2019-08-20 LAB — HBA1C MFR BLD: 7.4 % (ref 4.2–5.6)

## 2019-08-26 ENCOUNTER — OFFICE VISIT (OUTPATIENT)
Dept: FAMILY MEDICINE CLINIC | Age: 59
End: 2019-08-26

## 2019-08-26 VITALS
RESPIRATION RATE: 20 BRPM | OXYGEN SATURATION: 95 % | WEIGHT: 193 LBS | DIASTOLIC BLOOD PRESSURE: 95 MMHG | HEART RATE: 100 BPM | HEIGHT: 69 IN | SYSTOLIC BLOOD PRESSURE: 151 MMHG | TEMPERATURE: 97.5 F | BODY MASS INDEX: 28.58 KG/M2

## 2019-08-26 DIAGNOSIS — E11.42 TYPE 2 DIABETES MELLITUS WITH DIABETIC POLYNEUROPATHY, WITHOUT LONG-TERM CURRENT USE OF INSULIN (HCC): Primary | ICD-10-CM

## 2019-08-26 DIAGNOSIS — I10 ESSENTIAL HYPERTENSION: ICD-10-CM

## 2019-08-26 DIAGNOSIS — G89.29 CHRONIC MIDLINE LOW BACK PAIN WITHOUT SCIATICA: ICD-10-CM

## 2019-08-26 DIAGNOSIS — M54.50 CHRONIC MIDLINE LOW BACK PAIN WITHOUT SCIATICA: ICD-10-CM

## 2019-08-26 DIAGNOSIS — E78.5 DYSLIPIDEMIA: ICD-10-CM

## 2019-08-26 DIAGNOSIS — E66.09 CLASS 1 OBESITY DUE TO EXCESS CALORIES WITH SERIOUS COMORBIDITY AND BODY MASS INDEX (BMI) OF 30.0 TO 30.9 IN ADULT: ICD-10-CM

## 2019-08-26 RX ORDER — GABAPENTIN 300 MG/1
CAPSULE ORAL
Qty: 360 CAP | Refills: 5 | Status: SHIPPED | OUTPATIENT
Start: 2019-08-26 | End: 2019-11-26 | Stop reason: SDUPTHER

## 2019-08-26 RX ORDER — AMLODIPINE BESYLATE 5 MG/1
5 TABLET ORAL DAILY
Qty: 30 TAB | Refills: 5 | Status: SHIPPED | OUTPATIENT
Start: 2019-08-26 | End: 2020-02-26 | Stop reason: SDUPTHER

## 2019-08-26 RX ORDER — PHENTERMINE HYDROCHLORIDE 37.5 MG/1
37.5 TABLET ORAL
Qty: 30 TAB | Refills: 0 | Status: SHIPPED | OUTPATIENT
Start: 2019-08-26 | End: 2019-11-04 | Stop reason: SDUPTHER

## 2019-08-29 NOTE — PROGRESS NOTES
Subjective:       Chief Complaint  The patient presents for follow up of diabetes, hypertension and high cholesterol. SUZY Almeidaan is a 60 y.o. male seen for follow up of diabetes. Healso has hypertension and hyperlipidemia. Diabetes improving control, no significant medication side effects noted, on metformin, pt wants to try and use Adipex to lose weight and get his BS under better control which he has been successful in doing. .   hypertension uncontrolled. Pt may be having cough on Altace 2.5 mg , hyperlipidemia better controlled, no significant medication side effects noted, pt taking  1/2 tab Crestor 20 mg  Daily. Diet and Lifestyle: generally follows a low fat low cholesterol diet, sedentary    Home BP Monitoring: is not measured at home. Diabetic Review of Systems - home glucose monitoring: is performed sporadically. Other symptoms and concerns: COPD Review:  The patient is being seen for follow up of COPD. Oxygen: He currently is not on home oxygen therapy. Symptoms: chronic dyspnea: severity = moderate: course of sx: symptoms have progressed to a point and plateaued. .   Patient uses 1 pillows at night. Patient denies smoke cigarettes. He has to get free Inhalers from Pulmonary    Pt has chronic back pain and would like to increase Neurontin 300 mg from TID to QID. Pt not on narcotics in this office due to abnormal UDS. Current Outpatient Medications   Medication Sig    phentermine (ADIPEX-P) 37.5 mg tablet Take 1 Tab by mouth every morning. Max Daily Amount: 37.5 mg.    gabapentin (NEURONTIN) 300 mg capsule 1 tab Q 6 hr    amLODIPine (NORVASC) 5 mg tablet Take 1 Tab by mouth daily.  rosuvastatin (CRESTOR) 20 mg tablet TAKE ONE TABLET BY MOUTH NIGHTLY    coenzyme q10 (CO Q-10) 150 mg cap Take  by mouth.  metFORMIN (GLUCOPHAGE) 1,000 mg tablet Take 1 Tab by mouth two (2) times daily (with meals).  omeprazole (PRILOSEC) 20 mg capsule Take 40 mg by mouth daily.  nitroglycerin (NITROSTAT) 0.4 mg SL tablet 1 Tab by SubLINGual route every five (5) minutes as needed for Chest Pain.  aspirin 81 mg tablet Take 81 mg by mouth two (2) times a day.  multivitamin (ONE A DAY) tablet Take 1 Tab by mouth daily. No current facility-administered medications for this visit.               Review of Systems  Respiratory: negative for dyspnea on exertion  Cardiovascular: negative for chest pain    Objective:     Visit Vitals  BP (!) 151/95 (BP 1 Location: Left arm, BP Patient Position: Sitting)   Pulse 100   Temp 97.5 °F (36.4 °C) (Oral)   Resp 20   Ht 5' 9\" (1.753 m)   Wt 193 lb (87.5 kg)   SpO2 95%   BMI 28.50 kg/m²        Chest - clear to auscultation, no wheezes, rales or rhonchi, symmetric air entry  Heart - normal rate, regular rhythm, normal S1, S2, no murmurs, rubs, clicks or gallops  Extremities - peripheral pulses normal, no pedal edema, no clubbing or cyanosis      Labs:   Lab Results   Component Value Date/Time    Hemoglobin A1c 7.4 (H) 08/19/2019 08:05 AM    Hemoglobin A1c 9.0 (H) 05/13/2019 08:00 AM    Hemoglobin A1c 8.0 (H) 02/11/2019 08:12 AM    Hemoglobin A1c, External 7.2 02/27/2016    Glucose 186 (H) 08/19/2019 08:05 AM    Microalbumin/Creat ratio (mg/g creat) 201 (H) 02/11/2019 08:12 AM    Microalbumin,urine random 27.40 (H) 02/11/2019 08:12 AM    LDL, calculated  08/19/2019 08:05 AM     LDL AND VLDL CHOLESTEROL NOT CALCULATED WHEN TRIGLYCERIDES >400 MG/DL OR HDL CHOLESTEROL <20 MG/DL    Creatinine, POC 1.2 03/06/2017 04:09 PM    Creatinine 1.04 08/19/2019 08:05 AM      Lab Results   Component Value Date/Time    Prostate Specific Ag 0.7 08/19/2019 08:05 AM    Prostate Specific Ag 0.9 01/24/2017 04:30 PM     Lab Results   Component Value Date/Time    Sodium 138 08/19/2019 08:05 AM    Potassium 4.4 08/19/2019 08:05 AM    Chloride 102 08/19/2019 08:05 AM    CO2 30 08/19/2019 08:05 AM    Anion gap 6 08/19/2019 08:05 AM    Glucose 186 (H) 08/19/2019 08:05 AM    BUN 14 08/19/2019 08:05 AM    Creatinine 1.04 08/19/2019 08:05 AM    BUN/Creatinine ratio 13 08/19/2019 08:05 AM    GFR est AA >60 08/19/2019 08:05 AM    GFR est non-AA >60 08/19/2019 08:05 AM    Calcium 9.6 08/19/2019 08:05 AM    Bilirubin, total 0.2 08/19/2019 08:05 AM    ALT (SGPT) 28 08/19/2019 08:05 AM    AST (SGOT) 24 08/19/2019 08:05 AM    Alk. phosphatase 116 08/19/2019 08:05 AM    Protein, total 6.8 08/19/2019 08:05 AM    Albumin 3.9 08/19/2019 08:05 AM    Globulin 2.9 08/19/2019 08:05 AM    A-G Ratio 1.3 08/19/2019 08:05 AM      Lab Results   Component Value Date/Time    Hemoglobin A1c 7.4 (H) 08/19/2019 08:05 AM    Hemoglobin A1c (POC) 6.5 12/16/2016 12:02 PM    Hemoglobin A1c, External 7.2 02/27/2016            Labs:   Lab Results   Component Value Date/Time    Cholesterol, total 175 08/19/2019 08:05 AM    HDL Cholesterol 40 08/19/2019 08:05 AM    LDL, calculated  08/19/2019 08:05 AM     LDL AND VLDL CHOLESTEROL NOT CALCULATED WHEN TRIGLYCERIDES >400 MG/DL OR HDL CHOLESTEROL <20 MG/DL    LDL-C, External 132 02/27/2016    Triglyceride 429 (H) 08/19/2019 08:05 AM    CHOL/HDL Ratio 4.4 08/19/2019 08:05 AM       Labs:   Lab Results   Component Value Date/Time    Prostate Specific Ag 0.7 08/19/2019 08:05 AM    Prostate Specific Ag 0.9 01/24/2017 04:30 PM             Assessment / Plan     Diabetes improving control, will continue  metformin 1 gm BID and try to lose 10 more lbs   Hypertension  uncontrolled on Altace 2.5 mg which may be causing cough. Will change to Norvasc 5 mg   Hyperlipidemia Better controlled on Crestor 10 mg daily. Pt has h/o CAD and needs to get LDL<70      ICD-10-CM ICD-9-CM    1. Type 2 diabetes mellitus with diabetic polyneuropathy, without long-term current use of insulin (HCC) E11.42 250.60 HEMOGLOBIN A1C W/O EAG     357.2    2. Essential hypertension T68 475.1 METABOLIC PANEL, COMPREHENSIVE   3.  Dyslipidemia E78.5 272.4 LIPID PANEL   4. Class 1 obesity due to excess calories with serious comorbidity and body mass index (BMI) of 30.0 to 30.9 in adult E66.09 278.00 Will continue phentermine (ADIPEX-P) 37.5 mg tablet along with diet and exercise. Z68.30 V85.30    5. Chronic midline low back pain without sciatica M54.5 724.2 Stable on gabapentin (NEURONTIN) 300 mg capsule    G89.29 338.29        Consider FIT test if unable to afford Colonoscopy through a program         Diabetic issues reviewed with him: diabetic diet discussed in detail, and low cholesterol diet, weight control and daily exercise discussed. Follow-up and Dispositions    · Return in about 3 months (around 11/26/2019) for labs 1 week before. Reviewed plan of care. Patient has provided input and agrees with goals.

## 2019-09-10 ENCOUNTER — OFFICE VISIT (OUTPATIENT)
Dept: CARDIOLOGY CLINIC | Age: 59
End: 2019-09-10

## 2019-09-10 VITALS
HEIGHT: 69 IN | BODY MASS INDEX: 28.73 KG/M2 | DIASTOLIC BLOOD PRESSURE: 90 MMHG | SYSTOLIC BLOOD PRESSURE: 150 MMHG | WEIGHT: 194 LBS | HEART RATE: 106 BPM | OXYGEN SATURATION: 98 %

## 2019-09-10 DIAGNOSIS — I25.10 ATHEROSCLEROSIS OF NATIVE CORONARY ARTERY OF NATIVE HEART WITHOUT ANGINA PECTORIS: Primary | ICD-10-CM

## 2019-09-10 DIAGNOSIS — R01.1 MURMUR: ICD-10-CM

## 2019-09-10 DIAGNOSIS — E78.5 DYSLIPIDEMIA: ICD-10-CM

## 2019-09-10 DIAGNOSIS — I11.9 BENIGN HYPERTENSIVE HEART DISEASE WITHOUT HEART FAILURE: ICD-10-CM

## 2019-09-10 DIAGNOSIS — R06.09 DOE (DYSPNEA ON EXERTION): ICD-10-CM

## 2019-09-10 DIAGNOSIS — J44.9 CHRONIC OBSTRUCTIVE PULMONARY DISEASE, UNSPECIFIED COPD TYPE (HCC): ICD-10-CM

## 2019-09-10 DIAGNOSIS — E11.42 TYPE 2 DIABETES MELLITUS WITH DIABETIC POLYNEUROPATHY, WITHOUT LONG-TERM CURRENT USE OF INSULIN (HCC): ICD-10-CM

## 2019-09-10 RX ORDER — METOPROLOL SUCCINATE 50 MG/1
50 TABLET, EXTENDED RELEASE ORAL DAILY
Qty: 30 TAB | Refills: 6 | Status: SHIPPED | OUTPATIENT
Start: 2019-09-10 | End: 2020-05-26

## 2019-09-10 NOTE — PROGRESS NOTES
HPI: I saw Jenise Shaper. Garon Bernheim in my office today in cardiovascular evaluation regarding his chronic underlying coronary artery disease. Mr. Garon Bernheim is a pleasant 58-year-old  male who [facility] with chest tightness and acute coronary syndrome, for which we stabilized him medically and did an urgent cardiac catheterization on January 26, 2006. 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 This was opened with a wire and balloon, but we were unable to stent it. There was a residual stenosis of 40% with JB grade 3 flow following the procedure. Left ventricular function at that time was grossly normal with an ejection fraction in the 60% range.      He has done well on medical therapy over the years without any recurrent cardiovascular issues. Unfortunately, he continued to smoke 2 packs of cigarettes per day over the years and has developed moderately severe COPD with chronic shortness of breath. Fortunately, he finally did quit smoking completely in January of 2014 and has been able to stay off cigarettes now over 5 years ago. He comes in today and relates that he has been a lot of strain in his personal life having lost his wife of over 36 years a couple of months ago. He has not been taking care of himself as well as he should since that time, but has been taking his medications and really has no cardiovascular complaints at this time. Encounter Diagnoses   Name Primary?     Atherosclerosis of native coronary artery of native heart without angina pectoris Yes    Chronic obstructive pulmonary disease, unspecified COPD type ([address]     GARCIA (dyspnea on exertion)     Hypertensive heart disease      Dyslipidemia     Type 2 diabetes mellitus with diabetic polyneuropathy, without long-term current use of insulin [facility])     Murmur        Discussion: This patient appears to be doing fairly well. He does have an apical systolic murmur that is fairly loud, but he has not had an echocardiogram done as far as I can see so I am going to get a baseline echocardiogram to further determine the etiology of his heart murmur as well as assess his overall left function. His blood pressure is somewhat elevated today as is his heart rate and he thinks it is related to some personal stress in his life not only related to his wife's death, but do other family issues. His blood pressure when checked by my staff was 160/90 and I checked it again and got 150/95 and in reviewing his chart his blood pressure was somewhat elevated when I last saw him 6 months ago so I am going to start Toprol-XL 50 mg daily in addition to the Norvasc 5 mg that he is on currently and asked him to check his blood pressure in the next couple of weeks and let us know what it is or follow-up with Dr. Modesto Ro for further evaluation and management of this problem. His most recent lipid profile that have a copy of was done on August 19, 2019 show total cholesterol 175 with triglycerides of 429 and an HDL of 40. He has been taking his Crestor only 3 times a week because he thinks it is creating some problems with fatigue. I am not going to make any changes right now but I think we should get this repeated before Thanksgiving and if it is remaining elevated and this area I would consider adding a drug like Zetia 10 mg daily to his regimen or if higher consider adding a PCSK9 inhibitor.      PCP: Kat Fernandez MD      Past Medical History:   Diagnosis Date    Acute coronary syndrome [facility])     with non-ST-elevation myocardial infarction, s/p direct angioplasty of anomolous circumflex marginal coming off the right coronary cusp on 1/26/06    Arthritis     in hands    ASHD (arteriosclerotic heart disease)     Benign hypertensive heart disease without heart failure     CAD (coronary artery disease)     chronic underlying    Calculus of kidney     Cardiac cath 01/26/2006    LM mild. RCA mild. LAD tortuous. CX mild. CX angela 100%. S/P balloon angioplasty of CX angela. Residual 40%. LVEDP 20.  EF 65%.  Cardiac nuclear imaging test 08/30/2006    No ischemia or infarct. EF 59%. Negative max. stress test.    Chronic cough     coughing up sputum    Chronic lung disease     COPD (chronic obstructive pulmonary disease) ([facility])     moderate to severe    Coronary artery disease     Diabetes ([address]     Diabetes mellitus ([address]     GARCIA (dyspnea on exertion)     GERD (gastroesophageal reflux disease)     H/O: pneumonia     Heartburn     Hypertension     essential systemic    Indigestion     Pneumonia 8/12    \"walking\"    Pure hypercholesterolemia     Tobacco abuse          Past Surgical History:   Procedure Laterality Date    HX HEART CATHETERIZATION  01/26/06    HX ORTHOPAEDIC  1989    fracture of bilateral hands     HX PTCA      of anomalous circumflex marginal artery    TONGUE AND MOUTH SURG UNLISTED  04/04    tongue surgery    TONGUE TO LIP SURGERY  04/04     Current Outpatient Medications   Medication Sig    metoprolol succinate (TOPROL-XL) 50 mg XL tablet Take 1 Tab by mouth daily.  phentermine (ADIPEX-P) 37.5 mg tablet Take 1 Tab by mouth every morning. Max Daily Amount: 37.5 mg.    gabapentin (NEURONTIN) 300 mg capsule 1 tab Q 6 hr    amLODIPine (NORVASC) 5 mg tablet Take 1 Tab by mouth daily.  rosuvastatin (CRESTOR) 20 mg tablet TAKE ONE TABLET BY MOUTH NIGHTLY (Patient taking differently: Take 20 mg by mouth every other day. TAKE ONE TABLET BY MOUTH NIGHTLY)    coenzyme q10 (CO Q-10) 150 mg cap Take  by mouth.  metFORMIN (GLUCOPHAGE) 1,000 mg tablet Take 1 Tab by mouth two (2) times daily (with meals).  multivitamin (ONE A DAY) tablet Take 1 Tab by mouth daily.  omeprazole (PRILOSEC) 20 mg capsule Take 40 mg by mouth daily.     nitroglycerin (NITROSTAT) 0.4 mg SL tablet 1 Tab by SubLINGual route every five (5) minutes as needed for Chest Pain.  aspirin 81 mg tablet Take 81 mg by mouth daily. No current facility-administered medications for this visit. Allergies   Allergen Reactions    Altace [Ramipril] Cough       Social History:   Social History     Tobacco Use    Smoking status: Former Smoker     Packs/day: 1.25     Years: 20.00     Pack years: 25.00     Types: Cigarettes     Last attempt to quit: 2014     Years since quittin.6    Smokeless tobacco: Never Used   Substance Use Topics    Alcohol use: Yes     Comment: on special occasions only           Family history: family history includes Asthma in his father; COPD in his mother; Cancer in his father; Colon Polyps in his father. Review of Systems:    Constitutional: Negative. Respiratory: Negative. Cardiovascular: Negative. Gastrointestinal: Negative. Musculoskeletal: Negative. Neurological: Negative for dizziness. Physical Exam:   The patient is an alert, oriented, well developed, well nourished 60 y.o.  male who was in no acute distress at the time of my examination. Visit Vitals  /90 (BP 1 Location: Left arm, BP Patient Position: Sitting)   Pulse (!) 106   Ht 5' 9\" (1.753 m)   Wt 88 kg (194 lb)   SpO2 98%   BMI 28.65 kg/m²        BP Readings from Last 3 Encounters:   09/10/19 150/90   19 (!) 151/95   19 119/79        Wt Readings from Last 3 Encounters:   09/10/19 88 kg (194 lb)   19 87.5 kg (193 lb)   19 92.8 kg (204 lb 9.6 oz)       HEENT: Conjunctivae white, mucosa moist, no pallor or cyanosis. Neck: Supple without masses, tenderness or thyromegaly. No jugular venous distention. Carotid upstrokes are full bilaterally with soft bilateral bruits. Cardiovascular: Chest is symmetrical with good excursion. Hitchcock is not displaced. No lifts, heaves or thrills.   S1 and S2 are normal, without appreciable rubs, clicks or gallops. There is a grade III/VI apical systolic murmur with poor radiation, without diastolic murmurs. Lungs: Moderate to severe decreased breath sounds throughout with a lot of diffuse expiratory wheezes throughout. Abdomen: Soft. No masses, tenderness or organomegaly. Extremities: Trace to 1 + edema with 1 + peripheral pulses. Review of Data:  See PMH and Cardiology and Imaging sections for cardiac testing      Results for orders placed or performed in visit on 09/10/19   AMB POC EKG ROUTINE W/ 12 LEADS, INTER & REP     Status: None    Narrative    Sinus tachycardia rate of 106. Complete right bundle branch block. Compared to the EKG of March 5, 2019 the heart rate is somewhat faster being 96 at that time. Parveen Gonzalez D.O., F.A.C.C. [facility]  [facility]  [address] [place] [address]. [address]    PLEASE NOTE:  This document has been produced using voice recognition software. Unrecognized errors in transcription may be present.

## 2019-09-10 NOTE — PATIENT INSTRUCTIONS
A  will call you to schedule your cardiac testing.  If you do not receive a phone call within 48 hours or miss it you may call; Central Scheduling [phone]    START Toprol 50mg daily    Follow up in 6 months

## 2019-09-10 NOTE — PROGRESS NOTES
Tangela Perez presents today for   Chief Complaint   Patient presents with    Coronary Artery Disease     6 month follow up - no cardiac complaints        Tangela Perez preferred language for health care discussion is english/other. Is someone accompanying this pt? no    Is the patient using any DME equipment during [address]? no    Depression Screening:  3 most recent PHQ Screens 8/26/2019   Little interest or pleasure in doing things Not at all   Feeling down, depressed, irritable, or hopeless Not at all   Total Score PHQ 2 0       Learning Assessment:  Learning Assessment 2/24/2017   PRIMARY LEARNER Patient   HIGHEST LEVEL OF EDUCATION - PRIMARY LEARNER  -   BARRIERS PRIMARY LEARNER NONE   PRIMARY LANGUAGE ENGLISH   LEARNER PREFERENCE PRIMARY DEMONSTRATION     LISTENING   ANSWERED BY patient   RELATIONSHIP SELF       Abuse Screening:  Abuse Screening Questionnaire 2/18/2019   Do you ever feel afraid of your partner? N   Are you in a relationship with someone who physically or mentally threatens you? N   Is it safe for you to go home? Y       Fall Risk  No flowsheet data found. Pt currently taking Anticoagulant therapy? ASA 81mg every day     Coordination of Care:  1. Have you been to the ER, urgent care clinic since your last visit? Hospitalized since your last visit? no    2. Have you seen or consulted any other health care providers outside of the [address] [place] [address] since your last visit? Include any pap smears or colon screening.  no

## 2019-10-14 ENCOUNTER — HOSPITAL ENCOUNTER (OUTPATIENT)
Dept: NON INVASIVE DIAGNOSTICS | Age: 59
Discharge: HOME | End: 2019-10-14
Attending: INTERNAL MEDICINE
Payer: SELF-PAY

## 2019-10-14 VITALS
HEIGHT: 69 IN | DIASTOLIC BLOOD PRESSURE: 90 MMHG | WEIGHT: 194 LBS | BODY MASS INDEX: 28.73 KG/M2 | SYSTOLIC BLOOD PRESSURE: 150 MMHG

## 2019-10-14 DIAGNOSIS — R01.1 MURMUR: ICD-10-CM

## 2019-10-14 PROCEDURE — 93306 TTE W/DOPPLER COMPLETE: CPT

## 2019-10-15 LAB
ECHO AO ROOT DIAM: 2.79 CM
ECHO LA VOL BP: 45.79 ML (ref 18–58)
ECHO LA VOL/BSA BIPLANE: 22.46 ML/M2 (ref 16–28)
ECHO LV E' LATERAL VELOCITY: 6.88 CM/S
ECHO LV E' SEPTAL VELOCITY: 6.07 CM/S
ECHO PULMONARY ARTERY SYSTOLIC PRESSURE (PASP): 28 MMHG
ECHO RA AREA 4C: 11.48 CM2
ECHO RV TAPSE: 1.85 CM (ref 1.5–2)

## 2019-10-17 NOTE — PROGRESS NOTES
Per  Your last note'  Discussion: This patient appears to be doing fairly well. He does have an apical systolic murmur that is fairly loud, but he has not had an echocardiogram done as far as I can see so I am going to get a baseline echocardiogram to further determine the etiology of his heart murmur as well as assess his overall left function.

## 2019-10-25 ENCOUNTER — TELEPHONE (OUTPATIENT)
Dept: CARDIOLOGY CLINIC | Age: 59
End: 2019-10-25

## 2019-10-25 DIAGNOSIS — I35.0 AORTIC VALVE STENOSIS, ETIOLOGY OF CARDIAC VALVE DISEASE UNSPECIFIED: Primary | ICD-10-CM

## 2019-10-25 NOTE — TELEPHONE ENCOUNTER
I called and placed a message on the patient's answering machine for him to call the office for the results of his echocardiogram peers echocardiogram demonstrated normal left ventricular function but he does have moderately severe aortic stenosis and will need to repeat an echo again in 6 months and get it done right before his appointment with me so I can discuss it with him when he comes to the office. If his appointment is in March we may need to push it to April, so that we have 6 months between echoes. I will be glad to discuss the results with him when he calls. Please let him know.  ES

## 2019-10-25 NOTE — TELEPHONE ENCOUNTER
----- Message from Daksha Freeman LPN sent at 50/31/4293  8:24 AM EDT -----  Per  Your last note'  Discussion: This patient appears to be doing fairly well. He does have an apical systolic murmur that is fairly loud, but he has not had an echocardiogram done as far as I can see so I am going to get a baseline echocardiogram to further determine the etiology of his heart murmur as well as assess his overall left function.

## 2019-11-19 ENCOUNTER — HOSPITAL ENCOUNTER (OUTPATIENT)
Dept: LAB | Age: 59
Discharge: HOME | End: 2019-11-19
Payer: SELF-PAY

## 2019-11-19 DIAGNOSIS — E78.5 DYSLIPIDEMIA: ICD-10-CM

## 2019-11-19 DIAGNOSIS — E11.42 TYPE 2 DIABETES MELLITUS WITH DIABETIC POLYNEUROPATHY, WITHOUT LONG-TERM CURRENT USE OF INSULIN (HCC): ICD-10-CM

## 2019-11-19 DIAGNOSIS — I10 ESSENTIAL HYPERTENSION: ICD-10-CM

## 2019-11-19 LAB
ALBUMIN SERPL-MCNC: 3.8 G/DL (ref 3.4–5)
ALBUMIN/GLOB SERPL: 1.2 {RATIO} (ref 0.8–1.7)
ALP SERPL-CCNC: 121 U/L (ref 45–117)
ALT SERPL-CCNC: 31 U/L (ref 16–61)
ANION GAP SERPL CALC-SCNC: 7 MMOL/L (ref 3–18)
AST SERPL-CCNC: 17 U/L (ref 10–38)
BILIRUB SERPL-MCNC: 0.3 MG/DL (ref 0.2–1)
BUN SERPL-MCNC: 20 MG/DL (ref 7–18)
BUN/CREAT SERPL: 20 (ref 12–20)
CALCIUM SERPL-MCNC: 9.6 MG/DL (ref 8.5–10.1)
CHLORIDE SERPL-SCNC: 104 MMOL/L (ref 100–111)
CO2 SERPL-SCNC: 27 MMOL/L (ref 21–32)
CREAT SERPL-MCNC: 1.02 MG/DL (ref 0.6–1.3)
GLOBULIN SER CALC-MCNC: 3.3 G/DL (ref 2–4)
GLUCOSE SERPL-MCNC: 179 MG/DL (ref 74–99)
HBA1C MFR BLD: 7.4 % (ref 4.2–5.6)
POTASSIUM SERPL-SCNC: 4.2 MMOL/L (ref 3.5–5.5)
PROT SERPL-MCNC: 7.1 G/DL (ref 6.4–8.2)
SODIUM SERPL-SCNC: 138 MMOL/L (ref 136–145)

## 2019-11-19 PROCEDURE — 36415 COLL VENOUS BLD VENIPUNCTURE: CPT

## 2019-11-19 PROCEDURE — 80053 COMPREHEN METABOLIC PANEL: CPT

## 2019-11-19 PROCEDURE — 80061 LIPID PANEL: CPT

## 2019-11-19 PROCEDURE — 83036 HEMOGLOBIN GLYCOSYLATED A1C: CPT

## 2019-11-20 LAB
CHOLEST SERPL-MCNC: 153 MG/DL
HDLC SERPL-MCNC: 34 MG/DL (ref 40–60)
HDLC SERPL: 4.5 {RATIO} (ref 0–5)
LDLC SERPL CALC-MCNC: 68 MG/DL (ref 0–100)
LIPID PROFILE,FLP: ABNORMAL
TRIGL SERPL-MCNC: 255 MG/DL (ref ?–150)
VLDLC SERPL CALC-MCNC: 51 MG/DL

## 2019-11-26 ENCOUNTER — OFFICE VISIT (OUTPATIENT)
Dept: FAMILY MEDICINE CLINIC | Age: 59
End: 2019-11-26

## 2019-11-26 VITALS
HEART RATE: 92 BPM | OXYGEN SATURATION: 95 % | RESPIRATION RATE: 20 BRPM | TEMPERATURE: 97.9 F | DIASTOLIC BLOOD PRESSURE: 80 MMHG | HEIGHT: 69 IN | BODY MASS INDEX: 28.88 KG/M2 | WEIGHT: 195 LBS | SYSTOLIC BLOOD PRESSURE: 130 MMHG

## 2019-11-26 DIAGNOSIS — G89.29 CHRONIC MIDLINE LOW BACK PAIN WITHOUT SCIATICA: ICD-10-CM

## 2019-11-26 DIAGNOSIS — M54.50 CHRONIC MIDLINE LOW BACK PAIN WITHOUT SCIATICA: ICD-10-CM

## 2019-11-26 DIAGNOSIS — E11.42 TYPE 2 DIABETES MELLITUS WITH DIABETIC POLYNEUROPATHY, WITHOUT LONG-TERM CURRENT USE OF INSULIN (HCC): Primary | ICD-10-CM

## 2019-11-26 DIAGNOSIS — E78.5 DYSLIPIDEMIA: ICD-10-CM

## 2019-11-26 DIAGNOSIS — I10 ESSENTIAL HYPERTENSION: ICD-10-CM

## 2019-11-26 DIAGNOSIS — E66.09 CLASS 1 OBESITY DUE TO EXCESS CALORIES WITH SERIOUS COMORBIDITY AND BODY MASS INDEX (BMI) OF 30.0 TO 30.9 IN ADULT: ICD-10-CM

## 2019-11-26 RX ORDER — GABAPENTIN 300 MG/1
600 CAPSULE ORAL 3 TIMES DAILY
Qty: 540 CAP | Refills: 3 | Status: SHIPPED | OUTPATIENT
Start: 2019-11-26 | End: 2020-06-26 | Stop reason: SDUPTHER

## 2019-11-26 RX ORDER — PHENTERMINE HYDROCHLORIDE 37.5 MG/1
TABLET ORAL
Qty: 30 TAB | Refills: 0 | Status: SHIPPED | OUTPATIENT
Start: 2019-11-26 | End: 2020-03-24 | Stop reason: ALTCHOICE

## 2019-11-26 NOTE — PATIENT INSTRUCTIONS

## 2019-11-26 NOTE — PROGRESS NOTES
Patient is in the office today for 3 month follow up. 1. Have you been to the ER, urgent care clinic since your last visit? Hospitalized since your last visit? No    2. Have you seen or consulted any other health care providers outside of the [address] [place] [address] since your last visit? Include any pap smears or colon screening.  No

## 2019-12-04 NOTE — PROGRESS NOTES
Subjective:       Chief Complaint  The patient presents for follow up of diabetes, hypertension and high cholesterol. HPI  Thu Nunez is a 60 y.o. male seen for follow up of diabetes. Healso has hypertension and hyperlipidemia. Diabetes improving control, no significant medication side effects noted, on metformin, pt wants to try and use Adipex to lose weight and get his BS under better control. hypertension well controlled on Norvasc 5 mg. Pthad cough on Altace 2.5 mg , hyperlipidemia better controlled, no significant medication side effects noted, pt taking  Crestor 20 mg inconsistently. Diet and Lifestyle: generally follows a low fat low cholesterol diet, sedentary    Home BP Monitoring: is not measured at home. Diabetic Review of Systems - home glucose monitoring: is performed sporadically. Other symptoms and concerns: COPD Review:  The patient is being seen for follow up of COPD. Oxygen: He currently is not on home oxygen therapy. Symptoms: chronic dyspnea: severity = moderate: course of sx: symptoms have progressed to a point and plateaued. .   Patient uses 1 pillows at night. Patient denies smoke cigarettes. He has to get free Inhalers from Pulmonary    Pt has chronic back pain is on Neurontin 300 mg   QID. Pain is not optimally controlled so will increase Neurontin to 600 mg 3 times daily. Pt not on narcotics in this office due to abnormal UDS. Current Outpatient Medications   Medication Sig    phentermine (ADIPEX-P) 37.5 mg tablet TAKE 1 TABLET BY MOUTH EVERY MORNING    gabapentin (NEURONTIN) 300 mg capsule Take 2 Caps by mouth three (3) times daily. Max Daily Amount: 1,800 mg. 1 tab Q 6 hr    metFORMIN (GLUCOPHAGE) 1,000 mg tablet TAKE 1 TABLET BY MOUTH TWO TIMES A DAY WITH MEALS    metoprolol succinate (TOPROL-XL) 50 mg XL tablet Take 1 Tab by mouth daily.  amLODIPine (NORVASC) 5 mg tablet Take 1 Tab by mouth daily.     rosuvastatin (CRESTOR) 20 mg tablet TAKE ONE TABLET BY MOUTH NIGHTLY (Patient taking differently: Take 20 mg by mouth every other day. TAKE ONE TABLET BY MOUTH NIGHTLY)    coenzyme q10 (CO Q-10) 150 mg cap Take  by mouth.  multivitamin (ONE A DAY) tablet Take 1 Tab by mouth daily.  omeprazole (PRILOSEC) 20 mg capsule Take 40 mg by mouth daily.  nitroglycerin (NITROSTAT) 0.4 mg SL tablet 1 Tab by SubLINGual route every five (5) minutes as needed for Chest Pain.  aspirin 81 mg tablet Take 81 mg by mouth daily. No current facility-administered medications for this visit.               Review of Systems  Respiratory: negative for dyspnea on exertion  Cardiovascular: negative for chest pain    Objective:     Visit Vitals  /80 (BP 1 Location: Left arm, BP Patient Position: Sitting)   Pulse 92   Temp 97.9 °F (36.6 °C) (Oral)   Resp 20   Ht 5' 9\" (1.753 m)   Wt 195 lb (88.5 kg)   SpO2 95%   BMI 28.80 kg/m²        Chest - clear to auscultation, no wheezes, rales or rhonchi, symmetric air entry  Heart - normal rate, regular rhythm, normal S1, S2, no murmurs, rubs, clicks or gallops  Extremities - peripheral pulses normal, no pedal edema, no clubbing or cyanosis      Labs:   Lab Results   Component Value Date/Time    Hemoglobin A1c 7.4 (H) 11/19/2019 08:25 AM    Hemoglobin A1c 7.4 (H) 08/19/2019 08:05 AM    Hemoglobin A1c 9.0 (H) 05/13/2019 08:00 AM    Hemoglobin A1c, External 7.2 02/27/2016    Glucose 179 (H) 11/19/2019 08:25 AM    Microalbumin/Creat ratio (mg/g creat) 201 (H) 02/11/2019 08:12 AM    Microalbumin,urine random 27.40 (H) 02/11/2019 08:12 AM    LDL, calculated 68 11/19/2019 08:25 AM    Creatinine, POC 1.2 03/06/2017 04:09 PM    Creatinine 1.02 11/19/2019 08:25 AM      Lab Results   Component Value Date/Time    Prostate Specific Ag 0.7 08/19/2019 08:05 AM    Prostate Specific Ag 0.9 01/24/2017 04:30 PM     Lab Results   Component Value Date/Time    Sodium 138 11/19/2019 08:25 AM    Potassium 4.2 11/19/2019 08:25 AM    Chloride 104 11/19/2019 08:25 AM    CO2 27 11/19/2019 08:25 AM    Anion gap 7 11/19/2019 08:25 AM    Glucose 179 (H) 11/19/2019 08:25 AM    BUN 20 (H) 11/19/2019 08:25 AM    Creatinine 1.02 11/19/2019 08:25 AM    BUN/Creatinine ratio 20 11/19/2019 08:25 AM    GFR est AA >60 11/19/2019 08:25 AM    GFR est non-AA >60 11/19/2019 08:25 AM    Calcium 9.6 11/19/2019 08:25 AM    Bilirubin, total 0.3 11/19/2019 08:25 AM    ALT (SGPT) 31 11/19/2019 08:25 AM    AST (SGOT) 17 11/19/2019 08:25 AM    Alk. phosphatase 121 (H) 11/19/2019 08:25 AM    Protein, total 7.1 11/19/2019 08:25 AM    Albumin 3.8 11/19/2019 08:25 AM    Globulin 3.3 11/19/2019 08:25 AM    A-G Ratio 1.2 11/19/2019 08:25 AM      Lab Results   Component Value Date/Time    Hemoglobin A1c 7.4 (H) 11/19/2019 08:25 AM    Hemoglobin A1c (POC) 6.5 12/16/2016 12:02 PM    Hemoglobin A1c, External 7.2 02/27/2016            Labs:   Lab Results   Component Value Date/Time    Cholesterol, total 153 11/19/2019 08:25 AM    HDL Cholesterol 34 (L) 11/19/2019 08:25 AM    LDL, calculated 68 11/19/2019 08:25 AM    LDL-C, External 132 02/27/2016    Triglyceride 255 (H) 11/19/2019 08:25 AM    CHOL/HDL Ratio 4.5 11/19/2019 08:25 AM       Labs:   Lab Results   Component Value Date/Time    Prostate Specific Ag 0.7 08/19/2019 08:05 AM    Prostate Specific Ag 0.9 01/24/2017 04:30 PM             Assessment / Plan     Diabetes improving control, will continue  metformin 1 gm BID and try to lose 10 more lbs   Hypertension  Well controlled on Norvasc 5 mg   Hyperlipidemia not well controlled on Crestor 10 mg daily due to poor compliance. Pt has h/o CAD and needs to get LDL<70        ICD-10-CM ICD-9-CM    1. Type 2 diabetes mellitus with diabetic polyneuropathy, without long-term current use of insulin (HCC) E11.42 250.60 HEMOGLOBIN A1C W/O EAG     357.2 MICROALBUMIN, UR, RAND W/ MICROALB/CREAT RATIO   2. Essential hypertension F49 934.2 METABOLIC PANEL, COMPREHENSIVE   3.  Dyslipidemia E78.5 272.4 LIPID PANEL   4. Class 1 obesity due to excess calories with serious comorbidity and body mass index (BMI) of 30.0 to 30.9 in adult E66.09 278.00 Pt continues on phentermine (ADIPEX-P) 37.5 mg tablet along with diet and some exercise. Z68.30 V85.30    5. Chronic midline low back pain without sciatica M54.5 724.2 Not optimally controlled. Will increase to 2  gabapentin (NEURONTIN) 300 mg capsule TID     G89.29 338.29        Consider FIT test if unable to afford Colonoscopy through a program         Diabetic issues reviewed with him: diabetic diet discussed in detail, and low cholesterol diet, weight control and daily exercise discussed. Follow-up and Dispositions    · Return in about 4 months (around 3/26/2020) for labs 1 week before. Reviewed plan of care. Patient has provided input and agrees with goals.

## 2020-02-26 RX ORDER — AMLODIPINE BESYLATE 5 MG/1
5 TABLET ORAL DAILY
Qty: 30 TAB | Refills: 5 | Status: SHIPPED | OUTPATIENT
Start: 2020-02-26 | End: 2020-03-24 | Stop reason: ALTCHOICE

## 2020-02-26 NOTE — TELEPHONE ENCOUNTER
Last Visit: 11/26/19 with MD Tello  Next Appointment: 3/24/20 with MD Tello  Previous Refill Encounter(s): 8/26/19 #30 with 5 refills    Requested Prescriptions     Pending Prescriptions Disp Refills    amLODIPine (NORVASC) 5 mg tablet 30 Tab 5     Sig: Take 1 Tab by mouth daily.

## 2020-03-17 ENCOUNTER — HOSPITAL ENCOUNTER (OUTPATIENT)
Dept: LAB | Age: 60
Discharge: HOME | End: 2020-03-17
Payer: SELF-PAY

## 2020-03-17 DIAGNOSIS — I10 ESSENTIAL HYPERTENSION: ICD-10-CM

## 2020-03-17 DIAGNOSIS — E78.5 DYSLIPIDEMIA: ICD-10-CM

## 2020-03-17 DIAGNOSIS — E11.42 TYPE 2 DIABETES MELLITUS WITH DIABETIC POLYNEUROPATHY, WITHOUT LONG-TERM CURRENT USE OF INSULIN (HCC): ICD-10-CM

## 2020-03-17 LAB
ALBUMIN SERPL-MCNC: 3.5 G/DL (ref 3.4–5)
ALBUMIN/GLOB SERPL: 1.1 {RATIO} (ref 0.8–1.7)
ALP SERPL-CCNC: 124 U/L (ref 45–117)
ALT SERPL-CCNC: 24 U/L (ref 16–61)
ANION GAP SERPL CALC-SCNC: 8 MMOL/L (ref 3–18)
AST SERPL-CCNC: 19 U/L (ref 10–38)
BILIRUB SERPL-MCNC: 0.2 MG/DL (ref 0.2–1)
BUN SERPL-MCNC: 15 MG/DL (ref 7–18)
BUN/CREAT SERPL: 14 (ref 12–20)
CALCIUM SERPL-MCNC: 8.7 MG/DL (ref 8.5–10.1)
CHLORIDE SERPL-SCNC: 106 MMOL/L (ref 100–111)
CHOLEST SERPL-MCNC: 147 MG/DL
CO2 SERPL-SCNC: 25 MMOL/L (ref 21–32)
CREAT SERPL-MCNC: 1.11 MG/DL (ref 0.6–1.3)
CREAT UR-MCNC: 129 MG/DL (ref 30–125)
GLOBULIN SER CALC-MCNC: 3.3 G/DL (ref 2–4)
GLUCOSE SERPL-MCNC: 200 MG/DL (ref 74–99)
HBA1C MFR BLD: 7 % (ref 4.2–5.6)
HDLC SERPL-MCNC: 33 MG/DL (ref 40–60)
HDLC SERPL: 4.5 {RATIO} (ref 0–5)
LDLC SERPL CALC-MCNC: 60.4 MG/DL (ref 0–100)
LIPID PROFILE,FLP: ABNORMAL
MICROALBUMIN UR-MCNC: 12.6 MG/DL (ref 0–3)
MICROALBUMIN/CREAT UR-RTO: 98 MG/G (ref 0–30)
POTASSIUM SERPL-SCNC: 4 MMOL/L (ref 3.5–5.5)
PROT SERPL-MCNC: 6.8 G/DL (ref 6.4–8.2)
SODIUM SERPL-SCNC: 139 MMOL/L (ref 136–145)
TRIGL SERPL-MCNC: 268 MG/DL (ref ?–150)
VLDLC SERPL CALC-MCNC: 53.6 MG/DL

## 2020-03-17 PROCEDURE — 83036 HEMOGLOBIN GLYCOSYLATED A1C: CPT

## 2020-03-17 PROCEDURE — 80061 LIPID PANEL: CPT

## 2020-03-17 PROCEDURE — 82043 UR ALBUMIN QUANTITATIVE: CPT

## 2020-03-17 PROCEDURE — 80053 COMPREHEN METABOLIC PANEL: CPT

## 2020-03-17 PROCEDURE — 36415 COLL VENOUS BLD VENIPUNCTURE: CPT

## 2020-03-24 ENCOUNTER — OFFICE VISIT (OUTPATIENT)
Dept: FAMILY MEDICINE CLINIC | Age: 60
End: 2020-03-24

## 2020-03-24 VITALS
DIASTOLIC BLOOD PRESSURE: 80 MMHG | OXYGEN SATURATION: 97 % | WEIGHT: 183 LBS | HEART RATE: 84 BPM | BODY MASS INDEX: 27.11 KG/M2 | TEMPERATURE: 98.4 F | RESPIRATION RATE: 20 BRPM | SYSTOLIC BLOOD PRESSURE: 120 MMHG | HEIGHT: 69 IN

## 2020-03-24 DIAGNOSIS — E78.5 DYSLIPIDEMIA: ICD-10-CM

## 2020-03-24 DIAGNOSIS — M54.50 CHRONIC MIDLINE LOW BACK PAIN WITHOUT SCIATICA: ICD-10-CM

## 2020-03-24 DIAGNOSIS — G89.29 CHRONIC MIDLINE LOW BACK PAIN WITHOUT SCIATICA: ICD-10-CM

## 2020-03-24 DIAGNOSIS — I10 ESSENTIAL HYPERTENSION: ICD-10-CM

## 2020-03-24 DIAGNOSIS — E11.42 TYPE 2 DIABETES MELLITUS WITH DIABETIC POLYNEUROPATHY, WITHOUT LONG-TERM CURRENT USE OF INSULIN (HCC): Primary | ICD-10-CM

## 2020-03-24 DIAGNOSIS — R80.9 MICROALBUMINURIA: ICD-10-CM

## 2020-03-24 DIAGNOSIS — J43.2 CENTRILOBULAR EMPHYSEMA (HCC): ICD-10-CM

## 2020-03-24 RX ORDER — LOSARTAN POTASSIUM 100 MG/1
100 TABLET ORAL DAILY
Qty: 30 TAB | Refills: 5 | Status: SHIPPED | OUTPATIENT
Start: 2020-03-24 | End: 2021-06-22 | Stop reason: SDUPTHER

## 2020-03-24 NOTE — PROGRESS NOTES
Subjective:       Chief Complaint  The patient presents for follow up of diabetes, hypertension and high cholesterol. SUZY London is a 60 y.o. male seen for follow up of diabetes. Healso has hypertension and hyperlipidemia. Diabetes well control, no significant medication side effects noted, on metformin, pt has done well with weight loss. hypertension well controlled on Norvasc 5 mg and Toprol, pt has proteinuria and did not tolerate ACE- . Pthad cough on Altace 2.5 mg , hyperlipidemia well controlled, no significant medication side effects noted, on  Crestor 20 mg. Diet and Lifestyle: generally follows a low fat low cholesterol diet, sedentary    Home BP Monitoring: is not measured at home. Diabetic Review of Systems - home glucose monitoring: is performed sporadically. Other symptoms and concerns: COPD Review:  The patient is being seen for follow up of COPD. Oxygen: He currently is not on home oxygen therapy. Symptoms: chronic dyspnea: severity = moderate: course of sx: symptoms have progressed to a point and plateaued. .   Patient uses 1 pillows at night. Patient denies smoke cigarettes. He has to get free Inhalers from Pulmonary    Pt has chronic back pain and is on Neurontin 600 mg   QID. Pt not on narcotics in this office due to abnormal UDS. Current Outpatient Medications   Medication Sig    losartan (COZAAR) 100 mg tablet Take 1 Tab by mouth daily.  gabapentin (NEURONTIN) 300 mg capsule Take 2 Caps by mouth three (3) times daily. Max Daily Amount: 1,800 mg. 1 tab Q 6 hr    metFORMIN (GLUCOPHAGE) 1,000 mg tablet TAKE 1 TABLET BY MOUTH TWO TIMES A DAY WITH MEALS    metoprolol succinate (TOPROL-XL) 50 mg XL tablet Take 1 Tab by mouth daily.  omeprazole (PRILOSEC) 20 mg capsule Take 40 mg by mouth daily.  nitroglycerin (NITROSTAT) 0.4 mg SL tablet 1 Tab by SubLINGual route every five (5) minutes as needed for Chest Pain.     aspirin 81 mg tablet Take 81 mg by mouth daily.  rosuvastatin (CRESTOR) 20 mg tablet TAKE ONE TABLET BY MOUTH NIGHTLY (Patient taking differently: Take 20 mg by mouth every other day. TAKE ONE TABLET BY MOUTH NIGHTLY)    coenzyme q10 (CO Q-10) 150 mg cap Take  by mouth.  multivitamin (ONE A DAY) tablet Take 1 Tab by mouth daily. No current facility-administered medications for this visit.               Review of Systems  Respiratory: negative for dyspnea on exertion  Cardiovascular: negative for chest pain    Objective:     Visit Vitals  /80   Pulse 84   Temp 98.4 °F (36.9 °C) (Oral)   Resp 20   Ht 5' 9\" (1.753 m)   Wt 183 lb (83 kg)   SpO2 97%   BMI 27.02 kg/m²        Chest - clear to auscultation, no wheezes, rales or rhonchi, symmetric air entry  Heart - normal rate, regular rhythm, normal S1, S2, 2/6 systolic murmur RSB, rubs, clicks or gallops  Extremities - peripheral pulses normal, no pedal edema, no clubbing or cyanosis      Labs:   Lab Results   Component Value Date/Time    Hemoglobin A1c 7.0 (H) 03/17/2020 08:09 AM    Hemoglobin A1c 7.4 (H) 11/19/2019 08:25 AM    Hemoglobin A1c 7.4 (H) 08/19/2019 08:05 AM    Hemoglobin A1c, External 7.2 02/27/2016    Glucose 200 (H) 03/17/2020 08:09 AM    Microalbumin/Creat ratio (mg/g creat) 98 (H) 03/17/2020 08:09 AM    Microalbumin,urine random 12.60 (H) 03/17/2020 08:09 AM    LDL, calculated 60.4 03/17/2020 08:09 AM    Creatinine, POC 1.2 03/06/2017 04:09 PM    Creatinine 1.11 03/17/2020 08:09 AM      Lab Results   Component Value Date/Time    Prostate Specific Ag 0.7 08/19/2019 08:05 AM    Prostate Specific Ag 0.9 01/24/2017 04:30 PM     Lab Results   Component Value Date/Time    Sodium 139 03/17/2020 08:09 AM    Potassium 4.0 03/17/2020 08:09 AM    Chloride 106 03/17/2020 08:09 AM    CO2 25 03/17/2020 08:09 AM    Anion gap 8 03/17/2020 08:09 AM    Glucose 200 (H) 03/17/2020 08:09 AM    BUN 15 03/17/2020 08:09 AM    Creatinine 1.11 03/17/2020 08:09 AM    BUN/Creatinine ratio 14 03/17/2020 08:09 AM    GFR est AA >60 03/17/2020 08:09 AM    GFR est non-AA >60 03/17/2020 08:09 AM    Calcium 8.7 03/17/2020 08:09 AM    Bilirubin, total 0.2 03/17/2020 08:09 AM    ALT (SGPT) 24 03/17/2020 08:09 AM    AST (SGOT) 19 03/17/2020 08:09 AM    Alk. phosphatase 124 (H) 03/17/2020 08:09 AM    Protein, total 6.8 03/17/2020 08:09 AM    Albumin 3.5 03/17/2020 08:09 AM    Globulin 3.3 03/17/2020 08:09 AM    A-G Ratio 1.1 03/17/2020 08:09 AM      Lab Results   Component Value Date/Time    Hemoglobin A1c 7.0 (H) 03/17/2020 08:09 AM    Hemoglobin A1c (POC) 6.5 12/16/2016 12:02 PM    Hemoglobin A1c, External 7.2 02/27/2016            Labs:   Lab Results   Component Value Date/Time    Cholesterol, total 147 03/17/2020 08:09 AM    HDL Cholesterol 33 (L) 03/17/2020 08:09 AM    LDL, calculated 60.4 03/17/2020 08:09 AM    LDL-C, External 132 02/27/2016    Triglyceride 268 (H) 03/17/2020 08:09 AM    CHOL/HDL Ratio 4.5 03/17/2020 08:09 AM       Labs:   Lab Results   Component Value Date/Time    Prostate Specific Ag 0.7 08/19/2019 08:05 AM    Prostate Specific Ag 0.9 01/24/2017 04:30 PM             Assessment / Plan     Diabetes well control, will continue  metformin 1 gm BID  Hypertension  Well controlled on Norvasc 5 mg and Toprol but will stop Norvasc and start Cozaar 100 mg  Hyperlipidemia  well controlled on Crestor 10 mg daily. ICD-10-CM ICD-9-CM    1. Type 2 diabetes mellitus with diabetic polyneuropathy, without long-term current use of insulin (HCC) E11.42 250.60 HEMOGLOBIN A1C W/O EAG     357.2    2. Essential hypertension M75 505.7 METABOLIC PANEL, COMPREHENSIVE   3. Dyslipidemia E78.5 272.4 LIPID PANEL   4. Microalbuminuria R80.9 791.0 Will start Cozaar 100 mg daily    5. Centrilobular emphysema (HCC) J43.2 492.8 Pt unable to afford inhales    6.  Chronic midline low back pain without sciatica M54.5 724.2 Controlled on Neurontin 600 mg TID     G89.29 338.29            Consider FIT test if unable to afford Colonoscopy through a program         Diabetic issues reviewed with him: diabetic diet discussed in detail, and low cholesterol diet, weight control and daily exercise discussed. Follow-up and Dispositions    · Return in about 4 months (around 7/24/2020) for labs 1 week before. Reviewed plan of care. Patient has provided input and agrees with goals.

## 2020-03-24 NOTE — PATIENT INSTRUCTIONS
High Blood Pressure: Care Instructions Overview It's normal for blood pressure to go up and down throughout the day. But if it stays up, you have high blood pressure. Another name for high blood pressure is hypertension. Despite what a lot of people think, high blood pressure usually doesn't cause headaches or make you feel dizzy or lightheaded. It usually has no symptoms. But it does increase your risk of stroke, heart attack, and other problems. You and your doctor will talk about your risks of these problems based on your blood pressure. Your doctor will give you a goal for your blood pressure. Your goal will be based on your health and your age. Lifestyle changes, such as eating healthy and being active, are always important to help lower blood pressure. You might also take medicine to reach your blood pressure goal. 
Follow-up care is a key part of your treatment and safety. Be sure to make and go to all appointments, and call your doctor if you are having problems. It's also a good idea to know your test results and keep a list of the medicines you take. How can you care for yourself at home? Medical treatment · If you stop taking your medicine, your blood pressure will go back up. You may take one or more types of medicine to lower your blood pressure. Be safe with medicines. Take your medicine exactly as prescribed. Call your doctor if you think you are having a problem with your medicine. · Talk to your doctor before you start taking aspirin every day. Aspirin can help certain people lower their risk of a heart attack or stroke. But taking aspirin isn't right for everyone, because it can cause serious bleeding. · See your doctor regularly. You may need to see the doctor more often at first or until your blood pressure comes down. · If you are taking blood pressure medicine, talk to your doctor before you take decongestants or anti-inflammatory medicine, such as ibuprofen. Some of these medicines can raise blood pressure. · Learn how to check your blood pressure at home. Lifestyle changes · Stay at a healthy weight. This is especially important if you put on weight around the waist. Losing even 10 pounds can help you lower your blood pressure. · If your doctor recommends it, get more exercise. Walking is a good choice. Bit by bit, increase the amount you walk every day. Try for at least 30 minutes on most days of the week. You also may want to swim, bike, or do other activities. · Avoid or limit alcohol. Talk to your doctor about whether you can drink any alcohol. · Try to limit how much sodium you eat to less than 2,300 milligrams (mg) a day. Your doctor may ask you to try to eat less than 1,500 mg a day. · Eat plenty of fruits (such as bananas and oranges), vegetables, legumes, whole grains, and low-fat dairy products. · Lower the amount of saturated fat in your diet. Saturated fat is found in animal products such as milk, cheese, and meat. Limiting these foods may help you lose weight and also lower your risk for heart disease. · Do not smoke. Smoking increases your risk for heart attack and stroke. If you need help quitting, talk to your doctor about stop-smoking programs and medicines. These can increase your chances of quitting for good. When should you call for help? Call  911 anytime you think you may need emergency care. This may mean having symptoms that suggest that your blood pressure is causing a serious heart or blood vessel problem. Your blood pressure may be over 180/120. 
 For example, call  911 if: 
  · You have symptoms of a heart attack. These may include: 
? Chest pain or pressure, or a strange feeling in the chest. 
? Sweating. ? Shortness of breath. ? Nausea or vomiting. ? Pain, pressure, or a strange feeling in the back, neck, jaw, or upper belly or in one or both shoulders or arms. ? Lightheadedness or sudden weakness. ? A fast or irregular heartbeat.  
  · You have symptoms of a stroke. These may include: 
? Sudden numbness, tingling, weakness, or loss of movement in your face, arm, or leg, especially on only one side of your body. ? Sudden vision changes. ? Sudden trouble speaking. ? Sudden confusion or trouble understanding simple statements. ? Sudden problems with walking or balance. ? A sudden, severe headache that is different from past headaches.  
  · You have severe back or belly pain.  
 Do not wait until your blood pressure comes down on its own. Get help right away. 
 Call your doctor now or seek immediate care if: 
  · Your blood pressure is much higher than normal (such as 180/120 or higher), but you don't have symptoms.  
  · You think high blood pressure is causing symptoms, such as: 
? Severe headache. 
? Blurry vision.  
 Watch closely for changes in your health, and be sure to contact your doctor if: 
  · Your blood pressure measures higher than your doctor recommends at least 2 times. That means the top number is higher or the bottom number is higher, or both.  
  · You think you may be having side effects from your blood pressure medicine. Where can you learn more? Go to http://ramos-meghan.info/ Enter M921 in the search box to learn more about \"High Blood Pressure: Care Instructions. \" Current as of: December 15, 2019Content Version: 12.4 © 0537-7665 [employer]. Care instructions adapted under license by [employer] (which disclaims liability or warranty for this information). If you have questions about a medical condition or this instruction, always ask your healthcare professional. [place] [address] any warranty or liability for your use of this information.

## 2020-03-24 NOTE — PROGRESS NOTES
Patient is in the office today for a 4 month follow up. 1. Have you been to the ER, urgent care clinic since your last visit? Hospitalized since your last visit? No    2. Have you seen or consulted any other health care providers outside of the [address] [place] [address] since your last visit? Include any pap smears or colon screening.  No

## 2020-05-26 RX ORDER — METOPROLOL SUCCINATE 50 MG/1
TABLET, EXTENDED RELEASE ORAL
Qty: 30 TAB | Refills: 11 | Status: SHIPPED | OUTPATIENT
Start: 2020-05-26 | End: 2021-07-31

## 2020-06-20 RX ORDER — ROSUVASTATIN CALCIUM 20 MG/1
TABLET, COATED ORAL
Qty: 90 TAB | Refills: 0 | Status: SHIPPED | OUTPATIENT
Start: 2020-06-20 | End: 2020-06-25

## 2020-06-26 DIAGNOSIS — G89.29 CHRONIC MIDLINE LOW BACK PAIN WITHOUT SCIATICA: ICD-10-CM

## 2020-06-26 DIAGNOSIS — M54.50 CHRONIC MIDLINE LOW BACK PAIN WITHOUT SCIATICA: ICD-10-CM

## 2020-06-26 RX ORDER — GABAPENTIN 300 MG/1
600 CAPSULE ORAL 3 TIMES DAILY
Qty: 540 CAP | Refills: 1 | Status: SHIPPED | OUTPATIENT
Start: 2020-06-26 | End: 2021-03-01

## 2020-06-26 NOTE — TELEPHONE ENCOUNTER
[facility]  reports the last fill date for Neurontin as 5/22/20 for a 30 d/s. Last Visit: 3/24/20 with MD Tello  Next Appointment: 7/24/20 with MD Tello  Previous Refill Encounter(s): 11/26/19 #540 with  refills    Requested Prescriptions     Pending Prescriptions Disp Refills    gabapentin (NEURONTIN) 300 mg capsule 540 Cap 1     Sig: Take 2 Caps by mouth three (3) times daily. Max Daily Amount: 1,800 mg.

## 2020-07-17 ENCOUNTER — APPOINTMENT (OUTPATIENT)
Dept: INTERNAL MEDICINE CLINIC | Age: 60
End: 2020-07-17

## 2020-07-17 ENCOUNTER — HOSPITAL ENCOUNTER (OUTPATIENT)
Dept: LAB | Age: 60
Discharge: HOME | End: 2020-07-17
Payer: SELF-PAY

## 2020-07-17 DIAGNOSIS — E78.5 DYSLIPIDEMIA: ICD-10-CM

## 2020-07-17 DIAGNOSIS — E11.42 TYPE 2 DIABETES MELLITUS WITH DIABETIC POLYNEUROPATHY, WITHOUT LONG-TERM CURRENT USE OF INSULIN (HCC): ICD-10-CM

## 2020-07-17 DIAGNOSIS — I10 ESSENTIAL HYPERTENSION: ICD-10-CM

## 2020-07-17 LAB
ALBUMIN SERPL-MCNC: 3.6 G/DL (ref 3.4–5)
ALBUMIN/GLOB SERPL: 1.1 {RATIO} (ref 0.8–1.7)
ALP SERPL-CCNC: 102 U/L (ref 45–117)
ALT SERPL-CCNC: 21 U/L (ref 16–61)
ANION GAP SERPL CALC-SCNC: 5 MMOL/L (ref 3–18)
AST SERPL-CCNC: 17 U/L (ref 10–38)
BILIRUB SERPL-MCNC: 0.8 MG/DL (ref 0.2–1)
BUN SERPL-MCNC: 13 MG/DL (ref 7–18)
BUN/CREAT SERPL: 10 (ref 12–20)
CALCIUM SERPL-MCNC: 9 MG/DL (ref 8.5–10.1)
CHLORIDE SERPL-SCNC: 106 MMOL/L (ref 100–111)
CHOLEST SERPL-MCNC: 120 MG/DL
CO2 SERPL-SCNC: 28 MMOL/L (ref 21–32)
CREAT SERPL-MCNC: 1.29 MG/DL (ref 0.6–1.3)
GLOBULIN SER CALC-MCNC: 3.4 G/DL (ref 2–4)
GLUCOSE SERPL-MCNC: 104 MG/DL (ref 74–99)
HBA1C MFR BLD: 7.3 % (ref 4.2–5.6)
HDLC SERPL-MCNC: 35 MG/DL (ref 40–60)
HDLC SERPL: 3.4 {RATIO} (ref 0–5)
LDLC SERPL CALC-MCNC: 60.6 MG/DL (ref 0–100)
LIPID PROFILE,FLP: ABNORMAL
POTASSIUM SERPL-SCNC: 3.7 MMOL/L (ref 3.5–5.5)
PROT SERPL-MCNC: 7 G/DL (ref 6.4–8.2)
SODIUM SERPL-SCNC: 139 MMOL/L (ref 136–145)
TRIGL SERPL-MCNC: 122 MG/DL (ref ?–150)
VLDLC SERPL CALC-MCNC: 24.4 MG/DL

## 2020-07-17 PROCEDURE — 36415 COLL VENOUS BLD VENIPUNCTURE: CPT

## 2020-07-17 PROCEDURE — 80061 LIPID PANEL: CPT

## 2020-07-17 PROCEDURE — 83036 HEMOGLOBIN GLYCOSYLATED A1C: CPT

## 2020-07-17 PROCEDURE — 80053 COMPREHEN METABOLIC PANEL: CPT

## 2020-07-24 ENCOUNTER — VIRTUAL VISIT (OUTPATIENT)
Dept: INTERNAL MEDICINE CLINIC | Age: 60
End: 2020-07-24

## 2020-07-24 DIAGNOSIS — E11.42 TYPE 2 DIABETES MELLITUS WITH DIABETIC POLYNEUROPATHY, WITHOUT LONG-TERM CURRENT USE OF INSULIN (HCC): Primary | ICD-10-CM

## 2020-07-24 DIAGNOSIS — E78.5 DYSLIPIDEMIA: ICD-10-CM

## 2020-07-24 DIAGNOSIS — I10 ESSENTIAL HYPERTENSION: ICD-10-CM

## 2020-07-24 NOTE — PROGRESS NOTES
Troy Colon is a 60 y.o. male, evaluated via audio-only technology on 7/24/2020 for Diabetes; Hypertension; and Cholesterol Problem  . Assessment & Plan:   Diagnoses and all orders for this visit:    1. Type 2 diabetes mellitus with diabetic polyneuropathy, without long-term current use of insulin (HCC)  -     HEMOGLOBIN A1C W/O EAG; Future    2. Essential hypertension  -     METABOLIC PANEL, COMPREHENSIVE; Future    3. Dyslipidemia  -     LIPID PANEL; Future        12  Subjective:   Patient's diabetes is fairly well controlled on metformin 1 g twice daily. His cholesterol is well controlled on Crestor 20 mg daily. He continues on Toprol-XL 50 mg daily and Cozaar 100 mg daily for high blood pressure. Overall patient is doing well and continues on Neurontin for back pain. Prior to Admission medications    Medication Sig Start Date End Date Taking? Authorizing Provider   gabapentin (NEURONTIN) 300 mg capsule Take 2 Caps by mouth three (3) times daily. Max Daily Amount: 1,800 mg. 6/26/20   Lluvia Bhatt MD   rosuvastatin (CRESTOR) 20 mg tablet TAKE ONE TABLET BY MOUTH NIGHTLY 6/25/20   Yoshi Tello MD   metoprolol succinate (TOPROL-XL) 50 mg XL tablet TAKE 1 TABLET BY MOUTH ONCE DAILY 5/26/20   Madeleine Night, DO   losartan (COZAAR) 100 mg tablet Take 1 Tab by mouth daily. 3/24/20   Yoshi Tello MD   metFORMIN (GLUCOPHAGE) 1,000 mg tablet TAKE 1 TABLET BY MOUTH TWO TIMES A DAY WITH MEALS 11/4/19   Yoshi Tello MD   coenzyme q10 (CO Q-10) 150 mg cap Take  by mouth. 2/18/19   Lluvia Bhatt MD   multivitamin (ONE A DAY) tablet Take 1 Tab by mouth daily. Provider, Historical   omeprazole (PRILOSEC) 20 mg capsule Take 40 mg by mouth daily. Provider, Historical   nitroglycerin (NITROSTAT) 0.4 mg SL tablet 1 Tab by SubLINGual route every five (5) minutes as needed for Chest Pain. 8/25/11   Madeleine Night, DO   aspirin 81 mg tablet Take 81 mg by mouth daily.     Provider, Historical     Patient Active Problem List   Diagnosis Code    Tobacco abuse Z72.0    GARCIA (dyspnea on exertion) R06.00    S/P cardiac catheterization Z98.890    Arthritis M19.90    Chronic cough R05    Indigestion K30    Dyslipidemia E78.5    Hypertensive heart disease  I11.9    GERD (gastroesophageal reflux disease) K21.9    Coronary atherosclerosis of native coronary artery I25.10    COPD (chronic obstructive pulmonary disease), moderate to severe J44.9    Pneumonia in left lung - 8/2012 J18.9    Essential hypertension I10    Type 2 diabetes mellitus with diabetic polyneuropathy, without long-term current use of insulin (HCC) E11.42    Type 2 diabetes with nephropathy (HCC) E11.21     Results for orders placed or performed during the hospital encounter of 07/17/20   HEMOGLOBIN A1C W/O EAG   Result Value Ref Range    Hemoglobin A1c 7.3 (H) 4.2 - 5.6 %   METABOLIC PANEL, COMPREHENSIVE   Result Value Ref Range    Sodium 139 136 - 145 mmol/L    Potassium 3.7 3.5 - 5.5 mmol/L    Chloride 106 100 - 111 mmol/L    CO2 28 21 - 32 mmol/L    Anion gap 5 3.0 - 18 mmol/L    Glucose 104 (H) 74 - 99 mg/dL    BUN 13 7.0 - 18 MG/DL    Creatinine 1.29 0.6 - 1.3 MG/DL    BUN/Creatinine ratio 10 (L) 12 - 20      GFR est AA >60 >60 ml/min/1.73m2    GFR est non-AA 57 (L) >60 ml/min/1.73m2    Calcium 9.0 8.5 - 10.1 MG/DL    Bilirubin, total 0.8 0.2 - 1.0 MG/DL    ALT (SGPT) 21 16 - 61 U/L    AST (SGOT) 17 10 - 38 U/L    Alk. phosphatase 102 45 - 117 U/L    Protein, total 7.0 6.4 - 8.2 g/dL    Albumin 3.6 3.4 - 5.0 g/dL    Globulin 3.4 2.0 - 4.0 g/dL    A-G Ratio 1.1 0.8 - 1.7     LIPID PANEL   Result Value Ref Range    LIPID PROFILE          Cholesterol, total 120 <200 MG/DL    Triglyceride 122 <150 MG/DL    HDL Cholesterol 35 (L) 40 - 60 MG/DL    LDL, calculated 60.6 0 - 100 MG/DL    VLDL, calculated 24.4 MG/DL    CHOL/HDL Ratio 3.4 0 - 5.0           ROS    No flowsheet data found.      Chayito Cuello, who was evaluated through a patient-initiated, synchronous (real-time) audio only encounter, and/or her healthcare decision maker, is aware that it is a billable service, with coverage as determined by his insurance carrier. He provided verbal consent to proceed: Yes. He has not had a related appointment within my department in the past 7 days or scheduled within the next 24 hours.       Total Time: minutes: 5-10 minutes    Rod Hummel MD

## 2020-08-10 ENCOUNTER — HOSPITAL ENCOUNTER (OUTPATIENT)
Dept: NON INVASIVE DIAGNOSTICS | Age: 60
Discharge: HOME | End: 2020-08-10
Attending: INTERNAL MEDICINE

## 2020-08-10 VITALS
HEIGHT: 69 IN | WEIGHT: 183 LBS | BODY MASS INDEX: 27.11 KG/M2 | SYSTOLIC BLOOD PRESSURE: 120 MMHG | DIASTOLIC BLOOD PRESSURE: 80 MMHG

## 2020-08-10 DIAGNOSIS — I35.0 AORTIC VALVE STENOSIS, ETIOLOGY OF CARDIAC VALVE DISEASE UNSPECIFIED: ICD-10-CM

## 2020-08-10 LAB
ECHO AO ROOT DIAM: 3.19 CM
ECHO AV AREA PEAK VELOCITY: 0.81 CM2
ECHO AV AREA VTI: 0.89 CM2
ECHO AV AREA/BSA PEAK VELOCITY: 0.4 CM2/M2
ECHO AV AREA/BSA VTI: 0.4 CM2/M2
ECHO AV MEAN GRADIENT: 38.15 MMHG
ECHO AV PEAK GRADIENT: 56.69 MMHG
ECHO AV PEAK VELOCITY: 376.48 CM/S
ECHO AV VTI: 80.81 CM
ECHO LA AREA 4C: 17.21 CM2
ECHO LA VOL 2C: 53.67 ML (ref 18–58)
ECHO LA VOL 4C: 45.38 ML (ref 18–58)
ECHO LA VOL BP: 51.77 ML (ref 18–58)
ECHO LA VOL/BSA BIPLANE: 26.03 ML/M2 (ref 16–28)
ECHO LA VOLUME INDEX A2C: 26.98 ML/M2 (ref 16–28)
ECHO LA VOLUME INDEX A4C: 22.81 ML/M2 (ref 16–28)
ECHO LV E' LATERAL VELOCITY: 6.86 CM/S
ECHO LV E' SEPTAL VELOCITY: 7.6 CM/S
ECHO LV INTERNAL DIMENSION DIASTOLIC: 3.01 CM (ref 4.2–5.9)
ECHO LV INTERNAL DIMENSION SYSTOLIC: 2.65 CM
ECHO LV IVSD: 1.09 CM (ref 0.6–1)
ECHO LV MASS 2D: 78.8 G (ref 88–224)
ECHO LV MASS INDEX 2D: 39.6 G/M2 (ref 49–115)
ECHO LV POSTERIOR WALL DIASTOLIC: 0.85 CM (ref 0.6–1)
ECHO LVOT CARDIAC OUTPUT: 6.16 LITER/MINUTE
ECHO LVOT DIAM: 2.03 CM
ECHO LVOT PEAK GRADIENT: 3.53 MMHG
ECHO LVOT PEAK VELOCITY: 93.97 CM/S
ECHO LVOT SV: 72 ML
ECHO LVOT VTI: 22.21 CM
ECHO MV A VELOCITY: 86.77 CM/S
ECHO MV E DECELERATION TIME (DT): 0.28 S
ECHO MV E VELOCITY: 61.59 CM/S
ECHO MV E/A RATIO: 0.71
ECHO MV E/E' LATERAL: 8.98
ECHO MV E/E' RATIO (AVERAGED): 8.54
ECHO MV E/E' SEPTAL: 8.1
ECHO RV TAPSE: 1.72 CM (ref 1.5–2)
ECHO TV REGURGITANT MAX VELOCITY: 252.78 CM/S
ECHO TV REGURGITANT PEAK GRADIENT: 25.56 MMHG
LVOT MG: 2.13 MMHG

## 2020-08-10 PROCEDURE — 93306 TTE W/DOPPLER COMPLETE: CPT

## 2020-08-11 ENCOUNTER — OFFICE VISIT (OUTPATIENT)
Dept: CARDIOLOGY CLINIC | Age: 60
End: 2020-08-11

## 2020-08-11 VITALS
DIASTOLIC BLOOD PRESSURE: 76 MMHG | HEART RATE: 92 BPM | WEIGHT: 177 LBS | OXYGEN SATURATION: 97 % | BODY MASS INDEX: 26.22 KG/M2 | SYSTOLIC BLOOD PRESSURE: 110 MMHG | HEIGHT: 69 IN

## 2020-08-11 DIAGNOSIS — I25.10 ATHEROSCLEROSIS OF NATIVE CORONARY ARTERY OF NATIVE HEART WITHOUT ANGINA PECTORIS: ICD-10-CM

## 2020-08-11 DIAGNOSIS — E78.5 DYSLIPIDEMIA: ICD-10-CM

## 2020-08-11 DIAGNOSIS — E11.42 TYPE 2 DIABETES MELLITUS WITH DIABETIC POLYNEUROPATHY, WITHOUT LONG-TERM CURRENT USE OF INSULIN (HCC): ICD-10-CM

## 2020-08-11 DIAGNOSIS — J44.9 CHRONIC OBSTRUCTIVE PULMONARY DISEASE, UNSPECIFIED COPD TYPE (HCC): ICD-10-CM

## 2020-08-11 DIAGNOSIS — I35.0 AORTIC VALVE STENOSIS, ETIOLOGY OF CARDIAC VALVE DISEASE UNSPECIFIED: Primary | ICD-10-CM

## 2020-08-11 DIAGNOSIS — I11.9 BENIGN HYPERTENSIVE HEART DISEASE WITHOUT HEART FAILURE: ICD-10-CM

## 2020-08-11 NOTE — PROGRESS NOTES
HPI: I saw Keyanna Graf. Silviano Burnham in my office today in cardiovascular evaluation regarding his chronic underlying coronary artery disease. Mr. Silviano Burnham is a pleasant 70-year-old  male who presented to [facility] with chest tightness and acute coronary syndrome, for which we stabilized him medically and did an urgent cardiac catheterization on January 26, 2006. 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 This was opened with a wire and balloon, but we were unable to stent it. There was a residual stenosis of 40% with JB grade 3 flow following the procedure. Left ventricular function at that time was grossly normal with an ejection fraction in the 60% range.      He has done well on medical therapy over the years without any recurrent cardiovascular issues. Unfortunately, he continued to smoke 2 packs of cigarettes per day over the years and has developed moderately severe COPD with chronic shortness of breath. Fortunately, he finally did quit smoking completely in January of 2014 and has been able to stay off cigarettes now over 5 years ago. He has developed some aortic stenosis over the years and his latest echocardiogram which was just completed yesterday on August 10, 2020 demonstrated completely normal left ventricular systolic function without significant hypertrophy and moderately severe to severe aortic stenosis with a mean gradient of 38 mmHg across the valve and aortic valve area of 0.8 cm². Compared to the echocardiogram of October 14, 2019 there was really little interval change with a mean gradient being 36 mmHg at that time. He comes in today dates she is doing reasonably well. He is having some shortness of breath with exertion which is a chronic problem from his COPD and is really not sure it has gotten any worse. He does have some dizziness when he stands quickly and a little dizziness with walking but has had no syncopal episodes though this exertional dizziness certainly could be related to his aortic stenosis. Encounter Diagnoses   Name Primary?  Aortic valve stenosis, moderately severe to severe Yes    Atherosclerosis of native coronary artery of native heart without angina pectoris     Hypertensive heart disease      Dyslipidemia     Type 2 diabetes mellitus with diabetic polyneuropathy, without long-term current use of insulin (HCC)     Chronic obstructive pulmonary disease, unspecified COPD type ([address]        Discussion: This patient appears to be doing fairly well. He does have moderately severe to severe aortic stenosis by echocardiographic criteria and is possibly symptomatic although it is difficult to tell since he is always a little short of breath from his COPD. We did discuss possibly directly referring him to Dr. Chan Davidson at this time, but the patient is a little reluctant to get further testing since he has no insurance so we may need to check with the hospital systems about how we could possibly help this gentleman to afford the TAVR surgery which he will certainly need in the next several months. His recent lipid profile completed on July 17, 2020 was really excellent with total cholesterol 120, triglycerides 122, HDL of 35, LDL of 60.6, and VLDL of 24.4 which I think is very good control on Crestor 20 mg daily. His blood pressure is well controlled today and he otherwise seems to be doing well so I will simply plan to see him again in 3 months or sooner if his exertional shortness of breath worsens or he develops more clear-cut exertional dizziness or presyncope.     PCP: Eusebia Crenshaw MD      Past Medical History:   Diagnosis Date    Acute coronary syndrome [facility])     with non-ST-elevation myocardial infarction, s/p direct angioplasty of anomolous circumflex marginal coming off the right coronary cusp on 1/26/06    Arthritis     in hands    ASHD (arteriosclerotic heart disease)     Benign hypertensive heart disease without heart failure     CAD (coronary artery disease)     chronic underlying    Calculus of kidney     Cardiac cath 01/26/2006    LM mild. RCA mild. LAD tortuous. CX mild. CX angela 100%. S/P balloon angioplasty of CX angela. Residual 40%. LVEDP 20.  EF 65%.  Cardiac nuclear imaging test 08/30/2006    No ischemia or infarct. EF 59%. Negative max. stress test.    Chronic cough     coughing up sputum    Chronic lung disease     COPD (chronic obstructive pulmonary disease) ([facility])     moderate to severe    Coronary artery disease     Diabetes ([address]     Diabetes mellitus ([facility]     GARCIA (dyspnea on exertion)     GERD (gastroesophageal reflux disease)     H/O: pneumonia     Heartburn     Hypertension     essential systemic    Indigestion     Pneumonia 8/12    \"walking\"    Pure hypercholesterolemia     Tobacco abuse          Past Surgical History:   Procedure Laterality Date    HX HEART CATHETERIZATION  01/26/06    HX ORTHOPAEDIC  1989    fracture of bilateral hands     HX PTCA      of anomalous circumflex marginal artery    TONGUE AND MOUTH SURG UNLISTED  04/04    tongue surgery    TONGUE TO LIP SURGERY  04/04     Current Outpatient Medications   Medication Sig    gabapentin (NEURONTIN) 300 mg capsule Take 2 Caps by mouth three (3) times daily. Max Daily Amount: 1,800 mg.    rosuvastatin (CRESTOR) 20 mg tablet TAKE ONE TABLET BY MOUTH NIGHTLY    metoprolol succinate (TOPROL-XL) 50 mg XL tablet TAKE 1 TABLET BY MOUTH ONCE DAILY    losartan (COZAAR) 100 mg tablet Take 1 Tab by mouth daily.  metFORMIN (GLUCOPHAGE) 1,000 mg tablet TAKE 1 TABLET BY MOUTH TWO TIMES A DAY WITH MEALS (Patient taking differently: 1,000 mg daily.)    coenzyme q10 (CO Q-10) 150 mg cap Take  by mouth.  omeprazole (PRILOSEC) 20 mg capsule Take 40 mg by mouth daily.     nitroglycerin (NITROSTAT) 0.4 mg SL tablet 1 Tab by SubLINGual route every five (5) minutes as needed for Chest Pain.  aspirin 81 mg tablet Take 81 mg by mouth daily.  multivitamin (ONE A DAY) tablet Take 1 Tab by mouth daily. No current facility-administered medications for this visit. Allergies   Allergen Reactions    Altace [Ramipril] Cough       Social History:   Social History     Tobacco Use    Smoking status: Former Smoker     Packs/day: 1.25     Years: 20.00     Pack years: 25.00     Types: Cigarettes     Last attempt to quit: 2014     Years since quittin.6    Smokeless tobacco: Never Used   Substance Use Topics    Alcohol use: Yes     Comment: on special occasions only           Family history: family history includes Asthma in his father; COPD in his mother; Cancer in his father; Colon Polyps in his father. Review of Systems:    Constitutional: Negative. Respiratory: Negative. Cardiovascular: Negative. Gastrointestinal: Negative. Musculoskeletal: Negative. Neurological: Negative for dizziness. Physical Exam:   The patient is an alert, oriented, well developed, well nourished 60 y.o.  male who was in no acute distress at the time of my examination. Visit Vitals  /76   Pulse 92   Ht 5' 9\" (1.753 m)   Wt 177 lb (80.3 kg)   SpO2 97%   BMI 26.14 kg/m²        BP Readings from Last 3 Encounters:   20 110/76   08/10/20 120/80   20 120/80        Wt Readings from Last 3 Encounters:   20 177 lb (80.3 kg)   08/10/20 183 lb (83 kg)   20 183 lb (83 kg)       HEENT: Conjunctivae white, mucosa moist, no pallor or cyanosis. Neck: Supple without masses, tenderness or thyromegaly. No jugular venous distention. Carotid upstrokes are full bilaterally with soft bilateral bruits. Cardiovascular: Chest is symmetrical with good excursion. [place] is not displaced. No lifts, heaves or thrills.   S1 and S2 are normal, without appreciable rubs, clicks or gallops. There is a grade III/VI apical systolic murmur with poor radiation, without diastolic murmurs. Lungs: Moderate to severe decreased breath sounds throughout with a lot of diffuse expiratory wheezes throughout. Abdomen: Soft. No masses, tenderness or organomegaly. Extremities: Trace edema with 1 + peripheral pulses. Review of Data:  See PMH and Cardiology and Imaging sections for cardiac testing      Results for orders placed or performed in visit on 08/11/20   AMB POC EKG ROUTINE W/ 12 LEADS, INTER & REP     Status: None    Narrative    Normal sinus rhythm, rate 92. This EKG is within normal limits and similar to the EKG of September 10, 2019. Tej Recio D.O., F.A.C.C. [facility]  [facility]  [address] [place] [address]. [address] [place]    [phone]    PLEASE NOTE:  This document has been produced using voice recognition software. Unrecognized errors in transcription may be present.

## 2020-11-18 ENCOUNTER — HOSPITAL ENCOUNTER (OUTPATIENT)
Dept: LAB | Age: 60
Discharge: HOME | End: 2020-11-18
Payer: MEDICAID

## 2020-11-18 ENCOUNTER — APPOINTMENT (OUTPATIENT)
Dept: INTERNAL MEDICINE CLINIC | Age: 60
End: 2020-11-18

## 2020-11-18 DIAGNOSIS — E11.42 TYPE 2 DIABETES MELLITUS WITH DIABETIC POLYNEUROPATHY, WITHOUT LONG-TERM CURRENT USE OF INSULIN (HCC): ICD-10-CM

## 2020-11-18 DIAGNOSIS — I10 ESSENTIAL HYPERTENSION: ICD-10-CM

## 2020-11-18 DIAGNOSIS — E78.5 DYSLIPIDEMIA: ICD-10-CM

## 2020-11-18 LAB
ALBUMIN SERPL-MCNC: 3.9 G/DL (ref 3.4–5)
ALBUMIN/GLOB SERPL: 1.1 {RATIO} (ref 0.8–1.7)
ALP SERPL-CCNC: 117 U/L (ref 45–117)
ALT SERPL-CCNC: 17 U/L (ref 16–61)
ANION GAP SERPL CALC-SCNC: 4 MMOL/L (ref 3–18)
AST SERPL-CCNC: 15 U/L (ref 10–38)
BILIRUB SERPL-MCNC: 0.3 MG/DL (ref 0.2–1)
BUN SERPL-MCNC: 20 MG/DL (ref 7–18)
BUN/CREAT SERPL: 14 (ref 12–20)
CALCIUM SERPL-MCNC: 9.7 MG/DL (ref 8.5–10.1)
CHLORIDE SERPL-SCNC: 105 MMOL/L (ref 100–111)
CHOLEST SERPL-MCNC: 151 MG/DL
CO2 SERPL-SCNC: 30 MMOL/L (ref 21–32)
CREAT SERPL-MCNC: 1.44 MG/DL (ref 0.6–1.3)
GLOBULIN SER CALC-MCNC: 3.7 G/DL (ref 2–4)
GLUCOSE SERPL-MCNC: 151 MG/DL (ref 74–99)
HBA1C MFR BLD: 6.5 % (ref 4.2–5.6)
HDLC SERPL-MCNC: 41 MG/DL (ref 40–60)
HDLC SERPL: 3.7 {RATIO} (ref 0–5)
LDLC SERPL CALC-MCNC: 78.4 MG/DL (ref 0–100)
LIPID PROFILE,FLP: ABNORMAL
POTASSIUM SERPL-SCNC: 4.2 MMOL/L (ref 3.5–5.5)
PROT SERPL-MCNC: 7.6 G/DL (ref 6.4–8.2)
SODIUM SERPL-SCNC: 139 MMOL/L (ref 136–145)
TRIGL SERPL-MCNC: 158 MG/DL (ref ?–150)
VLDLC SERPL CALC-MCNC: 31.6 MG/DL

## 2020-11-18 PROCEDURE — 80053 COMPREHEN METABOLIC PANEL: CPT

## 2020-11-18 PROCEDURE — 36415 COLL VENOUS BLD VENIPUNCTURE: CPT

## 2020-11-18 PROCEDURE — 83036 HEMOGLOBIN GLYCOSYLATED A1C: CPT

## 2020-11-18 PROCEDURE — 80061 LIPID PANEL: CPT

## 2020-11-24 ENCOUNTER — OFFICE VISIT (OUTPATIENT)
Dept: INTERNAL MEDICINE CLINIC | Age: 60
End: 2020-11-24
Payer: MEDICAID

## 2020-11-24 VITALS
HEART RATE: 84 BPM | TEMPERATURE: 97.7 F | BODY MASS INDEX: 26.51 KG/M2 | RESPIRATION RATE: 20 BRPM | HEIGHT: 69 IN | OXYGEN SATURATION: 98 % | WEIGHT: 179 LBS | SYSTOLIC BLOOD PRESSURE: 132 MMHG | DIASTOLIC BLOOD PRESSURE: 80 MMHG

## 2020-11-24 DIAGNOSIS — E11.42 TYPE 2 DIABETES MELLITUS WITH DIABETIC POLYNEUROPATHY, WITHOUT LONG-TERM CURRENT USE OF INSULIN (HCC): Primary | ICD-10-CM

## 2020-11-24 DIAGNOSIS — Z12.11 COLON CANCER SCREENING: ICD-10-CM

## 2020-11-24 DIAGNOSIS — E78.5 DYSLIPIDEMIA: ICD-10-CM

## 2020-11-24 DIAGNOSIS — I10 ESSENTIAL HYPERTENSION: ICD-10-CM

## 2020-11-24 PROCEDURE — 99214 OFFICE O/P EST MOD 30 MIN: CPT | Performed by: INTERNAL MEDICINE

## 2020-11-24 NOTE — PROGRESS NOTES
Patient is in the office today for a 4 month follow up. 1. Have you been to the ER, urgent care clinic since your last visit? Hospitalized since your last visit? No    2. Have you seen or consulted any other health care providers outside of the [address] [place] [address] since your last visit? Include any pap smears or colon screening.  No

## 2020-11-24 NOTE — PROGRESS NOTES
Subjective:       Chief Complaint  The patient presents for follow up of diabetes, hypertension and high cholesterol. SUZY Lau is a 63 y.o. male seen for follow up of diabetes. Healso has hypertension and hyperlipidemia. Diabetes well control, no significant medication side effects noted, on metformin, pt has done well with weight loss. hypertension well controlled on Cozaar 100 mg and Toprol XL 50 mg, , hyperlipidemia well controlled, no significant medication side effects noted, on  Crestor 20 mg. Diet and Lifestyle: generally follows a low fat low cholesterol diet, sedentary    Home BP Monitoring: is not measured at home. Diabetic Review of Systems - home glucose monitoring: is performed sporadically. Other symptoms and concerns: COPD Review:  The patient is being seen for follow up of COPD. Oxygen: He currently is not on home oxygen therapy. Symptoms: chronic dyspnea: severity = moderate: course of sx: symptoms have progressed to a point and plateaued. .   Patient uses 1 pillows at night. Patient denies smoke cigarettes. He has to get free Inhalers from Pulmonary    Pt has chronic back pain and is on Neurontin 600 mg   QID. Pt not on narcotics in this office due to abnormal UDS. Current Outpatient Medications   Medication Sig    gabapentin (NEURONTIN) 300 mg capsule Take 2 Caps by mouth three (3) times daily. Max Daily Amount: 1,800 mg.    rosuvastatin (CRESTOR) 20 mg tablet TAKE ONE TABLET BY MOUTH NIGHTLY    metoprolol succinate (TOPROL-XL) 50 mg XL tablet TAKE 1 TABLET BY MOUTH ONCE DAILY    losartan (COZAAR) 100 mg tablet Take 1 Tab by mouth daily.  metFORMIN (GLUCOPHAGE) 1,000 mg tablet TAKE 1 TABLET BY MOUTH TWO TIMES A DAY WITH MEALS (Patient taking differently: 1,000 mg daily.)    omeprazole (PRILOSEC) 20 mg capsule Take 40 mg by mouth daily.     nitroglycerin (NITROSTAT) 0.4 mg SL tablet 1 Tab by SubLINGual route every five (5) minutes as needed for Chest Pain.  aspirin 81 mg tablet Take 81 mg by mouth daily.  coenzyme q10 (CO Q-10) 150 mg cap Take  by mouth.  multivitamin (ONE A DAY) tablet Take 1 Tab by mouth daily. No current facility-administered medications for this visit.               Review of Systems  Respiratory: negative for dyspnea on exertion  Cardiovascular: negative for chest pain    Objective:     Visit Vitals  /80   Pulse 84   Temp 97.7 °F (36.5 °C) (Temporal)   Resp 20   Ht 5' 9\" (1.753 m)   Wt 179 lb (81.2 kg)   SpO2 98%   BMI 26.43 kg/m²        Chest - clear to auscultation, no wheezes, rales or rhonchi, symmetric air entry  Heart - normal rate, regular rhythm, normal S1, S2, 2/6 systolic murmur RSB, rubs, clicks or gallops  Extremities - peripheral pulses normal, no pedal edema, no clubbing or cyanosis      Labs:   Lab Results   Component Value Date/Time    Hemoglobin A1c 6.5 (H) 11/18/2020 08:57 AM    Hemoglobin A1c 7.3 (H) 07/17/2020 11:06 AM    Hemoglobin A1c 7.0 (H) 03/17/2020 08:09 AM    Hemoglobin A1c, External 7.2 02/27/2016    Glucose 151 (H) 11/18/2020 08:57 AM    Microalbumin/Creat ratio (mg/g creat) 98 (H) 03/17/2020 08:09 AM    Microalbumin,urine random 12.60 (H) 03/17/2020 08:09 AM    LDL, calculated 78.4 11/18/2020 08:57 AM    Creatinine, POC 1.2 03/06/2017 04:09 PM    Creatinine 1.44 (H) 11/18/2020 08:57 AM      Lab Results   Component Value Date/Time    Prostate Specific Ag 0.7 08/19/2019 08:05 AM    Prostate Specific Ag 0.9 01/24/2017 04:30 PM     Lab Results   Component Value Date/Time    Sodium 139 11/18/2020 08:57 AM    Potassium 4.2 11/18/2020 08:57 AM    Chloride 105 11/18/2020 08:57 AM    CO2 30 11/18/2020 08:57 AM    Anion gap 4 11/18/2020 08:57 AM    Glucose 151 (H) 11/18/2020 08:57 AM    BUN 20 (H) 11/18/2020 08:57 AM    Creatinine 1.44 (H) 11/18/2020 08:57 AM    BUN/Creatinine ratio 14 11/18/2020 08:57 AM    GFR est AA >60 11/18/2020 08:57 AM    GFR est non-AA 50 (L) 11/18/2020 08:57 AM Calcium 9.7 11/18/2020 08:57 AM    Bilirubin, total 0.3 11/18/2020 08:57 AM    ALT (SGPT) 17 11/18/2020 08:57 AM    Alk. phosphatase 117 11/18/2020 08:57 AM    Protein, total 7.6 11/18/2020 08:57 AM    Albumin 3.9 11/18/2020 08:57 AM    Globulin 3.7 11/18/2020 08:57 AM    A-G Ratio 1.1 11/18/2020 08:57 AM      Lab Results   Component Value Date/Time    Hemoglobin A1c 6.5 (H) 11/18/2020 08:57 AM    Hemoglobin A1c (POC) 6.5 12/16/2016 12:02 PM    Hemoglobin A1c, External 7.2 02/27/2016            Labs:   Lab Results   Component Value Date/Time    Cholesterol, total 151 11/18/2020 08:57 AM    HDL Cholesterol 41 11/18/2020 08:57 AM    LDL, calculated 78.4 11/18/2020 08:57 AM    LDL-C, External 132 02/27/2016    Triglyceride 158 (H) 11/18/2020 08:57 AM    CHOL/HDL Ratio 3.7 11/18/2020 08:57 AM       Labs:   Lab Results   Component Value Date/Time    Prostate Specific Ag 0.7 08/19/2019 08:05 AM    Prostate Specific Ag 0.9 01/24/2017 04:30 PM             Assessment / Plan     Diabetes well control, will continue  metformin 1 gm BID  Hypertension  Well controlled on Cozaar 100 mg and Toprol. Hyperlipidemia  well controlled on Crestor 10 mg daily. ICD-10-CM ICD-9-CM    1. Type 2 diabetes mellitus with diabetic polyneuropathy, without long-term current use of insulin ([facility])  E11.42 250.60 HEMOGLOBIN A1C W/O EAG     357.2 MICROALBUMIN, UR, RAND W/ MICROALB/CREAT RATIO   2. Essential hypertension  W60 976.8 METABOLIC PANEL, COMPREHENSIVE   3. Dyslipidemia  E78.5 272.4 LIPID PANEL   4. Colon cancer screening  Z12.11 V76.51 OCCULT BLOOD IMMUNOASSAY,DIAGNOSTIC                  Diabetic issues reviewed with him: diabetic diet discussed in detail, and low cholesterol diet, weight control and daily exercise discussed. Follow-up and Dispositions    · Return in about 4 months (around 3/24/2021) for labs 1 week before. Reviewed plan of care. Patient has provided input and agrees with goals.

## 2020-11-24 NOTE — PATIENT INSTRUCTIONS
High Blood Pressure: Care Instructions Overview It's normal for blood pressure to go up and down throughout the day. But if it stays up, you have high blood pressure. Another name for high blood pressure is hypertension. Despite what a lot of people think, high blood pressure usually doesn't cause headaches or make you feel dizzy or lightheaded. It usually has no symptoms. But it does increase your risk of stroke, heart attack, and other problems. You and your doctor will talk about your risks of these problems based on your blood pressure. Your doctor will give you a goal for your blood pressure. Your goal will be based on your health and your age. Lifestyle changes, such as eating healthy and being active, are always important to help lower blood pressure. You might also take medicine to reach your blood pressure goal. 
Follow-up care is a key part of your treatment and safety. Be sure to make and go to all appointments, and call your doctor if you are having problems. It's also a good idea to know your test results and keep a list of the medicines you take. How can you care for yourself at home? Medical treatment · If you stop taking your medicine, your blood pressure will go back up. You may take one or more types of medicine to lower your blood pressure. Be safe with medicines. Take your medicine exactly as prescribed. Call your doctor if you think you are having a problem with your medicine. · Talk to your doctor before you start taking aspirin every day. Aspirin can help certain people lower their risk of a heart attack or stroke. But taking aspirin isn't right for everyone, because it can cause serious bleeding. · See your doctor regularly. You may need to see the doctor more often at first or until your blood pressure comes down. · If you are taking blood pressure medicine, talk to your doctor before you take decongestants or anti-inflammatory medicine, such as ibuprofen. Some of these medicines can raise blood pressure. · Learn how to check your blood pressure at home. Lifestyle changes · Stay at a healthy weight. This is especially important if you put on weight around the waist. Losing even 10 pounds can help you lower your blood pressure. · If your doctor recommends it, get more exercise. Walking is a good choice. Bit by bit, increase the amount you walk every day. Try for at least 30 minutes on most days of the week. You also may want to swim, bike, or do other activities. · Avoid or limit alcohol. Talk to your doctor about whether you can drink any alcohol. · Try to limit how much sodium you eat to less than 2,300 milligrams (mg) a day. Your doctor may ask you to try to eat less than 1,500 mg a day. · Eat plenty of fruits (such as bananas and oranges), vegetables, legumes, whole grains, and low-fat dairy products. · Lower the amount of saturated fat in your diet. Saturated fat is found in animal products such as milk, cheese, and meat. Limiting these foods may help you lose weight and also lower your risk for heart disease. · Do not smoke. Smoking increases your risk for heart attack and stroke. If you need help quitting, talk to your doctor about stop-smoking programs and medicines. These can increase your chances of quitting for good. When should you call for help? Call  911 anytime you think you may need emergency care. This may mean having symptoms that suggest that your blood pressure is causing a serious heart or blood vessel problem. Your blood pressure may be over 180/120. For example, call 911 if: 
  · You have symptoms of a heart attack. These may include: 
? Chest pain or pressure, or a strange feeling in the chest. 
? Sweating. ? Shortness of breath. ? Nausea or vomiting. ? Pain, pressure, or a strange feeling in the back, neck, jaw, or upper belly or in one or both shoulders or arms. ? Lightheadedness or sudden weakness. ? A fast or irregular heartbeat.  
  · You have symptoms of a stroke. These may include: 
? Sudden numbness, tingling, weakness, or loss of movement in your face, arm, or leg, especially on only one side of your body. ? Sudden vision changes. ? Sudden trouble speaking. ? Sudden confusion or trouble understanding simple statements. ? Sudden problems with walking or balance. ? A sudden, severe headache that is different from past headaches.  
  · You have severe back or belly pain. Do not wait until your blood pressure comes down on its own. Get help right away. Call your doctor now or seek immediate care if: 
  · Your blood pressure is much higher than normal (such as 180/120 or higher), but you don't have symptoms.  
  · You think high blood pressure is causing symptoms, such as: 
? Severe headache. 
? Blurry vision. Watch closely for changes in your health, and be sure to contact your doctor if: 
  · Your blood pressure measures higher than your doctor recommends at least 2 times. That means the top number is higher or the bottom number is higher, or both.  
  · You think you may be having side effects from your blood pressure medicine. Where can you learn more? Go to http://www.gray.com/ Enter X682 in the search box to learn more about \"High Blood Pressure: Care Instructions. \" Current as of: December 16, 2019               Content Version: 12.6 © 6880-6307 [employer]. Care instructions adapted under license by [employer] (which disclaims liability or warranty for this information). If you have questions about a medical condition or this instruction, always ask your healthcare professional. [place] [address] any warranty or liability for your use of this information.

## 2020-11-25 ENCOUNTER — OFFICE VISIT (OUTPATIENT)
Dept: CARDIOLOGY CLINIC | Age: 60
End: 2020-11-25
Payer: MEDICAID

## 2020-11-25 VITALS
HEART RATE: 91 BPM | HEIGHT: 69 IN | SYSTOLIC BLOOD PRESSURE: 120 MMHG | BODY MASS INDEX: 26.96 KG/M2 | WEIGHT: 182 LBS | DIASTOLIC BLOOD PRESSURE: 82 MMHG | OXYGEN SATURATION: 97 %

## 2020-11-25 DIAGNOSIS — I35.0 AORTIC VALVE STENOSIS, ETIOLOGY OF CARDIAC VALVE DISEASE UNSPECIFIED: Primary | ICD-10-CM

## 2020-11-25 DIAGNOSIS — I25.10 ATHEROSCLEROSIS OF NATIVE CORONARY ARTERY OF NATIVE HEART WITHOUT ANGINA PECTORIS: ICD-10-CM

## 2020-11-25 DIAGNOSIS — E78.5 DYSLIPIDEMIA: ICD-10-CM

## 2020-11-25 DIAGNOSIS — I11.9 BENIGN HYPERTENSIVE HEART DISEASE WITHOUT HEART FAILURE: ICD-10-CM

## 2020-11-25 PROCEDURE — 99214 OFFICE O/P EST MOD 30 MIN: CPT | Performed by: INTERNAL MEDICINE

## 2020-11-25 PROCEDURE — 93000 ELECTROCARDIOGRAM COMPLETE: CPT | Performed by: INTERNAL MEDICINE

## 2020-11-25 NOTE — PROGRESS NOTES
HPI: I saw Ying MaryHamlet Mendez in my office today in cardiovascular evaluation regarding his chronic underlying coronary artery disease and aortic valve disease. Mr. Maria T Mendez is a pleasant 58-year-old  male who presented to [facility] with chest tightness and acute coronary syndrome, for which we stabilized him medically and did an urgent cardiac catheterization on January 26, 2006. 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 This was opened with a wire and balloon, but we were unable to stent it. There was a residual stenosis of 40% with JB grade 3 flow following the procedure. Left ventricular function at that time was grossly normal with an ejection fraction in the 60% range.      He has done well on medical therapy over the years without any recurrent cardiovascular issues. Unfortunately, he continued to smoke 2 packs of cigarettes per day over the years and has developed moderately severe COPD with chronic shortness of breath. Fortunately, he finally did quit smoking completely in January of 2014 and has been able to stay off cigarettes now for almost 7 years. He has developed some aortic stenosis over the years and his latest echocardiogram which was just completed yesterday on  August 10, 2020 demonstrated completely normal left ventricular systolic function without significant hypertrophy and moderately severe to severe aortic stenosis with a mean gradient of 38 mmHg across the valve and aortic valve area of 0.8 cm². Compared to the echocardiogram of October 14, 2019 there was really little interval change with a mean gradient being 36 mmHg at that time.     He comes in today dates she is doing reasonably well, however he has heavy hard since he lost his wife some 16 months ago and is 59-year-old daughter to double pneumonia within the past month. .  He is having some shortness of breath with exertion which is a chronic problem from his COPD and is really not sure it has gotten any worse. He denies any other cardiovascular complaints. He continues to work full-time in [employer] and [address] Encounter Diagnoses   Name Primary?  Aortic valve stenosis, moderately severe to severe Yes    Atherosclerosis of native coronary artery of native heart without angina pectoris     Hypertensive heart disease         Discussion: This patient appears to be doing fairly well. He does have moderately severe to severe aortic stenosis by echocardiographic criteria and is possibly symptomatic although it is difficult to tell since he is always a little short of breath from his COPD. We again discussed referring him to Dr. Lissett Camejo, but the patient remains a little reluctant to get further testing since he has no insurance so we may need to check with the hospital systems about how we could possibly help this gentleman to afford the TAVR surgery which he will certainly need in the next several months. His recent lipid profile completed on July 17, 2020 was really excellent with total cholesterol 120, triglycerides 122, HDL of 35, LDL of 60.6, and VLDL of 24.4 which I think is very good control on Crestor 20 mg daily. His blood pressure is well controlled today and he otherwise seems to be doing well, so I am going to refer him to Dr. Janeth Jones to be seen early next year for evaluation. He may remain with Dr. Janeth Jones long-term but if he wishes to follow-up in our practice I will have him see Dr. Adam Gross in the next 4-6 months since I will be retiring at the end of next month.     PCP: Yesy Bagley MD      Past Medical History:   Diagnosis Date    Acute coronary syndrome [facility])     with non-ST-elevation myocardial infarction, s/p direct angioplasty of anomolous circumflex marginal coming off the right coronary cusp on 1/26/06    Arthritis     in hands    ASHD (arteriosclerotic heart disease)     Benign hypertensive heart disease without heart failure     CAD (coronary artery disease)     chronic underlying    Calculus of kidney     Cardiac cath 01/26/2006    LM mild. RCA mild. LAD tortuous. CX mild. CX angela 100%. S/P balloon angioplasty of CX angela. Residual 40%. LVEDP 20.  EF 65%.  Cardiac nuclear imaging test 08/30/2006    No ischemia or infarct. EF 59%. Negative max. stress test.    Chronic cough     coughing up sputum    Chronic lung disease     COPD (chronic obstructive pulmonary disease) ([facility])     moderate to severe    Coronary artery disease     Diabetes ([address]     Diabetes mellitus [facility] [address]     GARCIA (dyspnea on exertion)     GERD (gastroesophageal reflux disease)     H/O: pneumonia     Heartburn     Hypertension     essential systemic    Indigestion     Pneumonia 8/12    \"walking\"    Pure hypercholesterolemia     Tobacco abuse          Past Surgical History:   Procedure Laterality Date    HX HEART CATHETERIZATION  01/26/06    HX ORTHOPAEDIC  1989    fracture of bilateral hands     HX PTCA      of anomalous circumflex marginal artery    TONGUE AND MOUTH SURG UNLISTED  04/04    tongue surgery    TONGUE TO LIP SURGERY  04/04     Current Outpatient Medications   Medication Sig    gabapentin (NEURONTIN) 300 mg capsule Take 2 Caps by mouth three (3) times daily. Max Daily Amount: 1,800 mg.    rosuvastatin (CRESTOR) 20 mg tablet TAKE ONE TABLET BY MOUTH NIGHTLY    metoprolol succinate (TOPROL-XL) 50 mg XL tablet TAKE 1 TABLET BY MOUTH ONCE DAILY    losartan (COZAAR) 100 mg tablet Take 1 Tab by mouth daily.  metFORMIN (GLUCOPHAGE) 1,000 mg tablet TAKE 1 TABLET BY MOUTH TWO TIMES A DAY WITH MEALS (Patient taking differently: 1,000 mg daily.)    omeprazole (PRILOSEC) 20 mg capsule Take 40 mg by mouth daily.     nitroglycerin (NITROSTAT) 0.4 mg SL tablet 1 Tab by SubLINGual route every five (5) minutes as needed for Chest Pain.  aspirin 81 mg tablet Take 81 mg by mouth daily.  coenzyme q10 (CO Q-10) 150 mg cap Take  by mouth.  multivitamin (ONE A DAY) tablet Take 1 Tab by mouth daily. No current facility-administered medications for this visit. Allergies   Allergen Reactions    Altace [Ramipril] Cough       Social History:   Social History     Tobacco Use    Smoking status: Former Smoker     Packs/day: 1.25     Years: 20.00     Pack years: 25.00     Types: Cigarettes     Last attempt to quit: 2014     Years since quittin.9    Smokeless tobacco: Never Used   Substance Use Topics    Alcohol use: Yes     Comment: on special occasions only           Family history: family history includes Asthma in his father; COPD in his mother; Cancer in his father; Colon Polyps in his father. Review of Systems:    Constitutional: Negative. Respiratory: Negative. Cardiovascular: Negative. Gastrointestinal: Negative. Musculoskeletal: Negative. Neurological: Negative for dizziness. Physical Exam:   The patient is an alert, oriented, well developed, well nourished 63 y.o.  male who was in no acute distress at the time of my examination. Visit Vitals  /82   Pulse 91   Ht 5' 9\" (1.753 m)   Wt 82.6 kg (182 lb)   SpO2 97%   BMI 26.88 kg/m²        BP Readings from Last 3 Encounters:   20 120/82   20 132/80   20 110/76        Wt Readings from Last 3 Encounters:   20 82.6 kg (182 lb)   20 81.2 kg (179 lb)   20 80.3 kg (177 lb)       HEENT: Conjunctivae white, mucosa moist, no pallor or cyanosis. Neck: Supple without masses, tenderness or thyromegaly. No jugular venous distention. Carotid upstrokes are full bilaterally with soft bilateral bruits vs radiation of murmur from the heart. Cardiovascular: Chest is symmetrical with good excursion. [place] is not displaced. No lifts, heaves or thrills.  Normal S1 and a markedly decreased S2 with a grade III/VI systolic ejection murmur along left sternal border but best in the aortic area without diastolic diastolic murmurs, rubs, clicks, or gallops. .   Lungs: Moderate to severe decreased breath sounds throughout with a some diffuse expiratory wheezes throughout. Abdomen: Soft. No masses, tenderness or organomegaly. Extremities: Trace edema with 1 + peripheral pulses. Review of Data:  See PMH and Cardiology and Imaging sections for cardiac testing      Results for orders placed or performed in visit on 11/25/20   AMB POC EKG ROUTINE W/ 12 LEADS, INTER & REP     Status: None    Narrative    Normal sinus rhythm rate 91. Early R wave transition anteriorly. Complete right bundle branch block. Paired to the EKG of August 11, 2020 there was little interval change. Charles Dejesus D.O., F.A.C.C. [facility]  [facility]  [address] [place] [address]. [address] [place] [address]  [phone]    PLEASE NOTE:  This document has been produced using voice recognition software. Unrecognized errors in transcription may be present.

## 2020-12-08 RX ORDER — METFORMIN HYDROCHLORIDE 1000 MG/1
TABLET ORAL
Qty: 180 TAB | Refills: 1 | Status: SHIPPED | OUTPATIENT
Start: 2020-12-08 | End: 2021-10-04 | Stop reason: SDUPTHER

## 2020-12-08 NOTE — TELEPHONE ENCOUNTER
Last Visit: 11/24/20 with MD Tello  Next Appointment: 3/25/21 with MD Tello  Previous Refill Encounter(s): 11/4/19 #60 with 5 refills    Requested Prescriptions     Pending Prescriptions Disp Refills    metFORMIN (GLUCOPHAGE) 1,000 mg tablet 180 Tab 1     Sig: TAKE 1 TABLET BY MOUTH TWO TIMES A DAY WITH MEALS

## 2021-03-19 ENCOUNTER — HOSPITAL ENCOUNTER (OUTPATIENT)
Dept: LAB | Age: 61
Discharge: HOME | End: 2021-03-19
Payer: MEDICAID

## 2021-03-19 ENCOUNTER — APPOINTMENT (OUTPATIENT)
Dept: INTERNAL MEDICINE CLINIC | Age: 61
End: 2021-03-19

## 2021-03-19 DIAGNOSIS — E78.5 DYSLIPIDEMIA: ICD-10-CM

## 2021-03-19 DIAGNOSIS — I10 ESSENTIAL HYPERTENSION: ICD-10-CM

## 2021-03-19 DIAGNOSIS — E11.42 TYPE 2 DIABETES MELLITUS WITH DIABETIC POLYNEUROPATHY, WITHOUT LONG-TERM CURRENT USE OF INSULIN (HCC): ICD-10-CM

## 2021-03-19 LAB
ALBUMIN SERPL-MCNC: 3.8 G/DL (ref 3.4–5)
ALBUMIN/GLOB SERPL: 1.2 {RATIO} (ref 0.8–1.7)
ALP SERPL-CCNC: 119 U/L (ref 45–117)
ALT SERPL-CCNC: 20 U/L (ref 16–61)
ANION GAP SERPL CALC-SCNC: 5 MMOL/L (ref 3–18)
AST SERPL-CCNC: 16 U/L (ref 10–38)
BILIRUB SERPL-MCNC: 0.6 MG/DL (ref 0.2–1)
BUN SERPL-MCNC: 20 MG/DL (ref 7–18)
BUN/CREAT SERPL: 16 (ref 12–20)
CALCIUM SERPL-MCNC: 9.2 MG/DL (ref 8.5–10.1)
CHLORIDE SERPL-SCNC: 106 MMOL/L (ref 100–111)
CHOLEST SERPL-MCNC: 174 MG/DL
CO2 SERPL-SCNC: 28 MMOL/L (ref 21–32)
CREAT SERPL-MCNC: 1.24 MG/DL (ref 0.6–1.3)
CREAT UR-MCNC: 73 MG/DL (ref 30–125)
GLOBULIN SER CALC-MCNC: 3.3 G/DL (ref 2–4)
GLUCOSE SERPL-MCNC: 163 MG/DL (ref 74–99)
HBA1C MFR BLD: 6.8 % (ref 4.2–5.6)
HDLC SERPL-MCNC: 38 MG/DL (ref 40–60)
HDLC SERPL: 4.6 {RATIO} (ref 0–5)
LDLC SERPL CALC-MCNC: 82.8 MG/DL (ref 0–100)
LIPID PROFILE,FLP: ABNORMAL
MICROALBUMIN UR-MCNC: 4.41 MG/DL (ref 0–3)
MICROALBUMIN/CREAT UR-RTO: 60 MG/G (ref 0–30)
POTASSIUM SERPL-SCNC: 3.9 MMOL/L (ref 3.5–5.5)
PROT SERPL-MCNC: 7.1 G/DL (ref 6.4–8.2)
SODIUM SERPL-SCNC: 139 MMOL/L (ref 136–145)
TRIGL SERPL-MCNC: 266 MG/DL (ref ?–150)
VLDLC SERPL CALC-MCNC: 53.2 MG/DL

## 2021-03-19 PROCEDURE — 36415 COLL VENOUS BLD VENIPUNCTURE: CPT

## 2021-03-19 PROCEDURE — 80061 LIPID PANEL: CPT

## 2021-03-19 PROCEDURE — 82043 UR ALBUMIN QUANTITATIVE: CPT

## 2021-03-19 PROCEDURE — 80053 COMPREHEN METABOLIC PANEL: CPT

## 2021-03-19 PROCEDURE — 83036 HEMOGLOBIN GLYCOSYLATED A1C: CPT

## 2021-03-25 ENCOUNTER — OFFICE VISIT (OUTPATIENT)
Dept: INTERNAL MEDICINE CLINIC | Age: 61
End: 2021-03-25
Payer: MEDICAID

## 2021-03-25 VITALS
BODY MASS INDEX: 26.81 KG/M2 | OXYGEN SATURATION: 96 % | TEMPERATURE: 98 F | RESPIRATION RATE: 20 BRPM | SYSTOLIC BLOOD PRESSURE: 112 MMHG | WEIGHT: 181 LBS | DIASTOLIC BLOOD PRESSURE: 74 MMHG | HEIGHT: 69 IN | HEART RATE: 86 BPM

## 2021-03-25 DIAGNOSIS — E78.5 DYSLIPIDEMIA: ICD-10-CM

## 2021-03-25 DIAGNOSIS — I10 ESSENTIAL HYPERTENSION: ICD-10-CM

## 2021-03-25 DIAGNOSIS — B35.1 FUNGAL INFECTION OF TOENAIL: ICD-10-CM

## 2021-03-25 DIAGNOSIS — Z12.5 PROSTATE CANCER SCREENING: ICD-10-CM

## 2021-03-25 DIAGNOSIS — E11.42 TYPE 2 DIABETES MELLITUS WITH DIABETIC POLYNEUROPATHY, WITHOUT LONG-TERM CURRENT USE OF INSULIN (HCC): Primary | ICD-10-CM

## 2021-03-25 PROCEDURE — 99214 OFFICE O/P EST MOD 30 MIN: CPT | Performed by: INTERNAL MEDICINE

## 2021-03-25 RX ORDER — TERBINAFINE HYDROCHLORIDE 250 MG/1
250 TABLET ORAL DAILY
Qty: 30 TAB | Refills: 2 | Status: SHIPPED | OUTPATIENT
Start: 2021-03-25 | End: 2021-08-04

## 2021-03-25 NOTE — PROGRESS NOTES
Subjective:       Chief Complaint  The patient presents for follow up of diabetes, hypertension and high cholesterol. HPI Frederick Sever is a 63 y.o. male seen for follow up of diabetes. Healso has hypertension and hyperlipidemia. Diabetes well control, no significant medication side effects noted, on metformin, pt has done well with weight loss. hypertension well controlled on Cozaar 100 mg and Toprol XL 50 mg, , hyperlipidemia well controlled, no significant medication side effects noted, on  Crestor 20 mg. Diet and Lifestyle: generally follows a low fat low cholesterol diet, sedentary    Home BP Monitoring: is not measured at home. Diabetic Review of Systems - home glucose monitoring: is performed sporadically. Other symptoms and concerns: COPD Review:  The patient is being seen for follow up of COPD. Oxygen: He currently is not on home oxygen therapy. Symptoms: chronic dyspnea: severity = moderate: course of sx: symptoms have progressed to a point and plateaued. .   Patient uses 1 pillows at night. Patient denies smoke cigarettes. He has to get free Inhalers from Pulmonary    Pt has chronic back pain and is on Neurontin 600 mg   QID. Pt not on narcotics in this office due to abnormal UDS. Patient has bilateral fungal infection in his big toenails for which she would like to try Lamisil. Current Outpatient Medications   Medication Sig    terbinafine HCL (LAMISIL) 250 mg tablet Take 1 Tab by mouth daily. Indications: fungal infection of toenail    gabapentin (NEURONTIN) 300 mg capsule Take 2 Caps by mouth three (3) times daily. Max Daily Amount: 1,800 mg.    metFORMIN (GLUCOPHAGE) 1,000 mg tablet TAKE 1 TABLET BY MOUTH TWO TIMES A DAY WITH MEALS    rosuvastatin (CRESTOR) 20 mg tablet TAKE ONE TABLET BY MOUTH NIGHTLY    metoprolol succinate (TOPROL-XL) 50 mg XL tablet TAKE 1 TABLET BY MOUTH ONCE DAILY    losartan (COZAAR) 100 mg tablet Take 1 Tab by mouth daily.     coenzyme q10 (CO Q-10) 150 mg cap Take  by mouth.  omeprazole (PRILOSEC) 20 mg capsule Take 40 mg by mouth daily.  nitroglycerin (NITROSTAT) 0.4 mg SL tablet 1 Tab by SubLINGual route every five (5) minutes as needed for Chest Pain.  aspirin 81 mg tablet Take 81 mg by mouth daily.  multivitamin (ONE A DAY) tablet Take 1 Tab by mouth daily. No current facility-administered medications for this visit.               Review of Systems  Respiratory: negative for dyspnea on exertion  Cardiovascular: negative for chest pain    Objective:     Visit Vitals  /74 (BP 1 Location: Left arm, BP Patient Position: Sitting, BP Cuff Size: Adult)   Pulse 86   Temp 98 °F (36.7 °C) (Temporal)   Resp 20   Ht 5' 9\" (1.753 m)   Wt 181 lb (82.1 kg)   SpO2 96%   BMI 26.73 kg/m²        Chest - clear to auscultation, no wheezes, rales or rhonchi, symmetric air entry  Heart - normal rate, regular rhythm, normal S1, S2, 2/6 systolic murmur RSB, rubs, clicks or gallops  Extremities - peripheral pulses normal, no pedal edema, no clubbing or cyanosis      Labs:   Lab Results   Component Value Date/Time    Hemoglobin A1c 6.8 (H) 03/19/2021 10:09 AM    Hemoglobin A1c 6.5 (H) 11/18/2020 08:57 AM    Hemoglobin A1c 7.3 (H) 07/17/2020 11:06 AM    Hemoglobin A1c, External 7.2 02/27/2016    Glucose 163 (H) 03/19/2021 10:09 AM    Microalbumin/Creat ratio (mg/g creat) 60 (H) 03/19/2021 10:09 AM    Microalbumin,urine random 4.41 (H) 03/19/2021 10:09 AM    LDL, calculated 82.8 03/19/2021 10:09 AM    Creatinine, POC 1.2 03/06/2017 04:09 PM    Creatinine 1.24 03/19/2021 10:09 AM      Lab Results   Component Value Date/Time    Prostate Specific Ag 0.7 08/19/2019 08:05 AM    Prostate Specific Ag 0.9 01/24/2017 04:30 PM     Lab Results   Component Value Date/Time    Sodium 139 03/19/2021 10:09 AM    Potassium 3.9 03/19/2021 10:09 AM    Chloride 106 03/19/2021 10:09 AM    CO2 28 03/19/2021 10:09 AM    Anion gap 5 03/19/2021 10:09 AM    Glucose 163 (H) 03/19/2021 10:09 AM    BUN 20 (H) 03/19/2021 10:09 AM    Creatinine 1.24 03/19/2021 10:09 AM    BUN/Creatinine ratio 16 03/19/2021 10:09 AM    GFR est AA >60 03/19/2021 10:09 AM    GFR est non-AA 59 (L) 03/19/2021 10:09 AM    Calcium 9.2 03/19/2021 10:09 AM    Bilirubin, total 0.6 03/19/2021 10:09 AM    ALT (SGPT) 20 03/19/2021 10:09 AM    Alk. phosphatase 119 (H) 03/19/2021 10:09 AM    Protein, total 7.1 03/19/2021 10:09 AM    Albumin 3.8 03/19/2021 10:09 AM    Globulin 3.3 03/19/2021 10:09 AM    A-G Ratio 1.2 03/19/2021 10:09 AM      Lab Results   Component Value Date/Time    Hemoglobin A1c 6.8 (H) 03/19/2021 10:09 AM    Hemoglobin A1c (POC) 6.5 12/16/2016 12:02 PM    Hemoglobin A1c, External 7.2 02/27/2016            Labs:   Lab Results   Component Value Date/Time    Cholesterol, total 174 03/19/2021 10:09 AM    HDL Cholesterol 38 (L) 03/19/2021 10:09 AM    LDL, calculated 82.8 03/19/2021 10:09 AM    LDL-C, External 132 02/27/2016    Triglyceride 266 (H) 03/19/2021 10:09 AM    CHOL/HDL Ratio 4.6 03/19/2021 10:09 AM       Labs:   Lab Results   Component Value Date/Time    Prostate Specific Ag 0.7 08/19/2019 08:05 AM    Prostate Specific Ag 0.9 01/24/2017 04:30 PM             Assessment / Plan     Diabetes well control, will continue  metformin 1 gm BID  Hypertension  Well controlled on Cozaar 100 mg and Toprol. Hyperlipidemia  well controlled on Crestor 10 mg daily. ICD-10-CM ICD-9-CM    1. Type 2 diabetes mellitus with diabetic polyneuropathy, without long-term current use of insulin (HCC)  E11.42 250.60 HEMOGLOBIN A1C W/O EAG     357.2    2. Essential hypertension  O56 084.6 METABOLIC PANEL, COMPREHENSIVE   3. Dyslipidemia  E78.5 272.4 LIPID PANEL   4. Fungal infection of toenail  B35.1 110.1 terbinafine HCL (LAMISIL) 250 mg tablet   5.  Prostate cancer screening  Z12.5 V76.44 PSA, DIAGNOSTIC (PROSTATE SPECIFIC AG)                  Diabetic issues reviewed with him: diabetic diet discussed in detail, and low cholesterol diet, weight control and daily exercise discussed. Follow-up and Dispositions    · Return in about 6 months (around 9/25/2021) for labs 1 week before. Reviewed plan of care. Patient has provided input and agrees with goals.

## 2021-03-25 NOTE — PROGRESS NOTES
Patient is in the office today for a 4 month follow up. Patient states the has bilateral foot pain 8/10 and he would like something for toenail fungus. 1. Have you been to the ER, urgent care clinic since your last visit? Hospitalized since your last visit? No      2. Have you seen or consulted any other health care providers outside of the [address] [place] [address] since your last visit? Include any pap smears or colon screening.  No

## 2021-03-25 NOTE — PATIENT INSTRUCTIONS
High Blood Pressure: Care Instructions Overview It's normal for blood pressure to go up and down throughout the day. But if it stays up, you have high blood pressure. Another name for high blood pressure is hypertension. Despite what a lot of people think, high blood pressure usually doesn't cause headaches or make you feel dizzy or lightheaded. It usually has no symptoms. But it does increase your risk of stroke, heart attack, and other problems. You and your doctor will talk about your risks of these problems based on your blood pressure. Your doctor will give you a goal for your blood pressure. Your goal will be based on your health and your age. Lifestyle changes, such as eating healthy and being active, are always important to help lower blood pressure. You might also take medicine to reach your blood pressure goal. 
Follow-up care is a key part of your treatment and safety. Be sure to make and go to all appointments, and call your doctor if you are having problems. It's also a good idea to know your test results and keep a list of the medicines you take. How can you care for yourself at home? Medical treatment · If you stop taking your medicine, your blood pressure will go back up. You may take one or more types of medicine to lower your blood pressure. Be safe with medicines. Take your medicine exactly as prescribed. Call your doctor if you think you are having a problem with your medicine. · Talk to your doctor before you start taking aspirin every day. Aspirin can help certain people lower their risk of a heart attack or stroke. But taking aspirin isn't right for everyone, because it can cause serious bleeding. · See your doctor regularly. You may need to see the doctor more often at first or until your blood pressure comes down. · If you are taking blood pressure medicine, talk to your doctor before you take decongestants or anti-inflammatory medicine, such as ibuprofen.  Some of these medicines can raise blood pressure. · Learn how to check your blood pressure at home. Lifestyle changes · Stay at a healthy weight. This is especially important if you put on weight around the waist. Losing even 10 pounds can help you lower your blood pressure. · If your doctor recommends it, get more exercise. Walking is a good choice. Bit by bit, increase the amount you walk every day. Try for at least 30 minutes on most days of the week. You also may want to swim, bike, or do other activities. · Avoid or limit alcohol. Talk to your doctor about whether you can drink any alcohol. · Try to limit how much sodium you eat to less than 2,300 milligrams (mg) a day. Your doctor may ask you to try to eat less than 1,500 mg a day. · Eat plenty of fruits (such as bananas and oranges), vegetables, legumes, whole grains, and low-fat dairy products. · Lower the amount of saturated fat in your diet. Saturated fat is found in animal products such as milk, cheese, and meat. Limiting these foods may help you lose weight and also lower your risk for heart disease. · Do not smoke. Smoking increases your risk for heart attack and stroke. If you need help quitting, talk to your doctor about stop-smoking programs and medicines. These can increase your chances of quitting for good. When should you call for help? Call  911 anytime you think you may need emergency care. This may mean having symptoms that suggest that your blood pressure is causing a serious heart or blood vessel problem. Your blood pressure may be over 180/120. For example, call 911 if: 
  · You have symptoms of a heart attack. These may include: 
? Chest pain or pressure, or a strange feeling in the chest. 
? Sweating. ? Shortness of breath. ? Nausea or vomiting. ? Pain, pressure, or a strange feeling in the back, neck, jaw, or upper belly or in one or both shoulders or arms. ? Lightheadedness or sudden weakness.  
? A fast or irregular heartbeat.  
  · You have symptoms of a stroke. These may include: 
? Sudden numbness, tingling, weakness, or loss of movement in your face, arm, or leg, especially on only one side of your body. ? Sudden vision changes. ? Sudden trouble speaking. ? Sudden confusion or trouble understanding simple statements. ? Sudden problems with walking or balance. ? A sudden, severe headache that is different from past headaches.  
  · You have severe back or belly pain. Do not wait until your blood pressure comes down on its own. Get help right away. Call your doctor now or seek immediate care if: 
  · Your blood pressure is much higher than normal (such as 180/120 or higher), but you don't have symptoms.  
  · You think high blood pressure is causing symptoms, such as: 
? Severe headache. 
? Blurry vision. Watch closely for changes in your health, and be sure to contact your doctor if: 
  · Your blood pressure measures higher than your doctor recommends at least 2 times. That means the top number is higher or the bottom number is higher, or both.  
  · You think you may be having side effects from your blood pressure medicine. Where can you learn more? Go to http://www.gray.com/ Enter K933 in the search box to learn more about \"High Blood Pressure: Care Instructions. \" Current as of: December 16, 2019               Content Version: 12.6 © 7995-1341 [employer]. Care instructions adapted under license by [employer] (which disclaims liability or warranty for this information). If you have questions about a medical condition or this instruction, always ask your healthcare professional. [place] [address] any warranty or liability for your use of this information.

## 2021-04-05 ENCOUNTER — OFFICE VISIT (OUTPATIENT)
Dept: CARDIOLOGY CLINIC | Age: 61
End: 2021-04-05
Payer: MEDICAID

## 2021-04-05 VITALS
OXYGEN SATURATION: 96 % | HEART RATE: 78 BPM | SYSTOLIC BLOOD PRESSURE: 130 MMHG | HEIGHT: 69 IN | WEIGHT: 186 LBS | BODY MASS INDEX: 27.55 KG/M2 | DIASTOLIC BLOOD PRESSURE: 82 MMHG

## 2021-04-05 DIAGNOSIS — I35.0 AORTIC VALVE STENOSIS, ETIOLOGY OF CARDIAC VALVE DISEASE UNSPECIFIED: Primary | ICD-10-CM

## 2021-04-05 PROCEDURE — 99214 OFFICE O/P EST MOD 30 MIN: CPT | Performed by: INTERNAL MEDICINE

## 2021-04-05 NOTE — PROGRESS NOTES
Faith Pagan presents today for   Chief Complaint   Patient presents with    Follow-up     5 month f/u       Faith Pagan preferred language for health care discussion is english/other. Is someone accompanying this pt? no    Is the patient using any DME equipment during [address]? no    Depression Screening:  3 most recent PHQ Screens 4/5/2021   Little interest or pleasure in doing things Not at all   Feeling down, depressed, irritable, or hopeless Not at all   Total Score PHQ 2 0       Learning Assessment:  Learning Assessment 4/5/2021   PRIMARY LEARNER Patient   HIGHEST LEVEL OF EDUCATION - PRIMARY LEARNER  -   BARRIERS PRIMARY LEARNER -   PRIMARY LANGUAGE ENGLISH   LEARNER PREFERENCE PRIMARY DEMONSTRATION     -   ANSWERED BY patient   RELATIONSHIP SELF       Abuse Screening:  Abuse Screening Questionnaire 4/5/2021   Do you ever feel afraid of your partner? N   Are you in a relationship with someone who physically or mentally threatens you? N   Is it safe for you to go home? Y       Fall Risk  No flowsheet data found. Pt currently taking Anticoagulant therapy? ASA 81mg    Coordination of Care:  1. Have you been to the ER, urgent care clinic since your last visit? Hospitalized since your last visit? no    2. Have you seen or consulted any other health care providers outside of the [address] [place] [address] since your last visit? Include any pap smears or colon screening.  no

## 2021-04-11 NOTE — PROGRESS NOTES
Richard Ly is in the office today for cardiovascular evaluation for his chronic coronary artery disease and aortic valve disease.  He was admitted to [facility] with chest tightness/acute coronary syndrome in January, 2006. 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 This was ballooned but not stented. There was a residual stenosis of 40% with JB grade 3 flow following the procedure. Left ventricular function at that time was normal with an ejection fraction in the 60%.     He has done well on medical therapy over the years without any recurrent cardiovascular issues. He continued smoking and  developed moderately severe COPD with chronic shortness of breath. He has not smoked for many years now. He also has aortic stenosis with an echo in  August 2020 that demonstrated normal left ventricular systolic function  and moderately severe to severe aortic stenosis with a mean gradient of 38 mmHg across the valve and aortic valve area of 0.8 cm². Compared to the echocardiogram of October 14, 2019 there is minimal change with a mean gradient being 36 mmHg at that time. He had a cardiac catheterization done on January 22, 2021. He had a widely patent LAD and proximal OM/ramus and right coronary artery. 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 He had a CT CTA of the chest abdomen and pelvis  in preparation for possible TAVR    In the office today, he reports he feels \"good \". He does experience dyspnea with such activities walking up a flight of steps. He has had no chest pain. He has had no PND. He does have some peripheral swelling. No diagnosis found. We will plan to see him back in 4 months.   We will need to check and see if he has a appointment to see Dr. Constantine Faulkner  PCP: Alford Bosworth, MD      Past Medical History:   Diagnosis Date    Acute coronary syndrome 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 RCA mild. LAD tortuous. CX mild. CX angela 100%. S/P balloon angioplasty of CX angela. Residual 40%. LVEDP 20.  EF 65%.  Cardiac nuclear imaging test 08/30/2006    No ischemia or infarct. EF 59%. Negative max. stress test.    Chronic cough     coughing up sputum    Chronic lung disease     COPD [facility]chronic obstructive pulmonary disease) ([facility])     moderate to severe    Coronary artery disease     Diabetes ([address]     Diabetes mellitus ([address]     GARCIA (dyspnea on exertion)     GERD (gastroesophageal reflux disease)     H/O: pneumonia     Heartburn     Hypertension     essential systemic    Indigestion     Pneumonia 8/12    \"walking\"    Pure hypercholesterolemia     Tobacco abuse          Past Surgical History:   Procedure Laterality Date    HX HEART CATHETERIZATION  01/26/06    HX ORTHOPAEDIC  1989    fracture of bilateral hands     HX PTCA      of anomalous circumflex marginal artery    [place] TONGUE AND MOUTH SURG UNLISTED  04/04    tongue surgery    [place] TONGUE TO LIP SURGERY  04/04     Current Outpatient Medications   Medication Sig    terbinafine HCL (LAMISIL) 250 mg tablet Take 1 Tab by mouth daily. Indications: fungal infection of toenail    gabapentin (NEURONTIN) 300 mg capsule Take 2 Caps by mouth three (3) times daily.  Max Daily Amount: 1,800 mg.    metFORMIN (GLUCOPHAGE) 1,000 mg tablet TAKE 1 TABLET BY MOUTH TWO TIMES A DAY WITH MEALS    rosuvastatin (CRESTOR) 20 mg tablet TAKE ONE TABLET BY MOUTH NIGHTLY    metoprolol succinate (TOPROL-XL) 50 mg XL tablet TAKE 1 TABLET BY MOUTH ONCE DAILY    losartan (COZAAR) 100 mg tablet Take 1 Tab by mouth daily.  coenzyme q10 (CO Q-10) 150 mg cap Take  by mouth.  multivitamin (ONE A DAY) tablet Take 1 Tab by mouth daily.  omeprazole (PRILOSEC) 20 mg capsule Take 40 mg by mouth daily.  nitroglycerin (NITROSTAT) 0.4 mg SL tablet 1 Tab by SubLINGual route every five (5) minutes as needed for Chest Pain.  aspirin 81 mg tablet Take 81 mg by mouth daily. No current facility-administered medications for this visit. Allergies   Allergen Reactions    Altace [Ramipril] Cough       Social History:   Social History     Tobacco Use    Smoking status: Former Smoker     Packs/day: 1.25     Years: 20.00     Pack years: 25.00     Types: Cigarettes     Quit date: 2014     Years since quittin.2    Smokeless tobacco: Never Used   Substance Use Topics    Alcohol use: Yes     Comment: on special occasions only           Family history: family history includes Asthma in his father; COPD in his mother; Cancer in his father; Colon Polyps in his father. Review of Systems:    Constitutional: Negative. Respiratory: Negative. Cardiovascular: Negative. Gastrointestinal: Negative. Musculoskeletal: Negative. Neurological: Negative for dizziness. Physical Exam:   The patient is an alert and oriented  Visit Vitals  /82 (BP 1 Location: Right upper arm, BP Patient Position: Sitting, BP Cuff Size: Adult)   Pulse 78   Ht 5' 9\" (1.753 m)   Wt 186 lb (84.4 kg)   SpO2 96%   BMI 27.47 kg/m²        BP Readings from Last 3 Encounters:   21 130/82   21 112/74   20 120/82        Wt Readings from Last 3 Encounters:   21 186 lb (84.4 kg)   21 181 lb (82.1 kg)   20 182 lb (82.6 kg)       HEENT: Conjunctivae white and mucosa moist   Neck: Supple without masses, tenderness or thyromegaly. No jugular venous distention.   Left carotid remarkable for transmitted aortic murmur  Cardiovascular: Chest is symmetrical with good excursion. . Normal S1 and a markedly decreased S2 with a grade III/VI and a crescendo/decrescendo systolic murmur    Lungs: ecreased breath sounds throughout   Abdomen: Soft. No masses, tenderness or organomegaly. Extremities: Trace edema with 1 + peripheral pulses. Review of Data:  See PMH and Cardiology and Imaging sections for cardiac testing      Results for orders placed or performed in visit on 01/19/2021   AMB POC EKG ROUTINE W/ 12 LEADS, INTER & REP     Status: None    Narrative    Normal sinus rhythm rate 99. Incomplete right bundle branch block. Andrew Dodson MD F.A.C.C. [facility] Specialists  [facility] [place]  [address] [place] [address]. [address] [facility]  [phone]    PLEASE NOTE:  This document has been produced using voice recognition software. Unrecognized errors in transcription may be present.

## 2021-06-22 RX ORDER — LOSARTAN POTASSIUM 100 MG/1
100 TABLET ORAL DAILY
Qty: 90 TABLET | Refills: 1 | Status: SHIPPED | OUTPATIENT
Start: 2021-06-22 | End: 2022-04-13

## 2021-06-22 NOTE — TELEPHONE ENCOUNTER
Last Visit: 3/25/21 with MD Tello  Next Appointment: 10/4/21 with MD Tello  Previous Refill Encounter(s): 3/24/20 #30 with 5 refills    Requested Prescriptions     Pending Prescriptions Disp Refills    losartan (COZAAR) 100 mg tablet 90 Tablet 1     Sig: Take 1 Tablet by mouth daily.

## 2021-07-31 RX ORDER — METOPROLOL SUCCINATE 50 MG/1
TABLET, EXTENDED RELEASE ORAL
Qty: 90 TABLET | Refills: 2 | Status: SHIPPED | OUTPATIENT
Start: 2021-07-31 | End: 2022-04-13

## 2021-08-04 DIAGNOSIS — B35.1 FUNGAL INFECTION OF TOENAIL: ICD-10-CM

## 2021-08-04 RX ORDER — TERBINAFINE HYDROCHLORIDE 250 MG/1
TABLET ORAL
Qty: 30 TABLET | Refills: 1 | Status: SHIPPED | OUTPATIENT
Start: 2021-08-04 | End: 2021-10-04 | Stop reason: SDUPTHER

## 2021-08-09 ENCOUNTER — OFFICE VISIT (OUTPATIENT)
Dept: CARDIOLOGY CLINIC | Age: 61
End: 2021-08-09
Payer: MEDICAID

## 2021-08-09 VITALS
SYSTOLIC BLOOD PRESSURE: 132 MMHG | OXYGEN SATURATION: 90 % | HEIGHT: 69 IN | BODY MASS INDEX: 27.11 KG/M2 | WEIGHT: 183 LBS | HEART RATE: 63 BPM | DIASTOLIC BLOOD PRESSURE: 86 MMHG

## 2021-08-09 DIAGNOSIS — I35.0 AORTIC VALVE STENOSIS, ETIOLOGY OF CARDIAC VALVE DISEASE UNSPECIFIED: Primary | ICD-10-CM

## 2021-08-09 PROCEDURE — 99214 OFFICE O/P EST MOD 30 MIN: CPT | Performed by: INTERNAL MEDICINE

## 2021-08-09 RX ORDER — CLOPIDOGREL BISULFATE 75 MG/1
75 TABLET ORAL DAILY
COMMUNITY
Start: 2021-04-30 | End: 2021-10-04 | Stop reason: SDUPTHER

## 2021-08-09 NOTE — PROGRESS NOTES
Shay Astudillo presents today for   Chief Complaint   Patient presents with    Follow-up     4 month follow up        Day Wilda preferred language for health care discussion is english/other. Is someone accompanying this pt? no    Is the patient using any DME equipment during [address]? no    Depression Screening:  3 most recent PHQ Screens 8/9/2021   Little interest or pleasure in doing things Not at all   Feeling down, depressed, irritable, or hopeless Not at all   Total Score PHQ 2 0       Learning Assessment:  Learning Assessment 4/5/2021   PRIMARY LEARNER Patient   HIGHEST LEVEL OF EDUCATION - PRIMARY LEARNER  -   BARRIERS PRIMARY LEARNER -   PRIMARY LANGUAGE ENGLISH   LEARNER PREFERENCE PRIMARY DEMONSTRATION     -   ANSWERED BY patient   RELATIONSHIP SELF       Abuse Screening:  Abuse Screening Questionnaire 8/9/2021   Do you ever feel afraid of your partner? N   Are you in a relationship with someone who physically or mentally threatens you? N   Is it safe for you to go home? Y       Fall Risk  No flowsheet data found. Pt currently taking Anticoagulant therapy? no    Coordination of Care:  1. Have you been to the ER, urgent care clinic since your last visit? Hospitalized since your last visit? no    2. Have you seen or consulted any other health care providers outside of the [address] [place] [address] since your last visit? Include any pap smears or colon screening.  no

## 2021-08-19 NOTE — PROGRESS NOTES
Diane Horton is in the office today for cardiovascular reevaluation for his chronic coronary artery disease and aortic valve disease.  He was admitted to [facility] with chest tightness/acute coronary syndrome in January, 2006. 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 This was ballooned but not stented. There was a residual stenosis of 40% with JB grade 3 flow following the procedure. Left ventricular function at that time was normal with an ejection fraction in the 60%.     He has done well on medical therapy over the years without any recurrent cardiovascular issues. He continued smoking and  developed moderately severe COPD with chronic shortness of breath. He has not smoked for many years now. He also has aortic stenosis with an echo in  August 2020 that demonstrated normal left ventricular systolic function  and moderately severe to severe aortic stenosis with a mean gradient of 38 mmHg across the valve and aortic valve area of 0.8 cm². Compared to the echocardiogram of October 14, 2019 there is minimal change with a mean gradient being 36 mmHg at that time. He had a cardiac catheterization done on January 22, 2021. He had a widely patent LAD and proximal OM/ramus and right coronary artery. 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 He had a CT CTA of the chest abdomen and pelvis  in preparation for possible TAVR    In the office today, he reports that he did have his TAVR on 4/28/2021. He reports he is \"feeling good \". He has had mild shortness of breath but he is not nearly as short of breath as he was in the past.  He has had mild peripheral swelling. He has had no chest pain. No diagnosis found. We will plan to see him back in 6 months. Continue present cardiac Rx.   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 RCA mild. LAD tortuous. CX mild. CX angela 100%. S/P balloon angioplasty of CX angela. Residual 40%. LVEDP 20.  EF 65%.  Cardiac nuclear imaging test 08/30/2006    No ischemia or infarct. EF 59%. Negative max. stress test.    Chronic cough     coughing up sputum    Chronic lung disease     COPD [facility]ch[facility] obstructive pulmonary disease) ([facility])     moderate to severe    Coronary artery disease     Diabetes ([address]     Diabetes mellitus ([address]     GARCIA (dyspnea on exertion)     GERD (gastroesophageal reflux disease)     H/O: pneumonia     Heartburn     Hypertension     essential systemic    Indigestion     Pneumonia 8/12    \"walking\"    Pure hypercholesterolemia     Tobacco abuse          Past Surgical History:   Procedure Laterality Date    HX HEART CATHETERIZATION  01/26/06    HX ORTHOPAEDIC  1989    fracture of bilateral hands     HX PTCA      of anomalous circumflex marginal artery    [place] TONGUE AND MOUTH SURG UNLISTED  04/04    tongue surgery    [place] TONGUE TO LIP SURGERY  04/04     Current Outpatient Medications   Medication Sig    clopidogreL (PLAVIX) 75 mg tab Take 75 mg by mouth daily.  terbinafine HCL (LAMISIL) 250 mg tablet TAKE 1 TABLET BY MOUTH ONCE DAILY    metoprolol succinate (TOPROL-XL) 50 mg XL tablet TAKE 1 TABLET BY MOUTH ONCE DAILY    losartan (COZAAR) 100 mg tablet Take 1 Tablet by mouth daily.     rosuvastatin (CRESTOR) 20 mg tablet TAKE ONE TABLET BY MOUTH NIGHTLY    gabapentin (NEURONTIN) 300 mg capsule Take 2 Caps by mouth three (3) times daily. Max Daily Amount: 1,800 mg.    metFORMIN (GLUCOPHAGE) 1,000 mg tablet TAKE 1 TABLET BY MOUTH TWO TIMES A DAY WITH MEALS    coenzyme q10 (CO Q-10) 150 mg cap Take  by mouth.  multivitamin (ONE A DAY) tablet Take 1 Tab by mouth daily.  omeprazole (PRILOSEC) 20 mg capsule Take 40 mg by mouth daily.  nitroglycerin (NITROSTAT) 0.4 mg SL tablet 1 Tab by SubLINGual route every five (5) minutes as needed for Chest Pain.  aspirin 81 mg tablet Take 81 mg by mouth daily. No current facility-administered medications for this visit. Allergies   Allergen Reactions    Altace [Ramipril] Cough       Social History:   Social History     Tobacco Use    Smoking status: Former Smoker     Packs/day: 1.25     Years: 20.00     Pack years: 25.00     Types: Cigarettes     Quit date: 2014     Years since quittin.6    Smokeless tobacco: Never Used   Substance Use Topics    Alcohol use: Yes     Comment: on special occasions only           Family history: family history includes Asthma in his father; COPD in his mother; Cancer in his father; Colon Polyps in his father. Review of Systems:    Constitutional: Negative. Respiratory: Negative. Cardiovascular: Negative. Gastrointestinal: Negative. Musculoskeletal: Negative. Neurological: Negative for dizziness. Physical Exam:   The patient is an alert and oriented  Visit Vitals  /86 (BP 1 Location: Left upper arm, BP Patient Position: Sitting, BP Cuff Size: Adult)   Pulse 63   Ht 5' 9\" (1.753 m)   Wt 83 kg (183 lb)   SpO2 90%   BMI 27.02 kg/m²        BP Readings from Last 3 Encounters:   21 132/86   21 130/82   21 112/74        Wt Readings from Last 3 Encounters:   21 83 kg (183 lb)   21 84.4 kg (186 lb)   21 82.1 kg (181 lb)       HEENT: Conjunctivae white and mucosa moist   Neck: Supple without masses, tenderness or thyromegaly.   No jugular venous distention. Left carotid remarkable for transmitted aortic murmur  Cardiovascular: Chest is symmetrical with good excursion. . Normal S1 and a markedly decreased S2 with a grade III/VI and a crescendo/decrescendo systolic murmur    Lungs: ecreased breath sounds throughout   Abdomen: Soft. No masses, tenderness or organomegaly. Extremities: Trace edema with 1 + peripheral pulses. Review of Data:  See PMH and Cardiology and Imaging sections for cardiac testing      Results for orders placed or performed in visit on 01/19/2021   AMB POC EKG ROUTINE W/ 12 LEADS, INTER & REP     Status: None    Narrative    Normal sinus rhythm rate 99. Incomplete right bundle branch block. Aretha Dailey MD F.A.C.C. Cardiovascular Specialists  [facility] [place]  [address] [place] [address]. [address] [place] [address]  [phone]    PLEASE NOTE:  This document has been produced using voice recognition software. Unrecognized errors in transcription may be present.

## 2021-09-27 ENCOUNTER — HOSPITAL ENCOUNTER (OUTPATIENT)
Dept: LAB | Age: 61
Discharge: HOME | End: 2021-09-27

## 2021-09-27 ENCOUNTER — APPOINTMENT (OUTPATIENT)
Dept: INTERNAL MEDICINE CLINIC | Age: 61
End: 2021-09-27

## 2021-09-27 LAB — XX-LABCORP SPECIMEN COL,LCBCF: NORMAL

## 2021-09-27 PROCEDURE — 99001 SPECIMEN HANDLING PT-LAB: CPT

## 2021-09-29 LAB
ALBUMIN SERPL-MCNC: 4 G/DL (ref 3.8–4.8)
ALBUMIN/GLOB SERPL: 1.5 {RATIO} (ref 1.2–2.2)
ALP SERPL-CCNC: 115 IU/L (ref 44–121)
ALT SERPL-CCNC: 12 IU/L (ref 0–44)
AST SERPL-CCNC: 13 IU/L (ref 0–40)
BILIRUB SERPL-MCNC: <0.2 MG/DL (ref 0–1.2)
BUN SERPL-MCNC: 15 MG/DL (ref 8–27)
BUN/CREAT SERPL: 12 (ref 10–24)
CALCIUM SERPL-MCNC: 10.1 MG/DL (ref 8.6–10.2)
CHLORIDE SERPL-SCNC: 102 MMOL/L (ref 96–106)
CHOLEST SERPL-MCNC: 194 MG/DL (ref 100–199)
CO2 SERPL-SCNC: 28 MMOL/L (ref 20–29)
CREAT SERPL-MCNC: 1.22 MG/DL (ref 0.76–1.27)
GLOBULIN SER CALC-MCNC: 2.7 G/DL (ref 1.5–4.5)
GLUCOSE SERPL-MCNC: 180 MG/DL (ref 65–99)
HBA1C MFR BLD: 7.5 % (ref 4.8–5.6)
HDLC SERPL-MCNC: 36 MG/DL
IMP & REVIEW OF LAB RESULTS: NORMAL
LDLC SERPL CALC-MCNC: 99 MG/DL (ref 0–99)
POTASSIUM SERPL-SCNC: 4.5 MMOL/L (ref 3.5–5.2)
PROT SERPL-MCNC: 6.7 G/DL (ref 6–8.5)
SODIUM SERPL-SCNC: 140 MMOL/L (ref 134–144)
TRIGL SERPL-MCNC: 347 MG/DL (ref 0–149)
VLDLC SERPL CALC-MCNC: 59 MG/DL (ref 5–40)

## 2021-10-04 ENCOUNTER — OFFICE VISIT (OUTPATIENT)
Dept: INTERNAL MEDICINE CLINIC | Age: 61
End: 2021-10-04
Payer: MEDICAID

## 2021-10-04 VITALS
HEIGHT: 69 IN | SYSTOLIC BLOOD PRESSURE: 129 MMHG | DIASTOLIC BLOOD PRESSURE: 77 MMHG | HEART RATE: 85 BPM | TEMPERATURE: 97.8 F | OXYGEN SATURATION: 98 % | BODY MASS INDEX: 26.96 KG/M2 | WEIGHT: 182 LBS | RESPIRATION RATE: 20 BRPM

## 2021-10-04 DIAGNOSIS — Z12.5 PROSTATE CANCER SCREENING: ICD-10-CM

## 2021-10-04 DIAGNOSIS — E11.42 TYPE 2 DIABETES MELLITUS WITH DIABETIC POLYNEUROPATHY, WITHOUT LONG-TERM CURRENT USE OF INSULIN (HCC): Primary | ICD-10-CM

## 2021-10-04 DIAGNOSIS — B35.1 FUNGAL INFECTION OF TOENAIL: ICD-10-CM

## 2021-10-04 DIAGNOSIS — E78.5 DYSLIPIDEMIA: ICD-10-CM

## 2021-10-04 DIAGNOSIS — K40.90 NON-RECURRENT UNILATERAL INGUINAL HERNIA WITHOUT OBSTRUCTION OR GANGRENE: ICD-10-CM

## 2021-10-04 DIAGNOSIS — M25.521 RIGHT ELBOW PAIN: ICD-10-CM

## 2021-10-04 DIAGNOSIS — I10 ESSENTIAL HYPERTENSION: ICD-10-CM

## 2021-10-04 PROCEDURE — 3051F HG A1C>EQUAL 7.0%<8.0%: CPT | Performed by: INTERNAL MEDICINE

## 2021-10-04 PROCEDURE — 99214 OFFICE O/P EST MOD 30 MIN: CPT | Performed by: INTERNAL MEDICINE

## 2021-10-04 RX ORDER — ROSUVASTATIN CALCIUM 20 MG/1
20 TABLET, COATED ORAL
Qty: 90 TABLET | Refills: 1 | Status: SHIPPED | OUTPATIENT
Start: 2021-10-04 | End: 2022-04-13

## 2021-10-04 RX ORDER — METFORMIN HYDROCHLORIDE 1000 MG/1
TABLET ORAL
Qty: 180 TABLET | Refills: 1 | Status: SHIPPED | OUTPATIENT
Start: 2021-10-04 | End: 2021-11-19

## 2021-10-04 RX ORDER — CLOPIDOGREL BISULFATE 75 MG/1
75 TABLET ORAL DAILY
Qty: 90 TABLET | Refills: 1 | Status: SHIPPED | OUTPATIENT
Start: 2021-10-04 | End: 2022-04-13

## 2021-10-04 RX ORDER — TERBINAFINE HYDROCHLORIDE 250 MG/1
250 TABLET ORAL DAILY
Qty: 30 TABLET | Refills: 1 | Status: SHIPPED | OUTPATIENT
Start: 2021-10-04 | End: 2022-01-12

## 2021-10-04 NOTE — PROGRESS NOTES
Subjective:       Chief Complaint  The patient presents for follow up of diabetes, hypertension and high cholesterol. HPI  Sunday Angeles is a 63 y.o. male seen for follow up of diabetes. Healso has hypertension and hyperlipidemia. Diabetes uncontrol, no significant medication side effects noted, on metformin 1 gm every day , pt did well with weight loss in the past but has regained weight due to eating more starches and sugar recently. hypertension well controlled on Cozaar 100 mg and Toprol XL 50 mg, , hyperlipidemia borderline controlled, no significant medication side effects noted, on  Crestor 20 mg. Would like to get his LDL less than 70 with history of coronary artery disease. Diet and Lifestyle: generally follows a low fat low cholesterol diet, sedentary    Home BP Monitoring: is not measured at home. Diabetic Review of Systems - home glucose monitoring: is performed sporadically. Other symptoms and concerns: COPD Review:  The patient is being seen for follow up of COPD. Oxygen: He currently is not on home oxygen therapy. Symptoms: chronic dyspnea: severity = moderate: course of sx: symptoms have progressed to a point and plateaued. .   Patient uses 1 pillows at night. Patient denies smoke cigarettes. He has to get free Inhalers from Pulmonary    Pt has chronic back pain and is on Neurontin 600 mg   QID. Pt not on narcotics in this office due to abnormal UDS in the past..       Patient has bilateral fungal infection in his big toenails for which he continues on Lamisil. Patient has been having a progressive left inguinal hernia for which we will go ahead and refer him to surgery for further evaluation. Patient also has bilateral elbow tendinitis for which she may benefit from cortisone injection. Patient has a history of coronary artery disease for which he is followed closely by cardiology.   He also had a TAVR done 4/28/2021 at the [facility].      Current Outpatient Medications   Medication Sig    terbinafine HCL (LAMISIL) 250 mg tablet Take 1 Tablet by mouth daily.  clopidogreL (PLAVIX) 75 mg tab Take 1 Tablet by mouth daily.  rosuvastatin (CRESTOR) 20 mg tablet Take 1 Tablet by mouth nightly.  metFORMIN (GLUCOPHAGE) 1,000 mg tablet TAKE 1 TABLET BY MOUTH TWO TIMES A DAY WITH MEALS    metoprolol succinate (TOPROL-XL) 50 mg XL tablet TAKE 1 TABLET BY MOUTH ONCE DAILY    losartan (COZAAR) 100 mg tablet Take 1 Tablet by mouth daily.  gabapentin (NEURONTIN) 300 mg capsule Take 2 Caps by mouth three (3) times daily. Max Daily Amount: 1,800 mg.    omeprazole (PRILOSEC) 20 mg capsule Take 40 mg by mouth daily.  nitroglycerin (NITROSTAT) 0.4 mg SL tablet 1 Tab by SubLINGual route every five (5) minutes as needed for Chest Pain.  aspirin 81 mg tablet Take 81 mg by mouth daily.  coenzyme q10 (CO Q-10) 150 mg cap Take  by mouth. (Patient not taking: Reported on 10/4/2021)    multivitamin (ONE A DAY) tablet Take 1 Tab by mouth daily. (Patient not taking: Reported on 10/4/2021)     No current facility-administered medications for this visit.              Review of Systems  Respiratory: negative for dyspnea on exertion  Cardiovascular: negative for chest pain    Objective:     Visit Vitals  /77 (BP 1 Location: Left arm, BP Patient Position: Sitting, BP Cuff Size: Adult)   Pulse 85   Temp 97.8 °F (36.6 °C) (Temporal)   Resp 20   Ht 5' 9\" (1.753 m)   Wt 182 lb (82.6 kg)   SpO2 98%   BMI 26.88 kg/m²        Chest - clear to auscultation, no wheezes, rales or rhonchi, symmetric air entry  Heart - normal rate, regular rhythm, normal S1, S2, 2/6 systolic murmur RSB, rubs, clicks or gallops  Extremities - peripheral pulses normal, no pedal edema, no clubbing or cyanosis      Labs:   Lab Results   Component Value Date/Time    Hemoglobin A1c 7.5 (H) 09/27/2021 08:35 AM    Hemoglobin A1c 6.8 (H) 03/19/2021 10:09 AM    Hemoglobin A1c 6.5 (H) 11/18/2020 08:57 AM    Hemoglobin A1c, External 7.2 02/27/2016 12:00 AM    Glucose 180 (H) 09/27/2021 08:35 AM    Microalbumin/Creat ratio (mg/g creat) 60 (H) 03/19/2021 10:09 AM    Microalbumin,urine random 4.41 (H) 03/19/2021 10:09 AM    LDL, calculated 99 09/27/2021 08:35 AM    LDL, calculated 82.8 03/19/2021 10:09 AM    Creatinine, POC 1.2 03/06/2017 04:09 PM    Creatinine 1.22 09/27/2021 08:35 AM      Lab Results   Component Value Date/Time    Prostate Specific Ag 0.7 08/19/2019 08:05 AM    Prostate Specific Ag 0.9 01/24/2017 04:30 PM     Lab Results   Component Value Date/Time    Sodium 140 09/27/2021 08:35 AM    Potassium 4.5 09/27/2021 08:35 AM    Chloride 102 09/27/2021 08:35 AM    CO2 28 09/27/2021 08:35 AM    Anion gap 5 03/19/2021 10:09 AM    Glucose 180 (H) 09/27/2021 08:35 AM    BUN 15 09/27/2021 08:35 AM    Creatinine 1.22 09/27/2021 08:35 AM    BUN/Creatinine ratio 12 09/27/2021 08:35 AM    GFR est AA 74 09/27/2021 08:35 AM    GFR est non-AA 64 09/27/2021 08:35 AM    Calcium 10.1 09/27/2021 08:35 AM    Bilirubin, total <0.2 09/27/2021 08:35 AM    ALT (SGPT) 12 09/27/2021 08:35 AM    Alk.  phosphatase 115 09/27/2021 08:35 AM    Protein, total 6.7 09/27/2021 08:35 AM    Albumin 4.0 09/27/2021 08:35 AM    Globulin 3.3 03/19/2021 10:09 AM    A-G Ratio 1.5 09/27/2021 08:35 AM      Lab Results   Component Value Date/Time    Hemoglobin A1c 7.5 (H) 09/27/2021 08:35 AM    Hemoglobin A1c (POC) 6.5 12/16/2016 12:02 PM    Hemoglobin A1c, External 7.2 02/27/2016 12:00 AM            Labs:   Lab Results   Component Value Date/Time    Cholesterol, total 194 09/27/2021 08:35 AM    HDL Cholesterol 36 (L) 09/27/2021 08:35 AM    LDL, calculated 99 09/27/2021 08:35 AM    LDL, calculated 82.8 03/19/2021 10:09 AM    LDL-C, External 132 02/27/2016 12:00 AM    Triglyceride 347 (H) 09/27/2021 08:35 AM    CHOL/HDL Ratio 4.6 03/19/2021 10:09 AM       Labs:   Lab Results   Component Value Date/Time    Prostate Specific Ag 0.7 08/19/2019 08:05 AM    Prostate Specific Ag 0.9 01/24/2017 04:30 PM             Assessment / Plan     Diabetes uncontrol, will continue  metformin 1 gm daily for now and try and decrease starches and sweets in his diet. Hypertension  Well controlled on Cozaar 100 mg and Toprol. Hyperlipidemia borderline l controlled on Crestor 20 mg daily. Would like to get his LDL less than 70 with history of coronary artery disease. ICD-10-CM ICD-9-CM    1. Type 2 diabetes mellitus with diabetic polyneuropathy, without long-term current use of insulin (HCC)  E11.42 250.60 HEMOGLOBIN A1C W/O EAG     357.2    2. Essential hypertension  B47 823.6 METABOLIC PANEL, COMPREHENSIVE   3. Dyslipidemia  E78.5 272.4 LIPID PANEL   4. Fungal infection of toenail  B35.1 110.1 terbinafine HCL (LAMISIL) 250 mg tablet   5. Non-recurrent unilateral inguinal hernia without obstruction or gangrene  K40.90 550.90 REFERRAL TO SURGERY   6. Right elbow pain  M25.521 719.42 REFERRAL TO ORTHOPEDICS   7. Prostate cancer screening  Z12.5 V76.44 PSA, DIAGNOSTIC (PROSTATE SPECIFIC AG)                  Diabetic issues reviewed with him: diabetic diet discussed in detail, and low cholesterol diet, weight control and daily exercise discussed. Follow-up and Dispositions    · Return in about 4 months (around 2/4/2022) for labs 1 week before. Reviewed plan of care. Patient has provided input and agrees with goals.

## 2021-10-04 NOTE — PATIENT INSTRUCTIONS
High Blood Pressure: Care Instructions  Overview     It's normal for blood pressure to go up and down throughout the day. But if it stays up, you have high blood pressure. Another name for high blood pressure is hypertension. Despite what a lot of people think, high blood pressure usually doesn't cause headaches or make you feel dizzy or lightheaded. It usually has no symptoms. But it does increase your risk of stroke, heart attack, and other problems. You and your doctor will talk about your risks of these problems based on your blood pressure. Your doctor will give you a goal for your blood pressure. Your goal will be based on your health and your age. Lifestyle changes, such as eating healthy and being active, are always important to help lower blood pressure. You might also take medicine to reach your blood pressure goal.  Follow-up care is a key part of your treatment and safety. Be sure to make and go to all appointments, and call your doctor if you are having problems. It's also a good idea to know your test results and keep a list of the medicines you take. How can you care for yourself at home? Medical treatment  · If you stop taking your medicine, your blood pressure will go back up. You may take one or more types of medicine to lower your blood pressure. Be safe with medicines. Take your medicine exactly as prescribed. Call your doctor if you think you are having a problem with your medicine. · Talk to your doctor before you start taking aspirin every day. Aspirin can help certain people lower their risk of a heart attack or stroke. But taking aspirin isn't right for everyone, because it can cause serious bleeding. · See your doctor regularly. You may need to see the doctor more often at first or until your blood pressure comes down. · If you are taking blood pressure medicine, talk to your doctor before you take decongestants or anti-inflammatory medicine, such as ibuprofen.  Some of these medicines can raise blood pressure. · Learn how to check your blood pressure at home. Lifestyle changes  · Stay at a healthy weight. This is especially important if you put on weight around the waist. Losing even 10 pounds can help you lower your blood pressure. · If your doctor recommends it, get more exercise. Walking is a good choice. Bit by bit, increase the amount you walk every day. Try for at least 30 minutes on most days of the week. You also may want to swim, bike, or do other activities. · Avoid or limit alcohol. Talk to your doctor about whether you can drink any alcohol. · Try to limit how much sodium you eat to less than 2,300 milligrams (mg) a day. Your doctor may ask you to try to eat less than 1,500 mg a day. · Eat plenty of fruits (such as bananas and oranges), vegetables, legumes, whole grains, and low-fat dairy products. · Lower the amount of saturated fat in your diet. Saturated fat is found in animal products such as milk, cheese, and meat. Limiting these foods may help you lose weight and also lower your risk for heart disease. · Do not smoke. Smoking increases your risk for heart attack and stroke. If you need help quitting, talk to your doctor about stop-smoking programs and medicines. These can increase your chances of quitting for good. When should you call for help? Call 911  anytime you think you may need emergency care. This may mean having symptoms that suggest that your blood pressure is causing a serious heart or blood vessel problem. Your blood pressure may be over 180/120. For example, call 911 if:    · You have symptoms of a heart attack. These may include:  ? Chest pain or pressure, or a strange feeling in the chest.  ? Sweating. ? Shortness of breath. ? Nausea or vomiting. ? Pain, pressure, or a strange feeling in the back, neck, jaw, or upper belly or in one or both shoulders or arms. ? Lightheadedness or sudden weakness.   ? A fast or irregular heartbeat.     · You have symptoms of a stroke. These may include:  ? Sudden numbness, tingling, weakness, or loss of movement in your face, arm, or leg, especially on only one side of your body. ? Sudden vision changes. ? Sudden trouble speaking. ? Sudden confusion or trouble understanding simple statements. ? Sudden problems with walking or balance. ? A sudden, severe headache that is different from past headaches.     · You have severe back or belly pain. Do not wait until your blood pressure comes down on its own. Get help right away. Call your doctor now or seek immediate care if:    · Your blood pressure is much higher than normal (such as 180/120 or higher), but you don't have symptoms.     · You think high blood pressure is causing symptoms, such as:  ? Severe headache.  ? Blurry vision. Watch closely for changes in your health, and be sure to contact your doctor if:    · Your blood pressure measures higher than your doctor recommends at least 2 times. That means the top number is higher or the bottom number is higher, or both.     · You think you may be having side effects from your blood pressure medicine. Where can you learn more? Go to http://www.gray.com/  Enter [id number] in the search box to learn more about \"High Blood Pressure: Care Instructions. \"  Current as of: April 29, 2021               Content Version: 13.0  © 2006-2021 [employer]. Care instructions adapted under license by [employer] (which disclaims liability or warranty for this information). If you have questions about a medical condition or this instruction, always ask your healthcare professional. [place] [address] any warranty or liability for your use of this information.

## 2021-10-04 NOTE — PROGRESS NOTES
Patient is in the office today for a 6 month follow up. 1. Have you been to the ER, urgent care clinic since your last visit? Hospitalized since your last visit? No    2. Have you seen or consulted any other health care providers outside of the [address] [place] [address] since your last visit? Include any pap smears or colon screening.  No

## 2021-10-22 ENCOUNTER — OFFICE VISIT (OUTPATIENT)
Dept: SURGERY | Age: 61
End: 2021-10-22
Payer: MEDICAID

## 2021-10-22 VITALS
HEART RATE: 73 BPM | DIASTOLIC BLOOD PRESSURE: 75 MMHG | TEMPERATURE: 97.4 F | HEIGHT: 69 IN | SYSTOLIC BLOOD PRESSURE: 123 MMHG | RESPIRATION RATE: 18 BRPM | BODY MASS INDEX: 27.11 KG/M2 | WEIGHT: 183 LBS | OXYGEN SATURATION: 95 %

## 2021-10-22 DIAGNOSIS — K40.90 RIGHT INGUINAL HERNIA: ICD-10-CM

## 2021-10-22 DIAGNOSIS — I86.1 LEFT VARICOCELE: Primary | ICD-10-CM

## 2021-10-22 DIAGNOSIS — K40.90 LEFT INGUINAL HERNIA: ICD-10-CM

## 2021-10-22 PROCEDURE — 99204 OFFICE O/P NEW MOD 45 MIN: CPT | Performed by: SURGERY

## 2021-10-22 NOTE — PATIENT INSTRUCTIONS
Inguinal Hernia Repair: Before Your Surgery  What is inguinal hernia repair? Inguinal hernia repair is a type of surgery. An inguinal hernia is a bulge under the skin in your groin. It happens when there is a weak spot in the groin muscle and a piece of the intestines or tissue pokes through the muscle. This can be painful. You may have pain when you're active. Or it may be painful when you strain during a bowel movement or lift something heavy. Surgery can help with your pain. It can also prevent serious problems that can happen if an organ or tissue gets stuck in the hernia. There are two ways to do this surgery. In open surgery, the doctor makes one cut near the hernia. This cut is called an incision. In laparoscopic surgery, the doctor makes several very small incisions and uses a thin, lighted scope and small tools. During surgery, the doctor pushes the bulge back in place. The doctor may place a piece of mesh on top of the bulge to help keep it in place. Then the healthy tissue is sewn back together. Laparoscopic surgery leaves several small scars. Open surgery leaves one long scar. The scars fade with time. After the surgery, you can probably return to light activity after 1 to 3 weeks. How long it takes will depend on the type of surgery. Follow-up care is a key part of your treatment and safety. Be sure to make and go to all appointments, and call your doctor if you are having problems. It's also a good idea to know your test results and keep a list of the medicines you take. How do you prepare for surgery? Surgery can be stressful. This information will help you understand what you can expect. And it will help you safely prepare for surgery. Preparing for surgery    · Be sure you have someone to take you home.  Anesthesia and pain medicine will make it unsafe for you to drive or get home on your own.     · Understand exactly what surgery is planned, along with the risks, benefits, and other options.     · If you take aspirin or some other blood thinner, ask your doctor if you should stop taking it before your surgery. Make sure that you understand exactly what your doctor wants you to do. These medicines increase the risk of bleeding.     · Tell your doctor ALL the medicines, vitamins, supplements, and herbal remedies you take. Some may increase the risk of problems during your surgery. Your doctor will tell you if you should stop taking any of them before the surgery and how soon to do it.     · Make sure your doctor and the hospital have a copy of your advance directive. If you don't have one, you may want to prepare one. It lets others know your health care wishes. It's a good thing to have before any type of surgery or procedure. What happens on the day of surgery? · Follow the instructions exactly about when to stop eating and drinking. If you don't, your surgery may be canceled. If your doctor told you to take your medicines on the day of surgery, take them with only a sip of water.     · Take a bath or shower before you come in for your surgery. Do not apply lotions, perfumes, deodorants, or nail polish.     · Do not shave the surgical site yourself.     · Take off all jewelry and piercings. And take out contact lenses, if you wear them. At the hospital or surgery center   · Bring a picture ID.     · The area for surgery is often marked to make sure there are no errors.     · You will be kept comfortable and safe by your anesthesia provider. The anesthesia may make you sleep. Or it may just numb the area being worked on.     · The surgery will take about 1 to 2 hours. When should you call your doctor? · You have questions or concerns.     · You don't understand how to prepare for your surgery.     · You become ill before the surgery (such as fever, flu, or a cold).     · You need to reschedule or have changed your mind about having the surgery. Where can you learn more?   Go to http://www.gray.com/  Enter P932 in the search box to learn more about \"Inguinal Hernia Repair: Before Your Surgery. \"  Current as of: February 10, 2021               Content Version: 13.0  © 9920-4792 [employer], Inc[employer]. Care instructions adapted under license by [email or web address] (which disclaims liability or warranty for this information). If you have questions about a medical condition or this instruction, always ask your healthcare professional. [address] any warranty or liability for your use of this information.

## 2021-10-22 NOTE — PROGRESS NOTES
[facility]  General Surgery    Subjective:     CC: Left inguinal hernia     HPI: Patient is a very pleasant 24-year-old male with past medical history remarkable for hypertension, type 2 diabetes mellitus with nephropathy, dyslipidemia, hypertensive heart disease, coronary artery disease, history of tobacco abuse quit 8 years ago, history of cardiac catheterization with angioplasty and gastroesophageal reflux disease. He is referred by Dr. Luz Kothari for evaluation management of left inguinal hernia. Patient states that he was 24years old he was denied a job at the [employer] because he had enlarged veins in the left scrotum. He states that 2 years ago he was told that he had a small hernia on the left side but it was too small to do anything with by Dr. Annie Wynn. He states that now he has been experiencing increase in size of the left groin hernia and pain. He also states that he was told he had a right inguinal hernia.     Patient Active Problem List    Diagnosis Date Noted    Type 2 diabetes with nephropathy ([facility] 08/20/2018    Essential hypertension 04/13/2018    Type 2 diabetes mellitus with diabetic polyneuropathy, without long-term current use of insulin ([facility] 04/13/2018    COPD (chronic obstructive pulmonary disease), moderate to severe     Pneumonia in left lung - 8/2012     Dyslipidemia 08/25/2011    Hypertensive heart disease  08/25/2011    GERD (gastroesophageal reflux disease) 08/25/2011    Coronary atherosclerosis of native coronary artery 08/25/2011    Tobacco abuse     GARCIA (dyspnea on exertion)     S/P cardiac catheterization     Arthritis     Chronic cough     Indigestion      Past Medical History:   Diagnosis Date    Acute coronary syndrome (HCC)     with non-ST-elevation myocardial infarction, s/p direct angioplasty of 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 RCA mild. LAD tortuous. CX mild. CX angela 100%. S/P balloon angioplasty of CX angela. Residual 40%. LVEDP 20.  EF 65%.  Cardiac nuclear imaging test 2006    No ischemia or infarct. EF 59%. Negative max. stress test.    Chronic cough     coughing up sputum    Chronic lung disease     COPD [facility]ch[facility] obstructive pulmonary disease) ([facility])     moderate to severe    Coronary artery disease     Diabetes ([address]     Diabetes mellitus ([address]     GARCIA (dyspnea on exertion)     GERD (gastroesophageal reflux disease)     H/O: pneumonia     Heartburn     Hypertension     essential systemic    Indigestion     Left inguinal hernia     Neuropathy involving both lower extremities     Pneumonia     \"walking\"    Pure hypercholesterolemia     Tobacco abuse       Past Surgical History:   Procedure Laterality Date    HX HEART CATHETERIZATION  06    HX ORTHOPAEDIC      fracture of bilateral hands     HX PTCA      of anomalous circumflex marginal artery    HI TONGUE AND MOUTH SURG UNLISTED      tongue surgery    HI TONGUE TO LIP SURGERY        Family History   Problem Relation Age of Onset    COPD Mother         cause of death    Asthma Father     Cancer Father     Colon Polyps Father       Social History     Tobacco Use    Smoking status: Former Smoker     Packs/day: 1.25     Years: 20.00     Pack years: 25.00     Types: Cigarettes     Quit date: 2014     Years since quittin.8    Smokeless tobacco: Never Used   Substance Use Topics    Alcohol use: Yes     Comment: on special occasions only      Allergies   Allergen Reactions    Altace [Ramipril] Cough       Prior to Admission medications    Medication Sig Start Date End Date Taking?  Authorizing Provider   terbinafine HCL (LAMISIL) 250 mg tablet Take 1 Tablet by mouth daily. 10/4/21  Yes Tayo Tello MD   clopidogreL (PLAVIX) 75 mg tab Take 1 Tablet by mouth daily. 10/4/21  Yes Tayo Tello MD   rosuvastatin (CRESTOR) 20 mg tablet Take 1 Tablet by mouth nightly. 10/4/21  Yes Tayo Tello MD   metFORMIN (GLUCOPHAGE) 1,000 mg tablet TAKE 1 TABLET BY MOUTH TWO TIMES A DAY WITH MEALS 10/4/21  Yes Tayo Tello MD   metoprolol succinate (TOPROL-XL) 50 mg XL tablet TAKE 1 TABLET BY MOUTH ONCE DAILY 7/31/21  Yes Tayo Tello MD   losartan (COZAAR) 100 mg tablet Take 1 Tablet by mouth daily. 6/22/21  Yes Tayo Tello MD   gabapentin (NEURONTIN) 300 mg capsule Take 2 Caps by mouth three (3) times daily. Max Daily Amount: 1,800 mg. 3/1/21  Yes Tayo Tello MD   omeprazole (PRILOSEC) 20 mg capsule Take 40 mg by mouth daily. Yes Provider, Historical   nitroglycerin (NITROSTAT) 0.4 mg SL tablet 1 Tab by SubLINGual route every five (5) minutes as needed for Chest Pain. 8/25/11  Yes Alex Conde,    aspirin 81 mg tablet Take 81 mg by mouth daily. Yes Provider, Historical   coenzyme q10 (CO Q-10) 150 mg cap Take  by mouth. Patient not taking: Reported on 10/4/2021 2/18/19   Mariposa Lopez MD   multivitamin (ONE A DAY) tablet Take 1 Tab by mouth daily. Patient not taking: Reported on 10/4/2021    Provider, Historical       Review of Systems:    14 systems were reviewed. The results are as above in the HPI and otherwise negative. Objective:     Vitals:    10/22/21 1127   BP: 123/75   Pulse: 73   Resp: 18   Temp: 97.4 °F (36.3 °C)   TempSrc: Temporal   SpO2: 95%   Weight: 83 kg (183 lb)   Height: 5' 9\" (1.753 m)       Physical Exam:  GENERAL: alert, cooperative, no distress, appears stated age,   EYE: conjunctivae/corneas clear. PERRL, EOM's intact.   THROAT & NECK: normal and no erythema or exudates noted. ,    LYMPHATIC: Cervical, supraclavicular, and axillary nodes normal. ,   LUNG: clear to auscultation bilaterally,   HEART: regular rate and rhythm, S1, S2 normal, no murmur, click, rub or gallop,   ABDOMEN: soft, non-tender. Bowel sounds normal. No masses,  no organomegaly,   Genitalia: Both testicles are descended into the scrotum. There is enlargement of the tissues in the spermatic cord which could be varicocele. I placed the patient in Trendelenburg and could not reduce the swollen tissues. I cannot palpate a hernia on the right. EXTREMITIES:  extremities normal, atraumatic, no cyanosis or edema,   SKIN: Normal.,   NEUROLOGIC: AOx3. Cranial nerves 2-12 and sensation grossly intact. ,     Data Review:  to be done    Mr. Chandrika Francois has a reminder for a \"due or due soon\" health maintenance. I have asked that he contact his primary care provider for follow-up on this health maintenance. Impression:     · Patient with symptomatic left inguinal hernia versus varicocele and possible right inguinal hernia as well.     Plan:     · CT abdomen pelvis with IV contrast  · Follow-up in 2 weeks    Signed By: Terrall Dakin, MD     October 22, 2021

## 2021-10-29 ENCOUNTER — HOSPITAL ENCOUNTER (OUTPATIENT)
Dept: CT IMAGING | Age: 61
Discharge: HOME | End: 2021-10-29
Attending: SURGERY
Payer: MEDICAID

## 2021-10-29 DIAGNOSIS — K40.90 LEFT INGUINAL HERNIA: ICD-10-CM

## 2021-10-29 DIAGNOSIS — K40.90 RIGHT INGUINAL HERNIA: ICD-10-CM

## 2021-10-29 DIAGNOSIS — I86.1 LEFT VARICOCELE: ICD-10-CM

## 2021-10-29 LAB — CREAT UR-MCNC: 1.2 MG/DL (ref 0.6–1.3)

## 2021-10-29 PROCEDURE — 74177 CT ABD & PELVIS W/CONTRAST: CPT

## 2021-10-29 PROCEDURE — 74011000636 HC RX REV CODE- 636: Performed by: SURGERY

## 2021-10-29 PROCEDURE — 82565 ASSAY OF CREATININE: CPT

## 2021-10-29 RX ADMIN — IOPAMIDOL 100 ML: 612 INJECTION, SOLUTION INTRAVENOUS at 11:03

## 2021-11-10 ENCOUNTER — OFFICE VISIT (OUTPATIENT)
Dept: SURGERY | Age: 61
End: 2021-11-10
Payer: MEDICAID

## 2021-11-10 VITALS
BODY MASS INDEX: 26.63 KG/M2 | RESPIRATION RATE: 20 BRPM | SYSTOLIC BLOOD PRESSURE: 128 MMHG | HEIGHT: 70 IN | DIASTOLIC BLOOD PRESSURE: 84 MMHG | HEART RATE: 80 BPM | WEIGHT: 186 LBS | TEMPERATURE: 98.1 F | OXYGEN SATURATION: 97 %

## 2021-11-10 DIAGNOSIS — K40.90 LEFT INGUINAL HERNIA: Primary | ICD-10-CM

## 2021-11-10 DIAGNOSIS — K40.90 RIGHT INGUINAL HERNIA: ICD-10-CM

## 2021-11-10 PROCEDURE — 99215 OFFICE O/P EST HI 40 MIN: CPT | Performed by: SURGERY

## 2021-11-10 RX ORDER — SODIUM CHLORIDE 0.9 % (FLUSH) 0.9 %
5-40 SYRINGE (ML) INJECTION AS NEEDED
Status: CANCELLED | OUTPATIENT
Start: 2021-11-10

## 2021-11-10 RX ORDER — SODIUM CHLORIDE 0.9 % (FLUSH) 0.9 %
5-40 SYRINGE (ML) INJECTION EVERY 8 HOURS
Status: CANCELLED | OUTPATIENT
Start: 2021-11-10

## 2021-11-10 NOTE — PROGRESS NOTES
[facility]  General Surgery    Subjective:     CC: Left inguinal hernia     HPI: Patient is a very pleasant 23-year-old male with past medical history remarkable for hypertension diabetes COPD acute coronary syndrome who returns today following CT abdomen pelvis to assess for possible left varicocele and presence of a right inguinal hernia. Right inguinal hernia is present in a sliding type left inguinal hernia containing descending colon. No bowel is present in the right inguinal hernia I did review the CT scan independently on the monitor and agree with these findings. Patient Active Problem List    Diagnosis Date Noted    Type 2 diabetes with nephropathy ([address] 08/20/2018    Essential hypertension 04/13/2018    Type 2 diabetes mellitus with diabetic polyneuropathy, without long-term current use of insulin ([address] 04/13/2018    COPD (chronic obstructive pulmonary disease), moderate to severe     Pneumonia in left lung - 8/2012     Dyslipidemia 08/25/2011    Hypertensive heart disease  08/25/2011    GERD (gastroesophageal reflux disease) 08/25/2011    Coronary atherosclerosis of native coronary artery 08/25/2011    Tobacco abuse     GARCIA (dyspnea on exertion)     S/P cardiac catheterization     Arthritis     Chronic cough     Indigestion      Past Medical History:   Diagnosis Date    Acute coronary syndrome (HCC)     with non-ST-elevation myocardial infarction, s/p direct angioplasty of anomolous circumflex marginal coming off the right coronary cusp on 1/26/06    Arthritis     in hands    ASHD (arteriosclerotic heart disease)     Benign hypertensive heart disease without heart failure     CAD (coronary artery disease)     chronic underlying    Calculus of kidney     Cardiac cath 01/26/2006    LM mild. RCA mild. LAD tortuous. CX mild. CX angela 100%. S/P balloon angioplasty of CX angela. Residual 40%. LVEDP 20.  EF 65%.     Cardiac nuclear imaging test 08/30/2006 No ischemia or infarct. EF 59%. Negative max. stress test.    Chronic cough     coughing up sputum    Chronic lung disease     COPD (chronic obstructive pulmonary disease) (HCC)     moderate to severe    Coronary artery disease     Diabetes ([address]     [facility]     GARCIA (dyspnea on exertion)     GERD (gastroesophageal reflux disease)     H/O: pneumonia     Heartburn     Hypertension     essential systemic     Indigestion     Left inguinal hernia     Neuropathy involving both lower extremities     Pneumonia     \"walking\"    Pure hypercholesterolemia     Tobacco abuse       Past Surgical History:   Procedure Laterality Date    HX HEART CATHETERIZATION  06    HX ORTHOPAEDIC      fracture of bilateral hands     HX PTCA      of anomalous circumflex marginal artery    AL TONGUE AND MOUTH SURG UNLISTED      tongue surgery    AL TONGUE TO LIP SURGERY      VASCULAR SURGERY PROCEDURE UNLIST      ptca      Family History   Problem Relation Age of Onset    COPD Mother         cause of death    Asthma Father     Cancer Father     Colon Polyps Father       Social History     Tobacco Use    Smoking status: Former Smoker     Packs/day: 1.25     Years: 20.00     Pack years: 25.00     Types: Cigarettes     Quit date: 2014     Years since quittin.8    Smokeless tobacco: Never Used   Substance Use Topics    Alcohol use: Not Currently     Comment: on special occasions only      Allergies   Allergen Reactions    Altace [Ramipril] Cough       Prior to Admission medications    Medication Sig Start Date End Date Taking? Authorizing Provider   terbinafine HCL (LAMISIL) 250 mg tablet Take 1 Tablet by mouth daily. 10/4/21  Yes Linda Tello MD   clopidogreL (PLAVIX) 75 mg tab Take 1 Tablet by mouth daily. 10/4/21  Yes Linda Tello MD   rosuvastatin (CRESTOR) 20 mg tablet Take 1 Tablet by mouth nightly.  10/4/21  Yes Linda Tello MD   metFORMIN (GLUCOPHAGE) 1,000 mg tablet TAKE 1 TABLET BY MOUTH TWO TIMES A DAY WITH MEALS  Patient taking differently: 1,000 mg daily. TAKE 1 TABLET BY MOUTH TWO TIMES A DAY WITH MEALS 10/4/21  Yes Poppy Tello MD   metoprolol succinate (TOPROL-XL) 50 mg XL tablet TAKE 1 TABLET BY MOUTH ONCE DAILY 7/31/21  Yes Poppy Tello MD   losartan (COZAAR) 100 mg tablet Take 1 Tablet by mouth daily. 6/22/21  Yes Poppy Tello MD   gabapentin (NEURONTIN) 300 mg capsule Take 2 Caps by mouth three (3) times daily. Max Daily Amount: 1,800 mg. 3/1/21  Yes Poppy Tello MD   omeprazole (PRILOSEC) 20 mg capsule Take 40 mg by mouth daily. Yes Provider, Historical   aspirin 81 mg tablet Take 81 mg by mouth daily. Yes Provider, Historical   coenzyme q10 (CO Q-10) 150 mg cap Take  by mouth. Patient not taking: Reported on 10/4/2021 2/18/19   Curtis Alonzo MD   multivitamin (ONE A DAY) tablet Take 1 Tab by mouth daily. Patient not taking: Reported on 11/10/2021    Provider, Historical   nitroglycerin (NITROSTAT) 0.4 mg SL tablet 1 Tab by SubLINGual route every five (5) minutes as needed for Chest Pain. Patient not taking: Reported on 11/10/2021 8/25/11   Angie Pro DO       Review of Systems:    14 systems were reviewed. The results are as above in the HPI and otherwise negative. Objective:     Vitals:    11/10/21 1502   BP: 128/84   Pulse: 80   Resp: 20   Temp: 98.1 °F (36.7 °C)   SpO2: 97%   Weight: 84.4 kg (186 lb)   Height: 5' 10\" (1.778 m)       Physical Exam:  GENERAL: alert, cooperative, no distress, appears stated age,   EYE: conjunctivae/corneas clear. PERRL, EOM's intact. THROAT & NECK: normal and no erythema or exudates noted. ,    LYMPHATIC: Cervical, supraclavicular, and axillary nodes normal. ,   LUNG: clear to auscultation bilaterally,   HEART: regular rate and rhythm, S1, S2 normal, no murmur, click, rub or gallop,   ABDOMEN: soft, non-tender.  Bowel sounds normal. No masses,  no organomegaly,   Genitalia: Partially reducible left inguinal hernia. Reducible right inguinal hernia. EXTREMITIES:  extremities normal, atraumatic, no cyanosis or edema,   SKIN: Normal.,   NEUROLOGIC: AOx3. Cranial nerves 2-12 and sensation grossly intact. ,     Data Review:  to be done    Mr. Caroline Montes has a reminder for a \"due or due soon\" health maintenance. I have asked that he contact his primary care provider for follow-up on this health maintenance. Impression:     · Patient with bilateral inguinal hernias which are symptomatic.     Plan:     · Robot-assisted bilateral inguinal hernia repair with mesh (possible open)  · Consent on chart  · Preoperative orders written    Signed By: Carolyn Allred MD     November 10, 2021

## 2021-11-10 NOTE — PATIENT INSTRUCTIONS
Inguinal Hernia Repair: Before Your Surgery  What is inguinal hernia repair? Inguinal hernia repair is a type of surgery. An inguinal hernia is a bulge under the skin in your groin. It happens when there is a weak spot in the groin muscle and a piece of the intestines or tissue pokes through the muscle. This can be painful. You may have pain when you're active. Or it may be painful when you strain during a bowel movement or lift something heavy. Surgery can help with your pain. It can also prevent serious problems that can happen if an organ or tissue gets stuck in the hernia. There are two ways to do this surgery. In open surgery, the doctor makes one cut near the hernia. This cut is called an incision. In laparoscopic surgery, the doctor makes several very small incisions and uses a thin, lighted scope and small tools. During surgery, the doctor pushes the bulge back in place. The doctor may place a piece of mesh on top of the bulge to help keep it in place. Then the healthy tissue is sewn back together. Laparoscopic surgery leaves several small scars. Open surgery leaves one long scar. The scars fade with time. After the surgery, you can probably return to light activity after 1 to 3 weeks. How long it takes will depend on the type of surgery. Follow-up care is a key part of your treatment and safety. Be sure to make and go to all appointments, and call your doctor if you are having problems. It's also a good idea to know your test results and keep a list of the medicines you take. How do you prepare for surgery? Surgery can be stressful. This information will help you understand what you can expect. And it will help you safely prepare for surgery. Preparing for surgery    · Be sure you have someone to take you home.  Anesthesia and pain medicine will make it unsafe for you to drive or get home on your own.     · Understand exactly what surgery is planned, along with the risks, benefits, and other options.     · If you take aspirin or some other blood thinner, ask your doctor if you should stop taking it before your surgery. Make sure that you understand exactly what your doctor wants you to do. These medicines increase the risk of bleeding.     · Tell your doctor ALL the medicines, vitamins, supplements, and herbal remedies you take. Some may increase the risk of problems during your surgery. Your doctor will tell you if you should stop taking any of them before the surgery and how soon to do it.     · Make sure your doctor and the hospital have a copy of your advance directive. If you don't have one, you may want to prepare one. It lets others know your health care wishes. It's a good thing to have before any type of surgery or procedure. What happens on the day of surgery? · Follow the instructions exactly about when to stop eating and drinking. If you don't, your surgery may be canceled. If your doctor told you to take your medicines on the day of surgery, take them with only a sip of water.     · Take a bath or shower before you come in for your surgery. Do not apply lotions, perfumes, deodorants, or nail polish.     · Do not shave the surgical site yourself.     · Take off all jewelry and piercings. And take out contact lenses, if you wear them. At the hospital or surgery center   · Bring a picture ID.     · The area for surgery is often marked to make sure there are no errors.     · You will be kept comfortable and safe by your anesthesia provider. The anesthesia may make you sleep. Or it may just numb the area being worked on.     · The surgery will take about 1 to 2 hours. When should you call your doctor? · You have questions or concerns.     · You don't understand how to prepare for your surgery.     · You become ill before the surgery (such as fever, flu, or a cold).     · You need to reschedule or have changed your mind about having the surgery. Where can you learn more?   Go to http://www.gray.com/  Enter P932 in the search box to learn more about \"Inguinal Hernia Repair: Before Your Surgery. \"  Current as of: February 10, 2021               Content Version: 13.0  © 6563-5713 [employer]. Care instructions adapted under license by [employer] (which disclaims liability or warranty for this information). If you have questions about a medical condition or this instruction, always ask your healthcare professional. [address] any warranty or liability for your use of this information.

## 2021-11-17 ENCOUNTER — TELEPHONE (OUTPATIENT)
Dept: SURGERY | Age: 61
End: 2021-11-17

## 2021-11-17 NOTE — TELEPHONE ENCOUNTER
Spoke to Mr. Noe this morning and he would like to be referred to Dr. Ninfa Montoya for his hernia repair. Dr. Sandrita Darby does surgery at [facility] and that's where he would like to have his surgery done at. I faxed his office note's to Dr. Sandrita Darby office @ [phone] attention:Jennifer. She will call the patient for an appointment. Patient is aware Storm Lawrence will call him.

## 2021-11-19 ENCOUNTER — OFFICE VISIT (OUTPATIENT)
Dept: ORTHOPEDIC SURGERY | Age: 61
End: 2021-11-19
Payer: MEDICAID

## 2021-11-19 VITALS — WEIGHT: 183.2 LBS | OXYGEN SATURATION: 97 % | TEMPERATURE: 97.7 F | BODY MASS INDEX: 26.29 KG/M2 | HEART RATE: 68 BPM

## 2021-11-19 DIAGNOSIS — G89.29 CHRONIC ELBOW PAIN, RIGHT: ICD-10-CM

## 2021-11-19 DIAGNOSIS — M19.021 PRIMARY OSTEOARTHRITIS OF RIGHT ELBOW: Primary | ICD-10-CM

## 2021-11-19 DIAGNOSIS — M25.521 CHRONIC ELBOW PAIN, RIGHT: ICD-10-CM

## 2021-11-19 PROCEDURE — 99203 OFFICE O/P NEW LOW 30 MIN: CPT | Performed by: ORTHOPAEDIC SURGERY

## 2021-11-19 PROCEDURE — 73080 X-RAY EXAM OF ELBOW: CPT | Performed by: ORTHOPAEDIC SURGERY

## 2021-11-19 PROCEDURE — 20605 DRAIN/INJ JOINT/BURSA W/O US: CPT | Performed by: ORTHOPAEDIC SURGERY

## 2021-11-19 RX ORDER — TRIAMCINOLONE ACETONIDE 40 MG/ML
40 INJECTION, SUSPENSION INTRA-ARTICULAR; INTRAMUSCULAR ONCE
Status: COMPLETED | OUTPATIENT
Start: 2021-11-19 | End: 2021-11-19

## 2021-11-19 RX ADMIN — TRIAMCINOLONE ACETONIDE 40 MG: 40 INJECTION, SUSPENSION INTRA-ARTICULAR; INTRAMUSCULAR at 11:18

## 2021-11-19 NOTE — PROGRESS NOTES
Patient: Paramjit Rios                MRN: [id number]       SSN: [id number]  YOB: 1960        AGE: 57 y.o. SEX: male  Body mass index is 26.29 kg/m². PCP: Amira Bolanos MD  11/19/21    CHIEF COMPLAINT: Right elbow pain 5/10    HPI: Paramjit Rios is a 57 y.o. male patient who presents to the office today with right elbow pain for the past 4 to 5 years. He denies any specific injury or trauma to his right elbow he did have a fall back in the 1990s where he broke both of his wrists. Recently for the past 4 to 5 years she has been having increasing right elbow pain and stiffness. He uses his arms frequently for work and he notices the pain at work as well. He does not take any medication for this. 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 RCA mild. LAD tortuous. CX mild. CX angela 100%. S/P balloon angioplasty of CX angela. Residual 40%. LVEDP 20.  EF 65%.  Cardiac nuclear imaging test 08/30/2006    No ischemia or infarct. EF 59%. Negative max.  stress test.    Chronic cough     coughing up sputum    Chronic lung disease     COPD [facility]chronic obstructive pulmonary disease) ([facility])     moderate to severe    Coronary artery disease     Diabetes ([address]     Diabetes mellitus ([address]     GARCIA (dyspnea on exertion)     GERD (gastroesophageal reflux disease)     H/O: pneumonia     Heartburn     Hypertension     essential systemic     Indigestion     Left inguinal hernia     Neuropathy involving both lower extremities     Pneumonia 8/12    \"walking\"  Pure hypercholesterolemia     Tobacco abuse        Family History   Problem Relation Age of Onset    COPD Mother         cause of death    Asthma Father     Cancer Father     Colon Polyps Father        Current Outpatient Medications   Medication Sig Dispense Refill    terbinafine HCL (LAMISIL) 250 mg tablet Take 1 Tablet by mouth daily. 30 Tablet 1    clopidogreL (PLAVIX) 75 mg tab Take 1 Tablet by mouth daily. 90 Tablet 1    rosuvastatin (CRESTOR) 20 mg tablet Take 1 Tablet by mouth nightly. 90 Tablet 1    metFORMIN (GLUCOPHAGE) 1,000 mg tablet TAKE 1 TABLET BY MOUTH TWO TIMES A DAY WITH MEALS (Patient taking differently: 1,000 mg daily. TAKE 1 TABLET BY MOUTH TWO TIMES A DAY WITH MEALS) 180 Tablet 1    metoprolol succinate (TOPROL-XL) 50 mg XL tablet TAKE 1 TABLET BY MOUTH ONCE DAILY 90 Tablet 2    losartan (COZAAR) 100 mg tablet Take 1 Tablet by mouth daily. 90 Tablet 1    gabapentin (NEURONTIN) 300 mg capsule Take 2 Caps by mouth three (3) times daily. Max Daily Amount: 1,800 mg. 540 Cap 1    omeprazole (PRILOSEC) 20 mg capsule Take 40 mg by mouth daily.  aspirin 81 mg tablet Take 81 mg by mouth daily.  coenzyme q10 (CO Q-10) 150 mg cap Take  by mouth. (Patient not taking: Reported on 10/4/2021)      multivitamin (ONE A DAY) tablet Take 1 Tab by mouth daily. (Patient not taking: Reported on 11/10/2021)      nitroglycerin (NITROSTAT) 0.4 mg SL tablet 1 Tab by SubLINGual route every five (5) minutes as needed for Chest Pain.  (Patient not taking: Reported on 11/10/2021) 25 Tab 3       Allergies   Allergen Reactions    Altace [Ramipril] Cough       Past Surgical History:   Procedure Laterality Date    HX HEART CATHETERIZATION  01/26/06    HX ORTHOPAEDIC  1989    fracture of bilateral hands     HX PTCA      of anomalous circumflex marginal artery    IA TONGUE AND MOUTH SURG UNLISTED  04/04    tongue surgery    IA TONGUE TO LIP SURGERY  04/04    VASCULAR SURGERY PROCEDURE UNLIST ptca       Social History     Socioeconomic History    Marital status:      Spouse name: Not on file    Number of children: Not on file    Years of education: Not on file    Highest education level: Not on file   Occupational History    Not on file   Tobacco Use    Smoking status: Former Smoker     Packs/day: 1.25     Years: 20.00     Pack years: 25.00     Types: Cigarettes     Quit date: 2014     Years since quittin.8    Smokeless tobacco: Never Used   Substance and Sexual Activity    Alcohol use: Not Currently     Comment: on special occasions only    Drug use: No    Sexual activity: Yes   Other Topics Concern    Not on file   Social History Narrative    Not on file     Social Determinants of Health     Financial Resource Strain:     Difficulty of Paying Living Expenses: Not on file   Food Insecurity:     Worried About Running Out of Food in the Last Year: Not on file    Anand of Food in the Last Year: Not on file   Transportation Needs:     Lack of Transportation (Medical): Not on file    Lack of Transportation (Non-Medical):  Not on file   Physical Activity:     Days of Exercise per Week: Not on file    Minutes of Exercise per Session: Not on file   Stress:     Feeling of Stress : Not on file   Social Connections:     Frequency of Communication with Friends and Family: Not on file    Frequency of Social Gatherings with Friends and Family: Not on file    Attends Anglican Services: Not on file    Active Member of Clubs or Organizations: Not on file    Attends Club or Organization Meetings: Not on file    Marital Status: Not on file   Intimate Partner Violence:     Fear of Current or Ex-Partner: Not on file    Emotionally Abused: Not on file    Physically Abused: Not on file    Sexually Abused: Not on file   Housing Stability:     Unable to Pay for Housing in the Last Year: Not on file    Number of [place] in the Last Year: Not on file    Unstable Housing in the Last Year: Not on file       REVIEW OF SYSTEMS:      CON: negative for recent weight loss/gain, fever, or chills  EYE: negative for double or blurry vision  ENT: negative for hoarseness  RS:   negative for cough, URI, SOB  CV:  negative for chest pain, palpitations  GI:    negative for blood in stool, nausea/vomiting  :  negative for blood in urine  MS: As per HPI  Other systems reviewed and noted below. PHYSICAL EXAMINATION:  Visit Vitals  Pulse 68   Temp 97.7 °F (36.5 °C) (Temporal)   Wt 183 lb 3.2 oz (83.1 kg)   SpO2 97%   BMI 26.29 kg/m²     Body mass index is 26.29 kg/m². GENERAL: Alert and oriented x3, in no acute distress, well-developed, well-nourished. HEENT: Normocephalic, atraumatic. RESP: Non labored breathing with equal chest rise on inspiration. CV: Well perfused extremities. No cyanosis or clubbing noted. ABDOMEN: Soft, non-tender, non-distended. Elbow Exam   R   L  ROM Active/Pass      Flexion   120   Full   Extension  15   Full   Pronation  Full   Full   Supination  40   Full    Tenderness to palpation   Medial Epicondyle -   -   Lateral Epicondyle -   -  Tinel Sign Cubital Tunnel -   -  Ulnar Nerve Subluxation -   -  Distal Biceps Abnormality -   -  Triceps Abnormality  -   -  Other tenderness  -   -  Other Deformity  -   -  Olecranon Bursitis  -   -  Warmth   -   -  Erythema   -   -  Additional Findings: Crepitus noted with elbow range of motion specifically pronation supination      IMAGING:  Right elbow x-rays 3 views were taken in the office today which show osteoarthritis of the right elbow joint, ulnohumeral and radiocapitellar    ASSESSMENT & PLAN  Diagnosis: Right elbow osteoarthritis    Tylor Martinez has symptomatic right elbow osteoarthritis. I recommended a corticosteroid injection today into the right elbow which she was agreeable to. This was given without complication. He will follow-up as needed.     [facility]  OFFICE PROCEDURE PROGRESS NOTE        Chart reviewed for the following:   Fadia Madden MD, have reviewed the History, Physical and updated the Allergic reactions for [address] [place] [address] performed immediately prior to start of procedure:   Fadia Madden MD, have performed the following reviews on Lauro Chew prior to the start of the procedure:            * Patient was identified by name and date of birth   * Agreement on procedure being performed was verified  * Risks and Benefits explained to the patient  * Procedure site verified and marked as necessary  * Patient was positioned for comfort  * Consent was signed and verified    Time: 11:20 AM    Location: Right elbow joint intra-articular injection    Kenalog 40mg & 1cc Lidocaine    Date of procedure: 11/19/2021    Procedure performed by:  Bhavani Davis MD    Provider assisted by: Lee Henriquez LPN    Patient assisted by: self    How tolerated by patient: tolerated the procedure well with no complications    Post Procedural Pain Scale: 0 - No Hurt    Comments: none        Electronically signed by: Bhavani Davis MD    Note: This note was completed using voice recognition software.   Any typographical/name errors or mistakes are unintentional.

## 2021-11-23 ENCOUNTER — TELEPHONE (OUTPATIENT)
Dept: SURGERY | Age: 61
End: 2021-11-23

## 2021-11-23 NOTE — TELEPHONE ENCOUNTER
Spoke to Mr. Noe this morning and he would like to be referred to Dr. Jitendra Kaur for his hernia repair. Dr. Cheryl Medina does surgery at [facility] and that's where he would like to have his surgery done at. I faxed his office note's to Dr. Cehryl Medina office @ [phone] attention:Jennifer. She will call the patient for an appointment. Patient is aware Fay Webber will call him. Just received a call that Dr. Cheryl Medina does not do hernia repairs so the patient was referred to Dr. Laure Torres office at [facility].

## 2022-01-04 DIAGNOSIS — Z12.5 PROSTATE CANCER SCREENING: ICD-10-CM

## 2022-01-12 DIAGNOSIS — G89.29 CHRONIC MIDLINE LOW BACK PAIN WITHOUT SCIATICA: ICD-10-CM

## 2022-01-12 DIAGNOSIS — B35.1 FUNGAL INFECTION OF TOENAIL: ICD-10-CM

## 2022-01-12 DIAGNOSIS — M54.50 CHRONIC MIDLINE LOW BACK PAIN WITHOUT SCIATICA: ICD-10-CM

## 2022-01-12 RX ORDER — GABAPENTIN 300 MG/1
600 CAPSULE ORAL 3 TIMES DAILY
Qty: 540 CAPSULE | Refills: 0 | Status: SHIPPED | OUTPATIENT
Start: 2022-01-12 | End: 2022-04-13

## 2022-01-12 RX ORDER — TERBINAFINE HYDROCHLORIDE 250 MG/1
250 TABLET ORAL DAILY
Qty: 30 TABLET | Refills: 0 | Status: SHIPPED | OUTPATIENT
Start: 2022-01-12 | End: 2022-04-13

## 2022-02-01 DIAGNOSIS — E11.42 TYPE 2 DIABETES MELLITUS WITH DIABETIC POLYNEUROPATHY, WITHOUT LONG-TERM CURRENT USE OF INSULIN (HCC): ICD-10-CM

## 2022-02-01 DIAGNOSIS — I10 ESSENTIAL HYPERTENSION: ICD-10-CM

## 2022-02-01 DIAGNOSIS — E78.5 DYSLIPIDEMIA: ICD-10-CM

## 2022-02-22 ENCOUNTER — OFFICE VISIT (OUTPATIENT)
Dept: CARDIOLOGY CLINIC | Age: 62
End: 2022-02-22
Payer: MEDICAID

## 2022-02-22 VITALS
HEART RATE: 87 BPM | BODY MASS INDEX: 26.77 KG/M2 | DIASTOLIC BLOOD PRESSURE: 88 MMHG | HEIGHT: 70 IN | OXYGEN SATURATION: 95 % | WEIGHT: 187 LBS | SYSTOLIC BLOOD PRESSURE: 130 MMHG

## 2022-02-22 DIAGNOSIS — I35.0 AORTIC VALVE STENOSIS, ETIOLOGY OF CARDIAC VALVE DISEASE UNSPECIFIED: Primary | ICD-10-CM

## 2022-02-22 PROCEDURE — 93000 ELECTROCARDIOGRAM COMPLETE: CPT | Performed by: INTERNAL MEDICINE

## 2022-02-22 PROCEDURE — 99214 OFFICE O/P EST MOD 30 MIN: CPT | Performed by: INTERNAL MEDICINE

## 2022-02-22 RX ORDER — METFORMIN HYDROCHLORIDE 1000 MG/1
1000 TABLET ORAL DAILY
COMMUNITY
Start: 2021-04-30 | End: 2022-04-13

## 2022-02-22 RX ORDER — NITROGLYCERIN 0.4 MG/1
0.4 TABLET SUBLINGUAL
COMMUNITY

## 2022-02-22 NOTE — PROGRESS NOTES
Enrrique Jackson presents today for   Chief Complaint   Patient presents with    Follow-up     6 month       Enrrique Manuel preferred language for health care discussion is english/other. Is someone accompanying this pt? no    Is the patient using any DME equipment during [address]? no    Depression Screening:  3 most recent PHQ Screens 2/22/2022   Little interest or pleasure in doing things Not at all   Feeling down, depressed, irritable, or hopeless Not at all   Total Score PHQ 2 0       Learning Assessment:  Learning Assessment 2/22/2022   PRIMARY LEARNER Patient   HIGHEST LEVEL OF EDUCATION - PRIMARY LEARNER  -   BARRIERS PRIMARY LEARNER -   PRIMARY LANGUAGE ENGLISH   LEARNER PREFERENCE PRIMARY DEMONSTRATION     -   ANSWERED BY patient   RELATIONSHIP SELF       Abuse Screening:  Abuse Screening Questionnaire 2/22/2022   Do you ever feel afraid of your partner? N   Are you in a relationship with someone who physically or mentally threatens you? N   Is it safe for you to go home? Y       Fall Risk  No flowsheet data found. Pt currently taking Anticoagulant therapy? no    Pt currently taking Antiplatelet therapy ? no      Coordination of Care:  1. Have you been to the ER, urgent care clinic since your last visit? Hospitalized since your last visit? no    2. Have you seen or consulted any other health care providers outside of the [address] [place] [address] since your last visit? Include any pap smears or colon screening.  no

## 2022-03-05 NOTE — PROGRESS NOTES
Michael Millan is in the office today for cardiovascular reevaluation for his chronic coronary artery disease and aortic valve disease.  He was admitted to [facility] with chest tightness/acute coronary syndrome in January, 2006. 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 This was ballooned but not stented. There was a residual stenosis of 40% with JB grade 3 flow following the procedure. Left ventricular function at that time was normal with an ejection fraction in the 60%.     He has done well on medical therapy over the years without any recurrent cardiovascular issues. He continued smoking and  developed moderately severe COPD with chronic shortness of breath. He has not smoked for many years now. He also has aortic stenosis with an echo in  August 2020 that demonstrated normal left ventricular systolic function  and moderately severe to severe aortic stenosis with a mean gradient of 38 mmHg across the valve and aortic valve area of 0.8 cm². Compared to the echocardiogram of October 14, 2019 there was minimal change with a mean gradient being 36 mmHg at that time. He had a cardiac catheterization done on January 22, 2021. He had a widely patent LAD and proximal OM/ramus and right coronary artery. 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 He had a CT CTA of the chest abdomen and pelvis  in preparation for possible TAVR    The patient had a TAVR on 4/28/2021. He reports he feels \"great \". His breathing has been good. He has had no palpitations, near-syncope or syncope. He has had no peripheral swelling. He has an echocardiogram ordered for June 2022 at the [facility].          Encounter Diagnosis   Name Primary?     Aortic valve stenosis, etiology of cardiac valve disease unspecified Yes       We will plan to see him back in 6 months. Continue present cardiac Rx. 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 RCA mild. LAD tortuous. CX mild. CX angela 100%. S/P balloon angioplasty of CX angela. Residual 40%. LVEDP 20.  EF 65%.  Cardiac nuclear imaging test 08/30/2006    No ischemia or infarct. EF 59%. Negative max. stress test.    Chronic cough     coughing up sputum    Chronic lung disease     COPD [facility]chronic obstructive pulmonary disease) ([facility])     moderate to severe    Coronary artery disease     Diabetes ([address]     Diabetes mellitus ([address]     GARCIA (dyspnea on exertion)     GERD (gastroesophageal reflux disease)     H/O: pneumonia     Heartburn     Hypertension     essential systemic     Indigestion     Left inguinal hernia     Neuropathy involving both lower extremities     Pneumonia 8/12    \"walking\"    Pure hypercholesterolemia     Tobacco abuse          Past Surgical History:   Procedure Laterality Date    HX HEART CATHETERIZATION  01/26/06    HX ORTHOPAEDIC  1989    fracture of bilateral hands     HX PTCA      of anomalous circumflex marginal artery    NE TONGUE AND MOUTH SURG UNLISTED  04/04    tongue surgery    NE TONGUE TO LIP SURGERY  04/04    VASCULAR SURGERY PROCEDURE UNLIST      ptca     Current Outpatient Medications   Medication Sig    metFORMIN (GLUCOPHAGE) 1,000 mg tablet Take 1,000 mg by mouth daily.  nitroglycerin (NITROSTAT) 0.4 mg SL tablet 0.4 mg by SubLINGual route.     terbinafine HCL (LAMISIL) 250 mg tablet TAKE 1 TABLET BY MOUTH DAILY    gabapentin (NEURONTIN) 300 mg capsule Take 2 Caps by mouth three (3) times daily. Max Daily Amount: 1,800 mg.  clopidogreL (PLAVIX) 75 mg tab Take 1 Tablet by mouth daily.  rosuvastatin (CRESTOR) 20 mg tablet Take 1 Tablet by mouth nightly.  metoprolol succinate (TOPROL-XL) 50 mg XL tablet TAKE 1 TABLET BY MOUTH ONCE DAILY    losartan (COZAAR) 100 mg tablet Take 1 Tablet by mouth daily.  omeprazole (PRILOSEC) 20 mg capsule Take 40 mg by mouth daily.  aspirin 81 mg tablet Take 81 mg by mouth daily. No current facility-administered medications for this visit. Allergies   Allergen Reactions    Altace [Ramipril] Cough       Social History:   Social History     Tobacco Use    Smoking status: Former Smoker     Packs/day: 1.25     Years: 20.00     Pack years: 25.00     Types: Cigarettes     Quit date: 2014     Years since quittin.1    Smokeless tobacco: Never Used   Substance Use Topics    Alcohol use: Not Currently     Comment: on special occasions only           Family history: family history includes Asthma in his father; COPD in his mother; Cancer in his father; Colon Polyps in his father. Review of Systems:    Constitutional: Negative. Respiratory: Negative. Cardiovascular: Negative. Gastrointestinal: Negative. Musculoskeletal: Negative. Neurological: Negative for dizziness.      Physical Exam:   The patient is an alert and oriented  Visit Vitals  /88 (BP 1 Location: Left upper arm, BP Patient Position: Sitting, BP Cuff Size: Adult)   Pulse 87   Ht 5' 10\" (1.778 m)   Wt 84.8 kg (187 lb)   SpO2 95%   BMI 26.83 kg/m²        BP Readings from Last 3 Encounters:   22 130/88   11/10/21 128/84   10/22/21 123/75        Wt Readings from Last 3 Encounters:   22 84.8 kg (187 lb)   21 83.1 kg (183 lb 3.2 oz)   11/10/21 84.4 kg (186 lb)       HEENT: Conjunctivae white and mucosa moist Neck: Supple without masses, tenderness or thyromegaly. No jugular venous distention. Left carotid remarkable for transmitted aortic murmur  Cardiovascular: There was a grade 9-3/7  systolic murmur    Lungs: decreased breath sounds throughout   Abdomen: Soft. No masses, tenderness or organomegaly. Extremities: Trace edema with 1 + peripheral pulses. Review of Data:  See PMH and Cardiology and Imaging sections for cardiac testing      Results for orders placed or performed in visit on 02/22/2022   AMB POC EKG ROUTINE W/ 12 LEADS, INTER & REP     Status: None    Narrative    Normal sinus rhythm rate 84. IVCD of the right bundle branch block type. No significant change compared to prior tracing           Rico Vargas MD [facility]  [facility]  [address] [place] [address]. [address] [place] [address]  [phone]    PLEASE NOTE:  This document has been produced using voice recognition software. Unrecognized errors in transcription may be present.

## 2022-03-18 PROBLEM — E11.42 TYPE 2 DIABETES MELLITUS WITH DIABETIC POLYNEUROPATHY, WITHOUT LONG-TERM CURRENT USE OF INSULIN (HCC): Status: ACTIVE | Noted: 2018-04-13

## 2022-03-19 PROBLEM — I10 ESSENTIAL HYPERTENSION: Status: ACTIVE | Noted: 2018-04-13

## 2022-03-20 PROBLEM — E11.21 TYPE 2 DIABETES WITH NEPHROPATHY (HCC): Status: ACTIVE | Noted: 2018-08-20

## 2022-04-13 ENCOUNTER — APPOINTMENT (OUTPATIENT)
Dept: INTERNAL MEDICINE CLINIC | Age: 62
End: 2022-04-13

## 2022-04-14 LAB
ALBUMIN SERPL-MCNC: 4.1 G/DL (ref 3.8–4.8)
ALBUMIN/GLOB SERPL: 1.5 {RATIO} (ref 1.2–2.2)
ALP SERPL-CCNC: 137 IU/L (ref 44–121)
ALT SERPL-CCNC: 17 IU/L (ref 0–44)
AST SERPL-CCNC: 17 IU/L (ref 0–40)
BILIRUB SERPL-MCNC: 0.2 MG/DL (ref 0–1.2)
BUN SERPL-MCNC: 17 MG/DL (ref 8–27)
BUN/CREAT SERPL: 13 (ref 10–24)
CALCIUM SERPL-MCNC: 10.2 MG/DL (ref 8.6–10.2)
CHLORIDE SERPL-SCNC: 97 MMOL/L (ref 96–106)
CHOLEST SERPL-MCNC: 194 MG/DL (ref 100–199)
CO2 SERPL-SCNC: 18 MMOL/L (ref 20–29)
CREAT SERPL-MCNC: 1.36 MG/DL (ref 0.76–1.27)
EGFR: 59 ML/MIN/1.73
GLOBULIN SER CALC-MCNC: 2.8 G/DL (ref 1.5–4.5)
GLUCOSE SERPL-MCNC: 389 MG/DL (ref 65–99)
HBA1C MFR BLD: 9 % (ref 4.8–5.6)
HDLC SERPL-MCNC: 37 MG/DL
IMP & REVIEW OF LAB RESULTS: NORMAL
INTERPRETATION: NORMAL
LDLC SERPL CALC-MCNC: 75 MG/DL (ref 0–99)
POTASSIUM SERPL-SCNC: 4.6 MMOL/L (ref 3.5–5.2)
PROT SERPL-MCNC: 6.9 G/DL (ref 6–8.5)
SODIUM SERPL-SCNC: 136 MMOL/L (ref 134–144)
TRIGL SERPL-MCNC: 519 MG/DL (ref 0–149)
VLDLC SERPL CALC-MCNC: 82 MG/DL (ref 5–40)

## 2022-04-15 ENCOUNTER — OFFICE VISIT (OUTPATIENT)
Dept: INTERNAL MEDICINE CLINIC | Age: 62
End: 2022-04-15
Payer: MEDICAID

## 2022-04-15 VITALS
WEIGHT: 188 LBS | HEART RATE: 85 BPM | SYSTOLIC BLOOD PRESSURE: 121 MMHG | RESPIRATION RATE: 20 BRPM | TEMPERATURE: 97.6 F | OXYGEN SATURATION: 98 % | DIASTOLIC BLOOD PRESSURE: 78 MMHG | BODY MASS INDEX: 26.92 KG/M2 | HEIGHT: 70 IN

## 2022-04-15 DIAGNOSIS — I10 ESSENTIAL HYPERTENSION: ICD-10-CM

## 2022-04-15 DIAGNOSIS — N18.31 STAGE 3A CHRONIC KIDNEY DISEASE (HCC): ICD-10-CM

## 2022-04-15 DIAGNOSIS — K40.90 NON-RECURRENT UNILATERAL INGUINAL HERNIA WITHOUT OBSTRUCTION OR GANGRENE: ICD-10-CM

## 2022-04-15 DIAGNOSIS — E11.42 TYPE 2 DIABETES MELLITUS WITH DIABETIC POLYNEUROPATHY, WITHOUT LONG-TERM CURRENT USE OF INSULIN (HCC): Primary | ICD-10-CM

## 2022-04-15 DIAGNOSIS — E78.5 DYSLIPIDEMIA: ICD-10-CM

## 2022-04-15 PROCEDURE — 99214 OFFICE O/P EST MOD 30 MIN: CPT | Performed by: INTERNAL MEDICINE

## 2022-04-15 PROCEDURE — 3046F HEMOGLOBIN A1C LEVEL >9.0%: CPT | Performed by: INTERNAL MEDICINE

## 2022-04-15 NOTE — PROGRESS NOTES
Patient is in the office today for a 6 month follow up. 1. \"Have you been to the ER, urgent care clinic since your last visit? Hospitalized since your last visit? \" No    2. \"Have you seen or consulted any other health care providers outside of the [address] [place] [address] since your last visit? \" No     3. For patients aged 39-70: Has the patient had a colonoscopy / FIT/ Cologuard? No      If the patient is female:    4. For patients aged 41-77: Has the patient had a mammogram within the past 2 years? NA - based on age or sex      11. For patients aged 21-65: Has the patient had a pap smear?  NA - based on age or sex

## 2022-04-15 NOTE — PATIENT INSTRUCTIONS
High Blood Pressure: Care Instructions  Overview     It's normal for blood pressure to go up and down throughout the day. But if it stays up, you have high blood pressure. Another name for high blood pressure is hypertension. Despite what a lot of people think, high blood pressure usually doesn't cause headaches or make you feel dizzy or lightheaded. It usually has no symptoms. But it does increase your risk of stroke, heart attack, and other problems. You and your doctor will talk about your risks of these problems based on your blood pressure. Your doctor will give you a goal for your blood pressure. Your goal will be based on your health and your age. Lifestyle changes, such as eating healthy and being active, are always important to help lower blood pressure. You might also take medicine to reach your blood pressure goal.  Follow-up care is a key part of your treatment and safety. Be sure to make and go to all appointments, and call your doctor if you are having problems. It's also a good idea to know your test results and keep a list of the medicines you take. How can you care for yourself at home? Medical treatment  · If you stop taking your medicine, your blood pressure will go back up. You may take one or more types of medicine to lower your blood pressure. Be safe with medicines. Take your medicine exactly as prescribed. Call your doctor if you think you are having a problem with your medicine. · Talk to your doctor before you start taking aspirin every day. Aspirin can help certain people lower their risk of a heart attack or stroke. But taking aspirin isn't right for everyone, because it can cause serious bleeding. · See your doctor regularly. You may need to see the doctor more often at first or until your blood pressure comes down. · If you are taking blood pressure medicine, talk to your doctor before you take decongestants or anti-inflammatory medicine, such as ibuprofen.  Some of these medicines can raise blood pressure. · Learn how to check your blood pressure at home. Lifestyle changes  · Stay at a healthy weight. This is especially important if you put on weight around the waist. Losing even 10 pounds can help you lower your blood pressure. · If your doctor recommends it, get more exercise. Walking is a good choice. Bit by bit, increase the amount you walk every day. Try for at least 30 minutes on most days of the week. You also may want to swim, bike, or do other activities. · Avoid or limit alcohol. Talk to your doctor about whether you can drink any alcohol. · Try to limit how much sodium you eat to less than 2,300 milligrams (mg) a day. Your doctor may ask you to try to eat less than 1,500 mg a day. · Eat plenty of fruits (such as bananas and oranges), vegetables, legumes, whole grains, and low-fat dairy products. · Lower the amount of saturated fat in your diet. Saturated fat is found in animal products such as milk, cheese, and meat. Limiting these foods may help you lose weight and also lower your risk for heart disease. · Do not smoke. Smoking increases your risk for heart attack and stroke. If you need help quitting, talk to your doctor about stop-smoking programs and medicines. These can increase your chances of quitting for good. When should you call for help? Call 911  anytime you think you may need emergency care. This may mean having symptoms that suggest that your blood pressure is causing a serious heart or blood vessel problem. Your blood pressure may be over 180/120. For example, call 911 if:    · You have symptoms of a heart attack. These may include:  ? Chest pain or pressure, or a strange feeling in the chest.  ? Sweating. ? Shortness of breath. ? Nausea or vomiting. ? Pain, pressure, or a strange feeling in the back, neck, jaw, or upper belly or in one or both shoulders or arms. ? Lightheadedness or sudden weakness.   ? A fast or irregular heartbeat.     · You have symptoms of a stroke. These may include:  ? Sudden numbness, tingling, weakness, or loss of movement in your face, arm, or leg, especially on only one side of your body. ? Sudden vision changes. ? Sudden trouble speaking. ? Sudden confusion or trouble understanding simple statements. ? Sudden problems with walking or balance. ? A sudden, severe headache that is different from past headaches.     · You have severe back or belly pain. Do not wait until your blood pressure comes down on its own. Get help right away. Call your doctor now or seek immediate care if:    · Your blood pressure is much higher than normal (such as 180/120 or higher), but you don't have symptoms.     · You think high blood pressure is causing symptoms, such as:  ? Severe headache.  ? Blurry vision. Watch closely for changes in your health, and be sure to contact your doctor if:    · Your blood pressure measures higher than your doctor recommends at least 2 times. That means the top number is higher or the bottom number is higher, or both.     · You think you may be having side effects from your blood pressure medicine. Where can you learn more? Go to http://www.gray.com/  Enter [id number] in the search box to learn more about \"High Blood Pressure: Care Instructions. \"  Current as of: January 10, 2022               Content Version: 13.2  © 2006-2022 [employer]. Care instructions adapted under license by [employer] (which disclaims liability or warranty for this information). If you have questions about a medical condition or this instruction, always ask your healthcare professional. [place] [address] any warranty or liability for your use of this information.

## 2022-04-19 ENCOUNTER — TELEPHONE (OUTPATIENT)
Dept: INTERNAL MEDICINE CLINIC | Age: 62
End: 2022-04-19

## 2022-04-19 NOTE — TELEPHONE ENCOUNTER
Jose M with [facility] surgical office calling to request last OV note, recent labs and demographic info and ins card/s     Stated pt has been scheduled with  Dr Sawyer Palma for 05/09/2022. Lilia Sarkar they got the referral, but no other paperwork.

## 2022-04-20 NOTE — PROGRESS NOTES
Subjective:       Chief Complaint  The patient presents for follow up of diabetes, hypertension and high cholesterol. HPI  Angel Alston is a 57 y.o. male seen for follow up of diabetes. Healso has hypertension and hyperlipidemia. Diabetes uncontrol, no significant medication side effects noted, on metformin 1 gm every day , pt has been eating more starches and sugar in his diet which is contributed,   hypertension well controlled on Cozaar 100 mg and Toprol XL 50 mg, , hyperlipidemia borderline controlled, no significant medication side effects noted, on  Crestor 20 mg. Would like to get his LDL less than 70 with history of coronary artery disease. Diet and Lifestyle: generally follows a low fat low cholesterol diet, sedentary    Home BP Monitoring: is not measured at home. Diabetic Review of Systems - home glucose monitoring: is performed sporadically. Other symptoms and concerns: COPD Review:  The patient is being seen for follow up of COPD. Oxygen: He currently is not on home oxygen therapy. Symptoms: chronic dyspnea: severity = moderate: course of sx: symptoms have progressed to a point and plateaued. .   Patient uses 1 pillows at night. Patient denies smoke cigarettes. He has to get free Inhalers from Pulmonary    Pt has chronic back pain and is on Neurontin 600 mg   QID. Pt not on narcotics in this office due to abnormal UDS in the past..       Patient has been having a progressive left inguinal hernia for which we will go ahead and refer him to surgery for further evaluation. Patient also has bilateral elbow tendinitis for which she may benefit from cortisone injection. Patient has a history of coronary artery disease for which he is followed closely by cardiology.   He also had a TAVR done 4/28/2021 at the heart hospital at [facility].    Patient has significant left  inguinal hernia which needs to be repaired but he wants to have it done at  [facility].  We will go ahead and refer him to a surgeon who practices at that hospital but in the meantime if he has any significant pain that is worsening or associated with nausea vomiting he knows to go to the emergency room for further evaluation    Current Outpatient Medications   Medication Sig    clopidogreL (PLAVIX) 75 mg tab TAKE 1 TABLET BY MOUTH DAILY    losartan (COZAAR) 100 mg tablet TAKE 1 TABLET BY MOUTH DAILY.  gabapentin (NEURONTIN) 300 mg capsule TAKE 2 CAPS BY MOUTH THREE (3) TIMES DAILY. MAX DAILY AMOUNT: 1,800 MG.  rosuvastatin (CRESTOR) 20 mg tablet TAKE 1 TABLET BY MOUTH NIGHTLY    metFORMIN (GLUCOPHAGE) 1,000 mg tablet TAKE 1 TABLET BY MOUTH TWO TIMES A DAY WITH MEALS    metoprolol succinate (TOPROL-XL) 50 mg XL tablet TAKE 1 TABLET BY MOUTH ONCE DAILY    nitroglycerin (NITROSTAT) 0.4 mg SL tablet 0.4 mg by SubLINGual route.  omeprazole (PRILOSEC) 20 mg capsule Take 40 mg by mouth daily.  aspirin 81 mg tablet Take 81 mg by mouth daily. No current facility-administered medications for this visit.              Review of Systems  Respiratory: negative for dyspnea on exertion  Cardiovascular: negative for chest pain    Objective:     Visit Vitals  /78 (BP 1 Location: Left arm, BP Patient Position: Sitting, BP Cuff Size: Adult)   Pulse 85   Temp 97.6 °F (36.4 °C) (Temporal)   Resp 20   Ht 5' 10\" (1.778 m)   Wt 188 lb (85.3 kg)   SpO2 98%   BMI 26.98 kg/m²        Chest - clear to auscultation, no wheezes, rales or rhonchi, symmetric air entry  Heart - normal rate, regular rhythm, normal S1, S2, 2/6 systolic murmur RSB, rubs, clicks or gallops  Extremities - peripheral pulses normal, no pedal edema, no clubbing or cyanosis      Labs:   Lab Results   Component Value Date/Time    Hemoglobin A1c 9.0 (H) 04/13/2022 08:24 AM    Hemoglobin A1c 7.5 (H) 09/27/2021 08:35 AM    Hemoglobin A1c 6.8 (H) 03/19/2021 10:09 AM    Hemoglobin A1c, External 7.2 02/27/2016 12:00 AM    Glucose 389 (H) 04/13/2022 08:24 AM    Microalbumin/Creat ratio (mg/g creat) 60 (H) 03/19/2021 10:09 AM    Microalbumin,urine random 4.41 (H) 03/19/2021 10:09 AM    LDL, calculated 75 04/13/2022 08:24 AM    LDL, calculated 82.8 03/19/2021 10:09 AM    Creatinine, POC 1.2 10/29/2021 10:58 AM    Creatinine 1.36 (H) 04/13/2022 08:24 AM      Lab Results   Component Value Date/Time    Prostate Specific Ag 0.7 08/19/2019 08:05 AM    Prostate Specific Ag 0.9 01/24/2017 04:30 PM     Lab Results   Component Value Date/Time    Sodium 136 04/13/2022 08:24 AM    Potassium 4.6 04/13/2022 08:24 AM    Chloride 97 04/13/2022 08:24 AM    CO2 18 (L) 04/13/2022 08:24 AM    Anion gap 5 03/19/2021 10:09 AM    Glucose 389 (H) 04/13/2022 08:24 AM    BUN 17 04/13/2022 08:24 AM    Creatinine 1.36 (H) 04/13/2022 08:24 AM    BUN/Creatinine ratio 13 04/13/2022 08:24 AM    GFR est AA 74 09/27/2021 08:35 AM    GFR est non-AA 64 09/27/2021 08:35 AM    Calcium 10.2 04/13/2022 08:24 AM    Bilirubin, total 0.2 04/13/2022 08:24 AM    ALT (SGPT) 17 04/13/2022 08:24 AM    Alk.  phosphatase 137 (H) 04/13/2022 08:24 AM    Protein, total 6.9 04/13/2022 08:24 AM    Albumin 4.1 04/13/2022 08:24 AM    Globulin 3.3 03/19/2021 10:09 AM    A-G Ratio 1.5 04/13/2022 08:24 AM      Lab Results   Component Value Date/Time    Hemoglobin A1c 9.0 (H) 04/13/2022 08:24 AM    Hemoglobin A1c (POC) 6.5 12/16/2016 12:02 PM    Hemoglobin A1c, External 7.2 02/27/2016 12:00 AM            Labs:   Lab Results   Component Value Date/Time    Cholesterol, total 194 04/13/2022 08:24 AM    HDL Cholesterol 37 (L) 04/13/2022 08:24 AM    LDL, calculated 75 04/13/2022 08:24 AM    LDL, calculated 82.8 03/19/2021 10:09 AM    LDL-C, External 132 02/27/2016 12:00 AM    Triglyceride 519 (H) 04/13/2022 08:24 AM    CHOL/HDL Ratio 4.6 03/19/2021 10:09 AM       Labs:   Lab Results   Component Value Date/Time    Prostate Specific Ag 0.7 08/19/2019 08:05 AM    Prostate Specific Ag 0.9 01/24/2017 04:30 PM Assessment / Plan     Diabetes uncontrol, will increase to metformin 1 gm BID and try to cut back starches and sugar in his diet. Hypertension  Well controlled on Cozaar 100 mg and Toprol. Hyperlipidemia borderline controlled on Crestor 20 mg daily. Would like to get his LDL less than 70 with history of coronary artery disease. ICD-10-CM ICD-9-CM    1. Type 2 diabetes mellitus with diabetic polyneuropathy, without long-term current use of insulin (HCC)  E11.42 250.60 MICROALBUMIN, UR, RAND W/ MICROALB/CREAT RATIO     357.2 HEMOGLOBIN A1C W/O EAG   2. Essential hypertension  Z18 541.1 METABOLIC PANEL, COMPREHENSIVE   3. Dyslipidemia  E78.5 272.4 LIPID PANEL   4. Stage 3a chronic kidney disease (HCC)  N18.31 585. 3  this is exacerbated by history of diabetes not optimally controlled over the years. Patient encouraged to work on significantly improving his diabetes and keep it under control   5. Non-recurrent unilateral inguinal hernia without obstruction or gangrene  K40.90 550.90 REFERRAL TO GENERAL SURGERY                  Diabetic issues reviewed with him: diabetic diet discussed in detail, and low cholesterol diet, weight control and daily exercise discussed. Follow-up and Dispositions    · Return in about 3 months (around 7/15/2022) for labs 1 week before. Reviewed plan of care. Patient has provided input and agrees with goals.

## 2022-05-04 NOTE — TELEPHONE ENCOUNTER
[facility] called to request office note. Note has been signed. Verified fax number and note sent.     Fax: [phone]

## 2022-06-29 RX ORDER — LOSARTAN POTASSIUM 100 MG/1
100 TABLET ORAL DAILY
Qty: 90 TABLET | Refills: 1 | Status: SHIPPED | OUTPATIENT
Start: 2022-06-29

## 2022-07-25 ENCOUNTER — APPOINTMENT (OUTPATIENT)
Dept: INTERNAL MEDICINE CLINIC | Age: 62
End: 2022-07-25

## 2022-07-25 DIAGNOSIS — E11.42 TYPE 2 DIABETES MELLITUS WITH DIABETIC POLYNEUROPATHY, WITHOUT LONG-TERM CURRENT USE OF INSULIN (HCC): ICD-10-CM

## 2022-07-25 DIAGNOSIS — I10 ESSENTIAL HYPERTENSION: ICD-10-CM

## 2022-07-25 DIAGNOSIS — E78.5 DYSLIPIDEMIA: ICD-10-CM

## 2022-07-26 LAB
ALBUMIN SERPL-MCNC: 4.4 G/DL (ref 3.8–4.8)
ALBUMIN/CREAT UR: 43 MG/G CREAT (ref 0–29)
ALBUMIN/GLOB SERPL: 1.6 {RATIO} (ref 1.2–2.2)
ALP SERPL-CCNC: 95 IU/L (ref 44–121)
ALT SERPL-CCNC: 21 IU/L (ref 0–44)
AST SERPL-CCNC: 23 IU/L (ref 0–40)
BILIRUB SERPL-MCNC: 0.3 MG/DL (ref 0–1.2)
BUN SERPL-MCNC: 20 MG/DL (ref 8–27)
BUN/CREAT SERPL: 16 (ref 10–24)
CALCIUM SERPL-MCNC: 9.5 MG/DL (ref 8.6–10.2)
CHLORIDE SERPL-SCNC: 100 MMOL/L (ref 96–106)
CHOLEST SERPL-MCNC: 190 MG/DL (ref 100–199)
CO2 SERPL-SCNC: 21 MMOL/L (ref 20–29)
CREAT SERPL-MCNC: 1.23 MG/DL (ref 0.76–1.27)
CREAT UR-MCNC: 174.4 MG/DL
EGFR: 66 ML/MIN/1.73
GLOBULIN SER CALC-MCNC: 2.7 G/DL (ref 1.5–4.5)
GLUCOSE SERPL-MCNC: 153 MG/DL (ref 65–99)
HBA1C MFR BLD: 9.6 % (ref 4.8–5.6)
HDLC SERPL-MCNC: 36 MG/DL
IMP & REVIEW OF LAB RESULTS: NORMAL
LDLC SERPL CALC-MCNC: 102 MG/DL (ref 0–99)
MICROALBUMIN UR-MCNC: 75.2 UG/ML
POTASSIUM SERPL-SCNC: 4.7 MMOL/L (ref 3.5–5.2)
PROT SERPL-MCNC: 7.1 G/DL (ref 6–8.5)
SODIUM SERPL-SCNC: 138 MMOL/L (ref 134–144)
TRIGL SERPL-MCNC: 304 MG/DL (ref 0–149)
VLDLC SERPL CALC-MCNC: 52 MG/DL (ref 5–40)

## 2022-07-29 ENCOUNTER — OFFICE VISIT (OUTPATIENT)
Dept: INTERNAL MEDICINE CLINIC | Age: 62
End: 2022-07-29
Payer: MEDICAID

## 2022-07-29 VITALS
BODY MASS INDEX: 27.46 KG/M2 | RESPIRATION RATE: 16 BRPM | OXYGEN SATURATION: 97 % | WEIGHT: 191.8 LBS | SYSTOLIC BLOOD PRESSURE: 117 MMHG | TEMPERATURE: 97.2 F | DIASTOLIC BLOOD PRESSURE: 76 MMHG | HEART RATE: 86 BPM | HEIGHT: 70 IN

## 2022-07-29 DIAGNOSIS — I10 ESSENTIAL HYPERTENSION: ICD-10-CM

## 2022-07-29 DIAGNOSIS — E11.42 TYPE 2 DIABETES MELLITUS WITH DIABETIC POLYNEUROPATHY, WITHOUT LONG-TERM CURRENT USE OF INSULIN (HCC): Primary | ICD-10-CM

## 2022-07-29 DIAGNOSIS — E78.5 DYSLIPIDEMIA: ICD-10-CM

## 2022-07-29 PROCEDURE — 3046F HEMOGLOBIN A1C LEVEL >9.0%: CPT | Performed by: INTERNAL MEDICINE

## 2022-07-29 PROCEDURE — 99214 OFFICE O/P EST MOD 30 MIN: CPT | Performed by: INTERNAL MEDICINE

## 2022-07-29 RX ORDER — DULAGLUTIDE 0.75 MG/.5ML
0.75 INJECTION, SOLUTION SUBCUTANEOUS
Qty: 4 PEN | Refills: 5 | Status: SHIPPED | OUTPATIENT
Start: 2022-07-29 | End: 2022-11-04 | Stop reason: DRUGHIGH

## 2022-07-29 NOTE — PROGRESS NOTES
Nikki Peraza presents today for   Chief Complaint   Patient presents with    Cholesterol Problem    Hypertension    Diabetes       Nikki Peraza preferred language for health care discussion is english/other. Is someone accompanying this pt? no    Is the patient using any DME equipment during [address]? no    Depression Screening:  3 most recent PHQ Screens 7/29/2022   Little interest or pleasure in doing things Not at all   Feeling down, depressed, irritable, or hopeless Not at all   Total Score PHQ 2 0       Learning Assessment:  Learning Assessment 2/22/2022   PRIMARY LEARNER Patient   HIGHEST LEVEL OF EDUCATION - PRIMARY LEARNER  -   BARRIERS PRIMARY LEARNER -   PRIMARY LANGUAGE ENGLISH   LEARNER PREFERENCE PRIMARY DEMONSTRATION     -   ANSWERED BY patient   RELATIONSHIP SELF       Abuse Screening:  Abuse Screening Questionnaire 2/22/2022   Do you ever feel afraid of your partner? N   Are you in a relationship with someone who physically or mentally threatens you? N   Is it safe for you to go home? Y       Generalized Anxiety  No flowsheet data found. Health Maintenance Due   Topic Date Due    COVID-19 Vaccine (1) Never done    Pneumococcal 0-64 years (1 - PCV) Never done    Eye Exam Retinal or Dilated  Never done    DTaP/Tdap/Td series (1 - Tdap) Never done    Colorectal Cancer Screening Combo  Never done    Shingrix Vaccine Age 50> (1 of 2) Never done    Low dose CT lung screening  Never done    Foot Exam Q1  07/26/2018   . Health Maintenance reviewed and discussed and ordered per Provider. VACCINES DUE   SCREENINGS DUE       Nikki Peraza is updated on all HM    1. \"Have you been to the ER, urgent care clinic since your last visit? Hospitalized since your last visit? \" No    2. \"Have you seen or consulted any other health care providers outside of the [address] [place] [address] since your last visit? \" No     3. For patients aged 39-70: Has the patient had a colonoscopy / FIT/ Cologuard?  No

## 2022-07-29 NOTE — PROGRESS NOTES
Subjective:       Chief Complaint  The patient presents for follow up of diabetes, hypertension and high cholesterol. SUZY Chiu is a 57 y.o. male seen for follow up of diabetes. Healso has hypertension and hyperlipidemia. Diabetes uncontrolled, no significant medication side effects noted, on metformin 1 gm BID, pt has been eating more starches and sugar in his diet which has contributed,   hypertension well controlled on Cozaar 100 mg and Toprol XL 50 mg, , hyperlipidemia borderline controlled, no significant medication side effects noted, on  Crestor 20 mg. Would like to get his LDL less than 70 with history of coronary artery disease. Diet and Lifestyle: generally follows a low fat low cholesterol diet, sedentary    Home BP Monitoring: is not measured at home. Diabetic Review of Systems - home glucose monitoring: is performed sporadically. Other symptoms and concerns: COPD Review:  The patient is being seen for follow up of COPD. Oxygen: He currently is not on home oxygen therapy. Symptoms: chronic dyspnea: severity = moderate: course of sx: symptoms have progressed to a point and plateaued. .   Patient uses 1 pillows at night. Patient denies smoke cigarettes. He has to get free Inhalers from Pulmonary    Pt has chronic back pain and is on Neurontin 600 mg   QID. Pt not on narcotics in this office due to abnormal UDS in the past..         Patient has a history of coronary artery disease for which he is followed closely by cardiology. He also had a TAVR done 4/28/2021 at the heart hospital at [facility].        Current Outpatient Medications   Medication Sig    dulaglutide (Trulicity) 7.79 UZ/3.7 mL sub-q pen 0.5 mL by SubCUTAneous route every seven (7) days. losartan (COZAAR) 100 mg tablet TAKE 1 TABLET BY MOUTH DAILY. gabapentin (NEURONTIN) 300 mg capsule TAKE 2 CAPS BY MOUTH THREE (3) TIMES DAILY. MAX DAILY AMOUNT: 1,800 MG.     rosuvastatin (CRESTOR) 20 mg tablet TAKE 1 TABLET BY MOUTH NIGHTLY    metFORMIN (GLUCOPHAGE) 1,000 mg tablet TAKE 1 TABLET BY MOUTH TWO TIMES A DAY WITH MEALS    metoprolol succinate (TOPROL-XL) 50 mg XL tablet TAKE 1 TABLET BY MOUTH ONCE DAILY    omeprazole (PRILOSEC) 20 mg capsule Take 40 mg by mouth daily. aspirin 81 mg tablet Take 81 mg by mouth daily. terbinafine HCL (LAMISIL) 250 mg tablet TAKE 1 TABLET BY MOUTH DAILY    clopidogreL (PLAVIX) 75 mg tab TAKE 1 TABLET BY MOUTH DAILY (Patient not taking: Reported on 7/29/2022)    nitroglycerin (NITROSTAT) 0.4 mg SL tablet 0.4 mg by SubLINGual route. (Patient not taking: Reported on 7/29/2022)     No current facility-administered medications for this visit.              Review of Systems  Respiratory: negative for dyspnea on exertion  Cardiovascular: negative for chest pain    Objective:     Visit Vitals  /76 (BP 1 Location: Left arm, BP Patient Position: Sitting, BP Cuff Size: Adult)   Pulse 86   Temp 97.2 °F (36.2 °C) (Temporal)   Resp 16   Ht 5' 10\" (1.778 m)   Wt 191 lb 12.8 oz (87 kg)   SpO2 97%   BMI 27.52 kg/m²        Chest - clear to auscultation, no wheezes, rales or rhonchi, symmetric air entry  Heart - normal rate, regular rhythm, normal S1, S2, 2/6 systolic murmur RSB, rubs, clicks or gallops  Extremities - peripheral pulses normal, no pedal edema, no clubbing or cyanosis      Labs:   Lab Results   Component Value Date/Time    Hemoglobin A1c 9.6 (H) 07/25/2022 01:13 PM    Hemoglobin A1c 9.0 (H) 04/13/2022 08:24 AM    Hemoglobin A1c 7.5 (H) 09/27/2021 08:35 AM    Hemoglobin A1c, External 7.2 02/27/2016 12:00 AM    Glucose 153 (H) 07/25/2022 01:13 PM    Microalbumin/Creat ratio (mg/g creat) 60 (H) 03/19/2021 10:09 AM    Microalb/Creat ratio (ug/mg creat.) 43 (H) 07/25/2022 01:13 PM    Microalbumin,urine random 4.41 (H) 03/19/2021 10:09 AM    LDL, calculated 102 (H) 07/25/2022 01:13 PM    LDL, calculated 82.8 03/19/2021 10:09 AM    Creatinine, POC 1.2 10/29/2021 10:58 AM Creatinine 1.23 07/25/2022 01:13 PM      Lab Results   Component Value Date/Time    Prostate Specific Ag 0.7 08/19/2019 08:05 AM    Prostate Specific Ag 0.9 01/24/2017 04:30 PM     Lab Results   Component Value Date/Time    Sodium 138 07/25/2022 01:13 PM    Potassium 4.7 07/25/2022 01:13 PM    Chloride 100 07/25/2022 01:13 PM    CO2 21 07/25/2022 01:13 PM    Anion gap 5 03/19/2021 10:09 AM    Glucose 153 (H) 07/25/2022 01:13 PM    BUN 20 07/25/2022 01:13 PM    Creatinine 1.23 07/25/2022 01:13 PM    BUN/Creatinine ratio 16 07/25/2022 01:13 PM    GFR est AA 74 09/27/2021 08:35 AM    GFR est non-AA 64 09/27/2021 08:35 AM    Calcium 9.5 07/25/2022 01:13 PM    Bilirubin, total 0.3 07/25/2022 01:13 PM    ALT (SGPT) 21 07/25/2022 01:13 PM    Alk. phosphatase 95 07/25/2022 01:13 PM    Protein, total 7.1 07/25/2022 01:13 PM    Albumin 4.4 07/25/2022 01:13 PM    Globulin 3.3 03/19/2021 10:09 AM    A-G Ratio 1.6 07/25/2022 01:13 PM      Lab Results   Component Value Date/Time    Hemoglobin A1c 9.6 (H) 07/25/2022 01:13 PM    Hemoglobin A1c (POC) 6.5 12/16/2016 12:02 PM    Hemoglobin A1c, External 7.2 02/27/2016 12:00 AM            Labs:   Lab Results   Component Value Date/Time    Cholesterol, total 190 07/25/2022 01:13 PM    HDL Cholesterol 36 (L) 07/25/2022 01:13 PM    LDL, calculated 102 (H) 07/25/2022 01:13 PM    LDL, calculated 82.8 03/19/2021 10:09 AM    LDL-C, External 132 02/27/2016 12:00 AM    Triglyceride 304 (H) 07/25/2022 01:13 PM    CHOL/HDL Ratio 4.6 03/19/2021 10:09 AM       Labs:   Lab Results   Component Value Date/Time    Prostate Specific Ag 0.7 08/19/2019 08:05 AM    Prostate Specific Ag 0.9 01/24/2017 04:30 PM             Assessment / Plan     Diabetes uncontrolled, will continue  metformin 1 gm BID and add Trulicity 0.09 mg. If insurance does not cover consider Amaryl  Hypertension  Well controlled on Cozaar 100 mg and Toprol. Hyperlipidemia borderline controlled on Crestor 20 mg daily.   Would like to get his LDL less than 70 with history of coronary artery disease. ICD-10-CM ICD-9-CM    1. Type 2 diabetes mellitus with diabetic polyneuropathy, without long-term current use of insulin ([facility])  E11.42 250.60 HEMOGLOBIN A1C W/O EAG     357.2       2. Essential hypertension  E44 082.3 METABOLIC PANEL, COMPREHENSIVE      3. Dyslipidemia  E78.5 272.4 LIPID PANEL                 Diabetic issues reviewed with him: diabetic diet discussed in detail, and low cholesterol diet, weight control and daily exercise discussed. Follow-up and Dispositions    Return in about 3 months (around 10/29/2022) for labs 1 week before. Reviewed plan of care. Patient has provided input and agrees with goals.

## 2022-08-01 DIAGNOSIS — B35.1 FUNGAL INFECTION OF TOENAIL: ICD-10-CM

## 2022-08-01 RX ORDER — TERBINAFINE HYDROCHLORIDE 250 MG/1
250 TABLET ORAL DAILY
Qty: 30 TABLET | Refills: 1 | Status: SHIPPED | OUTPATIENT
Start: 2022-08-01 | End: 2022-09-08

## 2022-08-23 ENCOUNTER — OFFICE VISIT (OUTPATIENT)
Dept: CARDIOLOGY CLINIC | Age: 62
End: 2022-08-23
Payer: MEDICAID

## 2022-08-23 VITALS
OXYGEN SATURATION: 97 % | HEART RATE: 66 BPM | DIASTOLIC BLOOD PRESSURE: 62 MMHG | WEIGHT: 193 LBS | HEIGHT: 70 IN | SYSTOLIC BLOOD PRESSURE: 104 MMHG | BODY MASS INDEX: 27.63 KG/M2

## 2022-08-23 DIAGNOSIS — I35.0 AORTIC VALVE STENOSIS, ETIOLOGY OF CARDIAC VALVE DISEASE UNSPECIFIED: Primary | ICD-10-CM

## 2022-08-23 PROCEDURE — 99214 OFFICE O/P EST MOD 30 MIN: CPT | Performed by: INTERNAL MEDICINE

## 2022-08-23 NOTE — PROGRESS NOTES
Flo Rao presents today for   Chief Complaint   Patient presents with    Follow-up     6 month follow up- no complaints            Flo Rao preferred language for health care discussion is english/other. Is someone accompanying this pt? no    Is the patient using any DME equipment during [address]? no    Depression Screening:  3 most recent PHQ Screens 8/23/2022   Little interest or pleasure in doing things Not at all   Feeling down, depressed, irritable, or hopeless Not at all   Total Score PHQ 2 0       Learning Assessment:  Learning Assessment 2/22/2022   PRIMARY LEARNER Patient   HIGHEST LEVEL OF EDUCATION - PRIMARY LEARNER  -   BARRIERS PRIMARY LEARNER -   PRIMARY LANGUAGE ENGLISH   LEARNER PREFERENCE PRIMARY DEMONSTRATION     -   ANSWERED BY patient   RELATIONSHIP SELF       Abuse Screening:  Abuse Screening Questionnaire 8/23/2022   Do you ever feel afraid of your partner? N   Are you in a relationship with someone who physically or mentally threatens you? N   Is it safe for you to go home? Y       Fall Risk  No flowsheet data found. Pt currently taking Anticoagulant therapy? no    Coordination of Care:  1. Have you been to the ER, urgent care clinic since your last visit? Hospitalized since your last visit? no    2. Have you seen or consulted any other health care providers outside of the [address] [place] [address] since your last visit? Include any pap smears or colon screening.  no

## 2022-08-28 NOTE — PROGRESS NOTES
Dale Rodgers is in the office today for cardiovascular reevaluation for his chronic coronary artery disease and aortic valve disease. He was admitted to [facility] with chest tightness/acute coronary syndrome in January, 2006. 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 This was ballooned but not stented. There was a residual stenosis of 40% with JB grade 3 flow following the procedure. Left ventricular function at that time was normal with an ejection fraction in the 60%. He has done well on medical therapy over the years without any recurrent cardiovascular issues. He continued smoking and  developed moderately severe COPD with chronic shortness of breath. He has not smoked for many years now. He also had aortic stenosis with an echo in  August 2020 that demonstrated normal left ventricular systolic function  and moderately severe to severe aortic stenosis with a mean gradient of 38 mmHg across the valve and aortic valve area of 0.8 cm². Compared to the echocardiogram of October 14, 2019 there was minimal change with a mean gradient being 36 mmHg at that time. He had a cardiac catheterization done on January 22, 2021. He had a widely patent LAD and proximal OM/ramus and right coronary artery. 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 He had a CT CTA of the chest abdomen and pelvis  in preparation for possible TAVR    The patient had a TAVR on 4/28/2021. He recently had a repeat echocardiogram done at [facility] which he reports was [place] \". He reports that the only thing that really makes him short of breath is carrying something up a flight of steps. He has gained 6 pounds since his last visit. He has had no peripheral swelling. He has had no chest pain.   He reports his hemoglobin A1c was \"kind of high \". He is now on Trulicity and is about to have his fourth injection. Plan; we will plan to see him back in another 6 months. His symptoms seem very stable. He has done well since his recent TAVR. 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 RCA mild. LAD tortuous. CX mild. CX angela 100%. S/P balloon angioplasty of CX angela. Residual 40%. LVEDP 20.  EF 65%. Cardiac nuclear imaging test 08/30/2006    No ischemia or infarct. EF 59%. Negative max.  stress test.    Chronic cough     coughing up sputum    Chronic lung disease     COPD (chronic obstructive pulmonary disease) (HCC)     moderate to severe    Coronary artery disease     Diabetes (HCC)     Diabetes mellitus (HCC)     GARCIA (dyspnea on exertion)     GERD (gastroesophageal reflux disease)     H/O: pneumonia     Heartburn     Hypertension     essential systemic     Indigestion     Left inguinal hernia     Neuropathy involving both lower extremities     Pneumonia 8/12    \"walking\"    Pure hypercholesterolemia     Tobacco abuse          Past Surgical History:   Procedure Laterality Date    HX HEART CATHETERIZATION  01/26/06    HX ORTHOPAEDIC  1989    fracture of bilateral hands     HX PTCA      of anomalous circumflex marginal artery    CT TONGUE AND MOUTH SURG UNLISTED  04/04    tongue surgery    CT TONGUE TO LIP SURGERY  04/04    VASCULAR SURGERY PROCEDURE UNLIST      ptca     Current Outpatient Medications   Medication Sig    terbinafine HCL (LAMISIL) 250 mg tablet TAKE 1 TABLET BY MOUTH DAILY    dulaglutide (Trulicity) 7.07 LY/6.4 mL sub-q pen 0.5 mL by SubCUTAneous route every seven (7) days. losartan (COZAAR) 100 mg tablet TAKE 1 TABLET BY MOUTH DAILY. gabapentin (NEURONTIN) 300 mg capsule TAKE 2 CAPS BY MOUTH THREE (3) TIMES DAILY. MAX DAILY AMOUNT: 1,800 MG. rosuvastatin (CRESTOR) 20 mg tablet TAKE 1 TABLET BY MOUTH NIGHTLY    metFORMIN (GLUCOPHAGE) 1,000 mg tablet TAKE 1 TABLET BY MOUTH TWO TIMES A DAY WITH MEALS    metoprolol succinate (TOPROL-XL) 50 mg XL tablet TAKE 1 TABLET BY MOUTH ONCE DAILY    nitroglycerin (NITROSTAT) 0.4 mg SL tablet 0.4 mg by SubLINGual route. omeprazole (PRILOSEC) 20 mg capsule Take 20 mg by mouth two (2) times a day. aspirin 81 mg tablet Take 81 mg by mouth daily. clopidogreL (PLAVIX) 75 mg tab TAKE 1 TABLET BY MOUTH DAILY (Patient not taking: Reported on 2022)     No current facility-administered medications for this visit. Allergies   Allergen Reactions    Altace [Ramipril] Cough       Social History:   Social History     Tobacco Use    Smoking status: Former     Packs/day: 1.25     Years: 20.00     Pack years: 25.00     Types: Cigarettes     Quit date: 2014     Years since quittin.6    Smokeless tobacco: Never   Substance Use Topics    Alcohol use: Not Currently     Comment: on special occasions only           Family history: family history includes Asthma in his father; COPD in his mother; Cancer in his father; Colon Polyps in his father. Review of Systems:    Constitutional: Negative. Respiratory: Negative. Cardiovascular: Negative. Gastrointestinal: Negative. Musculoskeletal: Negative. Neurological: Negative for dizziness.      Physical Exam:   The patient is an alert and oriented  Visit Vitals  /62 (BP 1 Location: Left upper arm, BP Patient Position: Sitting, BP Cuff Size: Adult)   Pulse 66   Ht 5' 10\" (1.778 m)   Wt 87.5 kg (193 lb)   SpO2 97%   BMI 27.69 kg/m²        BP Readings from Last 3 Encounters:   08/23/22 104/62   07/29/22 117/76   04/15/22 121/78        Wt Readings from Last 3 Encounters:   08/23/22 87.5 kg (193 lb)   07/29/22 87 kg (191 lb 12.8 oz)   04/15/22 85.3 kg (188 lb)       HEENT: Conjunctivae white and mucosa moist   Neck: Supple without masses, tenderness or thyromegaly. No jugular venous distention. Left carotid remarkable for transmitted aortic murmur  Cardiovascular: There was a grade 2-4/1  systolic murmur    Lungs: decreased breath sounds throughout   Abdomen: Soft. No masses, tenderness or organomegaly. Extremities: Trace edema with 1 + peripheral pulses. Review of Data:  See PMH and Cardiology and Imaging sections for cardiac testing      Results for orders placed or performed in visit on 02/22/2022   AMB POC EKG ROUTINE W/ 12 LEADS, INTER & REP     Status: None    Narrative    Normal sinus rhythm rate 84. IVCD of the right bundle branch block type. No significant change compared to prior tracing           Mary Lou Joy MD F[facility]C.C[facility]  [facility]  [address] [place] [address]. [address]    PLEASE NOTE:  This document has been produced using voice recognition software. Unrecognized errors in transcription may be present.

## 2022-10-28 ENCOUNTER — APPOINTMENT (OUTPATIENT)
Dept: INTERNAL MEDICINE CLINIC | Age: 62
End: 2022-10-28

## 2022-10-29 LAB
ALBUMIN SERPL-MCNC: 4.4 G/DL (ref 3.8–4.8)
ALBUMIN/GLOB SERPL: 1.6 {RATIO} (ref 1.2–2.2)
ALP SERPL-CCNC: 105 IU/L (ref 44–121)
ALT SERPL-CCNC: 18 IU/L (ref 0–44)
AST SERPL-CCNC: 18 IU/L (ref 0–40)
BILIRUB SERPL-MCNC: <0.2 MG/DL (ref 0–1.2)
BUN SERPL-MCNC: 11 MG/DL (ref 8–27)
BUN/CREAT SERPL: 9 (ref 10–24)
CALCIUM SERPL-MCNC: 9.8 MG/DL (ref 8.6–10.2)
CHLORIDE SERPL-SCNC: 101 MMOL/L (ref 96–106)
CHOLEST SERPL-MCNC: 164 MG/DL (ref 100–199)
CO2 SERPL-SCNC: 27 MMOL/L (ref 20–29)
CREAT SERPL-MCNC: 1.19 MG/DL (ref 0.76–1.27)
EGFR: 69 ML/MIN/1.73
GLOBULIN SER CALC-MCNC: 2.7 G/DL (ref 1.5–4.5)
GLUCOSE SERPL-MCNC: 132 MG/DL (ref 70–99)
HBA1C MFR BLD: 7.8 % (ref 4.8–5.6)
HDLC SERPL-MCNC: 37 MG/DL
IMP & REVIEW OF LAB RESULTS: NORMAL
LDLC SERPL CALC-MCNC: 79 MG/DL (ref 0–99)
POTASSIUM SERPL-SCNC: 4.3 MMOL/L (ref 3.5–5.2)
PROT SERPL-MCNC: 7.1 G/DL (ref 6–8.5)
SODIUM SERPL-SCNC: 142 MMOL/L (ref 134–144)
TRIGL SERPL-MCNC: 297 MG/DL (ref 0–149)
VLDLC SERPL CALC-MCNC: 48 MG/DL (ref 5–40)

## 2022-11-04 ENCOUNTER — OFFICE VISIT (OUTPATIENT)
Dept: INTERNAL MEDICINE CLINIC | Age: 62
End: 2022-11-04
Payer: MEDICAID

## 2022-11-04 VITALS
WEIGHT: 191 LBS | HEIGHT: 70 IN | OXYGEN SATURATION: 97 % | SYSTOLIC BLOOD PRESSURE: 111 MMHG | TEMPERATURE: 97.8 F | RESPIRATION RATE: 20 BRPM | BODY MASS INDEX: 27.35 KG/M2 | HEART RATE: 91 BPM | DIASTOLIC BLOOD PRESSURE: 80 MMHG

## 2022-11-04 DIAGNOSIS — E11.42 TYPE 2 DIABETES MELLITUS WITH DIABETIC POLYNEUROPATHY, WITHOUT LONG-TERM CURRENT USE OF INSULIN (HCC): Primary | ICD-10-CM

## 2022-11-04 DIAGNOSIS — I25.10 ATHEROSCLEROSIS OF NATIVE CORONARY ARTERY OF NATIVE HEART WITHOUT ANGINA PECTORIS: ICD-10-CM

## 2022-11-04 DIAGNOSIS — E78.5 DYSLIPIDEMIA: ICD-10-CM

## 2022-11-04 DIAGNOSIS — E11.42 TYPE 2 DIABETES MELLITUS WITH DIABETIC POLYNEUROPATHY, WITHOUT LONG-TERM CURRENT USE OF INSULIN (HCC): ICD-10-CM

## 2022-11-04 DIAGNOSIS — I10 ESSENTIAL HYPERTENSION: ICD-10-CM

## 2022-11-04 PROCEDURE — 99214 OFFICE O/P EST MOD 30 MIN: CPT | Performed by: INTERNAL MEDICINE

## 2022-11-04 PROCEDURE — 3051F HG A1C>EQUAL 7.0%<8.0%: CPT | Performed by: INTERNAL MEDICINE

## 2022-11-04 PROCEDURE — 3074F SYST BP LT 130 MM HG: CPT | Performed by: INTERNAL MEDICINE

## 2022-11-04 PROCEDURE — 3078F DIAST BP <80 MM HG: CPT | Performed by: INTERNAL MEDICINE

## 2022-11-04 RX ORDER — DULAGLUTIDE 1.5 MG/.5ML
1.5 INJECTION, SOLUTION SUBCUTANEOUS
Qty: 4 EACH | Refills: 5 | Status: SHIPPED | OUTPATIENT
Start: 2022-11-04

## 2022-11-04 RX ORDER — LANCETS
EACH MISCELLANEOUS
Qty: 50 EACH | Refills: 11 | Status: SHIPPED | OUTPATIENT
Start: 2022-11-04

## 2022-11-04 RX ORDER — INSULIN PUMP SYRINGE, 3 ML
EACH MISCELLANEOUS
Qty: 1 KIT | Refills: 0 | Status: SHIPPED | OUTPATIENT
Start: 2022-11-04

## 2022-11-04 NOTE — PROGRESS NOTES
Patient is in the office today for a 4 month follow up. 1. \"Have you been to the ER, urgent care clinic since your last visit? Hospitalized since your last visit? \" No    2. \"Have you seen or consulted any other health care providers outside of the [address] [place] [address] since your last visit? \" No     3. For patients aged 39-70: Has the patient had a colonoscopy / FIT/ Cologuard? Yes - no Care Gap present      If the patient is female:    4. For patients aged 41-77: Has the patient had a mammogram within the past 2 years? NA - based on age or sex      11. For patients aged 21-65: Has the patient had a pap smear?  NA - based on age or sex

## 2022-11-06 NOTE — PROGRESS NOTES
Subjective:       Chief Complaint  The patient presents for follow up of diabetes, hypertension and high cholesterol. SUZY Cabral is a 60 y.o. male seen for follow up of diabetes. Radhika has hypertension and hyperlipidemia. Diabetes uncontrolled but improving, no significant medication side effects noted, on metformin 1 gm every day. He is only able to take 1 metformin due to    . He continues on Trulicity 0.28 mg every week.,  Patient consumes a lot of products with sugar and is encouraged to cut back. Hypertension well controlled on Cozaar 100 mg and Toprol XL 50 mg, , hyperlipidemia borderline controlled, no significant medication side effects noted, on  Crestor 20 mg. Would like to get his LDL less than 70 with history of coronary artery disease. Would probably improve if patient cuts back his sugar intake. Diet and Lifestyle: generally follows a low fat low cholesterol diet, sedentary    Home BP Monitoring: is not measured at home. Diabetic Review of Systems - home glucose monitoring: is performed sporadically. Other symptoms and concerns: COPD Review:  The patient is being seen for follow up of COPD. Oxygen: He currently is not on home oxygen therapy. Symptoms: chronic dyspnea: severity = moderate: course of sx: symptoms have progressed to a point and plateaued. .   Patient uses 1 pillows at night. Patient denies smoke cigarettes. He has to get free Inhalers from Pulmonary    Pt has chronic back pain and is on Neurontin 600 mg   QID. Pt not on narcotics in this office due to abnormal UDS in the past..         Patient has a history of coronary artery disease for which he is followed closely by cardiology. He also had a TAVR done 4/28/2021 at the heart [facility].  Patient remained stable on Plavix and statin.         Current Outpatient Medications   Medication Sig    dulaglutide (Trulicity) 1.5 ND/4.5 mL sub-q pen 0.5 mL by SubCUTAneous route every seven (7) days.    Blood-Glucose Meter monitoring kit Check BS 1x/day E 11.42    glucose blood VI test strips strip Check BS 1x/day E 11.42    lancets misc Check BS 1x/day E 11.42    terbinafine HCL (LAMISIL) 250 mg tablet TAKE 1 TABLET BY MOUTH DAILY    rosuvastatin (CRESTOR) 20 mg tablet TAKE 1 TABLET BY MOUTH NIGHTLY    metoprolol succinate (TOPROL-XL) 50 mg XL tablet TAKE 1 TABLET BY MOUTH ONCE DAILY    losartan (COZAAR) 100 mg tablet TAKE 1 TABLET BY MOUTH DAILY. gabapentin (NEURONTIN) 300 mg capsule TAKE 2 CAPS BY MOUTH THREE (3) TIMES DAILY. MAX DAILY AMOUNT: 1,800 MG.    metFORMIN (GLUCOPHAGE) 1,000 mg tablet TAKE 1 TABLET BY MOUTH TWO TIMES A DAY WITH MEALS (Patient taking differently: Take 1,000 mg by mouth daily.)    nitroglycerin (NITROSTAT) 0.4 mg SL tablet 0.4 mg by SubLINGual route. omeprazole (PRILOSEC) 20 mg capsule Take 20 mg by mouth two (2) times a day. aspirin 81 mg tablet Take 81 mg by mouth daily. clopidogreL (PLAVIX) 75 mg tab TAKE 1 TABLET BY MOUTH DAILY (Patient not taking: Reported on 11/4/2022)     No current facility-administered medications for this visit.              Review of Systems  Respiratory: negative for dyspnea on exertion  Cardiovascular: negative for chest pain    Objective:     Visit Vitals  /80 (BP 1 Location: Right arm, BP Patient Position: Sitting, BP Cuff Size: Adult)   Pulse 91   Temp 97.8 °F (36.6 °C) (Temporal)   Resp 20   Ht 5' 10\" (1.778 m)   Wt 191 lb (86.6 kg)   SpO2 97%   BMI 27.41 kg/m²        Chest - clear to auscultation, no wheezes, rales or rhonchi, symmetric air entry  Heart - normal rate, regular rhythm, normal S1, S2, 2/6 systolic murmur RSB, rubs, clicks or gallops  Extremities - peripheral pulses normal, no pedal edema, no clubbing or cyanosis      Labs:   Lab Results   Component Value Date/Time    Hemoglobin A1c 7.8 (H) 10/28/2022 07:51 AM    Hemoglobin A1c 9.6 (H) 07/25/2022 01:13 PM    Hemoglobin A1c 9.0 (H) 04/13/2022 08:24 AM    Hemoglobin A1c, External 7.2 02/27/2016 12:00 AM    Glucose 132 (H) 10/28/2022 07:51 AM    Microalbumin/Creat ratio (mg/g creat) 60 (H) 03/19/2021 10:09 AM    Microalb/Creat ratio (ug/mg creat.) 43 (H) 07/25/2022 01:13 PM    Microalbumin,urine random 4.41 (H) 03/19/2021 10:09 AM    LDL, calculated 79 10/28/2022 07:51 AM    LDL, calculated 82.8 03/19/2021 10:09 AM    Creatinine, POC 1.2 10/29/2021 10:58 AM    Creatinine 1.19 10/28/2022 07:51 AM      Lab Results   Component Value Date/Time    Prostate Specific Ag 0.7 08/19/2019 08:05 AM    Prostate Specific Ag 0.9 01/24/2017 04:30 PM     Lab Results   Component Value Date/Time    Sodium 142 10/28/2022 07:51 AM    Potassium 4.3 10/28/2022 07:51 AM    Chloride 101 10/28/2022 07:51 AM    CO2 27 10/28/2022 07:51 AM    Anion gap 5 03/19/2021 10:09 AM    Glucose 132 (H) 10/28/2022 07:51 AM    BUN 11 10/28/2022 07:51 AM    Creatinine 1.19 10/28/2022 07:51 AM    BUN/Creatinine ratio 9 (L) 10/28/2022 07:51 AM    GFR est AA 74 09/27/2021 08:35 AM    GFR est non-AA 64 09/27/2021 08:35 AM    Calcium 9.8 10/28/2022 07:51 AM    Bilirubin, total <0.2 10/28/2022 07:51 AM    ALT (SGPT) 18 10/28/2022 07:51 AM    Alk.  phosphatase 105 10/28/2022 07:51 AM    Protein, total 7.1 10/28/2022 07:51 AM    Albumin 4.4 10/28/2022 07:51 AM    Globulin 3.3 03/19/2021 10:09 AM    A-G Ratio 1.6 10/28/2022 07:51 AM      Lab Results   Component Value Date/Time    Hemoglobin A1c 7.8 (H) 10/28/2022 07:51 AM    Hemoglobin A1c (POC) 6.5 12/16/2016 12:02 PM    Hemoglobin A1c, External 7.2 02/27/2016 12:00 AM            Labs:   Lab Results   Component Value Date/Time    Cholesterol, total 164 10/28/2022 07:51 AM    HDL Cholesterol 37 (L) 10/28/2022 07:51 AM    LDL, calculated 79 10/28/2022 07:51 AM    LDL, calculated 82.8 03/19/2021 10:09 AM    LDL-C, External 132 02/27/2016 12:00 AM    Triglyceride 297 (H) 10/28/2022 07:51 AM    CHOL/HDL Ratio 4.6 03/19/2021 10:09 AM       Labs:   Lab Results   Component Value Date/Time    Prostate Specific Ag 0.7 08/19/2019 08:05 AM    Prostate Specific Ag 0.9 01/24/2017 04:30 PM             Assessment / Plan     Diabetes uncontrolled but improving, will continue  metformin 1 gm daily and increase to Trulicity 1.5 mg. Hypertension  Well controlled on Cozaar 100 mg and Toprol XL 50 mg  Hyperlipidemia borderline controlled on Crestor 20 mg daily. Would like to get his LDL less than 70 with history of coronary artery disease. Patient to try and cut back sugar in his diet. ICD-10-CM ICD-9-CM    1. Type 2 diabetes mellitus with diabetic polyneuropathy, without long-term current use of insulin ([facility])  E11.42 250.60 dulaglutide (Trulicity) 1.5 QO/3.1 mL sub-q pen     357.2 HEMOGLOBIN A1C W/O EAG      2. Essential hypertension  X90 737.6 METABOLIC PANEL, COMPREHENSIVE      3. Dyslipidemia  E78.5 272.4 LIPID PANEL      4. Atherosclerosis of native coronary artery of native heart without angina pectoris  I25.10 414.01 Stable on current medications patient followed by cardiology                 Diabetic issues reviewed with him: diabetic diet discussed in detail, and low cholesterol diet, weight control and daily exercise discussed. Follow-up and Dispositions    Return in about 4 months (around 3/4/2023) for labs 1 week before. Reviewed plan of care. Patient has provided input and agrees with goals.

## 2022-12-12 DIAGNOSIS — B35.1 FUNGAL INFECTION OF TOENAIL: ICD-10-CM

## 2022-12-12 RX ORDER — TERBINAFINE HYDROCHLORIDE 250 MG/1
250 TABLET ORAL DAILY
Qty: 30 TABLET | Refills: 0 | Status: SHIPPED | OUTPATIENT
Start: 2022-12-12

## 2022-12-19 DIAGNOSIS — B35.1 FUNGAL INFECTION OF TOENAIL: ICD-10-CM

## 2022-12-19 RX ORDER — TERBINAFINE HYDROCHLORIDE 250 MG/1
250 TABLET ORAL DAILY
Qty: 30 TABLET | Refills: 0 | Status: SHIPPED | OUTPATIENT
Start: 2022-12-19

## 2023-01-19 DIAGNOSIS — G89.29 CHRONIC MIDLINE LOW BACK PAIN WITHOUT SCIATICA: ICD-10-CM

## 2023-01-19 DIAGNOSIS — M54.50 CHRONIC MIDLINE LOW BACK PAIN WITHOUT SCIATICA: ICD-10-CM

## 2023-01-19 RX ORDER — GABAPENTIN 300 MG/1
600 CAPSULE ORAL 3 TIMES DAILY
Qty: 540 CAPSULE | Refills: 4 | Status: SHIPPED | OUTPATIENT
Start: 2023-01-19

## 2023-02-04 DIAGNOSIS — E11.42 TYPE 2 DIABETES MELLITUS WITH DIABETIC POLYNEUROPATHY, WITHOUT LONG-TERM CURRENT USE OF INSULIN (HCC): Primary | ICD-10-CM

## 2023-02-04 DIAGNOSIS — E78.5 DYSLIPIDEMIA: Primary | ICD-10-CM

## 2023-02-05 DIAGNOSIS — I10 ESSENTIAL HYPERTENSION: Primary | ICD-10-CM

## 2023-02-17 RX ORDER — TERBINAFINE HYDROCHLORIDE 250 MG/1
TABLET ORAL
Qty: 30 TABLET | Refills: 2 | Status: SHIPPED | OUTPATIENT
Start: 2023-02-17

## 2023-02-17 RX ORDER — DULAGLUTIDE 1.5 MG/.5ML
INJECTION, SOLUTION SUBCUTANEOUS
Qty: 2 ML | Refills: 4 | Status: SHIPPED | OUTPATIENT
Start: 2023-02-17 | End: 2023-03-10

## 2023-02-17 RX ORDER — METOPROLOL SUCCINATE 50 MG/1
TABLET, EXTENDED RELEASE ORAL
Qty: 90 TABLET | Refills: 1 | Status: SHIPPED | OUTPATIENT
Start: 2023-02-17 | End: 2023-03-10 | Stop reason: SDUPTHER

## 2023-02-22 ENCOUNTER — OFFICE VISIT (OUTPATIENT)
Age: 63
End: 2023-02-22

## 2023-02-22 VITALS
OXYGEN SATURATION: 97 % | DIASTOLIC BLOOD PRESSURE: 78 MMHG | HEART RATE: 86 BPM | WEIGHT: 202 LBS | HEIGHT: 70 IN | BODY MASS INDEX: 28.92 KG/M2 | SYSTOLIC BLOOD PRESSURE: 128 MMHG

## 2023-02-22 DIAGNOSIS — I35.0 NONRHEUMATIC AORTIC (VALVE) STENOSIS: ICD-10-CM

## 2023-02-22 DIAGNOSIS — R06.09 DOE (DYSPNEA ON EXERTION): Primary | ICD-10-CM

## 2023-02-22 RX ORDER — ASPIRIN 81 MG/1
81 TABLET, CHEWABLE ORAL DAILY
COMMUNITY

## 2023-02-22 ASSESSMENT — ANXIETY QUESTIONNAIRES
5. BEING SO RESTLESS THAT IT IS HARD TO SIT STILL: 0
GAD7 TOTAL SCORE: 0
1. FEELING NERVOUS, ANXIOUS, OR ON EDGE: 0
4. TROUBLE RELAXING: 0
7. FEELING AFRAID AS IF SOMETHING AWFUL MIGHT HAPPEN: 0
6. BECOMING EASILY ANNOYED OR IRRITABLE: 0
2. NOT BEING ABLE TO STOP OR CONTROL WORRYING: 0
3. WORRYING TOO MUCH ABOUT DIFFERENT THINGS: 0

## 2023-02-22 ASSESSMENT — PATIENT HEALTH QUESTIONNAIRE - PHQ9
SUM OF ALL RESPONSES TO PHQ QUESTIONS 1-9: 0
SUM OF ALL RESPONSES TO PHQ9 QUESTIONS 1 & 2: 0
SUM OF ALL RESPONSES TO PHQ QUESTIONS 1-9: 0
1. LITTLE INTEREST OR PLEASURE IN DOING THINGS: 0
2. FEELING DOWN, DEPRESSED OR HOPELESS: 0
SUM OF ALL RESPONSES TO PHQ QUESTIONS 1-9: 0
SUM OF ALL RESPONSES TO PHQ QUESTIONS 1-9: 0

## 2023-02-22 NOTE — PROGRESS NOTES
Julia Mir presents today for   Chief Complaint   Patient presents with    Follow-up     6 month       Julia Mir preferred language for health care discussion is english/other. Is someone accompanying this pt? no    Is the patient using any DME equipment during OV? no    Depression Screening:  Depression: Not at risk    PHQ-2 Score: 0        Learning Assessment:  Who is the primary learner? Patient    What is the preferred language for health care of the primary learner? ENGLISH    How does the primary learner prefer to learn new concepts? DEMONSTRATION    Answered By patient    Relationship to Learner SELF           Pt currently taking Anticoagulant therapy? no    Pt currently taking Antiplatelet therapy ? Aspirin 81 mg daily and plavix 75 mg daily      Coordination of Care:  1. Have you been to the ER, urgent care clinic since your last visit? Hospitalized since your last visit? no    2. Have you seen or consulted any other health care providers outside of the [address] [place] [address] since your last visit? Include any pap smears or colon screening.  no

## 2023-03-03 ENCOUNTER — NURSE ONLY (OUTPATIENT)
Age: 63
End: 2023-03-03

## 2023-03-03 DIAGNOSIS — E78.5 DYSLIPIDEMIA: ICD-10-CM

## 2023-03-03 DIAGNOSIS — I10 ESSENTIAL HYPERTENSION: ICD-10-CM

## 2023-03-03 DIAGNOSIS — E11.42 TYPE 2 DIABETES MELLITUS WITH DIABETIC POLYNEUROPATHY, WITHOUT LONG-TERM CURRENT USE OF INSULIN (HCC): ICD-10-CM

## 2023-03-04 LAB
ALBUMIN SERPL-MCNC: 4.4 G/DL (ref 3.8–4.8)
ALBUMIN/GLOB SERPL: 1.5 {RATIO} (ref 1.2–2.2)
ALP SERPL-CCNC: 108 IU/L (ref 44–121)
ALT SERPL-CCNC: 26 IU/L (ref 0–44)
AST SERPL-CCNC: 30 IU/L (ref 0–40)
BILIRUB SERPL-MCNC: <0.2 MG/DL (ref 0–1.2)
BUN SERPL-MCNC: 16 MG/DL (ref 8–27)
BUN/CREAT SERPL: 13 (ref 10–24)
CALCIUM SERPL-MCNC: 10.2 MG/DL (ref 8.6–10.2)
CHLORIDE SERPL-SCNC: 100 MMOL/L (ref 96–106)
CHOLEST SERPL-MCNC: 193 MG/DL (ref 100–199)
CO2 SERPL-SCNC: 22 MMOL/L (ref 20–29)
CREAT SERPL-MCNC: 1.22 MG/DL (ref 0.76–1.27)
EGFRCR SERPLBLD CKD-EPI 2021: 67 ML/MIN/1.73
GLOBULIN SER CALC-MCNC: 3 G/DL (ref 1.5–4.5)
GLUCOSE SERPL-MCNC: 151 MG/DL (ref 70–99)
HBA1C MFR BLD: 8.4 % (ref 4.8–5.6)
HDLC SERPL-MCNC: 39 MG/DL
LDLC SERPL CALC-MCNC: 85 MG/DL (ref 0–99)
POTASSIUM SERPL-SCNC: 4.5 MMOL/L (ref 3.5–5.2)
PROT SERPL-MCNC: 7.4 G/DL (ref 6–8.5)
SODIUM SERPL-SCNC: 141 MMOL/L (ref 134–144)
TRIGL SERPL-MCNC: 422 MG/DL (ref 0–149)
VLDLC SERPL CALC-MCNC: 69 MG/DL (ref 5–40)

## 2023-03-05 NOTE — PROGRESS NOTES
Valdemar Dumont is in the office today for cardiac reevaluation for his chronic coronary artery disease and aortic valve disease. He was admitted to [facility] with chest tightness/acute coronary syndrome in January, 2006. 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 This was ballooned but not stented. There was a residual stenosis of 40% with SABA grade 3 flow following the procedure. Left ventricular function at that time was normal with an ejection fraction in the 60%. He has done well on medical therapy over the years without any recurrent cardiovascular issues. He continued smoking and  developed moderately severe COPD with chronic shortness of breath. He has not smoked for many years now. He also had aortic stenosis with an echo in  August 2020 that demonstrated normal left ventricular systolic function  and moderately severe to severe aortic stenosis with a mean gradient of 38 mmHg across the valve and aortic valve area of 0.8 cm². Compared to the echocardiogram of October 14, 2019 there was minimal change with a mean gradient being 36 mmHg at that time. He had a cardiac catheterization done on January 22, 2021. He had a widely patent LAD and proximal OM/ramus and right coronary artery. 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 He had a CT CTA of the chest abdomen and pelvis  in preparation for possible TAVR    The patient had a TAVR on 4/28/2021. In the office today he reports he is doing well. He has had no chest pain or shortness of breath. Plan; we will plan to see him back in another 6 months. We will plan to repeat his echocardiogram at that time.   PCP: Eunice Vasquez MD      Past Medical History:   Diagnosis Date    Acute coronary syndrome [facility])     with non-ST-elevation myocardial infarction, s/p direct angioplasty of anomolous circumflex marginal coming off the right coronary cusp on 1/26/06    Arthritis     in hands    ASHD (arteriosclerotic heart disease)     Benign hypertensive heart disease without heart failure     CAD (coronary artery disease)     chronic underlying    Calculus of kidney     Cardiac cath 01/26/2006    LM mild. RCA mild. LAD tortuous. CX mild. CX erin 100%. S/P balloon angioplasty of CX erin. Residual 40%. LVEDP 20.  EF 65%. Cardiac nuclear imaging test 08/30/2006    No ischemia or infarct. EF 59%. Negative max. stress test.    Chronic cough     coughing up sputum    Chronic lung disease     COPD (chronic obstructive pulmonary disease) (HCC)     moderate to severe    Coronary artery disease     Diabetes (HCC)     Diabetes mellitus (HCC)     FERRARI (dyspnea on exertion)     GERD (gastroesophageal reflux disease)     H/O: pneumonia     Heartburn     Hypertension     essential systemic     Indigestion     Left inguinal hernia     Neuropathy involving both lower extremities     Pneumonia 8/12    \"walking\"    Pure hypercholesterolemia     Tobacco abuse          Past Surgical History:   Procedure Laterality Date    HX HEART CATHETERIZATION  01/26/06    HX ORTHOPAEDIC  1989    fracture of bilateral hands     HX PTCA      of anomalous circumflex marginal artery    LA TONGUE AND MOUTH SURG UNLISTED  04/04    tongue surgery    LA TONGUE TO LIP SURGERY  04/04    VASCULAR SURGERY PROCEDURE UNLIST      ptca     Current Outpatient Medications   Medication Sig    terbinafine HCL (LAMISIL) 250 mg tablet TAKE 1 TABLET BY MOUTH DAILY    dulaglutide (Trulicity) 3.99 TF/5.6 mL sub-q pen 0.5 mL by SubCUTAneous route every seven (7) days. losartan (COZAAR) 100 mg tablet TAKE 1 TABLET BY MOUTH DAILY. gabapentin (NEURONTIN) 300 mg capsule TAKE 2 CAPS BY MOUTH THREE (3) TIMES DAILY. MAX DAILY AMOUNT: 1,800 MG.     rosuvastatin (CRESTOR) 20 mg tablet TAKE 1 TABLET BY MOUTH NIGHTLY    metFORMIN (GLUCOPHAGE) 1,000 mg tablet TAKE 1 TABLET BY MOUTH TWO TIMES A DAY WITH MEALS    metoprolol succinate (TOPROL-XL) 50 mg XL tablet TAKE 1 TABLET BY MOUTH ONCE DAILY    nitroglycerin (NITROSTAT) 0.4 mg SL tablet 0.4 mg by SubLINGual route. omeprazole (PRILOSEC) 20 mg capsule Take 20 mg by mouth two (2) times a day. aspirin 81 mg tablet Take 81 mg by mouth daily. clopidogreL (PLAVIX) 75 mg tab TAKE 1 TABLET BY MOUTH DAILY (Patient not taking: Reported on 2022)     No current facility-administered medications for this visit. Allergies   Allergen Reactions    Altace [Ramipril] Cough       Social History:   Social History     Tobacco Use    Smoking status: Former     Packs/day: 1.25     Years: 20.00     Pack years: 25.00     Types: Cigarettes     Quit date: 2014     Years since quittin.6    Smokeless tobacco: Never   Substance Use Topics    Alcohol use: Not Currently     Comment: on special occasions only           Family history: family history includes Asthma in his father; COPD in his mother; Cancer in his father; Colon Polyps in his father. Review of Systems:    Constitutional: Negative. Respiratory: Negative. Cardiovascular: Negative. Gastrointestinal: Negative. Musculoskeletal: Negative. Neurological: Negative for dizziness. Physical Exam:   The patient is an alert and oriented  Visit Vitals  /62 (BP 1 Location: Left upper arm, BP Patient Position: Sitting, BP Cuff Size: Adult)   Pulse 66   Ht 5' 10\" (1.778 m)   Wt 87.5 kg (193 lb)   SpO2 97%   BMI 27.69 kg/m²        BP Readings from Last 3 Encounters:   22 104/62   22 117/76   04/15/22 121/78        Wt Readings from Last 3 Encounters:   22 87.5 kg (193 lb)   22 87 kg (191 lb 12.8 oz)   04/15/22 85.3 kg (188 lb)       HEENT: Conjunctivae white and mucosa moist   Neck: Supple without masses, tenderness or thyromegaly. No jugular venous distention.   Left carotid remarkable for transmitted aortic murmur  Cardiovascular: There was a soft systolic murmur    Lungs: decreased breath sounds throughout   Abdomen: Soft. No masses, tenderness or organomegaly. Extremities: Trace edema with intact peripheral pulses. Review of Data:  See PMH and Cardiology and Imaging sections for cardiac testing      Results for orders placed or performed in visit on 02/22/2022   AMB POC EKG ROUTINE W/ 12 LEADS, INTER & REP     Status: None    Narrative    Normal sinus rhythm rate 84. IVCD of the right bundle branch block type. No significant change compared to prior tracing           Angela Singleton MD [address]  [facility]  [facility]  [address]. [address]    PLEASE NOTE:  This document has been produced using voice recognition software. Unrecognized errors in transcription may be present.

## 2023-03-06 DIAGNOSIS — E11.42 TYPE 2 DIABETES MELLITUS WITH DIABETIC POLYNEUROPATHY, WITHOUT LONG-TERM CURRENT USE OF INSULIN (HCC): ICD-10-CM

## 2023-03-06 DIAGNOSIS — E78.5 DYSLIPIDEMIA: ICD-10-CM

## 2023-03-06 DIAGNOSIS — I10 ESSENTIAL HYPERTENSION: ICD-10-CM

## 2023-03-10 ENCOUNTER — OFFICE VISIT (OUTPATIENT)
Age: 63
End: 2023-03-10
Payer: MEDICAID

## 2023-03-10 VITALS
RESPIRATION RATE: 20 BRPM | HEIGHT: 70 IN | HEART RATE: 92 BPM | WEIGHT: 196 LBS | OXYGEN SATURATION: 97 % | TEMPERATURE: 98.6 F | BODY MASS INDEX: 28.06 KG/M2 | DIASTOLIC BLOOD PRESSURE: 76 MMHG | SYSTOLIC BLOOD PRESSURE: 112 MMHG

## 2023-03-10 DIAGNOSIS — R53.82 CHRONIC FATIGUE: ICD-10-CM

## 2023-03-10 DIAGNOSIS — E11.42 TYPE 2 DIABETES MELLITUS WITH DIABETIC POLYNEUROPATHY, WITHOUT LONG-TERM CURRENT USE OF INSULIN (HCC): Primary | ICD-10-CM

## 2023-03-10 DIAGNOSIS — E78.2 MIXED HYPERLIPIDEMIA: ICD-10-CM

## 2023-03-10 DIAGNOSIS — G63 POLYNEUROPATHY ASSOCIATED WITH UNDERLYING DISEASE (HCC): ICD-10-CM

## 2023-03-10 DIAGNOSIS — Z12.5 PROSTATE CANCER SCREENING: ICD-10-CM

## 2023-03-10 DIAGNOSIS — Z12.11 COLON CANCER SCREENING: ICD-10-CM

## 2023-03-10 DIAGNOSIS — I10 ESSENTIAL (PRIMARY) HYPERTENSION: ICD-10-CM

## 2023-03-10 PROCEDURE — 99214 OFFICE O/P EST MOD 30 MIN: CPT | Performed by: INTERNAL MEDICINE

## 2023-03-10 PROCEDURE — 99211 OFF/OP EST MAY X REQ PHY/QHP: CPT

## 2023-03-10 PROCEDURE — 3078F DIAST BP <80 MM HG: CPT | Performed by: INTERNAL MEDICINE

## 2023-03-10 PROCEDURE — 3074F SYST BP LT 130 MM HG: CPT | Performed by: INTERNAL MEDICINE

## 2023-03-10 PROCEDURE — 3052F HG A1C>EQUAL 8.0%<EQUAL 9.0%: CPT | Performed by: INTERNAL MEDICINE

## 2023-03-10 RX ORDER — LOSARTAN POTASSIUM 100 MG/1
100 TABLET ORAL DAILY
Qty: 90 TABLET | Refills: 1 | Status: SHIPPED | OUTPATIENT
Start: 2023-03-10

## 2023-03-10 RX ORDER — DULAGLUTIDE 1.5 MG/.5ML
1.5 INJECTION, SOLUTION SUBCUTANEOUS
Qty: 4 ADJUSTABLE DOSE PRE-FILLED PEN SYRINGE | Refills: 5 | Status: SHIPPED | OUTPATIENT
Start: 2023-03-10

## 2023-03-10 RX ORDER — ROSUVASTATIN CALCIUM 20 MG/1
20 TABLET, COATED ORAL DAILY
Qty: 90 TABLET | Refills: 1 | Status: SHIPPED | OUTPATIENT
Start: 2023-03-10

## 2023-03-10 RX ORDER — GABAPENTIN 300 MG/1
600 CAPSULE ORAL 3 TIMES DAILY
Qty: 180 CAPSULE | Refills: 2 | Status: SHIPPED | OUTPATIENT
Start: 2023-03-10 | End: 2023-06-08

## 2023-03-10 RX ORDER — METOPROLOL SUCCINATE 50 MG/1
TABLET, EXTENDED RELEASE ORAL
Qty: 90 TABLET | Refills: 1 | Status: SHIPPED | OUTPATIENT
Start: 2023-03-10

## 2023-03-10 SDOH — ECONOMIC STABILITY: INCOME INSECURITY: HOW HARD IS IT FOR YOU TO PAY FOR THE VERY BASICS LIKE FOOD, HOUSING, MEDICAL CARE, AND HEATING?: PATIENT DECLINED

## 2023-03-10 SDOH — ECONOMIC STABILITY: FOOD INSECURITY: WITHIN THE PAST 12 MONTHS, YOU WORRIED THAT YOUR FOOD WOULD RUN OUT BEFORE YOU GOT MONEY TO BUY MORE.: PATIENT DECLINED

## 2023-03-10 SDOH — ECONOMIC STABILITY: FOOD INSECURITY: WITHIN THE PAST 12 MONTHS, THE FOOD YOU BOUGHT JUST DIDN'T LAST AND YOU DIDN'T HAVE MONEY TO GET MORE.: PATIENT DECLINED

## 2023-03-10 SDOH — ECONOMIC STABILITY: HOUSING INSECURITY
IN THE LAST 12 MONTHS, WAS THERE A TIME WHEN YOU DID NOT HAVE A STEADY PLACE TO SLEEP OR SLEPT IN A SHELTER (INCLUDING NOW)?: PATIENT REFUSED

## 2023-03-10 ASSESSMENT — PATIENT HEALTH QUESTIONNAIRE - PHQ9
SUM OF ALL RESPONSES TO PHQ QUESTIONS 1-9: 2
SUM OF ALL RESPONSES TO PHQ QUESTIONS 1-9: 2
SUM OF ALL RESPONSES TO PHQ9 QUESTIONS 1 & 2: 2
SUM OF ALL RESPONSES TO PHQ QUESTIONS 1-9: 2
1. LITTLE INTEREST OR PLEASURE IN DOING THINGS: 1
SUM OF ALL RESPONSES TO PHQ QUESTIONS 1-9: 2
2. FEELING DOWN, DEPRESSED OR HOPELESS: 1

## 2023-03-10 NOTE — PROGRESS NOTES
Dipak Espinosa presents today for   Chief Complaint   Patient presents with    Hypertension    Cholesterol Problem    Diabetes     4 month follow up       1. \"Have you been to the ER, urgent care clinic since your last visit? Hospitalized since your last visit? \" no    2. \"Have you seen or consulted any other health care providers outside of the [address] [place] [address] since your last visit? \" no     3. For patients aged 39-70: Has the patient had a colonoscopy / FIT/ Cologuard? Yes - no Care Gap present      If the patient is female:    4. For patients aged 41-77: Has the patient had a mammogram within the past 2 years? NA - based on age or sex      11. For patients aged 21-65: Has the patient had a pap smear?  NA - based on age or sex

## 2023-03-16 NOTE — PROGRESS NOTES
Subjective:       Chief Complaint  The patient presents for follow up of diabetes, hypertension and high cholesterol. DELORIS Jones is a 60 y.o. male seen for follow up of diabetes. Corrine has hypertension and hyperlipidemia. Diabetes uncontrolled , no significant medication side effects noted, on metformin 1 gm daily. He is only able to take 1 metformin due to side effects. He continues on Trulicity 1.5 mg every week.,  Patient consumes a lot of products with sugar and is encouraged to cut back. Hypertension well controlled on Cozaar 100 mg and Toprol XL 50 mg, , hyperlipidemia borderline controlled, no significant medication side effects noted, on  Crestor 20 mg. Would like to get his LDL less than 70 with history of coronary artery disease. Would probably improve if patient cuts back his sugar intake. Diet and Lifestyle: generally follows a low fat low cholesterol diet, sedentary    Home BP Monitoring: is not measured at home. Diabetic Review of Systems - home glucose monitoring: is performed sporadically. Other symptoms and concerns: COPD Review:  The patient is being seen for follow up of COPD. Oxygen: He currently is not on home oxygen therapy. Symptoms: chronic dyspnea: severity = moderate: course of sx: symptoms have progressed to a point and plateaued. .   Patient uses 1 pillows at night. Patient denies smoke cigarettes. He has to get free Inhalers from Pulmonary    Pt has chronic back pain and is on Neurontin 600 mg   QID. Pt not on narcotics in this office due to abnormal UDS in the past..         Patient has a history of coronary artery disease for which he is followed closely by cardiology. He also had a TAVR done 4/28/2021 at the heart hospital at [facility].  Patient remained stable on Plavix and statin. Pt c/o fatigue over the last four weeks. He has been cleared by his cardiologist. Will check TSH and CBC for completeness.      Current Outpatient Medications Medication Sig    metoprolol succinate (TOPROL XL) 50 MG extended release tablet TAKE 1 TABLET BY MOUTH ONCE DAILY    dulaglutide (TRULICITY) 1.5 YA/5.3PN SC injection Inject 0.5 mLs into the skin every 7 days    gabapentin (NEURONTIN) 300 MG capsule Take 2 capsules by mouth 3 times daily for 90 days. Max Daily Amount: 1,800 mg    losartan (COZAAR) 100 MG tablet Take 1 tablet by mouth daily    metFORMIN (GLUCOPHAGE) 1000 MG tablet Take 1 tablet by mouth daily (with breakfast)    rosuvastatin (CRESTOR) 20 MG tablet Take 1 tablet by mouth daily    aspirin 81 MG chewable tablet Take 81 mg by mouth daily    terbinafine (LAMISIL) 250 MG tablet TAKE 1 TABLET BY MOUTH DAILY    Lancets MISC Check BS 1x/day E 11.42    nitroGLYCERIN (NITROSTAT) 0.4 MG SL tablet Place 0.4 mg under the tongue    omeprazole (PRILOSEC) 20 MG delayed release capsule Take 40 mg by mouth Daily    clopidogrel (PLAVIX) 75 MG tablet Take 75 mg by mouth daily (Patient not taking: Reported on 3/10/2023)     No current facility-administered medications for this visit.                  Review of Systems  Respiratory: negative for dyspnea on exertion  Cardiovascular: negative for chest pain    Objective:     Visit Vitals  Visit Vitals  /76 (Site: Left Upper Arm, Position: Sitting, Cuff Size: Large Adult)   Pulse 92   Temp 98.6 °F (37 °C) (Temporal)   Resp 20   Ht 5' 10\" (1.778 m)   Wt 196 lb (88.9 kg)   SpO2 97%   BMI 28.12 kg/m²         Chest - clear to auscultation, no wheezes, rales or rhonchi, symmetric air entry  Heart - normal rate, regular rhythm, normal S1, S2, 2/6 systolic murmur RSB, rubs, clicks or gallops  Extremities - peripheral pulses normal, no pedal edema, no clubbing or cyanosis    CMP:    Lab Results   Component Value Date/Time     03/03/2023 09:43 AM    K 4.5 03/03/2023 09:43 AM     03/03/2023 09:43 AM    CO2 22 03/03/2023 09:43 AM    BUN 16 03/03/2023 09:43 AM    CREATININE 1.22 03/03/2023 09:43 AM    GFRAA >60 10/29/2021 10:58 AM    AGRATIO 1.5 03/03/2023 09:43 AM    LABGLOM 67 03/03/2023 09:43 AM    GLUCOSE 151 03/03/2023 09:43 AM    PROT 7.4 03/03/2023 09:43 AM    LABALBU 4.4 03/03/2023 09:43 AM    CALCIUM 10.2 03/03/2023 09:43 AM    BILITOT <0.2 03/03/2023 09:43 AM    ALKPHOS 108 03/03/2023 09:43 AM    AST 30 03/03/2023 09:43 AM    ALT 26 03/03/2023 09:43 AM     HgBA1c:    Lab Results   Component Value Date/Time    LABA1C 8.4 03/03/2023 09:43 AM     FLP:    Lab Results   Component Value Date/Time    TRIG 422 03/03/2023 09:43 AM    HDL 39 03/03/2023 09:43 AM    LDLCALC 85 03/03/2023 09:43 AM    LABVLDL 69 03/03/2023 09:43 AM     PSA:   Lab Results   Component Value Date/Time    PSA 0.7 08/19/2019 08:05 AM             Assessment / Plan     Diabetes uncontrolled, will continue  metformin 1 gm daily and Trulicity 1.5 mg. If not improving with diet would increase Trulicity in the future. Hypertension  Well controlled on Cozaar 100 mg and Toprol XL 50 mg  Hyperlipidemia borderline controlled on Crestor 20 mg daily. Would like to get his LDL less than 70 with history of coronary artery disease. Patient to try and cut back sugar in his diet. ICD-10-CM    1. Type 2 diabetes mellitus with diabetic polyneuropathy, without long-term current use of insulin (HCC)  E11.42       2. Essential (primary) hypertension  I10       3. Mixed hyperlipidemia  E78.2       4. Chronic fatigue  R53.82 Will check TSH     And CBC with Auto Differential. If normal consider Sleep apnea evaluation. 5. Polyneuropathy associated with underlying disease ([address]  G63 gabapentin (NEURONTIN) 300 MG capsule      6. Colon cancer screening  Z12.11 Amb External Referral To Gastroenterology      7. Prostate cancer screening  Z12.5 PSA Screening                      Diabetic issues reviewed with him: diabetic diet discussed in detail, and low cholesterol diet, weight control and daily exercise discussed.      Return in about 3 months (around 6/10/2023) for labs next week andbeforeOV in 3 months . Reviewed plan of care. Patient has provided input and agrees with goals.

## 2023-03-18 LAB
BASOPHILS # BLD AUTO: 0.1 X10E3/UL (ref 0–0.2)
BASOPHILS NFR BLD AUTO: 1 %
EOSINOPHIL # BLD AUTO: 0.1 X10E3/UL (ref 0–0.4)
EOSINOPHIL NFR BLD AUTO: 1 %
ERYTHROCYTE [DISTWIDTH] IN BLOOD BY AUTOMATED COUNT: 13.5 % (ref 11.6–15.4)
HCT VFR BLD AUTO: 44.9 % (ref 37.5–51)
HGB BLD-MCNC: 14.5 G/DL (ref 13–17.7)
IMM GRANULOCYTES # BLD AUTO: 0 X10E3/UL (ref 0–0.1)
IMM GRANULOCYTES NFR BLD AUTO: 0 %
LYMPHOCYTES # BLD AUTO: 2.7 X10E3/UL (ref 0.7–3.1)
LYMPHOCYTES NFR BLD AUTO: 29 %
MCH RBC QN AUTO: 28.4 PG (ref 26.6–33)
MCHC RBC AUTO-ENTMCNC: 32.3 G/DL (ref 31.5–35.7)
MCV RBC AUTO: 88 FL (ref 79–97)
MONOCYTES # BLD AUTO: 0.5 X10E3/UL (ref 0.1–0.9)
MONOCYTES NFR BLD AUTO: 6 %
NEUTROPHILS # BLD AUTO: 5.8 X10E3/UL (ref 1.4–7)
NEUTROPHILS NFR BLD AUTO: 63 %
PLATELET # BLD AUTO: 266 X10E3/UL (ref 150–450)
PSA SERPL-MCNC: 0.6 NG/ML (ref 0–4)
RBC # BLD AUTO: 5.11 X10E6/UL (ref 4.14–5.8)
TSH SERPL DL<=0.005 MIU/L-ACNC: 1.55 UIU/ML (ref 0.45–4.5)
WBC # BLD AUTO: 9.2 X10E3/UL (ref 3.4–10.8)

## 2023-06-08 ENCOUNTER — NURSE ONLY (OUTPATIENT)
Age: 63
End: 2023-06-08

## 2023-06-08 DIAGNOSIS — I10 ESSENTIAL (PRIMARY) HYPERTENSION: ICD-10-CM

## 2023-06-08 DIAGNOSIS — E11.42 TYPE 2 DIABETES MELLITUS WITH DIABETIC POLYNEUROPATHY, WITHOUT LONG-TERM CURRENT USE OF INSULIN (HCC): Primary | ICD-10-CM

## 2023-06-08 DIAGNOSIS — E78.2 MIXED HYPERLIPIDEMIA: ICD-10-CM

## 2023-06-09 LAB
ALBUMIN SERPL-MCNC: 4.2 G/DL (ref 3.8–4.8)
ALBUMIN/GLOB SERPL: 1.4 {RATIO} (ref 1.2–2.2)
ALP SERPL-CCNC: 112 IU/L (ref 44–121)
ALT SERPL-CCNC: 19 IU/L (ref 0–44)
AST SERPL-CCNC: 23 IU/L (ref 0–40)
BILIRUB SERPL-MCNC: 0.3 MG/DL (ref 0–1.2)
BUN SERPL-MCNC: 18 MG/DL (ref 8–27)
BUN/CREAT SERPL: 12 (ref 10–24)
CALCIUM SERPL-MCNC: 9.6 MG/DL (ref 8.6–10.2)
CHLORIDE SERPL-SCNC: 101 MMOL/L (ref 96–106)
CHOLEST SERPL-MCNC: 149 MG/DL (ref 100–199)
CO2 SERPL-SCNC: 23 MMOL/L (ref 20–29)
CREAT SERPL-MCNC: 1.55 MG/DL (ref 0.76–1.27)
EGFRCR SERPLBLD CKD-EPI 2021: 50 ML/MIN/1.73
GLOBULIN SER CALC-MCNC: 3 G/DL (ref 1.5–4.5)
GLUCOSE SERPL-MCNC: 146 MG/DL (ref 70–99)
HBA1C MFR BLD: 8.9 % (ref 4.8–5.6)
HDLC SERPL-MCNC: 31 MG/DL
LDLC SERPL CALC-MCNC: 71 MG/DL (ref 0–99)
POTASSIUM SERPL-SCNC: 4.6 MMOL/L (ref 3.5–5.2)
PROT SERPL-MCNC: 7.2 G/DL (ref 6–8.5)
SODIUM SERPL-SCNC: 138 MMOL/L (ref 134–144)
TRIGL SERPL-MCNC: 289 MG/DL (ref 0–149)
VLDLC SERPL CALC-MCNC: 47 MG/DL (ref 5–40)

## 2023-06-12 ENCOUNTER — OFFICE VISIT (OUTPATIENT)
Age: 63
End: 2023-06-12
Payer: MEDICAID

## 2023-06-12 VITALS
RESPIRATION RATE: 18 BRPM | BODY MASS INDEX: 29.78 KG/M2 | TEMPERATURE: 98.7 F | SYSTOLIC BLOOD PRESSURE: 107 MMHG | HEART RATE: 96 BPM | OXYGEN SATURATION: 95 % | DIASTOLIC BLOOD PRESSURE: 57 MMHG | HEIGHT: 70 IN | WEIGHT: 208 LBS

## 2023-06-12 DIAGNOSIS — E11.42 TYPE 2 DIABETES MELLITUS WITH DIABETIC POLYNEUROPATHY, WITHOUT LONG-TERM CURRENT USE OF INSULIN (HCC): Primary | ICD-10-CM

## 2023-06-12 DIAGNOSIS — E78.2 MIXED HYPERLIPIDEMIA: ICD-10-CM

## 2023-06-12 DIAGNOSIS — I10 ESSENTIAL (PRIMARY) HYPERTENSION: ICD-10-CM

## 2023-06-12 DIAGNOSIS — E29.1 HYPOGONADISM IN MALE: ICD-10-CM

## 2023-06-12 DIAGNOSIS — M79.672 PAIN IN BOTH FEET: ICD-10-CM

## 2023-06-12 DIAGNOSIS — M79.671 PAIN IN BOTH FEET: ICD-10-CM

## 2023-06-12 PROCEDURE — 3052F HG A1C>EQUAL 8.0%<EQUAL 9.0%: CPT | Performed by: INTERNAL MEDICINE

## 2023-06-12 PROCEDURE — 99214 OFFICE O/P EST MOD 30 MIN: CPT | Performed by: INTERNAL MEDICINE

## 2023-06-12 PROCEDURE — 3078F DIAST BP <80 MM HG: CPT | Performed by: INTERNAL MEDICINE

## 2023-06-12 PROCEDURE — 99211 OFF/OP EST MAY X REQ PHY/QHP: CPT | Performed by: INTERNAL MEDICINE

## 2023-06-12 PROCEDURE — 3074F SYST BP LT 130 MM HG: CPT | Performed by: INTERNAL MEDICINE

## 2023-06-16 ENCOUNTER — HOSPITAL ENCOUNTER (OUTPATIENT)
Facility: HOSPITAL | Age: 63
Discharge: HOME | End: 2023-06-19

## 2023-06-16 LAB — LABCORP SPECIMEN COLLECTION: NORMAL

## 2023-06-21 DIAGNOSIS — E29.1 HYPOGONADISM IN MALE: Primary | ICD-10-CM

## 2023-06-21 LAB
TESTOST FREE SERPL-MCNC: 2.8 PG/ML (ref 6.6–18.1)
TESTOST SERPL-MCNC: 194 NG/DL (ref 264–916)

## 2023-08-24 ENCOUNTER — OFFICE VISIT (OUTPATIENT)
Age: 63
End: 2023-08-24

## 2023-08-24 ENCOUNTER — TELEPHONE (OUTPATIENT)
Age: 63
End: 2023-08-24

## 2023-08-24 VITALS
SYSTOLIC BLOOD PRESSURE: 122 MMHG | HEART RATE: 83 BPM | DIASTOLIC BLOOD PRESSURE: 80 MMHG | OXYGEN SATURATION: 98 % | WEIGHT: 206 LBS | BODY MASS INDEX: 29.56 KG/M2

## 2023-08-24 DIAGNOSIS — I35.0 NONRHEUMATIC AORTIC (VALVE) STENOSIS: Primary | ICD-10-CM

## 2023-08-24 NOTE — TELEPHONE ENCOUNTER
Pt came into office asking to be seen ,  (today) he states he was seen at [facility] 08/21-  not sure exactly why , he said he needs to be seen for his knee , I asked if he injured it , he said \"no\" he went to ER  because he has COPD please advise

## 2023-08-24 NOTE — PROGRESS NOTES
Colomamallory Alcantara presents today for   Chief Complaint   Patient presents with    Follow-up       Rachell Alcantara preferred language for health care discussion is english/other. Is someone accompanying this pt? no    Is the patient using any DME equipment during OV? no    Depression Screening:  Depression: Not at risk    PHQ-2 Score: 2        Learning Assessment:  Who is the primary learner? Patient    What is the preferred language for health care of the primary learner? ENGLISH    How does the primary learner prefer to learn new concepts? READING    Answered By patient    Relationship to Learner SELF           Pt currently taking Anticoagulant therapy? no    Pt currently taking Antiplatelet therapy ? no      Coordination of Care:  1. Have you been to the ER, urgent care clinic since your last visit? Hospitalized since your last visit? no    2. Have you seen or consulted any other health care providers outside of the [address] [place] [address] Avenue since your last visit? Include any pap smears or colon screening.  no

## 2023-08-25 NOTE — TELEPHONE ENCOUNTER
Spoke with patient he states he has an appointment in September and states if he needs a referral he will get it then.

## 2023-09-07 DIAGNOSIS — E78.2 MIXED HYPERLIPIDEMIA: ICD-10-CM

## 2023-09-07 DIAGNOSIS — E29.1 HYPOGONADISM IN MALE: ICD-10-CM

## 2023-09-07 DIAGNOSIS — E11.42 TYPE 2 DIABETES MELLITUS WITH DIABETIC POLYNEUROPATHY, WITHOUT LONG-TERM CURRENT USE OF INSULIN (HCC): Primary | ICD-10-CM

## 2023-09-07 DIAGNOSIS — I10 ESSENTIAL (PRIMARY) HYPERTENSION: ICD-10-CM

## 2023-09-08 LAB
ALBUMIN SERPL-MCNC: 4.2 G/DL (ref 3.9–4.9)
ALBUMIN/CREAT UR: 23 MG/G CREAT (ref 0–29)
ALBUMIN/GLOB SERPL: 1.4 {RATIO} (ref 1.2–2.2)
ALP SERPL-CCNC: 93 IU/L (ref 44–121)
ALT SERPL-CCNC: 19 IU/L (ref 0–44)
AST SERPL-CCNC: 24 IU/L (ref 0–40)
BILIRUB SERPL-MCNC: 0.5 MG/DL (ref 0–1.2)
BUN SERPL-MCNC: 19 MG/DL (ref 8–27)
BUN/CREAT SERPL: 12 (ref 10–24)
CALCIUM SERPL-MCNC: 10 MG/DL (ref 8.6–10.2)
CHLORIDE SERPL-SCNC: 99 MMOL/L (ref 96–106)
CHOLEST SERPL-MCNC: 141 MG/DL (ref 100–199)
CO2 SERPL-SCNC: 25 MMOL/L (ref 20–29)
CREAT SERPL-MCNC: 1.54 MG/DL (ref 0.76–1.27)
CREAT UR-MCNC: 230.5 MG/DL
EGFRCR SERPLBLD CKD-EPI 2021: 50 ML/MIN/1.73
GLOBULIN SER CALC-MCNC: 2.9 G/DL (ref 1.5–4.5)
GLUCOSE SERPL-MCNC: 165 MG/DL (ref 70–99)
HBA1C MFR BLD: 9.9 % (ref 4.8–5.6)
HDLC SERPL-MCNC: 35 MG/DL
LDLC SERPL CALC-MCNC: 62 MG/DL (ref 0–99)
MICROALBUMIN UR-MCNC: 53.5 UG/ML
POTASSIUM SERPL-SCNC: 5.2 MMOL/L (ref 3.5–5.2)
PROT SERPL-MCNC: 7.1 G/DL (ref 6–8.5)
SODIUM SERPL-SCNC: 139 MMOL/L (ref 134–144)
TRIGL SERPL-MCNC: 274 MG/DL (ref 0–149)
VLDLC SERPL CALC-MCNC: 44 MG/DL (ref 5–40)

## 2023-09-13 LAB
TESTOST FREE SERPL-MCNC: 5.6 PG/ML (ref 6.6–18.1)
TESTOST SERPL-MCNC: 297 NG/DL (ref 264–916)

## 2023-09-20 ENCOUNTER — OFFICE VISIT (OUTPATIENT)
Age: 63
End: 2023-09-20
Payer: MEDICAID

## 2023-09-20 VITALS
BODY MASS INDEX: 27.63 KG/M2 | TEMPERATURE: 97.8 F | HEART RATE: 74 BPM | OXYGEN SATURATION: 98 % | HEIGHT: 70 IN | WEIGHT: 193 LBS | DIASTOLIC BLOOD PRESSURE: 87 MMHG | RESPIRATION RATE: 20 BRPM | SYSTOLIC BLOOD PRESSURE: 139 MMHG

## 2023-09-20 DIAGNOSIS — E78.2 MIXED HYPERLIPIDEMIA: ICD-10-CM

## 2023-09-20 DIAGNOSIS — G63 POLYNEUROPATHY ASSOCIATED WITH UNDERLYING DISEASE (HCC): ICD-10-CM

## 2023-09-20 DIAGNOSIS — E29.1 HYPOGONADISM IN MALE: ICD-10-CM

## 2023-09-20 DIAGNOSIS — E11.42 TYPE 2 DIABETES MELLITUS WITH DIABETIC POLYNEUROPATHY, WITHOUT LONG-TERM CURRENT USE OF INSULIN (HCC): Primary | ICD-10-CM

## 2023-09-20 DIAGNOSIS — I10 ESSENTIAL (PRIMARY) HYPERTENSION: ICD-10-CM

## 2023-09-20 DIAGNOSIS — G47.33 OBSTRUCTIVE SLEEP APNEA SYNDROME: ICD-10-CM

## 2023-09-20 DIAGNOSIS — N18.31 CHRONIC KIDNEY DISEASE, STAGE 3A (HCC): ICD-10-CM

## 2023-09-20 PROCEDURE — 99214 OFFICE O/P EST MOD 30 MIN: CPT | Performed by: INTERNAL MEDICINE

## 2023-09-20 PROCEDURE — 3046F HEMOGLOBIN A1C LEVEL >9.0%: CPT | Performed by: INTERNAL MEDICINE

## 2023-09-20 PROCEDURE — 3079F DIAST BP 80-89 MM HG: CPT | Performed by: INTERNAL MEDICINE

## 2023-09-20 PROCEDURE — 3075F SYST BP GE 130 - 139MM HG: CPT | Performed by: INTERNAL MEDICINE

## 2023-09-20 RX ORDER — METOPROLOL SUCCINATE 50 MG/1
TABLET, EXTENDED RELEASE ORAL
Qty: 90 TABLET | Refills: 2 | Status: SHIPPED | OUTPATIENT
Start: 2023-09-20

## 2023-09-20 RX ORDER — SEMAGLUTIDE 1.34 MG/ML
1 INJECTION, SOLUTION SUBCUTANEOUS
Qty: 3 ML | Refills: 5 | Status: SHIPPED | OUTPATIENT
Start: 2023-09-20

## 2023-09-20 RX ORDER — LOSARTAN POTASSIUM 100 MG/1
100 TABLET ORAL DAILY
Qty: 90 TABLET | Refills: 2 | Status: SHIPPED | OUTPATIENT
Start: 2023-09-20

## 2023-09-20 RX ORDER — OMEPRAZOLE 20 MG/1
40 CAPSULE, DELAYED RELEASE ORAL DAILY
Qty: 90 CAPSULE | Refills: 2 | Status: SHIPPED | OUTPATIENT
Start: 2023-09-20

## 2023-09-20 RX ORDER — DULAGLUTIDE 1.5 MG/.5ML
1.5 INJECTION, SOLUTION SUBCUTANEOUS
Qty: 4 ADJUSTABLE DOSE PRE-FILLED PEN SYRINGE | Refills: 5 | Status: CANCELLED | OUTPATIENT
Start: 2023-09-20

## 2023-09-20 RX ORDER — GABAPENTIN 300 MG/1
600 CAPSULE ORAL 3 TIMES DAILY
Qty: 180 CAPSULE | Refills: 2 | Status: SHIPPED | OUTPATIENT
Start: 2023-09-20 | End: 2023-12-19

## 2023-09-20 RX ORDER — ROSUVASTATIN CALCIUM 20 MG/1
20 TABLET, COATED ORAL DAILY
Qty: 90 TABLET | Refills: 2 | Status: SHIPPED | OUTPATIENT
Start: 2023-09-20

## 2023-09-20 NOTE — PROGRESS NOTES
Melvina Su presents today for   Chief Complaint   Patient presents with    Diabetes    Hypertension    Cholesterol Problem     3 month follow up            1. \"Have you been to the ER, urgent care clinic since your last visit? Hospitalized since your last visit? \" Yes, [facility].    2. \"Have you seen or consulted any other health care providers outside of the [address] [place] [address] since your last visit? \" Yes, 1 Foot 2 Foot     3. For patients aged 43-73: Has the patient had a colonoscopy / FIT/ Cologuard? Yes - no Care Gap present      If the patient is female:    4. For patients aged 43-66: Has the patient had a mammogram within the past 2 years? NA - based on age or sex      11. For patients aged 21-65: Has the patient had a pap smear?  NA - based on age or sex

## 2023-09-20 NOTE — PROGRESS NOTES
Subjective:       Chief Complaint  The patient presents for follow up of diabetes, hypertension and high cholesterol. DELORIS Su is a 60 y.o. male seen for follow up of diabetes. Healso has hypertension and hyperlipidemia. Diabetes uncontrolled , no significant medication side effects noted, on metformin 1 gm daily and Trulicity 1.5 mg. Patient consumes a lot of products with sugar including sodas and is encouraged to cut back. He would like to switch to Ozempic and Trulicity which he feels is not working well for him. Hypertension borderline controlled on Cozaar 100 mg and Toprol XL 50 mg, , hyperlipidemia well controlled, no significant medication side effects noted, on  Crestor 20 mg. Would like to keep his LDL less than 70 with history of coronary artery disease. Patient now has chronic kidney disease stage III and would benefit from SGLT2 such as Jesenia which will also help with his diabetes. Since he uses a lot of caffeinated products he is encouraged to hydrate himself well while on the SGLT2. Diet and Lifestyle: generally follows a low fat low cholesterol diet, sedentary    Home BP Monitoring: is not measured at home. Diabetic Review of Systems - home glucose monitoring: is performed sporadically. Other symptoms and concerns: COPD Review:  The patient is being seen for follow up of COPD. Oxygen: He currently is not on home oxygen therapy. Symptoms: chronic dyspnea: severity = moderate: course of sx: symptoms have progressed to a point and plateaued. .   Patient uses 1 pillows at night. Patient denies smoke cigarettes. He has to get free Inhalers from Pulmonary    Pt has chronic back pain and is on Neurontin 600 mg   QID. Pt not on narcotics in this office due to abnormal UDS in the past..         Patient has a history of coronary artery disease for which he is followed closely by cardiology.   He also had a TAVR done 4/28/2021 at the heart hospital at [facility].

## 2023-10-13 NOTE — PROGRESS NOTES
03/19/2021 10:09 AM    ALT 19 09/07/2023 12:00 AM     Lab Results   Component Value Date/Time    CHOL 141 09/07/2023 12:00 AM    CHOL 164 10/28/2022 07:51 AM    HDL 35 09/07/2023 12:00 AM    VLDL 48 10/28/2022 07:51 AM     Lab Results   Component Value Date/Time    WBC 9.2 03/17/2023 01:09 PM    HGB 14.5 03/17/2023 01:09 PM    HCT 44.9 03/17/2023 01:09 PM     03/17/2023 01:09 PM    MCV 88 03/17/2023 01:09 PM     No results found for: \"HBA1C\"  Hemoglobin A1C   Date Value Ref Range Status   09/07/2023 9.9 (H) 4.8 - 5.6 % Final     Comment:              Prediabetes: 5.7 - 6.4           Diabetes: >6.4           Glycemic control for adults with diabetes: <7.0         Screenings/Prevention Parameters:  -Diabetic Eye and Foot Exams:      Diabetes Management   Topic Date Due    Diabetic retinal exam  Never done    Diabetic foot exam  07/26/2018         Diabetic Foot Exam HM Status   Topic Date Due    Diabetic Foot Exam  07/26/2018      -Microalbumin / Creatinine ratio:       Lab Results   Component Value Date/Time    MICROALBUR 75.2 07/25/2022 01:13 PM    LABCREA 230.5 09/07/2023 12:00 AM     -ASCVD Risk Score Parameters and Calculation    Race: White (non-)     BP Readings from Last 3 Encounters:   09/20/23 139/87   08/24/23 122/80   08/24/23 (!) 107/57        Lab Results   Component Value Date    CHOL 141 09/07/2023    CHOL 147 06/16/2023    CHOL 149 06/08/2023     Lab Results   Component Value Date    TRIG 274 (H) 09/07/2023    TRIG 257 (H) 06/16/2023    TRIG 289 (H) 06/08/2023     Lab Results   Component Value Date    HDL 35 (L) 09/07/2023    HDL 34 (L) 06/16/2023    HDL 31 (L) 06/08/2023     Lab Results   Component Value Date    LDLCALC 62 09/07/2023    [facility] 06/16/2023    [facility] 71 06/08/2023        Social History     Tobacco Use    Smoking status: Former     Packs/day: 1.25     Years: 30.00     Additional pack years: 0.00     Total pack years: 37.50     Types: Cigarettes     Quit date: 1/1/2014

## 2023-10-16 ENCOUNTER — PHARMACY VISIT (OUTPATIENT)
Age: 63
End: 2023-10-16

## 2023-10-16 DIAGNOSIS — E11.42 TYPE 2 DIABETES MELLITUS WITH DIABETIC POLYNEUROPATHY, WITHOUT LONG-TERM CURRENT USE OF INSULIN (HCC): Primary | ICD-10-CM

## 2023-10-16 RX ORDER — DULAGLUTIDE 1.5 MG/.5ML
1.5 INJECTION, SOLUTION SUBCUTANEOUS WEEKLY
COMMUNITY

## 2023-11-02 NOTE — PROGRESS NOTES
Pharmacy Progress Note - Diabetes Management       Assessment / Plan:   Diabetes Management:  Per ADA guidelines, Pt's A1c is not at goal of < 7%. Pt's SMBG logs indicate continued improvement in his glycemic control. Encouraged to continue to work on his diet specifically focusing on replacing his sugared sodas with sugar-free options. Will maintain his current tx and reassess with increased SMBG logs at follow up in 8 weeks. Nutrition/Lifestyle Modifications:  - Educated pt on the importance of moderating carbohydrate intake. Reviewed sources of carbohydrates and method to help determine appropriate portion sizes (e.g., Diabetes Plate Method). - Advised patient to avoid sugar-sweetened beverages and replace with water or diet/zero sugar option.  - Recommend ~30 minutes consistent, moderately intensive, exercise/day or ~150 minutes/week. Start small, stay consistent, and increase length and types of exercise, as tolerated. Patient will return to clinic in 8 week(s) for follow up. S/O: Mr. Gianna Vasquez, a 58 y.o. male referred by Pauline Tay MD,  has a past medical history of Acute coronary syndrome ([address]), Arthritis, ASHD (arteriosclerotic heart disease), Benign hypertensive heart disease without heart failure, CAD (coronary artery disease), Calculus of kidney, Chronic cough, Chronic lung disease, COPD (chronic obstructive pulmonary disease) ([address]), Coronary artery disease, Diabetes ([address]), Diabetes mellitus ([address]), FERRARI (dyspnea on exertion), GERD (gastroesophageal reflux disease), H/O: pneumonia, Heartburn, Hypertension, Indigestion, Left inguinal hernia, Neuropathy involving both lower extremities, Pneumonia, Pure hypercholesterolemia, S/P cardiac cath, and Tobacco abuse. Pt was seen today for diabetes management.   Patient's last A1c was:   Hemoglobin A1C   Date Value Ref Range Status   09/07/2023 9.9 (H) 4.8 - 5.6 % Final     Comment:              Prediabetes: 5.7 - 6.4           Diabetes:

## 2023-11-06 ENCOUNTER — PHARMACY VISIT (OUTPATIENT)
Age: 63
End: 2023-11-06

## 2023-11-06 ENCOUNTER — ENROLLMENT (OUTPATIENT)
Age: 63
End: 2023-11-06

## 2023-11-06 DIAGNOSIS — E11.42 TYPE 2 DIABETES MELLITUS WITH DIABETIC POLYNEUROPATHY, WITHOUT LONG-TERM CURRENT USE OF INSULIN (HCC): Primary | ICD-10-CM

## 2023-11-17 LAB — COLONOSCOPY, EXTERNAL: NORMAL

## 2023-12-14 ENCOUNTER — NURSE ONLY (OUTPATIENT)
Age: 63
End: 2023-12-14

## 2023-12-14 DIAGNOSIS — E78.2 MIXED HYPERLIPIDEMIA: ICD-10-CM

## 2023-12-14 DIAGNOSIS — I10 ESSENTIAL (PRIMARY) HYPERTENSION: ICD-10-CM

## 2023-12-14 DIAGNOSIS — E29.1 HYPOGONADISM IN MALE: ICD-10-CM

## 2023-12-14 DIAGNOSIS — E11.42 TYPE 2 DIABETES MELLITUS WITH DIABETIC POLYNEUROPATHY, WITHOUT LONG-TERM CURRENT USE OF INSULIN (HCC): ICD-10-CM

## 2023-12-15 LAB
ALBUMIN SERPL-MCNC: 4.2 G/DL (ref 3.9–4.9)
ALBUMIN/CREAT UR: 62 MG/G CREAT (ref 0–29)
ALBUMIN/GLOB SERPL: 1.4 {RATIO} (ref 1.2–2.2)
ALP SERPL-CCNC: 110 IU/L (ref 44–121)
ALT SERPL-CCNC: 15 IU/L (ref 0–44)
AST SERPL-CCNC: 19 IU/L (ref 0–40)
BILIRUB SERPL-MCNC: 0.3 MG/DL (ref 0–1.2)
BUN SERPL-MCNC: 14 MG/DL (ref 8–27)
BUN/CREAT SERPL: 11 (ref 10–24)
CALCIUM SERPL-MCNC: 10 MG/DL (ref 8.6–10.2)
CHLORIDE SERPL-SCNC: 101 MMOL/L (ref 96–106)
CHOLEST SERPL-MCNC: 144 MG/DL (ref 100–199)
CO2 SERPL-SCNC: 24 MMOL/L (ref 20–29)
CREAT SERPL-MCNC: 1.29 MG/DL (ref 0.76–1.27)
CREAT UR-MCNC: 107.3 MG/DL
EGFRCR SERPLBLD CKD-EPI 2021: 62 ML/MIN/1.73
GLOBULIN SER CALC-MCNC: 3.1 G/DL (ref 1.5–4.5)
GLUCOSE SERPL-MCNC: 178 MG/DL (ref 70–99)
HBA1C MFR BLD: 8.4 % (ref 4.8–5.6)
HDLC SERPL-MCNC: 37 MG/DL
LDLC SERPL CALC-MCNC: 78 MG/DL (ref 0–99)
MICROALBUMIN UR-MCNC: 66.3 UG/ML
POTASSIUM SERPL-SCNC: 4.5 MMOL/L (ref 3.5–5.2)
PROT SERPL-MCNC: 7.3 G/DL (ref 6–8.5)
SODIUM SERPL-SCNC: 142 MMOL/L (ref 134–144)
TRIGL SERPL-MCNC: 167 MG/DL (ref 0–149)
VLDLC SERPL CALC-MCNC: 29 MG/DL (ref 5–40)

## 2023-12-17 LAB
TESTOST FREE SERPL-MCNC: 5.8 PG/ML (ref 6.6–18.1)
TESTOST SERPL-MCNC: 241 NG/DL (ref 264–916)

## 2024-01-03 ENCOUNTER — PHARMACY VISIT (OUTPATIENT)
Age: 64
End: 2024-01-03

## 2024-01-03 DIAGNOSIS — E11.21 TYPE 2 DIABETES WITH NEPHROPATHY (HCC): Primary | ICD-10-CM

## 2024-01-17 ENCOUNTER — OFFICE VISIT (OUTPATIENT)
Age: 64
End: 2024-01-17
Payer: MEDICAID

## 2024-01-17 VITALS
BODY MASS INDEX: 26.05 KG/M2 | WEIGHT: 182 LBS | OXYGEN SATURATION: 97 % | HEIGHT: 70 IN | SYSTOLIC BLOOD PRESSURE: 102 MMHG | HEART RATE: 86 BPM | RESPIRATION RATE: 20 BRPM | DIASTOLIC BLOOD PRESSURE: 67 MMHG | TEMPERATURE: 97.6 F

## 2024-01-17 DIAGNOSIS — F32.1 CURRENT MODERATE EPISODE OF MAJOR DEPRESSIVE DISORDER WITHOUT PRIOR EPISODE (HCC): Primary | ICD-10-CM

## 2024-01-17 PROCEDURE — 99213 OFFICE O/P EST LOW 20 MIN: CPT | Performed by: INTERNAL MEDICINE

## 2024-01-17 PROCEDURE — 3078F DIAST BP <80 MM HG: CPT | Performed by: INTERNAL MEDICINE

## 2024-01-17 PROCEDURE — 3074F SYST BP LT 130 MM HG: CPT | Performed by: INTERNAL MEDICINE

## 2024-01-17 RX ORDER — DULOXETIN HYDROCHLORIDE 60 MG/1
60 CAPSULE, DELAYED RELEASE ORAL DAILY
Qty: 30 CAPSULE | Refills: 3 | Status: SHIPPED | OUTPATIENT
Start: 2024-01-17

## 2024-01-17 ASSESSMENT — PATIENT HEALTH QUESTIONNAIRE - PHQ9
7. TROUBLE CONCENTRATING ON THINGS, SUCH AS READING THE NEWSPAPER OR WATCHING TELEVISION: 0
1. LITTLE INTEREST OR PLEASURE IN DOING THINGS: 1
5. POOR APPETITE OR OVEREATING: 1
10. IF YOU CHECKED OFF ANY PROBLEMS, HOW DIFFICULT HAVE THESE PROBLEMS MADE IT FOR YOU TO DO YOUR WORK, TAKE CARE OF THINGS AT HOME, OR GET ALONG WITH OTHER PEOPLE: 0
4. FEELING TIRED OR HAVING LITTLE ENERGY: 1
SUM OF ALL RESPONSES TO PHQ QUESTIONS 1-9: 5
6. FEELING BAD ABOUT YOURSELF - OR THAT YOU ARE A FAILURE OR HAVE LET YOURSELF OR YOUR FAMILY DOWN: 0
SUM OF ALL RESPONSES TO PHQ9 QUESTIONS 1 & 2: 2
2. FEELING DOWN, DEPRESSED OR HOPELESS: 1
9. THOUGHTS THAT YOU WOULD BE BETTER OFF DEAD, OR OF HURTING YOURSELF: 0
3. TROUBLE FALLING OR STAYING ASLEEP: 1
SUM OF ALL RESPONSES TO PHQ QUESTIONS 1-9: 5
8. MOVING OR SPEAKING SO SLOWLY THAT OTHER PEOPLE COULD HAVE NOTICED. OR THE OPPOSITE, BEING SO FIGETY OR RESTLESS THAT YOU HAVE BEEN MOVING AROUND A LOT MORE THAN USUAL: 0

## 2024-01-17 NOTE — PROGRESS NOTES
Isidro Reardon presents today for   Chief Complaint   Patient presents with    Depression     4 week follow up      Patient states he doesn't think the lexapro is working.        1. \"Have you been to the ER, urgent care clinic since your last visit?  Hospitalized since your last visit?\" no    2. \"Have you seen or consulted any other health care providers outside of the [facility] since your last visit?\" no     3. For patients aged 45-75: Has the patient had a colonoscopy / FIT/ Cologuard? Yes - no Care Gap present      If the patient is female:    4. For patients aged 40-74: Has the patient had a mammogram within the past 2 years? NA - based on age or sex      5. For patients aged 21-65: Has the patient had a pap smear? NA - based on age or sex

## 2024-01-17 NOTE — PROGRESS NOTES
Isidro Reardon (:  1960) is a 63 y.o. male established patient, here for evaluation of the following chief complaint(s):  Depression (4 week follow up )         ASSESSMENT/PLAN:    ICD-10-CM    1. Current moderate episode of major depressive disorder without prior episode (HCC)  F32.1 Uncontrolled.  Will treat with DULoxetine (CYMBALTA) 60 MG extended release capsule and stop Lexapro.             Return in about 4 weeks (around 2024) for Depression.         Subjective   SUBJECTIVE/OBJECTIVE:  Patient has not noticed any improvement in his mood with Lexapro 10 mg daily.  He continues to find it difficult to motivate himself to do routine daily activities.  States he has a lot of pain in his feet at due to neuropathy will discontinue Lexapro and switch to Cymbalta to see if it  helps with his pain as well as his mood    Respiratory ROS: negative for - shortness of breath  Cardiovascular ROS: negative for - chest pain       Objective   /67 (Site: Left Upper Arm, Position: Sitting, Cuff Size: Small Adult)   Pulse 86   Temp 97.6 °F (36.4 °C) (Oral)   Resp 20   Ht 1.778 m (5' 10\")   Wt 82.6 kg (182 lb)   SpO2 97%   BMI 26.11 kg/m²          Current Outpatient Medications   Medication Sig    DULoxetine (CYMBALTA) 60 MG extended release capsule Take 1 capsule by mouth daily    rosuvastatin (CRESTOR) 20 MG tablet Take 1 tablet by mouth daily    omeprazole (PRILOSEC) 20 MG delayed release capsule Take 2 capsules by mouth Daily    metoprolol succinate (TOPROL XL) 50 MG extended release tablet TAKE 1 TABLET BY MOUTH ONCE DAILY    metFORMIN (GLUCOPHAGE) 1000 MG tablet Take 1 tablet by mouth daily (with breakfast)    losartan (COZAAR) 100 MG tablet Take 1 tablet by mouth daily    gabapentin (NEURONTIN) 300 MG capsule Take 2 capsules by mouth 3 times daily for 90 days. Max Daily Amount: 1,800 mg    dapagliflozin (FARXIGA) 10 MG tablet Take 1 tablet by mouth every morning    Semaglutide, 1 MG/DOSE,

## 2024-02-14 ENCOUNTER — PHARMACY VISIT (OUTPATIENT)
Age: 64
End: 2024-02-14
Payer: MEDICAID

## 2024-02-14 ENCOUNTER — OFFICE VISIT (OUTPATIENT)
Age: 64
End: 2024-02-14
Payer: MEDICAID

## 2024-02-14 VITALS
OXYGEN SATURATION: 98 % | RESPIRATION RATE: 20 BRPM | TEMPERATURE: 98.7 F | BODY MASS INDEX: 25.91 KG/M2 | HEART RATE: 75 BPM | HEIGHT: 70 IN | DIASTOLIC BLOOD PRESSURE: 68 MMHG | WEIGHT: 181 LBS | SYSTOLIC BLOOD PRESSURE: 116 MMHG

## 2024-02-14 DIAGNOSIS — E11.21 TYPE 2 DIABETES WITH NEPHROPATHY (HCC): Primary | ICD-10-CM

## 2024-02-14 DIAGNOSIS — F32.1 CURRENT MODERATE EPISODE OF MAJOR DEPRESSIVE DISORDER WITHOUT PRIOR EPISODE (HCC): Primary | ICD-10-CM

## 2024-02-14 DIAGNOSIS — N48.1 BALANITIS: ICD-10-CM

## 2024-02-14 PROCEDURE — 3074F SYST BP LT 130 MM HG: CPT | Performed by: INTERNAL MEDICINE

## 2024-02-14 PROCEDURE — 99211 OFF/OP EST MAY X REQ PHY/QHP: CPT | Performed by: INTERNAL MEDICINE

## 2024-02-14 PROCEDURE — 3078F DIAST BP <80 MM HG: CPT | Performed by: INTERNAL MEDICINE

## 2024-02-14 PROCEDURE — 99213 OFFICE O/P EST LOW 20 MIN: CPT | Performed by: INTERNAL MEDICINE

## 2024-02-14 RX ORDER — FLUCONAZOLE 150 MG/1
150 TABLET ORAL
Qty: 2 TABLET | Refills: 0 | Status: SHIPPED | OUTPATIENT
Start: 2024-02-14 | End: 2024-02-20

## 2024-02-14 NOTE — PROGRESS NOTES
Pharmacy Progress Note - Diabetes Management       Assessment / Plan:   Diabetes Management:  Per ADA guidelines, Pt's A1c is not at goal of < 7%.  Pt's FBG values are significantly improved from his prior appointment with his dietary changes and continued adherence of his medication regimen.  His two NFBG values were elevated, but will have him perform staggered SMBG checks to provide more data.  Encouraged to further decrease his soda intake an to choose zero-sugar options.  He would benefit from a dose increase of his Ozempic to 2mg weekly, but he would like to try another month of the 1mg dose while he makes more dietary changes.  Will reassess with staggered SMBG logs at follow up in 4 weeks.      Nutrition/Lifestyle Modifications:  - Educated pt on the importance of moderating carbohydrate intake. Reviewed sources of carbohydrates and method to help determine appropriate portion sizes (e.g., Diabetes Plate Method).  - Advised patient to avoid sugar-sweetened beverages and replace with water or diet/zero sugar option.  - Recommend ~30 minutes consistent, moderately intensive, exercise/day or ~150 minutes/week. Start small, stay consistent, and increase length and types of exercise, as tolerated.       Patient will return to clinic in 4 week(s) for follow up.        S/O: Mr. Isidro Reardon, a 63 y.o. male referred by Phillip Rizzo MD,  has a past medical history of Acute coronary syndrome (HCC), Arthritis, ASHD (arteriosclerotic heart disease), Benign hypertensive heart disease without heart failure, CAD (coronary artery disease), Calculus of kidney, Chronic cough, Chronic lung disease, COPD (chronic obstructive pulmonary disease) ([facility]), Coronary artery disease, Diabetes (HCC), Diabetes mellitus (HCC), FERRARI (dyspnea on exertion), GERD (gastroesophageal reflux disease), H/O: pneumonia, Heartburn, Hypertension, Indigestion, Left inguinal hernia, Neuropathy involving both lower extremities, Pneumonia, Pure

## 2024-02-14 NOTE — PROGRESS NOTES
Isidro Reardon presents today for   Chief Complaint   Patient presents with    Depression     4 week  follow up           \"Have you been to the ER, urgent care clinic since your last visit?  Hospitalized since your last visit?\"    NO    “Have you seen or consulted any other health care providers outside of [facility] since your last visit?”    NO             
MG extended release capsule Take 1 capsule by mouth daily    rosuvastatin (CRESTOR) 20 MG tablet Take 1 tablet by mouth daily    omeprazole (PRILOSEC) 20 MG delayed release capsule Take 2 capsules by mouth Daily    metoprolol succinate (TOPROL XL) 50 MG extended release tablet TAKE 1 TABLET BY MOUTH ONCE DAILY    metFORMIN (GLUCOPHAGE) 1000 MG tablet Take 1 tablet by mouth daily (with breakfast)    losartan (COZAAR) 100 MG tablet Take 1 tablet by mouth daily    gabapentin (NEURONTIN) 300 MG capsule Take 2 capsules by mouth 3 times daily for 90 days. Max Daily Amount: 1,800 mg    dapagliflozin (FARXIGA) 10 MG tablet Take 1 tablet by mouth every morning    Semaglutide, 1 MG/DOSE, (OZEMPIC, 1 MG/DOSE,) 4 MG/3ML SOPN Inject 1 mg into the skin every 7 days    aspirin 81 MG chewable tablet Take 1 tablet by mouth daily    Lancets MISC Check BS 1x/day E 11.42    nitroGLYCERIN (NITROSTAT) 0.4 MG SL tablet Place 1 tablet under the tongue (Patient not taking: Reported on 1/24/2024)     No current facility-administered medications for this visit.       HgBA1c:    Lab Results   Component Value Date/Time    LABA1C 8.4 12/14/2023 09:36 AM             An electronic signature was used to authenticate this note.    --ANDREA RIOS MD

## 2024-03-08 NOTE — PROGRESS NOTES
<7.0         Interim update: Pt was last seen by me on 2/14/2024.  Per my prior note: Pt's A1c is not at goal of < 7%.  Pt's FBG values are significantly improved from his prior appointment with his dietary changes and continued adherence of his medication regimen.  His two NFBG values were elevated, but will have him perform staggered SMBG checks to provide more data.  Encouraged to further decrease his soda intake and to choose zero-sugar options.  He would benefit from a dose increase of his Ozempic to 2mg weekly, but he would like to try another month of the 1mg dose while he makes more dietary changes.  Will reassess with staggered SMBG logs at follow up in 4 weeks.    Today:   Pt has decreased his soda intake to 1-2 per day on most days and others 3-4 per day.  He is only drinking the 8oz can size.  He is still increasing his water intake.  He states that he plans to try to decrease his soda intake further.  He would like to continue with the Ozempic 1mg dose for now.  He is happy with his weight loss thus far since starting it.    Current anti-hyperglycemic regimen includes:    Key Antihyperglycemic Medications               metFORMIN (GLUCOPHAGE) 1000 MG tablet Take 1 tablet by mouth daily (with breakfast)    dapagliflozin (FARXIGA) 10 MG tablet Take 1 tablet by mouth every morning    Semaglutide, 1 MG/DOSE, (OZEMPIC, 1 MG/DOSE,) 4 MG/3ML SOPN Inject 1 mg into the skin every 7 days          Complete current medication regimen includes:  Current Outpatient Medications   Medication Sig    albuterol sulfate HFA (PROVENTIL;VENTOLIN;PROAIR) 108 (90 Base) MCG/ACT inhaler Inhale 1-2 puffs into the lungs every 6 hours    tadalafil (CIALIS) 20 MG tablet Take 1 tablet by mouth daily as needed for Erectile Dysfunction    Testosterone (ANDROGEL) 20.25 MG/ACT (1.62%) GEL gel Apply 2 pumps daily.    nystatin-triamcinolone (MYCOLOG II) 518387-4.1 UNIT/GM-% cream Apply topically 2 times daily.    DULoxetine (CYMBALTA) 60 MG

## 2024-03-11 ENCOUNTER — PHARMACY VISIT (OUTPATIENT)
Age: 64
End: 2024-03-11

## 2024-03-11 DIAGNOSIS — E11.21 TYPE 2 DIABETES WITH NEPHROPATHY (HCC): Primary | ICD-10-CM

## 2024-04-18 DIAGNOSIS — G63 POLYNEUROPATHY ASSOCIATED WITH UNDERLYING DISEASE (HCC): ICD-10-CM

## 2024-04-18 DIAGNOSIS — E11.42 TYPE 2 DIABETES MELLITUS WITH DIABETIC POLYNEUROPATHY, WITHOUT LONG-TERM CURRENT USE OF INSULIN (HCC): ICD-10-CM

## 2024-04-18 RX ORDER — SEMAGLUTIDE 1.34 MG/ML
INJECTION, SOLUTION SUBCUTANEOUS
Qty: 3 ML | Refills: 4 | Status: SHIPPED | OUTPATIENT
Start: 2024-04-18

## 2024-04-18 RX ORDER — GABAPENTIN 300 MG/1
CAPSULE ORAL
Qty: 180 CAPSULE | Refills: 5 | Status: SHIPPED | OUTPATIENT
Start: 2024-04-18 | End: 2024-10-15

## 2024-05-02 ENCOUNTER — TELEPHONE (OUTPATIENT)
Age: 64
End: 2024-05-02

## 2024-05-07 ENCOUNTER — NURSE ONLY (OUTPATIENT)
Age: 64
End: 2024-05-07

## 2024-05-07 DIAGNOSIS — E11.42 TYPE 2 DIABETES MELLITUS WITH DIABETIC POLYNEUROPATHY, WITHOUT LONG-TERM CURRENT USE OF INSULIN (HCC): ICD-10-CM

## 2024-05-07 DIAGNOSIS — N18.31 CHRONIC KIDNEY DISEASE, STAGE 3A (HCC): ICD-10-CM

## 2024-05-07 DIAGNOSIS — E78.2 MIXED HYPERLIPIDEMIA: ICD-10-CM

## 2024-05-07 DIAGNOSIS — I10 ESSENTIAL (PRIMARY) HYPERTENSION: ICD-10-CM

## 2024-05-07 DIAGNOSIS — E11.42 TYPE 2 DIABETES MELLITUS WITH DIABETIC POLYNEUROPATHY, WITHOUT LONG-TERM CURRENT USE OF INSULIN (HCC): Primary | ICD-10-CM

## 2024-05-08 LAB
ALBUMIN SERPL-MCNC: 4.3 G/DL (ref 3.9–4.9)
ALBUMIN/GLOB SERPL: 1.4 {RATIO} (ref 1.2–2.2)
ALP SERPL-CCNC: 104 IU/L (ref 44–121)
ALT SERPL-CCNC: 12 IU/L (ref 0–44)
AST SERPL-CCNC: 16 IU/L (ref 0–40)
BILIRUB SERPL-MCNC: 0.3 MG/DL (ref 0–1.2)
BUN SERPL-MCNC: 17 MG/DL (ref 8–27)
BUN/CREAT SERPL: 13 (ref 10–24)
CALCIUM SERPL-MCNC: 9.7 MG/DL (ref 8.6–10.2)
CHLORIDE SERPL-SCNC: 99 MMOL/L (ref 96–106)
CHOLEST SERPL-MCNC: 169 MG/DL (ref 100–199)
CO2 SERPL-SCNC: 21 MMOL/L (ref 20–29)
CREAT SERPL-MCNC: 1.31 MG/DL (ref 0.76–1.27)
EGFRCR SERPLBLD CKD-EPI 2021: 61 ML/MIN/1.73
GLOBULIN SER CALC-MCNC: 3 G/DL (ref 1.5–4.5)
GLUCOSE SERPL-MCNC: 145 MG/DL (ref 70–99)
HBA1C MFR BLD: 7.5 % (ref 4.8–5.6)
HDLC SERPL-MCNC: 37 MG/DL
LDLC SERPL CALC-MCNC: 93 MG/DL (ref 0–99)
POTASSIUM SERPL-SCNC: 4.5 MMOL/L (ref 3.5–5.2)
PROT SERPL-MCNC: 7.3 G/DL (ref 6–8.5)
SODIUM SERPL-SCNC: 139 MMOL/L (ref 134–144)
TRIGL SERPL-MCNC: 228 MG/DL (ref 0–149)
VLDLC SERPL CALC-MCNC: 39 MG/DL (ref 5–40)

## 2024-05-09 LAB
ALBUMIN/CREAT UR: 41 MG/G CREAT (ref 0–29)
CREAT UR-MCNC: 89.2 MG/DL
MICROALBUMIN UR-MCNC: 36.9 UG/ML

## 2024-05-14 ENCOUNTER — OFFICE VISIT (OUTPATIENT)
Age: 64
End: 2024-05-14

## 2024-05-14 VITALS
BODY MASS INDEX: 26.92 KG/M2 | TEMPERATURE: 98.7 F | HEART RATE: 87 BPM | OXYGEN SATURATION: 97 % | SYSTOLIC BLOOD PRESSURE: 104 MMHG | DIASTOLIC BLOOD PRESSURE: 70 MMHG | RESPIRATION RATE: 18 BRPM | WEIGHT: 188 LBS | HEIGHT: 70 IN

## 2024-05-14 DIAGNOSIS — Z12.5 PROSTATE CANCER SCREENING: ICD-10-CM

## 2024-05-14 DIAGNOSIS — E11.42 TYPE 2 DIABETES MELLITUS WITH DIABETIC POLYNEUROPATHY, WITHOUT LONG-TERM CURRENT USE OF INSULIN (HCC): Primary | ICD-10-CM

## 2024-05-14 DIAGNOSIS — E78.2 MIXED HYPERLIPIDEMIA: ICD-10-CM

## 2024-05-14 DIAGNOSIS — I10 ESSENTIAL (PRIMARY) HYPERTENSION: ICD-10-CM

## 2024-05-14 DIAGNOSIS — F32.1 CURRENT MODERATE EPISODE OF MAJOR DEPRESSIVE DISORDER WITHOUT PRIOR EPISODE (HCC): ICD-10-CM

## 2024-05-14 DIAGNOSIS — N18.31 CHRONIC KIDNEY DISEASE, STAGE 3A (HCC): ICD-10-CM

## 2024-05-14 DIAGNOSIS — G63 POLYNEUROPATHY ASSOCIATED WITH UNDERLYING DISEASE (HCC): ICD-10-CM

## 2024-05-14 PROCEDURE — 3078F DIAST BP <80 MM HG: CPT | Performed by: INTERNAL MEDICINE

## 2024-05-14 PROCEDURE — 3074F SYST BP LT 130 MM HG: CPT | Performed by: INTERNAL MEDICINE

## 2024-05-14 PROCEDURE — 99214 OFFICE O/P EST MOD 30 MIN: CPT | Performed by: INTERNAL MEDICINE

## 2024-05-14 PROCEDURE — 3051F HG A1C>EQUAL 7.0%<8.0%: CPT | Performed by: INTERNAL MEDICINE

## 2024-05-14 RX ORDER — DULOXETIN HYDROCHLORIDE 60 MG/1
60 CAPSULE, DELAYED RELEASE ORAL DAILY
Qty: 30 CAPSULE | Refills: 3 | Status: SHIPPED | OUTPATIENT
Start: 2024-05-14

## 2024-05-14 RX ORDER — BUPROPION HYDROCHLORIDE 150 MG/1
150 TABLET ORAL EVERY MORNING
Qty: 90 TABLET | Refills: 3 | Status: SHIPPED | OUTPATIENT
Start: 2024-05-14

## 2024-05-14 RX ORDER — LOSARTAN POTASSIUM 100 MG/1
100 TABLET ORAL DAILY
Qty: 90 TABLET | Refills: 2 | Status: SHIPPED | OUTPATIENT
Start: 2024-05-14

## 2024-05-14 RX ORDER — GABAPENTIN 300 MG/1
CAPSULE ORAL
Qty: 180 CAPSULE | Refills: 5 | Status: SHIPPED | OUTPATIENT
Start: 2024-05-14 | End: 2024-10-14

## 2024-05-14 RX ORDER — DAPAGLIFLOZIN 10 MG/1
10 TABLET, FILM COATED ORAL EVERY MORNING
Qty: 90 TABLET | Refills: 2 | Status: CANCELLED | OUTPATIENT
Start: 2024-05-14

## 2024-05-14 RX ORDER — METOPROLOL SUCCINATE 50 MG/1
TABLET, EXTENDED RELEASE ORAL
Qty: 90 TABLET | Refills: 2 | Status: SHIPPED | OUTPATIENT
Start: 2024-05-14

## 2024-05-14 SDOH — ECONOMIC STABILITY: INCOME INSECURITY: HOW HARD IS IT FOR YOU TO PAY FOR THE VERY BASICS LIKE FOOD, HOUSING, MEDICAL CARE, AND HEATING?: PATIENT DECLINED

## 2024-05-14 SDOH — ECONOMIC STABILITY: FOOD INSECURITY: WITHIN THE PAST 12 MONTHS, YOU WORRIED THAT YOUR FOOD WOULD RUN OUT BEFORE YOU GOT MONEY TO BUY MORE.: PATIENT DECLINED

## 2024-05-14 SDOH — ECONOMIC STABILITY: FOOD INSECURITY: WITHIN THE PAST 12 MONTHS, THE FOOD YOU BOUGHT JUST DIDN'T LAST AND YOU DIDN'T HAVE MONEY TO GET MORE.: PATIENT DECLINED

## 2024-05-14 SDOH — ECONOMIC STABILITY: HOUSING INSECURITY
IN THE LAST 12 MONTHS, WAS THERE A TIME WHEN YOU DID NOT HAVE A STEADY PLACE TO SLEEP OR SLEPT IN A SHELTER (INCLUDING NOW)?: PATIENT DECLINED

## 2024-05-14 NOTE — PROGRESS NOTES
Isidro Reardon presents today for   Chief Complaint   Patient presents with    Cholesterol Problem    Hypertension    Diabetes     3 month follow up            \"Have you been to the ER, urgent care clinic since your last visit?  Hospitalized since your last visit?\"    NO    “Have you seen or consulted any other health care providers outside of [facility] since your last visit?”    NO

## 2024-05-14 NOTE — PROGRESS NOTES
Subjective:       Chief Complaint  The patient presents for follow up of diabetes, hypertension and high cholesterol.        HPI  Isidro Reardon is a 63 y.o. male seen for follow up of diabetes.   Healso has hypertension and hyperlipidemia. Diabetes uncontrolled , no significant medication side effects noted, on metformin 1 gm daily and Ozempic 1 mg.    Patient consumes a lot of products with sugar including sodas and is encouraged to continue to cut back.    Hypertension wellcontrolled on Cozaar 100 mg and Toprol XL 50 mg, , hyperlipidemia borderline controlled, no significant medication side effects noted, on  Crestor 20 mg.  Would like to keep his LDL less than 70 with history of coronary artery disease.  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  Since he uses a lot of caffeinated products he is encouraged to hydrate himself well while on the SGLT2.  He has insurance issues and will not be able to get Farxiga until he gets his insurance back in hopefully about a month.  He is using Ozempic from a friend again until he is able to get his insurance back.    Diet and Lifestyle: generally follows a low fat low cholesterol diet, sedentary    Home BP Monitoring: is not measured at home.    Diabetic Review of Systems - home glucose monitoring: is performed sporadically.    Other symptoms and concerns: COPD Review:  The patient is being seen for follow up of COPD.   Oxygen: He currently is not on home oxygen therapy.  Symptoms: chronic dyspnea: severity = moderate: course of sx: symptoms have progressed to a point and plateaued..   Patient uses 1 pillows at night.   Patient denies smoke cigarettes. He has to get free Inhalers from Pulmonary    Pt has chronic back pain and is on Neurontin 600 mg   QID.   Pt not on narcotics in this office due to abnormal UDS in the past..         Patient has a history of coronary artery disease for which he is followed closely by cardiology.  He

## 2024-06-10 ENCOUNTER — TELEPHONE (OUTPATIENT)
Facility: CLINIC | Age: 64
End: 2024-06-10

## 2024-06-10 NOTE — TELEPHONE ENCOUNTER
Pharmacy Progress Note - Telephone Call    Mr. Isidro Reardon 63 y.o. was contacted via an outbound telephone call today to reschedule their missed appointment for PharmD diabetes management and education per referral from Phillip Rizzo MD.    Please call pt to reschedule.  Please close encounter after scheduled.  If unable to reach pt after 3 documented attempts, please close encounter.    Thank you,    Zac Craven, PharmD, BCACP, BC-ADM      For Pharmacy Admin Tracking Only    Program: Medical Group  CPA in place:  Yes  Time Spent (min): 5

## 2024-06-19 ENCOUNTER — OFFICE VISIT (OUTPATIENT)
Age: 64
End: 2024-06-19

## 2024-06-19 VITALS
OXYGEN SATURATION: 95 % | WEIGHT: 187 LBS | BODY MASS INDEX: 26.77 KG/M2 | DIASTOLIC BLOOD PRESSURE: 72 MMHG | HEIGHT: 70 IN | HEART RATE: 71 BPM | SYSTOLIC BLOOD PRESSURE: 108 MMHG

## 2024-06-19 DIAGNOSIS — I35.0 NONRHEUMATIC AORTIC (VALVE) STENOSIS: ICD-10-CM

## 2024-06-19 DIAGNOSIS — R06.09 DOE (DYSPNEA ON EXERTION): Primary | ICD-10-CM

## 2024-06-19 ASSESSMENT — PATIENT HEALTH QUESTIONNAIRE - PHQ9
9. THOUGHTS THAT YOU WOULD BE BETTER OFF DEAD, OR OF HURTING YOURSELF: NOT AT ALL
8. MOVING OR SPEAKING SO SLOWLY THAT OTHER PEOPLE COULD HAVE NOTICED. OR THE OPPOSITE, BEING SO FIGETY OR RESTLESS THAT YOU HAVE BEEN MOVING AROUND A LOT MORE THAN USUAL: NOT AT ALL
7. TROUBLE CONCENTRATING ON THINGS, SUCH AS READING THE NEWSPAPER OR WATCHING TELEVISION: NOT AT ALL
5. POOR APPETITE OR OVEREATING: NOT AT ALL
SUM OF ALL RESPONSES TO PHQ QUESTIONS 1-9: 0
6. FEELING BAD ABOUT YOURSELF - OR THAT YOU ARE A FAILURE OR HAVE LET YOURSELF OR YOUR FAMILY DOWN: NOT AT ALL
4. FEELING TIRED OR HAVING LITTLE ENERGY: NOT AT ALL
3. TROUBLE FALLING OR STAYING ASLEEP: NOT AT ALL
2. FEELING DOWN, DEPRESSED OR HOPELESS: NOT AT ALL
1. LITTLE INTEREST OR PLEASURE IN DOING THINGS: NOT AT ALL
SUM OF ALL RESPONSES TO PHQ9 QUESTIONS 1 & 2: 0
SUM OF ALL RESPONSES TO PHQ QUESTIONS 1-9: 0
10. IF YOU CHECKED OFF ANY PROBLEMS, HOW DIFFICULT HAVE THESE PROBLEMS MADE IT FOR YOU TO DO YOUR WORK, TAKE CARE OF THINGS AT HOME, OR GET ALONG WITH OTHER PEOPLE: NOT DIFFICULT AT ALL
SUM OF ALL RESPONSES TO PHQ QUESTIONS 1-9: 0
SUM OF ALL RESPONSES TO PHQ QUESTIONS 1-9: 0

## 2024-06-19 NOTE — PROGRESS NOTES
Isidro Reardon presents today for   Chief Complaint   Patient presents with    Follow-up     yearly       Isidro Reardon preferred language for health care discussion is english/other.    Is someone accompanying this pt? no    Is the patient using any DME equipment during OV? no    Depression Screening:  Depression: Not at risk (6/19/2024)    PHQ-2     PHQ-2 Score: 0        Learning Assessment:  Who is the primary learner? Patient    What is the preferred language for health care of the primary learner? ENGLISH    How does the primary learner prefer to learn new concepts? DEMONSTRATION    Answered By patient    Relationship to Learner SELF           Pt currently taking Anticoagulant therapy? no    Pt currently taking Antiplatelet therapy ? aspirin      Coordination of Care:  1. Have you been to the ER, urgent care clinic since your last visit? Hospitalized since your last visit? no    2. Have you seen or consulted any other health care providers outside of the [facility] since your last visit? Include any pap smears or colon screening. no

## 2024-07-15 ENCOUNTER — OFFICE VISIT (OUTPATIENT)
Facility: CLINIC | Age: 64
End: 2024-07-15

## 2024-07-15 VITALS
RESPIRATION RATE: 20 BRPM | DIASTOLIC BLOOD PRESSURE: 69 MMHG | HEIGHT: 70 IN | HEART RATE: 88 BPM | BODY MASS INDEX: 26.63 KG/M2 | WEIGHT: 186 LBS | SYSTOLIC BLOOD PRESSURE: 120 MMHG | TEMPERATURE: 97.5 F | OXYGEN SATURATION: 95 %

## 2024-07-15 DIAGNOSIS — I10 ESSENTIAL (PRIMARY) HYPERTENSION: ICD-10-CM

## 2024-07-15 DIAGNOSIS — E78.2 MIXED HYPERLIPIDEMIA: ICD-10-CM

## 2024-07-15 DIAGNOSIS — L02.419 CELLULITIS AND ABSCESS OF UPPER EXTREMITY: ICD-10-CM

## 2024-07-15 DIAGNOSIS — Z09 HOSPITAL DISCHARGE FOLLOW-UP: ICD-10-CM

## 2024-07-15 DIAGNOSIS — E11.42 TYPE 2 DIABETES MELLITUS WITH DIABETIC POLYNEUROPATHY, WITHOUT LONG-TERM CURRENT USE OF INSULIN (HCC): Primary | ICD-10-CM

## 2024-07-15 DIAGNOSIS — L03.119 CELLULITIS AND ABSCESS OF UPPER EXTREMITY: ICD-10-CM

## 2024-07-15 PROCEDURE — 1111F DSCHRG MED/CURRENT MED MERGE: CPT | Performed by: INTERNAL MEDICINE

## 2024-07-15 PROCEDURE — 3074F SYST BP LT 130 MM HG: CPT | Performed by: INTERNAL MEDICINE

## 2024-07-15 PROCEDURE — 99214 OFFICE O/P EST MOD 30 MIN: CPT | Performed by: INTERNAL MEDICINE

## 2024-07-15 PROCEDURE — 3078F DIAST BP <80 MM HG: CPT | Performed by: INTERNAL MEDICINE

## 2024-07-15 PROCEDURE — 3051F HG A1C>EQUAL 7.0%<8.0%: CPT | Performed by: INTERNAL MEDICINE

## 2024-07-15 NOTE — PROGRESS NOTES
Isidro Reardon presents today for   Chief Complaint   Patient presents with    Follow-Up from [facility] ER           \"Have you been to the ER, urgent care clinic since your last visit?  Hospitalized since your last visit?\"    Yes,     “Have you seen or consulted any other health care providers outside of [facility] since your last visit?”    NO

## 2024-07-15 NOTE — PROGRESS NOTES
Post-Discharge Transitional Care Management Progress Note      Isidro Reardon   YOB: 1960    Date of Office Visit:  7/15/2024  Date of Hospital Admission: 7/1/24  Date of Hospital Discharge: 7/6/24    Care management risk score Rising risk (score 2-5) and Complex Care (Scores >=6): No Risk Score On File     Non face to face  following discharge, date last encounter closed (first attempt may have been earlier): *No documented post hospital discharge outreach found in the last 14 days *No documented post hospital discharge outreach found in the last 14 days    Call initiated 2 business days of discharge: *No response recorded in the last 14 days    ASSESSMENT/PLAN:     ICD-10-CM    1. Type 2 diabetes mellitus with diabetic polyneuropathy, without long-term current use of insulin (HCC)  E11.42 Fairly well controlled on metformin. If BS becomes elevated would add Amaryl since he unable to afford GLP-1 or SGLT-2      2. Essential (primary) hypertension  I10 Well controlled on Cozaar and Metoprolol XL 50 mg        3. Mixed hyperlipidemia  E78.2 Well controlled on Crestor 20 mg       4. Cellulitis and abscess of upper extremity  L03.119 Improving after surgery and will continue Dicloxacillin     L02.419       5. Hospital discharge follow-up  Z09 CO DISCHARGE MEDS RECONCILED W/ CURRENT OUTPATIENT MED LIST           Medical Decision Making: moderate complexity  Return if symptoms worsen or fail to improve.         Subjective:   HPI:  Follow up of Hospital problems/diagnosis(es): Right upper extremity cellulitis with possible olecranon bursitis, wound culture positive for MSSA, diabetes    Inpatient course: Discharge summary reviewed- see chart.    Interval history/Current status: Patient has a history of hypertension and diabetes and gastroesophageal reflux disease as well as depression and peripheral neuropathy.  He presented to the emergency room with right elbow swelling and pain.  He noticed a pimple on

## 2024-08-30 RX ORDER — LOSARTAN POTASSIUM 100 MG/1
100 TABLET ORAL DAILY
Qty: 90 TABLET | Refills: 1 | Status: SHIPPED | OUTPATIENT
Start: 2024-08-30

## 2024-10-22 PROBLEM — R01.1 CARDIAC MURMUR, UNSPECIFIED: Status: ACTIVE | Noted: 2017-02-13

## 2024-10-22 PROBLEM — L03.115 CELLULITIS OF RIGHT LOWER LIMB: Status: ACTIVE | Noted: 2017-02-13

## 2024-10-22 PROBLEM — M19.021 PRIMARY OSTEOARTHRITIS OF RIGHT ELBOW: Status: ACTIVE | Noted: 2024-07-01

## 2024-10-22 PROBLEM — N17.9 AKI (ACUTE KIDNEY INJURY) (HCC): Status: ACTIVE | Noted: 2024-07-01

## 2024-10-22 PROBLEM — L03.113 CELLULITIS OF RIGHT UPPER EXTREMITY: Status: ACTIVE | Noted: 2024-06-30

## 2024-11-01 DIAGNOSIS — F32.1 CURRENT MODERATE EPISODE OF MAJOR DEPRESSIVE DISORDER WITHOUT PRIOR EPISODE (HCC): ICD-10-CM

## 2024-11-04 RX ORDER — DULOXETIN HYDROCHLORIDE 60 MG/1
60 CAPSULE, DELAYED RELEASE ORAL DAILY
Qty: 30 CAPSULE | Refills: 2 | Status: SHIPPED | OUTPATIENT
Start: 2024-11-04

## 2024-11-04 NOTE — TELEPHONE ENCOUNTER
PCP: Phillip Rizzo MD    LAST OFFICE VISIT: 07/15/2024    LAST REFILL PER CHART:  Medication:DULoxetine (CYMBALTA) 60 MG extended release capsule   Ordered On:05/14/2024  Instructions:Take 1 capsule by mouth daily   Dispense:30 capsules  Refills:3      Future Appointments   Date Time Provider Department Center   11/7/2024 10:15 AM IO LAB VISIT [facility] DEP   11/14/2024  3:20 PM Phillip Rizzo MD [facility]   12/12/2024  3:00 PM Scott Bridges MD [facility]   2/5/2025  9:45 AM IO LAB VISIT [facility]   2/11/2025  2:40 PM Pri[facility]ina [facility] - [facility] Sched

## 2024-11-07 ENCOUNTER — HOSPITAL ENCOUNTER (OUTPATIENT)
Facility: HOSPITAL | Age: 64
Setting detail: SPECIMEN
Discharge: HOME | End: 2024-11-10
Payer: MEDICARE

## 2024-11-07 DIAGNOSIS — Z12.5 PROSTATE CANCER SCREENING: ICD-10-CM

## 2024-11-07 DIAGNOSIS — E78.2 MIXED HYPERLIPIDEMIA: ICD-10-CM

## 2024-11-07 DIAGNOSIS — N18.31 CHRONIC KIDNEY DISEASE, STAGE 3A (HCC): ICD-10-CM

## 2024-11-07 DIAGNOSIS — I10 ESSENTIAL (PRIMARY) HYPERTENSION: ICD-10-CM

## 2024-11-07 DIAGNOSIS — E11.42 TYPE 2 DIABETES MELLITUS WITH DIABETIC POLYNEUROPATHY, WITHOUT LONG-TERM CURRENT USE OF INSULIN (HCC): ICD-10-CM

## 2024-11-07 LAB
ALBUMIN SERPL-MCNC: 3.5 G/DL (ref 3.4–5)
ALBUMIN/GLOB SERPL: 0.9 (ref 0.8–1.7)
ALP SERPL-CCNC: 128 U/L (ref 45–117)
ALT SERPL-CCNC: 32 U/L (ref 16–61)
ANION GAP SERPL CALC-SCNC: 7 MMOL/L (ref 3–18)
AST SERPL-CCNC: 18 U/L (ref 10–38)
BASOPHILS # BLD: 0.1 K/UL (ref 0–0.1)
BASOPHILS NFR BLD: 1 % (ref 0–2)
BILIRUB SERPL-MCNC: 0.5 MG/DL (ref 0.2–1)
BUN SERPL-MCNC: 10 MG/DL (ref 7–18)
BUN/CREAT SERPL: 6 (ref 12–20)
CALCIUM SERPL-MCNC: 10.1 MG/DL (ref 8.5–10.1)
CHLORIDE SERPL-SCNC: 100 MMOL/L (ref 100–111)
CHOLEST SERPL-MCNC: 192 MG/DL
CO2 SERPL-SCNC: 26 MMOL/L (ref 21–32)
CREAT SERPL-MCNC: 1.65 MG/DL (ref 0.6–1.3)
DIFFERENTIAL METHOD BLD: ABNORMAL
EOSINOPHIL # BLD: 0.2 K/UL (ref 0–0.4)
EOSINOPHIL NFR BLD: 2 % (ref 0–5)
ERYTHROCYTE [DISTWIDTH] IN BLOOD BY AUTOMATED COUNT: 12.8 % (ref 11.6–14.5)
EST. AVERAGE GLUCOSE BLD GHB EST-MCNC: 298 MG/DL
GLOBULIN SER CALC-MCNC: 3.8 G/DL (ref 2–4)
GLUCOSE SERPL-MCNC: 291 MG/DL (ref 74–99)
HBA1C MFR BLD: 12 % (ref 4.2–5.6)
HCT VFR BLD AUTO: 49.9 % (ref 36–48)
HDLC SERPL-MCNC: 41 MG/DL (ref 40–60)
HDLC SERPL: 4.7 (ref 0–5)
HGB BLD-MCNC: 15.8 G/DL (ref 13–16)
IMM GRANULOCYTES # BLD AUTO: 0.1 K/UL (ref 0–0.04)
IMM GRANULOCYTES NFR BLD AUTO: 1 % (ref 0–0.5)
LDLC SERPL CALC-MCNC: ABNORMAL MG/DL (ref 0–100)
LIPID PANEL: ABNORMAL
LYMPHOCYTES # BLD: 3.1 K/UL (ref 0.9–3.6)
LYMPHOCYTES NFR BLD: 31 % (ref 21–52)
MCH RBC QN AUTO: 29.3 PG (ref 24–34)
MCHC RBC AUTO-ENTMCNC: 31.7 G/DL (ref 31–37)
MCV RBC AUTO: 92.6 FL (ref 78–100)
MONOCYTES # BLD: 0.8 K/UL (ref 0.05–1.2)
MONOCYTES NFR BLD: 8 % (ref 3–10)
NEUTS SEG # BLD: 5.6 K/UL (ref 1.8–8)
NEUTS SEG NFR BLD: 57 % (ref 40–73)
NRBC # BLD: 0 K/UL (ref 0–0.01)
NRBC BLD-RTO: 0 PER 100 WBC
PLATELET # BLD AUTO: 267 K/UL (ref 135–420)
PMV BLD AUTO: 10.3 FL (ref 9.2–11.8)
POTASSIUM SERPL-SCNC: 4 MMOL/L (ref 3.5–5.5)
PROT SERPL-MCNC: 7.3 G/DL (ref 6.4–8.2)
PSA SERPL-MCNC: 0.7 NG/ML (ref 0–4)
RBC # BLD AUTO: 5.39 M/UL (ref 4.35–5.65)
SODIUM SERPL-SCNC: 133 MMOL/L (ref 136–145)
TRIGL SERPL-MCNC: 456 MG/DL
VLDLC SERPL CALC-MCNC: ABNORMAL MG/DL
WBC # BLD AUTO: 9.8 K/UL (ref 4.6–13.2)

## 2024-11-07 PROCEDURE — 85025 COMPLETE CBC W/AUTO DIFF WBC: CPT

## 2024-11-07 PROCEDURE — 80053 COMPREHEN METABOLIC PANEL: CPT

## 2024-11-07 PROCEDURE — 36415 COLL VENOUS BLD VENIPUNCTURE: CPT

## 2024-11-07 PROCEDURE — G0103 PSA SCREENING: HCPCS

## 2024-11-07 PROCEDURE — 83036 HEMOGLOBIN GLYCOSYLATED A1C: CPT

## 2024-11-07 PROCEDURE — 80061 LIPID PANEL: CPT

## 2024-11-14 ENCOUNTER — OFFICE VISIT (OUTPATIENT)
Facility: CLINIC | Age: 64
End: 2024-11-14

## 2024-11-14 VITALS
SYSTOLIC BLOOD PRESSURE: 108 MMHG | BODY MASS INDEX: 28.2 KG/M2 | RESPIRATION RATE: 18 BRPM | OXYGEN SATURATION: 98 % | TEMPERATURE: 98.1 F | HEART RATE: 86 BPM | HEIGHT: 70 IN | WEIGHT: 197 LBS | DIASTOLIC BLOOD PRESSURE: 75 MMHG

## 2024-11-14 DIAGNOSIS — I10 ESSENTIAL (PRIMARY) HYPERTENSION: ICD-10-CM

## 2024-11-14 DIAGNOSIS — F32.1 CURRENT MODERATE EPISODE OF MAJOR DEPRESSIVE DISORDER WITHOUT PRIOR EPISODE (HCC): ICD-10-CM

## 2024-11-14 DIAGNOSIS — Z87.891 PERSONAL HISTORY OF TOBACCO USE: ICD-10-CM

## 2024-11-14 DIAGNOSIS — J41.0 SIMPLE CHRONIC BRONCHITIS (HCC): ICD-10-CM

## 2024-11-14 DIAGNOSIS — Z00.00 WELCOME TO MEDICARE PREVENTIVE VISIT: Primary | ICD-10-CM

## 2024-11-14 DIAGNOSIS — E11.42 TYPE 2 DIABETES MELLITUS WITH DIABETIC POLYNEUROPATHY, WITHOUT LONG-TERM CURRENT USE OF INSULIN (HCC): ICD-10-CM

## 2024-11-14 DIAGNOSIS — E78.2 MIXED HYPERLIPIDEMIA: ICD-10-CM

## 2024-11-14 RX ORDER — SEMAGLUTIDE 1.34 MG/ML
1 INJECTION, SOLUTION SUBCUTANEOUS
Qty: 3 ML | Refills: 4 | Status: SHIPPED | OUTPATIENT
Start: 2024-11-14

## 2024-11-14 RX ORDER — BUPROPION HYDROCHLORIDE 300 MG/1
300 TABLET ORAL EVERY MORNING
Qty: 90 TABLET | Refills: 1 | Status: SHIPPED | OUTPATIENT
Start: 2024-11-14

## 2024-11-14 RX ORDER — GABAPENTIN 300 MG/1
CAPSULE ORAL
Qty: 180 CAPSULE | Refills: 5 | Status: SHIPPED | OUTPATIENT
Start: 2024-11-14 | End: 2025-04-16

## 2024-11-14 ASSESSMENT — LIFESTYLE VARIABLES
HOW MANY STANDARD DRINKS CONTAINING ALCOHOL DO YOU HAVE ON A TYPICAL DAY: PATIENT DOES NOT DRINK
HOW OFTEN DO YOU HAVE A DRINK CONTAINING ALCOHOL: NEVER

## 2024-11-14 ASSESSMENT — PATIENT HEALTH QUESTIONNAIRE - PHQ9
SUM OF ALL RESPONSES TO PHQ QUESTIONS 1-9: 0
SUM OF ALL RESPONSES TO PHQ QUESTIONS 1-9: 0
2. FEELING DOWN, DEPRESSED OR HOPELESS: NOT AT ALL
SUM OF ALL RESPONSES TO PHQ QUESTIONS 1-9: 0
SUM OF ALL RESPONSES TO PHQ QUESTIONS 1-9: 0
SUM OF ALL RESPONSES TO PHQ9 QUESTIONS 1 & 2: 0
1. LITTLE INTEREST OR PLEASURE IN DOING THINGS: NOT AT ALL

## 2024-11-14 ASSESSMENT — VISUAL ACUITY
OS_CC: 20/40
OD_CC: 20/25

## 2024-11-14 NOTE — PROGRESS NOTES
Isidro Reardon presents today for   Chief Complaint   Patient presents with    Diabetes    Hypertension    Cholesterol Problem           \"Have you been to the ER, urgent care clinic since your last visit?  Hospitalized since your last visit?\"    NO    “Have you seen or consulted any other health care providers outside of [facility] since your last visit?”    NO

## 2024-11-14 NOTE — PROGRESS NOTES
Medicare Annual Wellness Visit    Isidro Reardon is here for Diabetes, Hypertension, Cholesterol Problem, and Medicare AWV    Assessment & Plan   Welcome to Medicare preventive visit  Type 2 diabetes mellitus with diabetic polyneuropathy, without long-term current use of insulin (HCC)  -     Semaglutide, 1 MG/DOSE, (OZEMPIC, 1 MG/DOSE,) 4 MG/3ML SOPN sc injection; Inject 1 mg into the skin every 7 days, Disp-3 mL, R-4Normal  -     gabapentin (NEURONTIN) 300 MG capsule; TAKE TWO CAPSULES BY MOUTH THREE TIMES DAILY FOR 90 DAYS. MAX DAILY AMOUNT: 1,800 MG, Disp-180 capsule, R-5Normal  -     Hemoglobin A1C; Future  -     Microalbumin / Creatinine Urine Ratio; Future  Mixed hyperlipidemia  -     Lipid Panel; Future  Essential (primary) hypertension  -     Comprehensive Metabolic Panel; Future  Simple chronic bronchitis (HCC)  Personal history of tobacco use  -     [place] VISIT TO DISCUSS LUNG CA SCREEN W LDCT  -     CT Lung Screen (Initial/Annual/Baseline); Future  Current moderate episode of major depressive disorder without prior episode (HCC)  -     buPROPion (WELLBUTRIN XL) 300 MG extended release tablet; Take 1 tablet by mouth every morning, Disp-90 tablet, R-1Normal    Recommendations for Preventive Services Due: see orders and patient instructions/AVS.  Recommended screening schedule for the next 5-10 years is provided to the patient in written form: see Patient Instructions/AVS.     Return in about 3 months (around 2/14/2025) for labs 1 week before.     Subjective       Patient's complete Health Risk Assessment and screening values have been reviewed and are found in Flowsheets. The following problems were reviewed today and where indicated follow up appointments were made and/or referrals ordered.    Positive Risk Factor Screenings with Interventions:     Cognitive:      Words recalled: 2 Words Recalled     Total Score Interpretation: Abnormal Mini-Cog  Interventions:  Patient declines any further evaluation or

## 2024-11-14 NOTE — PROGRESS NOTES
Subjective:       Chief Complaint  The patient presents for follow up of diabetes, hypertension and high cholesterol.        HPI  Isidro Reardon is a 64 y.o. male seen for follow up of diabetes.   Healso has hypertension and hyperlipidemia. Diabetes uncontrolled , no significant medication side effects noted, on metformin 1 gm daily. He has been off  Ozempic 1 mg due to cost.    Patient consumes a lot of products with sugar including sodas and is encouraged to continue to cut back.    Hypertension well controlled on Cozaar 100 mg.  Patient does not appear to be taking  Toprol XL 50 mg, , hyperlipidemia borderline controlled, no significant medication side effects noted, on  Crestor 20 mg.  Would like to keep his LDL less than 70 with history of coronary artery disease.  Patient now has chronic kidney disease stage 3a. He is no longer on  Farxiga 10 mg it appears due to cost.  Patient was seen Pharm.D. and may benefit from pharmaceutical program if he is unable to get the Ozempic or Farxiga with his current insurance.    Diet and Lifestyle: generally follows a low fat low cholesterol diet, sedentary    Home BP Monitoring: is not measured at home.    Diabetic Review of Systems - home glucose monitoring: is performed sporadically.    Other symptoms and concerns: COPD Review:  The patient is being seen for follow up of COPD.   Oxygen: He currently is not on home oxygen therapy.  Symptoms: chronic dyspnea: severity = moderate: course of sx: symptoms have progressed to a point and plateaued..   Patient uses 1 pillows at night.   Patient denies smoke cigarettes. He has to get free Inhalers from Pulmonary    Pt has chronic back pain and is on Neurontin 600 mg   TID due to CKD.   Pt not on narcotics in this office due to abnormal UDS in the past..         Patient has a history of coronary artery disease for which he is followed closely by cardiology.  He also had a TAVR done 4/28/2021 at the heart [facility].

## 2024-12-02 ENCOUNTER — HOSPITAL ENCOUNTER (OUTPATIENT)
Facility: HOSPITAL | Age: 64
Discharge: HOME | End: 2024-12-05
Attending: INTERNAL MEDICINE
Payer: MEDICARE

## 2024-12-02 DIAGNOSIS — Z87.891 PERSONAL HISTORY OF TOBACCO USE: ICD-10-CM

## 2024-12-02 PROCEDURE — 71271 CT THORAX LUNG CANCER SCR C-: CPT

## 2024-12-05 ENCOUNTER — TELEPHONE (OUTPATIENT)
Facility: CLINIC | Age: 64
End: 2024-12-05

## 2024-12-05 NOTE — TELEPHONE ENCOUNTER
----- Message from Dr. Phillip Rizzo MD sent at 12/4/2024  8:03 PM EST -----  Lung scan is stable. Will repeat in 1 year

## 2024-12-12 ENCOUNTER — OFFICE VISIT (OUTPATIENT)
Age: 64
End: 2024-12-12

## 2024-12-12 VITALS
OXYGEN SATURATION: 97 % | BODY MASS INDEX: 27.63 KG/M2 | HEIGHT: 70 IN | WEIGHT: 193 LBS | SYSTOLIC BLOOD PRESSURE: 124 MMHG | HEART RATE: 74 BPM | DIASTOLIC BLOOD PRESSURE: 82 MMHG

## 2024-12-12 DIAGNOSIS — I35.0 AORTIC VALVE STENOSIS, ETIOLOGY OF CARDIAC VALVE DISEASE UNSPECIFIED: Primary | ICD-10-CM

## 2024-12-12 DIAGNOSIS — I25.10 ATHEROSCLEROSIS OF NATIVE CORONARY ARTERY, UNSPECIFIED WHETHER ANGINA PRESENT, UNSPECIFIED WHETHER NATIVE OR TRANSPLANTED HEART: ICD-10-CM

## 2024-12-12 ASSESSMENT — PATIENT HEALTH QUESTIONNAIRE - PHQ9
SUM OF ALL RESPONSES TO PHQ QUESTIONS 1-9: 0
4. FEELING TIRED OR HAVING LITTLE ENERGY: NOT AT ALL
10. IF YOU CHECKED OFF ANY PROBLEMS, HOW DIFFICULT HAVE THESE PROBLEMS MADE IT FOR YOU TO DO YOUR WORK, TAKE CARE OF THINGS AT HOME, OR GET ALONG WITH OTHER PEOPLE: NOT DIFFICULT AT ALL
SUM OF ALL RESPONSES TO PHQ QUESTIONS 1-9: 0
6. FEELING BAD ABOUT YOURSELF - OR THAT YOU ARE A FAILURE OR HAVE LET YOURSELF OR YOUR FAMILY DOWN: NOT AT ALL
SUM OF ALL RESPONSES TO PHQ9 QUESTIONS 1 & 2: 0
9. THOUGHTS THAT YOU WOULD BE BETTER OFF DEAD, OR OF HURTING YOURSELF: NOT AT ALL
8. MOVING OR SPEAKING SO SLOWLY THAT OTHER PEOPLE COULD HAVE NOTICED. OR THE OPPOSITE, BEING SO FIGETY OR RESTLESS THAT YOU HAVE BEEN MOVING AROUND A LOT MORE THAN USUAL: NOT AT ALL
7. TROUBLE CONCENTRATING ON THINGS, SUCH AS READING THE NEWSPAPER OR WATCHING TELEVISION: NOT AT ALL
2. FEELING DOWN, DEPRESSED OR HOPELESS: NOT AT ALL
1. LITTLE INTEREST OR PLEASURE IN DOING THINGS: NOT AT ALL
SUM OF ALL RESPONSES TO PHQ QUESTIONS 1-9: 0
SUM OF ALL RESPONSES TO PHQ QUESTIONS 1-9: 0
3. TROUBLE FALLING OR STAYING ASLEEP: NOT AT ALL
5. POOR APPETITE OR OVEREATING: NOT AT ALL

## 2024-12-12 NOTE — PROGRESS NOTES
Isidro Reardon presents today for   Chief Complaint   Patient presents with    Follow-up       Isidro Reardon preferred language for health care discussion is english/other.    Is someone accompanying this pt? no    Is the patient using any DME equipment during OV? no    Depression Screening:  Depression: Not at risk (12/12/2024)    PHQ-2     PHQ-2 Score: 0        Learning Assessment:  Who is the primary learner? Patient    What is the preferred language for health care of the primary learner? ENGLISH    How does the primary learner prefer to learn new concepts? DEMONSTRATION    Answered By patient    Relationship to Learner SELF           Pt currently taking Anticoagulant therapy? no    Pt currently taking Antiplatelet therapy ? aspirin      Coordination of Care:  1. Have you been to the ER, urgent care clinic since your last visit? Hospitalized since your last visit? no    2. Have you seen or consulted any other health care providers outside of the [facility] since your last visit? Include any pap smears or colon screening. no

## 2025-01-04 NOTE — PROGRESS NOTES
Isidro is in the office today for cardiac reevaluation for his chronic coronary artery disease and aortic valve disease.  He was admitted to [facility] with chest tightness/acute coronary syndrome in January, 2006.  He had a patent LAD and right coronary artery systems as well as a small nondominant circumflex,.  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 This was ballooned but not stented.  There was a residual stenosis of 40% with SABA grade 3 flow following the procedure. Left ventricular function at that time was normal with an ejection fraction in the 60%.      He had done well on medical therapy over the years without any recurrent cardiovascular issues.  He continued smoking and  developed moderately severe COPD with chronic shortness of breath.  He has not smoked for many years now.    He also had aortic stenosis with an echo in  August 2020 that demonstrated normal left ventricular systolic function  and moderately severe to severe aortic stenosis with a mean gradient of 38 mmHg across the valve and aortic valve area of 0.8 cm².  Compared to the echocardiogram of October 14, 2019 there was minimal change with a mean gradient being 36 mmHg at that time.  He had a cardiac catheterization done on January 22, 2021.  He had a widely patent LAD and proximal OM/ramus and right coronary artery.  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 He had a CT CTA of the chest abdomen and pelvis  in preparation for possible TAVR    The patient had a TAVR on 4/28/2021.  He has had no chest pain or shortness of breath.  He believes he can walk up 7 to 8 flights of steps without too much difficulty.  He had a repeat echocardiogram done on 3/6/2024.  Ejection fraction was 60 to 65%.  There was an Penny SANTANA S3 bioprosthetic valve well-seated 29 mm TAVR with 20 SANTANA S3 bioprosthetic valve.  Gradients were

## 2025-01-30 ENCOUNTER — OFFICE VISIT (OUTPATIENT)
Age: 65
End: 2025-01-30
Payer: MEDICARE

## 2025-01-30 VITALS
DIASTOLIC BLOOD PRESSURE: 78 MMHG | BODY MASS INDEX: 27.2 KG/M2 | HEIGHT: 70 IN | OXYGEN SATURATION: 97 % | HEART RATE: 88 BPM | WEIGHT: 190 LBS | SYSTOLIC BLOOD PRESSURE: 140 MMHG

## 2025-01-30 DIAGNOSIS — Z95.2 HISTORY OF TRANSCATHETER AORTIC VALVE REPLACEMENT (TAVR): ICD-10-CM

## 2025-01-30 DIAGNOSIS — I35.0 NONRHEUMATIC AORTIC VALVE STENOSIS: Primary | ICD-10-CM

## 2025-01-30 PROCEDURE — 3078F DIAST BP <80 MM HG: CPT | Performed by: INTERNAL MEDICINE

## 2025-01-30 PROCEDURE — 3077F SYST BP >= 140 MM HG: CPT | Performed by: INTERNAL MEDICINE

## 2025-01-30 PROCEDURE — 1123F ACP DISCUSS/DSCN MKR DOCD: CPT | Performed by: INTERNAL MEDICINE

## 2025-01-30 PROCEDURE — 99214 OFFICE O/P EST MOD 30 MIN: CPT | Performed by: INTERNAL MEDICINE

## 2025-01-30 ASSESSMENT — PATIENT HEALTH QUESTIONNAIRE - PHQ9
SUM OF ALL RESPONSES TO PHQ QUESTIONS 1-9: 0
7. TROUBLE CONCENTRATING ON THINGS, SUCH AS READING THE NEWSPAPER OR WATCHING TELEVISION: NOT AT ALL
SUM OF ALL RESPONSES TO PHQ QUESTIONS 1-9: 0
8. MOVING OR SPEAKING SO SLOWLY THAT OTHER PEOPLE COULD HAVE NOTICED. OR THE OPPOSITE, BEING SO FIGETY OR RESTLESS THAT YOU HAVE BEEN MOVING AROUND A LOT MORE THAN USUAL: NOT AT ALL
10. IF YOU CHECKED OFF ANY PROBLEMS, HOW DIFFICULT HAVE THESE PROBLEMS MADE IT FOR YOU TO DO YOUR WORK, TAKE CARE OF THINGS AT HOME, OR GET ALONG WITH OTHER PEOPLE: NOT DIFFICULT AT ALL
4. FEELING TIRED OR HAVING LITTLE ENERGY: NOT AT ALL
1. LITTLE INTEREST OR PLEASURE IN DOING THINGS: NOT AT ALL
6. FEELING BAD ABOUT YOURSELF - OR THAT YOU ARE A FAILURE OR HAVE LET YOURSELF OR YOUR FAMILY DOWN: NOT AT ALL
5. POOR APPETITE OR OVEREATING: NOT AT ALL
SUM OF ALL RESPONSES TO PHQ QUESTIONS 1-9: 0
SUM OF ALL RESPONSES TO PHQ QUESTIONS 1-9: 0
SUM OF ALL RESPONSES TO PHQ9 QUESTIONS 1 & 2: 0
9. THOUGHTS THAT YOU WOULD BE BETTER OFF DEAD, OR OF HURTING YOURSELF: NOT AT ALL
3. TROUBLE FALLING OR STAYING ASLEEP: NOT AT ALL
2. FEELING DOWN, DEPRESSED OR HOPELESS: NOT AT ALL

## 2025-01-30 NOTE — PROGRESS NOTES
Isidro Reardon presents today for No chief complaint on file.      Isidro Reardon preferred language for health care discussion is english/other.    Is someone accompanying this pt? no    Is the patient using any DME equipment during OV? no    Depression Screening:  Depression: Not at risk (12/12/2024)    PHQ-2     PHQ-2 Score: 0        Learning Assessment:  Who is the primary learner? Patient    What is the preferred language for health care of the primary learner? ENGLISH    How does the primary learner prefer to learn new concepts? READING    Answered By patient    Relationship to Learner SELF           Pt currently taking Anticoagulant therapy? no    Pt currently taking Antiplatelet therapy ? Yes aspirin      Coordination of Care:  1. Have you been to the ER, urgent care clinic since your last visit? Hospitalized since your last visit? no    2. Have you seen or consulted any other health care providers outside of the [facility] since your last visit? Include any pap smears or colon screening. no

## 2025-02-06 LAB — LDL CHOLESTEROL DIRECT: 154 MG/DL (ref 50–99)

## 2025-02-12 NOTE — PROGRESS NOTES
Isidro is in the office today for cardiac reevaluation for his chronic coronary artery disease and aortic valve disease.  He was admitted to [facility] with chest tightness/acute coronary syndrome in January, 2006.  He had a patent LAD and right coronary artery systems as well as a small nondominant circumflex,.  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 This was ballooned but not stented.  There was a residual stenosis of 40% with SABA grade 3 flow following the procedure. Left ventricular function at that time was normal with an ejection fraction in the 60%.      He had done well on medical therapy over the years without any recurrent cardiovascular issues.  He continued smoking and  developed moderately severe COPD with chronic shortness of breath.  He has not smoked for many years now.    He also had aortic stenosis with an echo in  August 2020 that demonstrated normal left ventricular systolic function  and moderately severe to severe aortic stenosis with a mean gradient of 38 mmHg across the valve and aortic valve area of 0.8 cm².  Compared to the echocardiogram of October 14, 2019 there was minimal change with a mean gradient being 36 mmHg at that time.  He had a cardiac catheterization done on January 22, 2021.  He had a widely patent LAD and proximal OM/ramus and right coronary artery.  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 He had a CT CTA of the chest abdomen and pelvis  in preparation for possible TAVR    The patient had a TAVR on 4/28/2021.  He has had no chest pain or shortness of breath. .  He had a repeat echocardiogram done on 12/19/2024..  Ejection fraction was 58%.  There was an Penny SANTANA S3 bioprosthetic valve well-seated 29 mm TAVR with 20 SANTANA S3 bioprosthetic valve.  Peak velocity was 1.6 m/s.  Mean gradient was 5 mmHg.  Plan; we will plan to see him back in another

## 2025-02-26 ENCOUNTER — OFFICE VISIT (OUTPATIENT)
Facility: CLINIC | Age: 65
End: 2025-02-26

## 2025-02-26 VITALS
BODY MASS INDEX: 26.34 KG/M2 | HEIGHT: 70 IN | WEIGHT: 184 LBS | SYSTOLIC BLOOD PRESSURE: 123 MMHG | RESPIRATION RATE: 20 BRPM | TEMPERATURE: 97.6 F | DIASTOLIC BLOOD PRESSURE: 79 MMHG | OXYGEN SATURATION: 95 % | HEART RATE: 78 BPM

## 2025-02-26 DIAGNOSIS — I10 ESSENTIAL (PRIMARY) HYPERTENSION: ICD-10-CM

## 2025-02-26 DIAGNOSIS — E78.2 MIXED HYPERLIPIDEMIA: ICD-10-CM

## 2025-02-26 DIAGNOSIS — Z00.00 INITIAL MEDICARE ANNUAL WELLNESS VISIT: Primary | ICD-10-CM

## 2025-02-26 DIAGNOSIS — E11.42 TYPE 2 DIABETES MELLITUS WITH DIABETIC POLYNEUROPATHY, WITHOUT LONG-TERM CURRENT USE OF INSULIN (HCC): ICD-10-CM

## 2025-02-26 RX ORDER — INSULIN GLARGINE 100 [IU]/ML
12 INJECTION, SOLUTION SUBCUTANEOUS 2 TIMES DAILY
Qty: 5 ADJUSTABLE DOSE PRE-FILLED PEN SYRINGE | Refills: 5 | Status: SHIPPED | OUTPATIENT
Start: 2025-02-26

## 2025-02-26 RX ORDER — INSULIN GLARGINE 100 [IU]/ML
12 INJECTION, SOLUTION SUBCUTANEOUS EVERY 24 HOURS
COMMUNITY
Start: 2025-02-20 | End: 2025-02-26 | Stop reason: SDUPTHER

## 2025-02-26 RX ORDER — ROSUVASTATIN CALCIUM 20 MG/1
20 TABLET, COATED ORAL DAILY
Qty: 90 TABLET | Refills: 2 | Status: SHIPPED | OUTPATIENT
Start: 2025-02-26

## 2025-02-26 SDOH — ECONOMIC STABILITY: FOOD INSECURITY: WITHIN THE PAST 12 MONTHS, THE FOOD YOU BOUGHT JUST DIDN'T LAST AND YOU DIDN'T HAVE MONEY TO GET MORE.: NEVER TRUE

## 2025-02-26 SDOH — ECONOMIC STABILITY: FOOD INSECURITY: WITHIN THE PAST 12 MONTHS, YOU WORRIED THAT YOUR FOOD WOULD RUN OUT BEFORE YOU GOT MONEY TO BUY MORE.: NEVER TRUE

## 2025-02-26 ASSESSMENT — PATIENT HEALTH QUESTIONNAIRE - PHQ9
SUM OF ALL RESPONSES TO PHQ QUESTIONS 1-9: 0
8. MOVING OR SPEAKING SO SLOWLY THAT OTHER PEOPLE COULD HAVE NOTICED. OR THE OPPOSITE, BEING SO FIGETY OR RESTLESS THAT YOU HAVE BEEN MOVING AROUND A LOT MORE THAN USUAL: NOT AT ALL
5. POOR APPETITE OR OVEREATING: NOT AT ALL
7. TROUBLE CONCENTRATING ON THINGS, SUCH AS READING THE NEWSPAPER OR WATCHING TELEVISION: NOT AT ALL
SUM OF ALL RESPONSES TO PHQ QUESTIONS 1-9: 0
SUM OF ALL RESPONSES TO PHQ QUESTIONS 1-9: 0
6. FEELING BAD ABOUT YOURSELF - OR THAT YOU ARE A FAILURE OR HAVE LET YOURSELF OR YOUR FAMILY DOWN: NOT AT ALL
SUM OF ALL RESPONSES TO PHQ9 QUESTIONS 1 & 2: 0
SUM OF ALL RESPONSES TO PHQ QUESTIONS 1-9: 0
2. FEELING DOWN, DEPRESSED OR HOPELESS: NOT AT ALL
3. TROUBLE FALLING OR STAYING ASLEEP: NOT AT ALL
1. LITTLE INTEREST OR PLEASURE IN DOING THINGS: NOT AT ALL
10. IF YOU CHECKED OFF ANY PROBLEMS, HOW DIFFICULT HAVE THESE PROBLEMS MADE IT FOR YOU TO DO YOUR WORK, TAKE CARE OF THINGS AT HOME, OR GET ALONG WITH OTHER PEOPLE: NOT DIFFICULT AT ALL
9. THOUGHTS THAT YOU WOULD BE BETTER OFF DEAD, OR OF HURTING YOURSELF: NOT AT ALL
4. FEELING TIRED OR HAVING LITTLE ENERGY: NOT AT ALL

## 2025-02-26 ASSESSMENT — LIFESTYLE VARIABLES
HOW OFTEN DO YOU HAVE A DRINK CONTAINING ALCOHOL: NEVER
HOW MANY STANDARD DRINKS CONTAINING ALCOHOL DO YOU HAVE ON A TYPICAL DAY: PATIENT DOES NOT DRINK

## 2025-02-26 NOTE — PROGRESS NOTES
Subjective:       Chief Complaint  The patient presents for follow up of diabetes, hypertension and high cholesterol.        HPI  Isidro Reardon is a 64 y.o. male seen for follow up of diabetes.   Healso has hypertension and hyperlipidemia. Diabetes uncontrolled , no significant medication side effects noted, on metformin 1 gm daily  Ozempic 2 mg. He was recently hospitalized for sepsis/flu and was placed on Lantus insulin 12 units BID.  Pt has stopped using sodas and is now using diet sodas   Hypertension well controlled on Cozaar 100 mg, hyperlipidemia uncontrolled, pt has not been taking  Crestor 20 mg and will restart it.  Would like to keep his LDL less than 70 with history of coronary artery disease.  Patient now has chronic kidney disease stage 3a. He is no longer on  Farxiga 10 mg it appears due to cost.  Patient was seen Pharm.D. and may benefit from pharmaceutical program in the future if he is unable to get the Ozempic or Farxiga with his current insurance.    Diet and Lifestyle: generally follows a low fat low cholesterol diet, sedentary    Home BP Monitoring: is not measured at home.    Diabetic Review of Systems - home glucose monitoring: is performed sporadically.    Other symptoms and concerns: COPD Review:  The patient is being seen for follow up of COPD.   Oxygen: He currently is not on home oxygen therapy.  Symptoms: chronic dyspnea: severity = moderate: course of sx: symptoms have progressed to a point and plateaued..   Patient uses 1 pillows at night.   Patient denies smoke cigarettes. He has to get free Inhalers from Pulmonary    Pt has chronic back pain and is on Neurontin 600 mg   TID due to CKD.   Pt not on narcotics in this office due to abnormal UDS in the past..         Patient has a history of coronary artery disease for which he is followed closely by cardiology.  He also had a TAVR done 4/28/2021 at the heart [facility].  Patient remained stable on ASA and

## 2025-02-26 NOTE — PATIENT INSTRUCTIONS
sudden weakness.     A fast or irregular heartbeat.   After you call 911, the  may tell you to chew 1 adult-strength or 2 to 4 low-dose aspirin. Wait for an ambulance. Do not try to drive yourself.  Watch closely for changes in your health, and be sure to contact your doctor if you have any problems.  Where can you learn more?  Go to https://www.[employer].net/patientEd and enter F075 to learn more about \"A Healthy Heart: Care Instructions.\"  Current as of: July 31, 2024  Content Version: 14.3  © 2024 [employer].   Care instructions adapted under license by "[employer]. If you have questions about a medical condition or this instruction, always ask your healthcare professional. [employer] disclaims any warranty or liability for your use of this information.    Personalized Preventive Plan for Isidro Reardon - 2/26/2025  Medicare offers a range of preventive health benefits. Some of the tests and screenings are paid in full while other may be subject to a deductible, co-insurance, and/or copay.  Some of these benefits include a comprehensive review of your medical history including lifestyle, illnesses that may run in your family, and various assessments and screenings as appropriate.  After reviewing your medical record and screening and assessments performed today your provider may have ordered immunizations, labs, imaging, and/or referrals for you.  A list of these orders (if applicable) as well as your Preventive Care list are included within your After Visit Summary for your review.

## 2025-02-26 NOTE — PROGRESS NOTES
Medicare Annual Wellness Visit    Isidro Reardon is here for Follow-Up from Hospital ([facility] ) and Medicare AW    Assessment & Plan   Initial Medicare annual wellness visit  Type 2 diabetes mellitus with diabetic polyneuropathy, without long-term current use of insulin (HCC)  -     Hemoglobin A1C; Future  Mixed hyperlipidemia  -     Lipid Panel; Future  Essential (primary) hypertension  -     Comprehensive Metabolic Panel; Future     Return in about 3 months (around 5/26/2025) for labs 1 week before.     Subjective       Patient's complete Health Risk Assessment and screening values have been reviewed and are found in Flowsheets. The following problems were reviewed today and where indicated follow up appointments were made and/or referrals ordered.    Positive Risk Factor Screenings with Interventions:              Inactivity:  On average, how many days per week do you engage in moderate to strenuous exercise (like a brisk walk)?: 0 days (!) Abnormal  On average, how many minutes do you engage in exercise at this level?: 0 min  Interventions:  Patient will try to walk for exercise         Safety:  Do you have non-slip mats or non-slip surfaces or shower bars or grab bars in your shower or bathtub?: (!) No  Interventions:  Patient declined any further interventions or treatment     Advanced Directives:  Do you have a Living Will?: (!) No    Intervention:  has NO advanced directive - not interested in additional information                     Objective   Vitals:    02/26/25 0852   BP: 123/79   Site: Right Upper Arm   Position: Sitting   Cuff Size: Large Adult   Pulse: 78   Resp: 20   Temp: 97.6 °F (36.4 °C)   TempSrc: Temporal   SpO2: 95%   Weight: 83.5 kg (184 lb)   Height: 1.778 m (5' 10\")      Body mass index is 26.4 kg/m².                    Allergies   Allergen Reactions    Ramipril Cough     Prior to Visit Medications    Medication Sig Taking? Authorizing Provider   insulin glargine (LANTUS SOLOSTAR)

## 2025-02-26 NOTE — PROGRESS NOTES
Isidro Reardon presents today for   Chief Complaint   Patient presents with    [facility]     [facility]           \"Have you been to the ER, urgent care clinic since your last visit?  Hospitalized since your last visit?\"    Yes, Kay    “Have you seen or consulted any other health care providers outside of [facility] since your last visit?”    NO

## 2025-03-12 RX ORDER — CALCIUM CITRATE/VITAMIN D3 200MG-6.25
TABLET ORAL
Qty: 50 STRIP | Refills: 4 | Status: SHIPPED | OUTPATIENT
Start: 2025-03-12

## 2025-03-12 NOTE — TELEPHONE ENCOUNTER
Last Office visit:  2/26/2025    Last Filled: not present     Follow up visit:    Future Appointments   Date Time Provider Department Center   5/30/2025 10:15 AM IOC LAB VISIT [facility] DEP   6/6/2025 11:40 AM Phillip Rizzo MD [facility] DEP   7/31/2025  1:40 PM Scott Bridges MD [facility]   8/12/2025  1:40 PM Prieto Avina APRN - NP [facility] Sched

## 2025-04-04 DIAGNOSIS — F32.1 CURRENT MODERATE EPISODE OF MAJOR DEPRESSIVE DISORDER WITHOUT PRIOR EPISODE (HCC): ICD-10-CM

## 2025-04-04 RX ORDER — DULOXETIN HYDROCHLORIDE 60 MG/1
60 CAPSULE, DELAYED RELEASE ORAL DAILY
Qty: 30 CAPSULE | Refills: 5 | Status: SHIPPED | OUTPATIENT
Start: 2025-04-04

## 2025-04-04 NOTE — TELEPHONE ENCOUNTER
PCP: Phillip Rizzo MD    LAST OFFICE VISIT: 02/26/2025    LAST REFILL PER CHART:  Medication:DULoxetine (CYMBALTA) 60 MG extended release capsule   Ordered On:11/04/2024  Instructions:TAKE 1 CAPSULE BY MOUTH DAILY,   Dispense:30 capsules  Refills:2    Future Appointments   Date Time Provider Department Center   5/30/2025 10:15 AM IOC LAB VISIT [facility] DEP   6/6/2025 11:40 AM Phillip Rizzo MD [facility] DEP   7/31/2025  1:40 PM Scott Bridges MD [facility] BS [facility]   8/12/2025  1:40 PM Prieto Avina APRN - NP [facility] Sue Sched

## 2025-05-15 ENCOUNTER — APPOINTMENT (OUTPATIENT)
Facility: HOSPITAL | Age: 65
DRG: 242 | End: 2025-05-15
Attending: INTERNAL MEDICINE
Payer: MEDICARE

## 2025-05-15 ENCOUNTER — ANESTHESIA (OUTPATIENT)
Facility: HOSPITAL | Age: 65
End: 2025-05-15
Payer: MEDICARE

## 2025-05-15 ENCOUNTER — HOSPITAL ENCOUNTER (INPATIENT)
Facility: HOSPITAL | Age: 65
LOS: 14 days | Discharge: NURSING, REHAB OR LONG-TERM CARE | DRG: 242 | End: 2025-05-29
Attending: EMERGENCY MEDICINE | Admitting: HOSPITALIST
Payer: MEDICARE

## 2025-05-15 ENCOUNTER — APPOINTMENT (OUTPATIENT)
Facility: HOSPITAL | Age: 65
DRG: 242 | End: 2025-05-15
Payer: MEDICARE

## 2025-05-15 ENCOUNTER — ANESTHESIA EVENT (OUTPATIENT)
Facility: HOSPITAL | Age: 65
End: 2025-05-15
Payer: MEDICARE

## 2025-05-15 DIAGNOSIS — I45.9 HEART BLOCK: ICD-10-CM

## 2025-05-15 DIAGNOSIS — Z95.2 S/P TAVR (TRANSCATHETER AORTIC VALVE REPLACEMENT): ICD-10-CM

## 2025-05-15 DIAGNOSIS — J96.01 ACUTE RESPIRATORY FAILURE WITH HYPOXIA (HCC): ICD-10-CM

## 2025-05-15 DIAGNOSIS — I50.9 CONGESTIVE HEART FAILURE, UNSPECIFIED HF CHRONICITY, UNSPECIFIED HEART FAILURE TYPE (HCC): ICD-10-CM

## 2025-05-15 DIAGNOSIS — T67.1XXA HEAT SYNCOPE, INITIAL ENCOUNTER: ICD-10-CM

## 2025-05-15 DIAGNOSIS — I44.2 COMPLETE HEART BLOCK (HCC): Primary | ICD-10-CM

## 2025-05-15 LAB
ANION GAP SERPL CALC-SCNC: 14 MMOL/L (ref 3–18)
APTT PPP: 26.8 SEC (ref 23–36.4)
BASOPHILS # BLD: 0.14 K/UL (ref 0–0.1)
BASOPHILS NFR BLD: 0.9 % (ref 0–2)
BUN SERPL-MCNC: 33 MG/DL (ref 6–23)
BUN/CREAT SERPL: 21 (ref 12–20)
CALCIUM SERPL-MCNC: 9.6 MG/DL (ref 8.5–10.1)
CHLORIDE SERPL-SCNC: 100 MMOL/L (ref 98–107)
CO2 SERPL-SCNC: 23 MMOL/L (ref 21–32)
CREAT SERPL-MCNC: 1.6 MG/DL (ref 0.6–1.3)
DIFFERENTIAL METHOD BLD: ABNORMAL
EOSINOPHIL # BLD: 0.14 K/UL (ref 0–0.4)
EOSINOPHIL NFR BLD: 0.9 % (ref 0–5)
ERYTHROCYTE [DISTWIDTH] IN BLOOD BY AUTOMATED COUNT: 13.2 % (ref 11.6–14.5)
GLUCOSE BLD STRIP.AUTO-MCNC: 296 MG/DL (ref 70–110)
GLUCOSE SERPL-MCNC: 265 MG/DL (ref 74–108)
HCT VFR BLD AUTO: 47.9 % (ref 36–48)
HGB BLD-MCNC: 15.2 G/DL (ref 13–16)
IMM GRANULOCYTES # BLD AUTO: 0.06 K/UL (ref 0–0.04)
IMM GRANULOCYTES NFR BLD AUTO: 0.4 % (ref 0–0.5)
INR PPP: 1 (ref 0.9–1.1)
LYMPHOCYTES # BLD: 4.42 K/UL (ref 0.9–3.3)
LYMPHOCYTES NFR BLD: 27.6 % (ref 21–52)
MAGNESIUM SERPL-MCNC: 1.7 MG/DL (ref 1.6–2.6)
MCH RBC QN AUTO: 29 PG (ref 24–34)
MCHC RBC AUTO-ENTMCNC: 31.7 G/DL (ref 31–37)
MCV RBC AUTO: 91.2 FL (ref 78–100)
MONOCYTES # BLD: 1.33 K/UL (ref 0.05–1.2)
MONOCYTES NFR BLD: 8.3 % (ref 3–10)
NEUTS SEG # BLD: 9.9 K/UL (ref 1.8–8)
NEUTS SEG NFR BLD: 61.9 % (ref 40–73)
NRBC # BLD: 0 K/UL (ref 0–0.01)
NRBC BLD-RTO: 0 PER 100 WBC
NT PRO BNP: 91 PG/ML (ref 36–900)
PLATELET # BLD AUTO: 291 K/UL (ref 135–420)
PMV BLD AUTO: 9.3 FL (ref 9.2–11.8)
POTASSIUM SERPL-SCNC: 5.1 MMOL/L (ref 3.5–5.5)
PROTHROMBIN TIME: 13 SEC (ref 11.9–14.9)
RBC # BLD AUTO: 5.25 M/UL (ref 4.35–5.65)
RBC MORPH BLD: ABNORMAL
SODIUM SERPL-SCNC: 138 MMOL/L (ref 136–145)
TROPONIN T SERPL HS-MCNC: 13.3 NG/L (ref 0–22)
TSH, 3RD GENERATION: 2.97 UIU/ML (ref 0.27–4.2)
WBC # BLD AUTO: 16 K/UL (ref 4.6–13.2)

## 2025-05-15 PROCEDURE — 5A12012 PERFORMANCE OF CARDIAC OUTPUT, SINGLE, MANUAL: ICD-10-PCS | Performed by: EMERGENCY MEDICINE

## 2025-05-15 PROCEDURE — 93306 TTE W/DOPPLER COMPLETE: CPT

## 2025-05-15 PROCEDURE — 83735 ASSAY OF MAGNESIUM: CPT

## 2025-05-15 PROCEDURE — C1894 INTRO/SHEATH, NON-LASER: HCPCS | Performed by: INTERNAL MEDICINE

## 2025-05-15 PROCEDURE — 99285 EMERGENCY DEPT VISIT HI MDM: CPT

## 2025-05-15 PROCEDURE — 96376 TX/PRO/DX INJ SAME DRUG ADON: CPT

## 2025-05-15 PROCEDURE — 71045 X-RAY EXAM CHEST 1 VIEW: CPT

## 2025-05-15 PROCEDURE — 6360000002 HC RX W HCPCS: Performed by: NURSE ANESTHETIST, CERTIFIED REGISTERED

## 2025-05-15 PROCEDURE — 84443 ASSAY THYROID STIM HORMONE: CPT

## 2025-05-15 PROCEDURE — 2580000003 HC RX 258: Performed by: HOSPITALIST

## 2025-05-15 PROCEDURE — 85730 THROMBOPLASTIN TIME PARTIAL: CPT

## 2025-05-15 PROCEDURE — 6360000002 HC RX W HCPCS: Performed by: EMERGENCY MEDICINE

## 2025-05-15 PROCEDURE — 31500 INSERT EMERGENCY AIRWAY: CPT

## 2025-05-15 PROCEDURE — 0BH17EZ INSERTION OF ENDOTRACHEAL AIRWAY INTO TRACHEA, VIA NATURAL OR ARTIFICIAL OPENING: ICD-10-PCS | Performed by: HOSPITALIST

## 2025-05-15 PROCEDURE — 2580000003 HC RX 258: Performed by: EMERGENCY MEDICINE

## 2025-05-15 PROCEDURE — 6360000002 HC RX W HCPCS

## 2025-05-15 PROCEDURE — 2000000000 HC ICU R&B

## 2025-05-15 PROCEDURE — 83880 ASSAY OF NATRIURETIC PEPTIDE: CPT

## 2025-05-15 PROCEDURE — 94002 VENT MGMT INPAT INIT DAY: CPT

## 2025-05-15 PROCEDURE — 31500 INSERT EMERGENCY AIRWAY: CPT | Performed by: NURSE ANESTHETIST, CERTIFIED REGISTERED

## 2025-05-15 PROCEDURE — 92950 HEART/LUNG RESUSCITATION CPR: CPT

## 2025-05-15 PROCEDURE — 6360000002 HC RX W HCPCS: Performed by: HOSPITALIST

## 2025-05-15 PROCEDURE — 85025 COMPLETE CBC W/AUTO DIFF WBC: CPT

## 2025-05-15 PROCEDURE — 84484 ASSAY OF TROPONIN QUANT: CPT

## 2025-05-15 PROCEDURE — 5A1955Z RESPIRATORY VENTILATION, GREATER THAN 96 CONSECUTIVE HOURS: ICD-10-PCS | Performed by: HOSPITALIST

## 2025-05-15 PROCEDURE — 2709999900 HC NON-CHARGEABLE SUPPLY: Performed by: INTERNAL MEDICINE

## 2025-05-15 PROCEDURE — 93005 ELECTROCARDIOGRAM TRACING: CPT | Performed by: EMERGENCY MEDICINE

## 2025-05-15 PROCEDURE — 3E033XZ INTRODUCTION OF VASOPRESSOR INTO PERIPHERAL VEIN, PERCUTANEOUS APPROACH: ICD-10-PCS | Performed by: EMERGENCY MEDICINE

## 2025-05-15 PROCEDURE — 85610 PROTHROMBIN TIME: CPT

## 2025-05-15 PROCEDURE — 82962 GLUCOSE BLOOD TEST: CPT

## 2025-05-15 PROCEDURE — 99153 MOD SED SAME PHYS/QHP EA: CPT | Performed by: INTERNAL MEDICINE

## 2025-05-15 PROCEDURE — 99152 MOD SED SAME PHYS/QHP 5/>YRS: CPT | Performed by: INTERNAL MEDICINE

## 2025-05-15 PROCEDURE — 80048 BASIC METABOLIC PNL TOTAL CA: CPT

## 2025-05-15 PROCEDURE — 2700000000 HC OXYGEN THERAPY PER DAY

## 2025-05-15 PROCEDURE — 96374 THER/PROPH/DIAG INJ IV PUSH: CPT

## 2025-05-15 PROCEDURE — 33210 INSERT ELECTRD/PM CATH SNGL: CPT | Performed by: INTERNAL MEDICINE

## 2025-05-15 RX ORDER — ROSUVASTATIN CALCIUM 10 MG/1
20 TABLET, COATED ORAL DAILY
Status: DISCONTINUED | OUTPATIENT
Start: 2025-05-16 | End: 2025-05-29 | Stop reason: HOSPADM

## 2025-05-15 RX ORDER — INSULIN LISPRO 100 [IU]/ML
0-8 INJECTION, SOLUTION INTRAVENOUS; SUBCUTANEOUS
Status: DISCONTINUED | OUTPATIENT
Start: 2025-05-15 | End: 2025-05-16

## 2025-05-15 RX ORDER — POTASSIUM CHLORIDE 29.8 MG/ML
20 INJECTION INTRAVENOUS PRN
Status: DISCONTINUED | OUTPATIENT
Start: 2025-05-15 | End: 2025-05-25

## 2025-05-15 RX ORDER — MAGNESIUM SULFATE IN WATER 40 MG/ML
2000 INJECTION, SOLUTION INTRAVENOUS PRN
Status: DISCONTINUED | OUTPATIENT
Start: 2025-05-15 | End: 2025-05-16 | Stop reason: SDUPTHER

## 2025-05-15 RX ORDER — ONDANSETRON 4 MG/1
4 TABLET, ORALLY DISINTEGRATING ORAL EVERY 8 HOURS PRN
Status: DISCONTINUED | OUTPATIENT
Start: 2025-05-15 | End: 2025-05-29 | Stop reason: HOSPADM

## 2025-05-15 RX ORDER — POTASSIUM CHLORIDE 7.45 MG/ML
10 INJECTION INTRAVENOUS PRN
Status: DISCONTINUED | OUTPATIENT
Start: 2025-05-15 | End: 2025-05-18 | Stop reason: SDUPTHER

## 2025-05-15 RX ORDER — SODIUM CHLORIDE 0.9 % (FLUSH) 0.9 %
5-40 SYRINGE (ML) INJECTION PRN
Status: DISCONTINUED | OUTPATIENT
Start: 2025-05-15 | End: 2025-05-23 | Stop reason: SDUPTHER

## 2025-05-15 RX ORDER — ENOXAPARIN SODIUM 100 MG/ML
40 INJECTION SUBCUTANEOUS DAILY
Status: DISCONTINUED | OUTPATIENT
Start: 2025-05-16 | End: 2025-05-22

## 2025-05-15 RX ORDER — GLUCAGON 1 MG/ML
1 KIT INJECTION PRN
Status: DISCONTINUED | OUTPATIENT
Start: 2025-05-15 | End: 2025-05-29 | Stop reason: HOSPADM

## 2025-05-15 RX ORDER — LOSARTAN POTASSIUM 50 MG/1
100 TABLET ORAL DAILY
Status: DISCONTINUED | OUTPATIENT
Start: 2025-05-16 | End: 2025-05-19

## 2025-05-15 RX ORDER — GABAPENTIN 300 MG/1
300 CAPSULE ORAL 3 TIMES DAILY
Status: DISCONTINUED | OUTPATIENT
Start: 2025-05-15 | End: 2025-05-29 | Stop reason: HOSPADM

## 2025-05-15 RX ORDER — POLYETHYLENE GLYCOL 3350 17 G/17G
17 POWDER, FOR SOLUTION ORAL DAILY PRN
Status: DISCONTINUED | OUTPATIENT
Start: 2025-05-15 | End: 2025-05-29 | Stop reason: HOSPADM

## 2025-05-15 RX ORDER — MIDAZOLAM HYDROCHLORIDE 1 MG/ML
1-10 INJECTION, SOLUTION INTRAVENOUS CONTINUOUS
Status: DISCONTINUED | OUTPATIENT
Start: 2025-05-15 | End: 2025-05-23

## 2025-05-15 RX ORDER — DOPAMINE HYDROCHLORIDE 160 MG/100ML
1-20 INJECTION, SOLUTION INTRAVENOUS CONTINUOUS
Status: DISCONTINUED | OUTPATIENT
Start: 2025-05-15 | End: 2025-05-21

## 2025-05-15 RX ORDER — FENTANYL CITRATE-0.9 % NACL/PF 10 MCG/ML
25-200 PLASTIC BAG, INJECTION (ML) INTRAVENOUS CONTINUOUS
Refills: 0 | Status: DISCONTINUED | OUTPATIENT
Start: 2025-05-15 | End: 2025-05-20

## 2025-05-15 RX ORDER — ONDANSETRON 2 MG/ML
4 INJECTION INTRAMUSCULAR; INTRAVENOUS EVERY 6 HOURS PRN
Status: DISCONTINUED | OUTPATIENT
Start: 2025-05-15 | End: 2025-05-29 | Stop reason: HOSPADM

## 2025-05-15 RX ORDER — ASPIRIN 81 MG/1
81 TABLET, CHEWABLE ORAL DAILY
Status: DISCONTINUED | OUTPATIENT
Start: 2025-05-16 | End: 2025-05-29 | Stop reason: HOSPADM

## 2025-05-15 RX ORDER — DULOXETIN HYDROCHLORIDE 60 MG/1
60 CAPSULE, DELAYED RELEASE ORAL DAILY
Status: DISCONTINUED | OUTPATIENT
Start: 2025-05-16 | End: 2025-05-29 | Stop reason: HOSPADM

## 2025-05-15 RX ORDER — SODIUM CHLORIDE 0.9 % (FLUSH) 0.9 %
5-40 SYRINGE (ML) INJECTION EVERY 12 HOURS SCHEDULED
Status: DISCONTINUED | OUTPATIENT
Start: 2025-05-15 | End: 2025-05-23 | Stop reason: SDUPTHER

## 2025-05-15 RX ORDER — PROPOFOL 10 MG/ML
INJECTION, EMULSION INTRAVENOUS
Status: DISCONTINUED | OUTPATIENT
Start: 2025-05-15 | End: 2025-05-15 | Stop reason: SDUPTHER

## 2025-05-15 RX ORDER — 0.9 % SODIUM CHLORIDE 0.9 %
500 INTRAVENOUS SOLUTION INTRAVENOUS ONCE
Status: COMPLETED | OUTPATIENT
Start: 2025-05-15 | End: 2025-05-15

## 2025-05-15 RX ORDER — ACETAMINOPHEN 325 MG/1
650 TABLET ORAL EVERY 6 HOURS PRN
Status: DISCONTINUED | OUTPATIENT
Start: 2025-05-15 | End: 2025-05-29 | Stop reason: HOSPADM

## 2025-05-15 RX ORDER — FENTANYL CITRATE 50 UG/ML
INJECTION, SOLUTION INTRAMUSCULAR; INTRAVENOUS
Status: COMPLETED
Start: 2025-05-15 | End: 2025-05-15

## 2025-05-15 RX ORDER — FENTANYL CITRATE 50 UG/ML
100 INJECTION, SOLUTION INTRAMUSCULAR; INTRAVENOUS ONCE
Status: COMPLETED | OUTPATIENT
Start: 2025-05-15 | End: 2025-05-15

## 2025-05-15 RX ORDER — DEXTROSE MONOHYDRATE 100 MG/ML
INJECTION, SOLUTION INTRAVENOUS CONTINUOUS PRN
Status: DISCONTINUED | OUTPATIENT
Start: 2025-05-15 | End: 2025-05-29 | Stop reason: HOSPADM

## 2025-05-15 RX ORDER — ACETAMINOPHEN 650 MG/1
650 SUPPOSITORY RECTAL EVERY 6 HOURS PRN
Status: DISCONTINUED | OUTPATIENT
Start: 2025-05-15 | End: 2025-05-29 | Stop reason: HOSPADM

## 2025-05-15 RX ADMIN — PROPOFOL 50 MG: 10 INJECTION, EMULSION INTRAVENOUS at 23:02

## 2025-05-15 RX ADMIN — DOPAMINE HYDROCHLORIDE 5 MCG/KG/MIN: 160 INJECTION, SOLUTION INTRAVENOUS at 19:15

## 2025-05-15 RX ADMIN — PROPOFOL 50 MG: 10 INJECTION, EMULSION INTRAVENOUS at 23:03

## 2025-05-15 RX ADMIN — FENTANYL CITRATE 50 MCG/HR: 0.05 INJECTION, SOLUTION INTRAMUSCULAR; INTRAVENOUS at 23:34

## 2025-05-15 RX ADMIN — FENTANYL CITRATE 100 MCG: 50 INJECTION INTRAMUSCULAR; INTRAVENOUS at 23:45

## 2025-05-15 RX ADMIN — MIDAZOLAM HYDROCHLORIDE 2 MG/HR: 5 INJECTION, SOLUTION INTRAMUSCULAR; INTRAVENOUS at 23:16

## 2025-05-15 RX ADMIN — EPINEPHRINE 17 MCG/MIN: 1 INJECTION INTRAMUSCULAR; INTRAVENOUS; SUBCUTANEOUS at 19:48

## 2025-05-15 RX ADMIN — SODIUM CHLORIDE 500 ML: 0.9 INJECTION, SOLUTION INTRAVENOUS at 19:17

## 2025-05-15 RX ADMIN — FENTANYL CITRATE 100 MCG: 50 INJECTION, SOLUTION INTRAMUSCULAR; INTRAVENOUS at 23:45

## 2025-05-15 ASSESSMENT — PULMONARY FUNCTION TESTS: PIF_VALUE: 24

## 2025-05-15 NOTE — PROGRESS NOTES
Discussed with Dr. Corona @1910  Patient came to the hospital with symptoms of light headedness, bradycardia heart rate between 30-40   Patient have chronic right bundle-branch block   EKG have shown AV dissociation new onset   Patient was started on epinephrine infusion responded.   I have recommended starting dopamine and then wean off epinephrine.    Labs are pending      @2020  Patient responded well to dopamine and epinephrine.  Patient heart rate increased above 100 epinephrine stopped rhythm looks like sinus rhythm  Patient have transient AV dissociation.    Will check TSH   Avoid AV yg blocking medicine   Patient will be observed in ICU   At this point there is no indication to put TVP  Please call if any change in cardiac status of the patient.  Thanks

## 2025-05-15 NOTE — ED TRIAGE NOTES
Pt brought in by ambulance from home c/o lightheadedness and bradycardia. pPer EMS, pt HR 31, 1mg atropine given, 250 bolus NS, and 5mcg/min Epi started at 1845.   , last /60.   In triage HR 40-65  Pt denies passing out  MD in room. Verbal orders to continue 5mcg/min epi. EKG done    A&Ox4    Active Ambulatory Problems     Diagnosis Date Noted    S/P cardiac catheterization     Arthritis     Coronary atherosclerosis of native coronary artery 08/25/2011    Type 2 diabetes mellitus with diabetic polyneuropathy, without long-term current use of insulin ([facility]) 04/13/2018    Pneumonia     Chronic cough     Tobacco abuse     Dyslipidemia 08/25/2011    FERRARI (dyspnea on exertion)     GERD (gastroesophageal reflux disease) 08/25/2011    Indigestion     COPD (chronic obstructive pulmonary disease) ([facility])     Essential hypertension 04/13/2018    Benign hypertensive heart disease without heart failure 08/25/2011    Type 2 diabetes with nephropathy ([facility]) 08/20/2018    AS (aortic stenosis) 04/27/2021    Primary osteoarthritis of right elbow 07/01/2024    Cardiac murmur, unspecified 02/13/2017    RENAE (acute kidney injury) 07/01/2024    Cellulitis of right lower limb 02/13/2017    Cellulitis of right upper extremity 06/30/2024     Resolved Ambulatory Problems     Diagnosis Date Noted    No Resolved Ambulatory Problems     Past Medical History:   Diagnosis Date    Acute coronary syndrome ([facility])     ASHD (arteriosclerotic heart disease)     CAD (coronary artery disease)     Calculus of kidney     Chronic lung disease     Coronary artery disease     Diabetes ([facility])     Diabetes mellitus ([facility])     H/O: pneumonia     Heartburn     Hypertension     Left inguinal hernia     Neuropathy involving both lower extremities     Pure hypercholesterolemia     S/P cardiac cath 01/26/2006

## 2025-05-16 ENCOUNTER — APPOINTMENT (OUTPATIENT)
Facility: HOSPITAL | Age: 65
DRG: 242 | End: 2025-05-16
Attending: INTERNAL MEDICINE
Payer: MEDICARE

## 2025-05-16 ENCOUNTER — APPOINTMENT (OUTPATIENT)
Facility: HOSPITAL | Age: 65
DRG: 242 | End: 2025-05-16
Payer: MEDICARE

## 2025-05-16 PROBLEM — E87.5 HYPERKALEMIA: Status: ACTIVE | Noted: 2025-05-16

## 2025-05-16 PROBLEM — J96.00 ACUTE RESPIRATORY FAILURE (HCC): Status: ACTIVE | Noted: 2025-05-16

## 2025-05-16 PROBLEM — Z95.2 S/P TAVR (TRANSCATHETER AORTIC VALVE REPLACEMENT): Status: ACTIVE | Noted: 2025-05-16

## 2025-05-16 PROBLEM — I46.9 CARDIAC ARREST (HCC): Status: ACTIVE | Noted: 2025-05-16

## 2025-05-16 LAB
ALBUMIN SERPL-MCNC: 3.5 G/DL (ref 3.4–5)
ALBUMIN SERPL-MCNC: 3.7 G/DL (ref 3.4–5)
ALBUMIN/GLOB SERPL: 1.2 (ref 0.8–1.7)
ALP SERPL-CCNC: 76 U/L (ref 45–117)
ALT SERPL-CCNC: 21 U/L (ref 10–50)
AMPHET UR QL SCN: NEGATIVE
ANION GAP SERPL CALC-SCNC: 12 MMOL/L (ref 3–18)
ANION GAP SERPL CALC-SCNC: 13 MMOL/L (ref 3–18)
APPEARANCE UR: ABNORMAL
ARTERIAL PATENCY WRIST A: POSITIVE
ARTERIAL PATENCY WRIST A: POSITIVE
AST SERPL-CCNC: 24 U/L (ref 10–38)
BACTERIA URNS QL MICRO: ABNORMAL /HPF
BARBITURATES UR QL SCN: NEGATIVE
BASE DEFICIT BLD-SCNC: 2.1 MMOL/L
BASE EXCESS BLD CALC-SCNC: 4 MMOL/L
BASOPHILS # BLD: 0.11 K/UL (ref 0–0.1)
BASOPHILS NFR BLD: 0.4 % (ref 0–2)
BDY SITE: ABNORMAL
BDY SITE: ABNORMAL
BENZODIAZ UR QL: POSITIVE
BILIRUB SERPL-MCNC: 0.5 MG/DL (ref 0.2–1)
BILIRUB UR QL: NEGATIVE
BODY TEMPERATURE: 98
BUN SERPL-MCNC: 39 MG/DL (ref 6–23)
BUN SERPL-MCNC: 39 MG/DL (ref 6–23)
BUN/CREAT SERPL: 18 (ref 12–20)
BUN/CREAT SERPL: 19 (ref 12–20)
CA-I BLD-SCNC: 1.18 MMOL/L (ref 1.12–1.32)
CALCIUM SERPL-MCNC: 9.1 MG/DL (ref 8.5–10.1)
CALCIUM SERPL-MCNC: 9.6 MG/DL (ref 8.5–10.1)
CANNABINOIDS UR QL SCN: NEGATIVE
CHLORIDE SERPL-SCNC: 98 MMOL/L (ref 98–107)
CHLORIDE SERPL-SCNC: 99 MMOL/L (ref 98–107)
CO2 SERPL-SCNC: 23 MMOL/L (ref 21–32)
CO2 SERPL-SCNC: 26 MMOL/L (ref 21–32)
COCAINE UR QL SCN: NEGATIVE
COLOR UR: YELLOW
CREAT SERPL-MCNC: 2.03 MG/DL (ref 0.6–1.3)
CREAT SERPL-MCNC: 2.23 MG/DL (ref 0.6–1.3)
DIFFERENTIAL METHOD BLD: ABNORMAL
ECHO AO ROOT DIAM: 3.1 CM
ECHO AO ROOT INDEX: 1.5 CM/M2
ECHO AV AREA PEAK VELOCITY: 0.8 CM2
ECHO AV AREA VTI: 0.7 CM2
ECHO AV AREA/BSA PEAK VELOCITY: 0.4 CM2/M2
ECHO AV AREA/BSA VTI: 0.3 CM2/M2
ECHO AV MEAN GRADIENT: 7 MMHG
ECHO AV MEAN VELOCITY: 1.3 M/S
ECHO AV PEAK GRADIENT: 10 MMHG
ECHO AV PEAK VELOCITY: 1.6 M/S
ECHO AV VELOCITY RATIO: 0.31
ECHO AV VTI: 34.1 CM
ECHO BSA: 2.08 M2
ECHO EST RA PRESSURE: 3 MMHG
ECHO LA VOL A-L A4C: 42 ML (ref 18–58)
ECHO LA VOL MOD A4C: 41 ML (ref 18–58)
ECHO LA VOLUME INDEX A-L A4C: 20 ML/M2 (ref 16–34)
ECHO LA VOLUME INDEX MOD A4C: 20 ML/M2 (ref 16–34)
ECHO LV E' LATERAL VELOCITY: 7.08 CM/S
ECHO LV E' SEPTAL VELOCITY: 6.6 CM/S
ECHO LV EDV A2C: 37 ML
ECHO LV EDV A4C: 35 ML
ECHO LV EDV BP: 36 ML (ref 67–155)
ECHO LV EDV INDEX A4C: 17 ML/M2
ECHO LV EDV INDEX BP: 17 ML/M2
ECHO LV EDV NDEX A2C: 18 ML/M2
ECHO LV EF PHYSICIAN: 60 %
ECHO LV EJECTION FRACTION A2C: 54 %
ECHO LV EJECTION FRACTION A4C: 57 %
ECHO LV EJECTION FRACTION BIPLANE: 55 % (ref 55–100)
ECHO LV ESV A2C: 17 ML
ECHO LV ESV A4C: 15 ML
ECHO LV ESV BP: 16 ML (ref 22–58)
ECHO LV ESV INDEX A2C: 8 ML/M2
ECHO LV ESV INDEX A4C: 7 ML/M2
ECHO LV ESV INDEX BP: 8 ML/M2
ECHO LV FRACTIONAL SHORTENING: 36 % (ref 28–44)
ECHO LV INTERNAL DIMENSION DIASTOLE INDEX: 1.89 CM/M2
ECHO LV INTERNAL DIMENSION DIASTOLIC: 3.9 CM (ref 4.2–5.9)
ECHO LV INTERNAL DIMENSION SYSTOLIC INDEX: 1.21 CM/M2
ECHO LV INTERNAL DIMENSION SYSTOLIC: 2.5 CM
ECHO LV IVSD: 0.9 CM (ref 0.6–1)
ECHO LV MASS 2D: 97.4 G (ref 88–224)
ECHO LV MASS INDEX 2D: 47.3 G/M2 (ref 49–115)
ECHO LV POSTERIOR WALL DIASTOLIC: 0.8 CM (ref 0.6–1)
ECHO LV RELATIVE WALL THICKNESS RATIO: 0.41
ECHO LVOT AREA: 2.5 CM2
ECHO LVOT AV VTI INDEX: 0.28
ECHO LVOT DIAM: 1.8 CM
ECHO LVOT MEAN GRADIENT: 1 MMHG
ECHO LVOT PEAK GRADIENT: 1 MMHG
ECHO LVOT PEAK VELOCITY: 0.5 M/S
ECHO LVOT STROKE VOLUME INDEX: 12 ML/M2
ECHO LVOT SV: 24.7 ML
ECHO LVOT VTI: 9.7 CM
ECHO MV A VELOCITY: 1.02 M/S
ECHO MV AREA VTI: 1 CM2
ECHO MV E DECELERATION TIME (DT): 133.2 MS
ECHO MV E VELOCITY: 0.82 M/S
ECHO MV E/A RATIO: 0.8
ECHO MV E/E' LATERAL: 11.58
ECHO MV E/E' RATIO (AVERAGED): 12
ECHO MV E/E' SEPTAL: 12.42
ECHO MV LVOT VTI INDEX: 2.64
ECHO MV MAX VELOCITY: 1.1 M/S
ECHO MV MEAN GRADIENT: 2 MMHG
ECHO MV MEAN VELOCITY: 0.7 M/S
ECHO MV PEAK GRADIENT: 4 MMHG
ECHO MV VTI: 25.6 CM
ECHO RA END SYSTOLIC VOLUME APICAL 4 CHAMBER INDEX BSA: 16 ML/M2
ECHO RA VOLUME: 33 ML
ECHO RIGHT VENTRICULAR SYSTOLIC PRESSURE (RVSP): 44 MMHG
ECHO RV INTERNAL DIMENSION: 4.1 CM
ECHO RV TAPSE: 2.2 CM (ref 1.7–?)
ECHO TV REGURGITANT MAX VELOCITY: 3.19 M/S
ECHO TV REGURGITANT PEAK GRADIENT: 41 MMHG
EOSINOPHIL # BLD: 0 K/UL (ref 0–0.4)
EOSINOPHIL NFR BLD: 0 % (ref 0–5)
EPITH CASTS URNS QL MICRO: NEGATIVE /LPF (ref 0–5)
ERYTHROCYTE [DISTWIDTH] IN BLOOD BY AUTOMATED COUNT: 13.2 % (ref 11.6–14.5)
FENTANYL: POSITIVE
FLUAV RNA SPEC QL NAA+PROBE: NOT DETECTED
FLUBV RNA SPEC QL NAA+PROBE: NOT DETECTED
GAS FLOW.O2 O2 DELIVERY SYS: ABNORMAL
GAS FLOW.O2 O2 DELIVERY SYS: ABNORMAL
GAS FLOW.O2 SETTING OXYMISER: 16 BPM
GLOBULIN SER CALC-MCNC: 3.1 G/DL (ref 2–4)
GLUCOSE BLD STRIP.AUTO-MCNC: 172 MG/DL (ref 70–110)
GLUCOSE BLD STRIP.AUTO-MCNC: 198 MG/DL (ref 70–110)
GLUCOSE BLD STRIP.AUTO-MCNC: 256 MG/DL (ref 70–110)
GLUCOSE BLD STRIP.AUTO-MCNC: 301 MG/DL (ref 70–110)
GLUCOSE SERPL-MCNC: 283 MG/DL (ref 74–108)
GLUCOSE SERPL-MCNC: 312 MG/DL (ref 74–108)
GLUCOSE UR STRIP.AUTO-MCNC: 500 MG/DL
HCO3 BLD-SCNC: 24.4 MMOL/L (ref 21–28)
HCO3 BLD-SCNC: 29.8 MMOL/L (ref 21–28)
HCT VFR BLD AUTO: 48.7 % (ref 36–48)
HGB BLD-MCNC: 15.4 G/DL (ref 13–16)
HGB UR QL STRIP: NEGATIVE
HYALINE CASTS URNS QL MICRO: ABNORMAL /LPF (ref 0–2)
IMM GRANULOCYTES # BLD AUTO: 0.19 K/UL (ref 0–0.04)
IMM GRANULOCYTES NFR BLD AUTO: 0.7 % (ref 0–0.5)
INSPIRATION.DURATION SETTING TIME VENT: 0.9 SEC
IPAP/PIP/HIGH PEEP: 20
KETONES UR QL STRIP.AUTO: ABNORMAL MG/DL
LACTATE SERPL-SCNC: 1.3 MMOL/L (ref 0.4–2)
LEUKOCYTE ESTERASE UR QL STRIP.AUTO: NEGATIVE
LYMPHOCYTES # BLD: 1.23 K/UL (ref 0.9–3.3)
LYMPHOCYTES NFR BLD: 4.6 % (ref 21–52)
Lab: ABNORMAL
MAGNESIUM SERPL-MCNC: 1.7 MG/DL (ref 1.6–2.6)
MCH RBC QN AUTO: 28.8 PG (ref 24–34)
MCHC RBC AUTO-ENTMCNC: 31.6 G/DL (ref 31–37)
MCV RBC AUTO: 91.2 FL (ref 78–100)
METHADONE UR QL: NEGATIVE
MONOCYTES # BLD: 1.39 K/UL (ref 0.05–1.2)
MONOCYTES NFR BLD: 5.2 % (ref 3–10)
NEUTS SEG # BLD: 23.88 K/UL (ref 1.8–8)
NEUTS SEG NFR BLD: 89.1 % (ref 40–73)
NITRITE UR QL STRIP.AUTO: NEGATIVE
NRBC # BLD: 0 K/UL (ref 0–0.01)
NRBC BLD-RTO: 0 PER 100 WBC
O2/TOTAL GAS SETTING VFR VENT: 100 %
O2/TOTAL GAS SETTING VFR VENT: 50 %
OPIATES UR QL: NEGATIVE
OXYCODONE UR QL SCN: NEGATIVE
PAW @ MEAN EXP FLOW ON VENT: 9 CMH2O
PCO2 BLD: 46 MMHG (ref 35–48)
PCO2 BLD: 46.9 MMHG (ref 35–48)
PEEP RESPIRATORY: 5 CMH2O
PH BLD: 7.33 (ref 7.35–7.45)
PH BLD: 7.41 (ref 7.35–7.45)
PH UR STRIP: 6 (ref 5–8)
PHOSPHATE SERPL-MCNC: 3 MG/DL (ref 2.5–4.9)
PHOSPHATE SERPL-MCNC: 3.4 MG/DL (ref 2.5–4.9)
PLATELET # BLD AUTO: 273 K/UL (ref 135–420)
PLATELET COMMENT: ABNORMAL
PMV BLD AUTO: 9.4 FL (ref 9.2–11.8)
PO2 BLD: 361 MMHG (ref 83–108)
PO2 BLD: 86 MMHG (ref 83–108)
POTASSIUM SERPL-SCNC: 5 MMOL/L (ref 3.5–5.5)
POTASSIUM SERPL-SCNC: 5.3 MMOL/L (ref 3.5–5.5)
POTASSIUM SERPL-SCNC: 6.6 MMOL/L (ref 3.5–5.5)
PROCALCITONIN SERPL-MCNC: 1.54 NG/ML
PROT SERPL-MCNC: 6.8 G/DL (ref 6.4–8.2)
PROT UR STRIP-MCNC: 100 MG/DL
RBC # BLD AUTO: 5.34 M/UL (ref 4.35–5.65)
RBC #/AREA URNS HPF: ABNORMAL /HPF (ref 0–5)
RBC MORPH BLD: ABNORMAL
RESPIRATORY RATE, POC: 18 (ref 5–40)
SAO2 % BLD: 96.5 % (ref 92–97)
SAO2 % BLD: 99.9 % (ref 92–97)
SARS-COV-2 RNA RESP QL NAA+PROBE: NOT DETECTED
SERVICE CMNT-IMP: ABNORMAL
SERVICE CMNT-IMP: ABNORMAL
SODIUM SERPL-SCNC: 134 MMOL/L (ref 136–145)
SODIUM SERPL-SCNC: 136 MMOL/L (ref 136–145)
SOURCE: NORMAL
SP GR UR REFRACTOMETRY: 1.02 (ref 1–1.03)
SPECIMEN TYPE: ABNORMAL
SPECIMEN TYPE: ABNORMAL
TROPONIN T SERPL HS-MCNC: 40.9 NG/L (ref 0–22)
UROBILINOGEN UR QL STRIP.AUTO: 1 EU/DL (ref 0.2–1)
VENTILATION MODE VENT: ABNORMAL
VENTILATION MODE VENT: ABNORMAL
VT SETTING VENT: 500 ML
WBC # BLD AUTO: 26.8 K/UL (ref 4.6–13.2)
WBC URNS QL MICRO: ABNORMAL /HPF (ref 0–5)

## 2025-05-16 PROCEDURE — 31500 INSERT EMERGENCY AIRWAY: CPT

## 2025-05-16 PROCEDURE — 84484 ASSAY OF TROPONIN QUANT: CPT

## 2025-05-16 PROCEDURE — 2580000003 HC RX 258: Performed by: HOSPITALIST

## 2025-05-16 PROCEDURE — 83735 ASSAY OF MAGNESIUM: CPT

## 2025-05-16 PROCEDURE — 36600 WITHDRAWAL OF ARTERIAL BLOOD: CPT

## 2025-05-16 PROCEDURE — 99223 1ST HOSP IP/OBS HIGH 75: CPT

## 2025-05-16 PROCEDURE — 71045 X-RAY EXAM CHEST 1 VIEW: CPT

## 2025-05-16 PROCEDURE — 82330 ASSAY OF CALCIUM: CPT

## 2025-05-16 PROCEDURE — 2709999900 HC NON-CHARGEABLE SUPPLY: Performed by: INTERNAL MEDICINE

## 2025-05-16 PROCEDURE — 6360000002 HC RX W HCPCS: Performed by: HOSPITALIST

## 2025-05-16 PROCEDURE — 93005 ELECTROCARDIOGRAM TRACING: CPT | Performed by: INTERNAL MEDICINE

## 2025-05-16 PROCEDURE — 6360000002 HC RX W HCPCS: Performed by: INTERNAL MEDICINE

## 2025-05-16 PROCEDURE — 6360000002 HC RX W HCPCS: Performed by: EMERGENCY MEDICINE

## 2025-05-16 PROCEDURE — 06HY33Z INSERTION OF INFUSION DEVICE INTO LOWER VEIN, PERCUTANEOUS APPROACH: ICD-10-PCS | Performed by: INTERNAL MEDICINE

## 2025-05-16 PROCEDURE — 80069 RENAL FUNCTION PANEL: CPT

## 2025-05-16 PROCEDURE — 99153 MOD SED SAME PHYS/QHP EA: CPT | Performed by: INTERNAL MEDICINE

## 2025-05-16 PROCEDURE — 6370000000 HC RX 637 (ALT 250 FOR IP): Performed by: STUDENT IN AN ORGANIZED HEALTH CARE EDUCATION/TRAINING PROGRAM

## 2025-05-16 PROCEDURE — 85025 COMPLETE CBC W/AUTO DIFF WBC: CPT

## 2025-05-16 PROCEDURE — 6370000000 HC RX 637 (ALT 250 FOR IP): Performed by: HOSPITALIST

## 2025-05-16 PROCEDURE — 80307 DRUG TEST PRSMV CHEM ANLYZR: CPT

## 2025-05-16 PROCEDURE — 84145 PROCALCITONIN (PCT): CPT

## 2025-05-16 PROCEDURE — 33210 INSERT ELECTRD/PM CATH SNGL: CPT | Performed by: INTERNAL MEDICINE

## 2025-05-16 PROCEDURE — 84132 ASSAY OF SERUM POTASSIUM: CPT

## 2025-05-16 PROCEDURE — 82962 GLUCOSE BLOOD TEST: CPT

## 2025-05-16 PROCEDURE — 51798 US URINE CAPACITY MEASURE: CPT

## 2025-05-16 PROCEDURE — 2500000003 HC RX 250 WO HCPCS: Performed by: INTERNAL MEDICINE

## 2025-05-16 PROCEDURE — 76770 US EXAM ABDO BACK WALL COMP: CPT

## 2025-05-16 PROCEDURE — 6360000002 HC RX W HCPCS: Performed by: STUDENT IN AN ORGANIZED HEALTH CARE EDUCATION/TRAINING PROGRAM

## 2025-05-16 PROCEDURE — 2500000003 HC RX 250 WO HCPCS: Performed by: HOSPITALIST

## 2025-05-16 PROCEDURE — 99152 MOD SED SAME PHYS/QHP 5/>YRS: CPT | Performed by: INTERNAL MEDICINE

## 2025-05-16 PROCEDURE — P9047 ALBUMIN (HUMAN), 25%, 50ML: HCPCS | Performed by: INTERNAL MEDICINE

## 2025-05-16 PROCEDURE — 6370000000 HC RX 637 (ALT 250 FOR IP): Performed by: INTERNAL MEDICINE

## 2025-05-16 PROCEDURE — 81001 URINALYSIS AUTO W/SCOPE: CPT

## 2025-05-16 PROCEDURE — 2580000003 HC RX 258: Performed by: INTERNAL MEDICINE

## 2025-05-16 PROCEDURE — 2580000003 HC RX 258: Performed by: STUDENT IN AN ORGANIZED HEALTH CARE EDUCATION/TRAINING PROGRAM

## 2025-05-16 PROCEDURE — 2700000000 HC OXYGEN THERAPY PER DAY

## 2025-05-16 PROCEDURE — 87040 BLOOD CULTURE FOR BACTERIA: CPT

## 2025-05-16 PROCEDURE — 5A1223Z PERFORMANCE OF CARDIAC PACING, CONTINUOUS: ICD-10-PCS | Performed by: INTERNAL MEDICINE

## 2025-05-16 PROCEDURE — 36556 INSERT NON-TUNNEL CV CATH: CPT

## 2025-05-16 PROCEDURE — 87636 SARSCOV2 & INF A&B AMP PRB: CPT

## 2025-05-16 PROCEDURE — 84100 ASSAY OF PHOSPHORUS: CPT

## 2025-05-16 PROCEDURE — 83605 ASSAY OF LACTIC ACID: CPT

## 2025-05-16 PROCEDURE — 51702 INSERT TEMP BLADDER CATH: CPT

## 2025-05-16 PROCEDURE — 2000000000 HC ICU R&B

## 2025-05-16 PROCEDURE — 74018 RADEX ABDOMEN 1 VIEW: CPT

## 2025-05-16 PROCEDURE — 94003 VENT MGMT INPAT SUBQ DAY: CPT

## 2025-05-16 PROCEDURE — 80053 COMPREHEN METABOLIC PANEL: CPT

## 2025-05-16 PROCEDURE — 82803 BLOOD GASES ANY COMBINATION: CPT

## 2025-05-16 PROCEDURE — 93970 EXTREMITY STUDY: CPT

## 2025-05-16 PROCEDURE — 94640 AIRWAY INHALATION TREATMENT: CPT

## 2025-05-16 PROCEDURE — C1894 INTRO/SHEATH, NON-LASER: HCPCS | Performed by: INTERNAL MEDICINE

## 2025-05-16 DEVICE — DISPOSABLE ADAPTER: Type: IMPLANTABLE DEVICE | Status: FUNCTIONAL

## 2025-05-16 RX ORDER — ARFORMOTEROL TARTRATE 15 UG/2ML
15 SOLUTION RESPIRATORY (INHALATION)
Status: DISCONTINUED | OUTPATIENT
Start: 2025-05-16 | End: 2025-05-19

## 2025-05-16 RX ORDER — CALCIUM GLUCONATE 20 MG/ML
2000 INJECTION, SOLUTION INTRAVENOUS ONCE
Status: COMPLETED | OUTPATIENT
Start: 2025-05-16 | End: 2025-05-16

## 2025-05-16 RX ORDER — POTASSIUM CHLORIDE 7.45 MG/ML
10 INJECTION INTRAVENOUS PRN
Status: DISCONTINUED | OUTPATIENT
Start: 2025-05-16 | End: 2025-05-25

## 2025-05-16 RX ORDER — INSULIN GLARGINE 100 [IU]/ML
5 INJECTION, SOLUTION SUBCUTANEOUS NIGHTLY
Status: DISCONTINUED | OUTPATIENT
Start: 2025-05-16 | End: 2025-05-24

## 2025-05-16 RX ORDER — MAGNESIUM SULFATE IN WATER 40 MG/ML
2000 INJECTION, SOLUTION INTRAVENOUS PRN
Status: DISCONTINUED | OUTPATIENT
Start: 2025-05-16 | End: 2025-05-29 | Stop reason: HOSPADM

## 2025-05-16 RX ORDER — FENTANYL CITRATE 50 UG/ML
50 INJECTION, SOLUTION INTRAMUSCULAR; INTRAVENOUS
Status: DISCONTINUED | OUTPATIENT
Start: 2025-05-16 | End: 2025-05-20

## 2025-05-16 RX ORDER — ALBUMIN (HUMAN) 12.5 G/50ML
12.5 SOLUTION INTRAVENOUS EVERY 6 HOURS
Status: DISCONTINUED | OUTPATIENT
Start: 2025-05-16 | End: 2025-05-17

## 2025-05-16 RX ORDER — MINERAL OIL AND WHITE PETROLATUM 150; 830 MG/G; MG/G
OINTMENT OPHTHALMIC EVERY 4 HOURS
Status: DISCONTINUED | OUTPATIENT
Start: 2025-05-16 | End: 2025-05-29 | Stop reason: HOSPADM

## 2025-05-16 RX ORDER — FUROSEMIDE 10 MG/ML
40 INJECTION INTRAMUSCULAR; INTRAVENOUS ONCE
Status: COMPLETED | OUTPATIENT
Start: 2025-05-16 | End: 2025-05-16

## 2025-05-16 RX ORDER — DEXTROSE MONOHYDRATE 25 G/50ML
25 INJECTION, SOLUTION INTRAVENOUS PRN
Status: DISCONTINUED | OUTPATIENT
Start: 2025-05-16 | End: 2025-05-29 | Stop reason: HOSPADM

## 2025-05-16 RX ORDER — MINERAL OIL AND WHITE PETROLATUM 150; 830 MG/G; MG/G
OINTMENT OPHTHALMIC EVERY 4 HOURS
Status: DISCONTINUED | OUTPATIENT
Start: 2025-05-16 | End: 2025-05-16 | Stop reason: SDUPTHER

## 2025-05-16 RX ORDER — INSULIN LISPRO 100 [IU]/ML
0-8 INJECTION, SOLUTION INTRAVENOUS; SUBCUTANEOUS EVERY 6 HOURS
Status: DISCONTINUED | OUTPATIENT
Start: 2025-05-16 | End: 2025-05-16

## 2025-05-16 RX ORDER — INDOMETHACIN 25 MG/1
50 CAPSULE ORAL ONCE
Status: COMPLETED | OUTPATIENT
Start: 2025-05-16 | End: 2025-05-16

## 2025-05-16 RX ORDER — LIDOCAINE HYDROCHLORIDE 10 MG/ML
INJECTION, SOLUTION INFILTRATION; PERINEURAL PRN
Status: DISCONTINUED | OUTPATIENT
Start: 2025-05-16 | End: 2025-05-16 | Stop reason: HOSPADM

## 2025-05-16 RX ORDER — POLYVINYL ALCOHOL 14 MG/ML
1 SOLUTION/ DROPS OPHTHALMIC EVERY 4 HOURS
Status: DISCONTINUED | OUTPATIENT
Start: 2025-05-16 | End: 2025-05-16 | Stop reason: SDUPTHER

## 2025-05-16 RX ORDER — POTASSIUM CHLORIDE 1500 MG/1
40 TABLET, EXTENDED RELEASE ORAL PRN
Status: DISCONTINUED | OUTPATIENT
Start: 2025-05-16 | End: 2025-05-25

## 2025-05-16 RX ORDER — MIDAZOLAM HYDROCHLORIDE 1 MG/ML
INJECTION INTRAMUSCULAR; INTRAVENOUS PRN
Status: DISCONTINUED | OUTPATIENT
Start: 2025-05-16 | End: 2025-05-16 | Stop reason: HOSPADM

## 2025-05-16 RX ORDER — INSULIN LISPRO 100 [IU]/ML
0-16 INJECTION, SOLUTION INTRAVENOUS; SUBCUTANEOUS
Status: DISCONTINUED | OUTPATIENT
Start: 2025-05-16 | End: 2025-05-18

## 2025-05-16 RX ORDER — SODIUM CHLORIDE 9 MG/ML
500 INJECTION, SOLUTION INTRAVENOUS ONCE
Status: COMPLETED | OUTPATIENT
Start: 2025-05-16 | End: 2025-05-16

## 2025-05-16 RX ORDER — CHLORHEXIDINE GLUCONATE ORAL RINSE 1.2 MG/ML
15 SOLUTION DENTAL 2 TIMES DAILY
Status: DISCONTINUED | OUTPATIENT
Start: 2025-05-16 | End: 2025-05-23

## 2025-05-16 RX ORDER — MIDAZOLAM HYDROCHLORIDE 2 MG/2ML
2 INJECTION, SOLUTION INTRAMUSCULAR; INTRAVENOUS ONCE
Status: COMPLETED | OUTPATIENT
Start: 2025-05-16 | End: 2025-05-16

## 2025-05-16 RX ADMIN — ROSUVASTATIN CALCIUM 20 MG: 10 TABLET, FILM COATED ORAL at 09:51

## 2025-05-16 RX ADMIN — FAMOTIDINE 20 MG: 10 INJECTION, SOLUTION INTRAVENOUS at 10:35

## 2025-05-16 RX ADMIN — ENOXAPARIN SODIUM 40 MG: 100 INJECTION SUBCUTANEOUS at 09:36

## 2025-05-16 RX ADMIN — INSULIN GLARGINE 5 UNITS: 100 INJECTION, SOLUTION SUBCUTANEOUS at 10:37

## 2025-05-16 RX ADMIN — DOPAMINE HYDROCHLORIDE 15 MCG/KG/MIN: 160 INJECTION, SOLUTION INTRAVENOUS at 09:29

## 2025-05-16 RX ADMIN — MINERAL OIL, PETROLATUM: 425; 568 OINTMENT OPHTHALMIC at 20:30

## 2025-05-16 RX ADMIN — SODIUM CHLORIDE, PRESERVATIVE FREE 10 ML: 5 INJECTION INTRAVENOUS at 10:46

## 2025-05-16 RX ADMIN — SODIUM ZIRCONIUM CYCLOSILICATE 10 G: 5 POWDER, FOR SUSPENSION ORAL at 22:40

## 2025-05-16 RX ADMIN — FENTANYL CITRATE 50 MCG: 50 INJECTION INTRAMUSCULAR; INTRAVENOUS at 10:58

## 2025-05-16 RX ADMIN — Medication 4 UNITS: at 17:27

## 2025-05-16 RX ADMIN — CALCIUM GLUCONATE 2000 MG: 20 INJECTION, SOLUTION INTRAVENOUS at 08:12

## 2025-05-16 RX ADMIN — ACETAMINOPHEN 650 MG: 325 TABLET ORAL at 22:54

## 2025-05-16 RX ADMIN — FENTANYL CITRATE 100 MCG/HR: 0.05 INJECTION, SOLUTION INTRAMUSCULAR; INTRAVENOUS at 17:22

## 2025-05-16 RX ADMIN — Medication 8 UNITS: at 11:30

## 2025-05-16 RX ADMIN — ALBUMIN (HUMAN) 12.5 G: 0.25 INJECTION, SOLUTION INTRAVENOUS at 15:15

## 2025-05-16 RX ADMIN — MINERAL OIL, PETROLATUM: 425; 568 OINTMENT OPHTHALMIC at 14:41

## 2025-05-16 RX ADMIN — DOPAMINE HYDROCHLORIDE 15 MCG/KG/MIN: 160 INJECTION, SOLUTION INTRAVENOUS at 21:19

## 2025-05-16 RX ADMIN — SODIUM CHLORIDE, PRESERVATIVE FREE 10 ML: 5 INJECTION INTRAVENOUS at 22:48

## 2025-05-16 RX ADMIN — CHLORHEXIDINE GLUCONATE 15 ML: 1.2 RINSE BUCCAL at 22:40

## 2025-05-16 RX ADMIN — FENTANYL CITRATE 100 MCG/HR: 0.05 INJECTION, SOLUTION INTRAMUSCULAR; INTRAVENOUS at 10:51

## 2025-05-16 RX ADMIN — INSULIN LISPRO 6 UNITS: 100 INJECTION, SOLUTION INTRAVENOUS; SUBCUTANEOUS at 07:31

## 2025-05-16 RX ADMIN — SODIUM BICARBONATE 50 MEQ: 84 INJECTION, SOLUTION INTRAVENOUS at 08:16

## 2025-05-16 RX ADMIN — MIDAZOLAM HYDROCHLORIDE 2 MG: 1 INJECTION, SOLUTION INTRAMUSCULAR; INTRAVENOUS at 10:44

## 2025-05-16 RX ADMIN — CEFTRIAXONE 1000 MG: 1 INJECTION, POWDER, FOR SOLUTION INTRAMUSCULAR; INTRAVENOUS at 10:35

## 2025-05-16 RX ADMIN — ASPIRIN 81 MG: 81 TABLET, CHEWABLE ORAL at 09:51

## 2025-05-16 RX ADMIN — SODIUM ZIRCONIUM CYCLOSILICATE 10 G: 5 POWDER, FOR SUSPENSION ORAL at 14:37

## 2025-05-16 RX ADMIN — ALBUMIN (HUMAN) 12.5 G: 0.25 INJECTION, SOLUTION INTRAVENOUS at 09:48

## 2025-05-16 RX ADMIN — SODIUM CHLORIDE 500 ML: 0.9 INJECTION, SOLUTION INTRAVENOUS at 09:59

## 2025-05-16 RX ADMIN — ALBUMIN (HUMAN) 12.5 G: 0.25 INJECTION, SOLUTION INTRAVENOUS at 22:48

## 2025-05-16 RX ADMIN — MINERAL OIL, PETROLATUM: 425; 568 OINTMENT OPHTHALMIC at 17:23

## 2025-05-16 RX ADMIN — ARFORMOTEROL TARTRATE 15 MCG: 15 SOLUTION RESPIRATORY (INHALATION) at 20:09

## 2025-05-16 RX ADMIN — INSULIN HUMAN 10 UNITS: 100 INJECTION, SOLUTION PARENTERAL at 08:17

## 2025-05-16 RX ADMIN — CHLORHEXIDINE GLUCONATE 15 ML: 1.2 RINSE BUCCAL at 14:42

## 2025-05-16 RX ADMIN — FUROSEMIDE 40 MG: 10 INJECTION, SOLUTION INTRAMUSCULAR; INTRAVENOUS at 09:46

## 2025-05-16 ASSESSMENT — PULMONARY FUNCTION TESTS
PIF_VALUE: 20
PIF_VALUE: 29
PIF_VALUE: 28
PIF_VALUE: 22
PIF_VALUE: 21

## 2025-05-16 ASSESSMENT — PAIN SCALES - GENERAL
PAINLEVEL_OUTOF10: 0
PAINLEVEL_OUTOF10: 0

## 2025-05-16 NOTE — H&P
Hospitalist History & Physical    Patient: Isidro Reardon MRN: [id number]  CSN: [id number]    YOB: 1960  Age: 64 y.o.  Sex: male      DOA: 5/15/2025  Primary Care Provider:  Phillip Rizzo MD      Assessment/Plan     Active Hospital Problems    Diagnosis     S/P TAVR (transcatheter aortic valve replacement) [Z95.2]     Cardiac arrest (HCC) [I46.9]     Complete heart block (HCC) [I44.2]     Heart block [I45.9]     Type 2 diabetes with nephropathy (HCC) [E11.21]     Essential hypertension [I10]     COPD (chronic obstructive pulmonary disease) (HCC) [J44.9]     Coronary atherosclerosis of native coronary artery [I25.10]        Admit to ICU    CRITICAL CARE PLAN    Resp   Acute respiratory failure  Orally intubated, sedated  See vent orders, VAP bundle. HOB>30 degrees.     ID   No evidence of infection,   Will monitor off antibiotics    CVS   Monitor HD.   Complete heart block  Cardiac arrest  S/p ACLS protocol. Received epinephrine, CPR with ROSC.   External pacers placed  On dopamine drip.  Discussed with Dr. Giraldo, he will place TVP.  Check echo.    Heme/onc   Follow H&H, plts. INR.    Renal   Trend BUN, Cr, follow I/O. Check and replace Mg, K, phos.    Endocrine   Follow FSG    CNS  Neuro/ Pain/ Sedation  versed/fentanyl    GI   NPO for now..       DVT Prophylaxis:  [x]Lovenox SQ  [] Heparin SQ  [] SCDs  [] Coumadin, Eliquis, Xarelto,   [] On Heparin gtt or therapeutic Lovenox. [] Patient refused.     [id number]  65 minutes of critical care time spent in the direct evaluation and treatment of this high risk patient. The reason for providing this level of medical care for this critically ill patient was due a critical illness that impaired one or more vital organ systems such that there was a high probability of imminent or life threatening deterioration in the patients condition. This care involved high complexity decision making to assess, manipulate, and support vital system functions, discuss

## 2025-05-16 NOTE — PROGRESS NOTES
Cardiology Progress Note        Patient: Isidro Reardon        Sex: male          DOA: 5/15/2025  YOB: 1960      Age:  64 y.o.        LOS:  LOS: 1 day   Assessment/Plan     Principal Problem:    Complete heart block (HCC)  Active Problems:    Coronary atherosclerosis of native coronary artery    COPD (chronic obstructive pulmonary disease) (HCC)    Essential hypertension    Type 2 diabetes with nephropathy (HCC)    Heart block    S/P TAVR (transcatheter aortic valve replacement)    Cardiac arrest (HCC)    Acute respiratory failure (HCC)  Resolved Problems:    * No resolved hospital problems. *      Plan:  Patient remained intubated  EF is normal  Prosthetic valve not well-visualized but stable gradient  Overall RV looks normal in size and function  At this point there is no urgent indication of putting a permanent pacemaker especially with suspected infection.  Patient's daughter updated  Discussed with RN  Discussed with Dr. Barrientos  Critical care time spent more than 45-minute in taking care of the patient and working was transvenous pacemaker  Discussed with patient's daughter  Critical care time spent more than 45-minute      Transient complete heart block  Cardiac arrest  Leukocytosis  TAVR  Acute kidney injury      Patient heart rate is above 88 bpm with intrinsic sinus rhythm and right bundle branch block  Heart block have resolved  Continue supportive treatment with antibiotic  Patient will need CT scan of chest and abdomen                  Subjective:    cc:          REVIEW OF SYSTEMS:     General: No fevers or chills.  Cardiovascular: No chest pain or pressure. No palpitations. No ankle swelling  Pulmonary: No SOB, orthopnea, PND  Gastrointestinal: No nausea, vomiting or diarrhea      Objective:      Visit Vitals  BP (!) 89/67   Pulse 86   Temp 99.5 °F (37.5 °C) (Axillary)   Resp 20   Ht 1.803 m (5' 10.98\")   Wt 86.2 kg (190 lb)   SpO2 93%   BMI 26.51 kg/m²     Body mass index is 26.51  1600 mcg/mL in dextrose 5% infusion  1-20 mcg/kg/min IntraVENous Continuous    aspirin chewable tablet 81 mg  81 mg Oral Daily    [Held by provider] DULoxetine (CYMBALTA) extended release capsule 60 mg  60 mg Oral Daily    [Held by provider] gabapentin (NEURONTIN) capsule 300 mg  300 mg Oral TID    [Held by provider] losartan (COZAAR) tablet 100 mg  100 mg Oral Daily    rosuvastatin (CRESTOR) tablet 20 mg  20 mg Oral Daily    sodium chloride flush 0.9 % injection 5-40 mL  5-40 mL IntraVENous 2 times per day    sodium chloride flush 0.9 % injection 5-40 mL  5-40 mL IntraVENous PRN    potassium chloride 20 mEq/50 mL IVPB (Central Line)  20 mEq IntraVENous PRN    Or    potassium chloride 10 mEq/100 mL IVPB (Peripheral Line)  10 mEq IntraVENous PRN    enoxaparin (LOVENOX) injection 40 mg  40 mg SubCUTAneous Daily    ondansetron (ZOFRAN-ODT) disintegrating tablet 4 mg  4 mg Oral Q8H PRN    Or    ondansetron (ZOFRAN) injection 4 mg  4 mg IntraVENous Q6H PRN    polyethylene glycol (GLYCOLAX) packet 17 g  17 g Oral Daily PRN    acetaminophen (TYLENOL) tablet 650 mg  650 mg Oral Q6H PRN    Or    acetaminophen (TYLENOL) suppository 650 mg  650 mg Rectal Q6H PRN    glucose chewable tablet 16 g  4 tablet Oral PRN    dextrose bolus 10% 125 mL  125 mL IntraVENous PRN    Or    dextrose bolus 10% 250 mL  250 mL IntraVENous PRN    glucagon injection 1 mg  1 mg SubCUTAneous PRN    dextrose 10 % infusion   IntraVENous Continuous PRN    midazolam (VERSED) 100mg/100mL in NS infusion  1-10 mg/hr IntraVENous Continuous    fentaNYL (SUBLIMAZE) 1,000 mcg in sodium chloride 0.9% 100 mL infusion   mcg/hr IntraVENous Continuous               Lab/Data Reviewed:  Procedures/imaging: see electronic medical records for all procedures/Xrays   and details which were not copied into this note but were reviewed prior to creation of Plan        Recent Labs     05/15/25  1856 05/16/25  0656   WBC 16.0* 26.8*   HGB 15.2 15.4   HCT 47.9 48.7*   PLT

## 2025-05-16 NOTE — PROGRESS NOTES
Patient taken to the Cath Lab on the [place] Vent.  Back to ICU and placed back on the 980 ventilator at 14:05.  No Changes, No events.

## 2025-05-16 NOTE — PROGRESS NOTES
Patient seen ICU 4  TVP is working fine heart rate of 80 bpm blood pressure is 120 over 68 mmHg on dopamine  Epinephrine was stopped  The pulse oximetry is 99%  Will continue to monitor the patient in the ICU  Patient's daughter updated and other family member as well in the waiting area  Thanks

## 2025-05-16 NOTE — PROGRESS NOTES
1220: Dr. Giraldo made aware pt TVP not pacing pt. Pt HR is 88 with out pacer firing. Dr. Giraldo coming to bedside to assess TVP.  1330: Pt off floor for TVP replacement.  1430: TVP now working pt tolerated procedure.

## 2025-05-16 NOTE — ED PROVIDER NOTES
History     Tobacco Use    Smoking status: Former     Current packs/day: 0.00     Average packs/day: 1.3 packs/day for 30.0 years (37.5 ttl pk-yrs)     Types: Cigarettes     Start date: 1984     Quit date: 2014     Years since quittin.3     Passive exposure: Never    Smokeless tobacco: Never   Vaping Use    Vaping status: Never Used   Substance Use Topics    Alcohol use: Not Currently    Drug use: No       Allergies:  No Known Allergies      Review of Systems   Review of Systems        Physical Exam   Physical Exam    GENERAL: alert and oriented, no acute distress  EYES: PEERL, No injection, discharge or icterus.  ENT: Mucous membranes pink and moist.  NECK: Supple  LUNGS: Airway patent. Non-labored respirations. Breath sounds clear with good air entry bilaterally.  HEART: bradycardic  No peripheral edema  ABDOMEN: Non-distended and non-tender, without guarding or rebound.  SKIN:  warm, dry  EXTREMITIES: Without swelling, tenderness or deformity, symmetric with normal ROM  NEUROLOGICAL: Alert, oriented      Diagnostic Study Results     Labs -         Radiologic Studies -   Non-plain film images such as CT, Ultrasound and MRI are read by the radiologist. Plain radiographic images are visualized and preliminarily interpreted by me with the below findings:        Interpretation per the Radiologist below, if available at the time of this note:  XR CHEST PORTABLE   Final Result   No acute cardiopulmonary process.      Electronically signed by Rolf Serrano        [unfilled]  [unfilled]      Medical Decision Making     I reviewed the vital signs, available nursing notes, past medical history, past surgical history, family history and social history.    Vital Signs-Reviewed the patient's vital signs.  Patient Vitals for the past 12 hrs:   Temp Pulse Resp BP SpO2   25 0135 -- 80 -- 112/70 --   25 0130 -- 84 17 118/72 100 %   25 0115 -- 92 21 114/75 99 %   25 0100 -- 84 17 117/66 100 %  heart block spontaneously resolved.  Repeat EKG showed a second-degree type II heart block at 30 EKG showed normal sinus rhythm.  We are able to wean patient off epinephrine infusion and maintained on dopamine.  For remainder of patient's ED stay he remained in normal sinus rhythm, hemodynamically stable.  Discussed case with intensivist as well as hospitalist with plan for ICU admission.    ED Course:   Initial assessment performed. The patients presenting problems have been discussed, and they are in agreement with the care plan formulated and outlined with them.  I have encouraged them to ask questions as they arise throughout their visit.    Patient was given the following medications:  Medications   EPINEPHrine 5 mg in sodium chloride 0.9 % 250 mL infusion (0 mcg/min IntraVENous Stopped 5/16/25 0018)   DOPamine (INTROPIN) 1600 mcg/mL in dextrose 5% infusion (15 mcg/kg/min × 86.2 kg IntraVENous Rate/Dose Change 5/16/25 0027)   aspirin chewable tablet 81 mg (has no administration in time range)   DULoxetine (CYMBALTA) extended release capsule 60 mg (has no administration in time range)   gabapentin (NEURONTIN) capsule 300 mg (has no administration in time range)   losartan (COZAAR) tablet 100 mg (has no administration in time range)   rosuvastatin (CRESTOR) tablet 20 mg (has no administration in time range)   sodium chloride flush 0.9 % injection 5-40 mL (has no administration in time range)   sodium chloride flush 0.9 % injection 5-40 mL (has no administration in time range)   potassium chloride 20 mEq/50 mL IVPB (Central Line) (has no administration in time range)     Or   potassium chloride 10 mEq/100 mL IVPB (Peripheral Line) (has no administration in time range)   magnesium sulfate 2000 mg in 50 mL IVPB premix (has no administration in time range)   enoxaparin (LOVENOX) injection 40 mg (has no administration in time range)   ondansetron (ZOFRAN-ODT) disintegrating tablet 4 mg (has no administration in time

## 2025-05-16 NOTE — CONSULTS
[facility]  Pulmonary, Critical Care, and Sleep Medicine    Name: Isidro Reardon MRN: [id number]   : 1960 Hospital: [facility]    Date: 2025  Room: [address] [place] [address] Note                                              Consult requesting physician: Dr. Pak  Reason for Consult: Respiratory failure    IMPRESSION:   Acute respiratory failure   J90.00  Cardiac arrest   I 46.9  Complete heart block   I45 .9  Hypotension  COPD  CAD  Status post TAVR  GERD        Active Hospital Problems    Diagnosis Date Noted    S/P TAVR (transcatheter aortic valve replacement) [Z95.2] 2025    Cardiac arrest (HCC) [I46.9] 2025    Acute respiratory failure (HCC) [J96.00] 2025    Complete heart block (HCC) [I44.2] 05/15/2025    Heart block [I45.9] 05/15/2025    Type 2 diabetes with nephropathy ([facility]) [E11.21] 2018    Essential hypertension [I10] 2018    COPD (chronic obstructive pulmonary disease) ([facility]) [J44.9]     Coronary atherosclerosis of native coronary artery [I25.10] 2011        Code status: Full Code       RECOMMENDATIONS:     Respiratory:   Acute hypoxemic respiratory failure in the setting of cardiac arrest.  Intubated during cardiac arrest.  Patient has a history of mild COPD as per the records he is only on albuterol.  Patient is on full ventilatory support   /18/50/5.  7.3 2/46/363/99.  Wean down FiO2, repeat ABG at noon.  Bronchodilators Brovana.  Respiratory culture  Sedation: Versed and fentanyl drip  As needed Fentanyl for pain.  Ventilator bundle & Sedation protocol followed. Daily sedation holiday, assessment for readiness for SBT and then re-titrate if required. Chlorhexidine mouth washes.   Keep SPO2 >=92%. HOB 30 degree elevation all the time. Aggressive pulmonary toileting. Aspiration precautions. Incentive spirometry.    CVS:   Patient has a history of coronary artery disease and status post TAVR in .  Patient used to  recommended treatment and results of the investigation ordered.      ·Please note: Voice-recognition software may have been used to generate this report, which may have resulted in some phonetic-based errors in grammar and contents. Even though attempts were made to correct all the mistakes, some may have been missed, and remained in the body of the document.    Seema Yousif MD  5/16/2025

## 2025-05-16 NOTE — CONSULTS
Palliative Medicine  Patient Name: Isidro Reardon  YOB: 1960  MRN: [id number]  Age: 64 y.o.  Gender: male    Date of Initial Consult: 5/16/2025  Date of Service: 5/16/2025  Time: 10:40 AM  Provider: NEIL Parra CNP  Hospital Day: 2  Admit Date: 5/15/2025  Referring Provider: Seema Yousif MD      Reasons for Consultation:  Other: cardiac arrest    HISTORY OF PRESENT ILLNESS (HPI):   Isidro Reardon is a 64 y.o. male with a past medical history of AS s/p TAVR, CAD, HTN, COPD, and DM2 who was admitted on 5/15/2025 from ED with a diagnosis of heart block and cardiac arrest. Patient is currently intubated and on temporary pacemaker.    Psychosocial: Patient is not  and has one child, daughter Priyanka Craven, who is his legal next of kin and only surrogate decision-maker. She is accepting of this role and lives in [place]. She tells us the patient is very interested in car shows and typically put off healthcare appointments \"unless it got really painful,\" as was the case in December 2024 when he needed I&D of his elbow for what sounds like cellulitis/septic arthritis. His best friend was hospitalized around the same time for MRSA cellulitis of his arms.    Functional: Prior to this event patient was fully independent with ADLs, lives alone, and did not use any DME. His best friend's wife describes a previous episode a few weeks ago when the patient slumped forward and had to be shaken awake, which the patient thought was low blood sugar. He was otherwise in good health after his TAVR, with the acute elbow infection in December and then influenza in February.     PALLIATIVE DIAGNOSES:    Encounter for palliative care  Goals of care  Cardiac arrest  S/p TAVR    ASSESSMENT AND PLAN:   Patient seen in ICU-04 by Ghislaine Esqueda RN and Loraine TREJO. He is sedated on Versed and fentanyl, orally intubated with ventilator settings of PEEP 5 and 50% FiO2. Good color, no edema, abdomen

## 2025-05-16 NOTE — CARE COORDINATION
05/16/25 1042   Discharge Planning   Living Arrangements Alone   Support Systems Children   Current DME Prior to Arrival Other (Comment)  (None)   Potential Assistance Needed N/A   Potential Assistance Purchasing Medications No   M2B Y/N Yes   Potential DME Needed Other (Comment)  (Pending pts clinical course)   Type of Home Care Services None   Patient expects to be discharged to: Unknown       SW presented to bedside, pt on a vent with his dtr Priyanka [phone]. Pts dtr verified the demographics on file. Per pts dtr, pt resides alone in a 1 level home with 3-4 steps to its entrance. Pts tr reported that pt was independent with all ADLs prior to admission.    Pts dtr confirmed pts PCP as Dr. Rizzo. Per pts dtr family will transport pt home at discharge. SW requested CMS to sent a referral to LTACH as a proactive measure.    CM team will continue to follow.    ZOË Bobby  Case Management Department

## 2025-05-16 NOTE — ACP (ADVANCE CARE PLANNING)
Advance Care Planning     Palliative Team Advance Care Planning (ACP) Conversation    Date of Conversation: 05/16/25    Individuals present for the conversation: Daughter, Priyanka Craven, [phone]     ACP documents on file prior to discussion:  -None    Healthcare Decision Maker:    Primary Decision Maker: Priyanka Craven - Child - [phone]   Priyanka Craven is the sole child. According to [facility] of Decision Making Priyanka Craven is Primary Decision Maker.     Conversation Summary:      Seen today in Rm 104, along with Loraine Rodriguez NP. Patient lying in bed supine. Intubated. Vent Settings: 50%O2, PEEP 5.  Sedated: Fentanyl, Versed    Arrived to ED via EMS. Patient was driving from [place] to [place] when he began to feel dizzy, lightheaded. After arrival to his destination, his friend began CPR.   CODE BLUE in ICU after becoming bradycardic with asystole. EKG showed Complete Heart Block. He was intubated and a TVP placed. Plan to place permanent pacemaker once suspected infection is treated.      Admitted for Cardiac Arrest.     PMH: CAD, Aortic Stenosis, S/P TAVR, COPD, DM2, HTN, neuropathy. Family friend reports he has had an episode recently, where friend had to shake him after patient was found slumped over wheel of vehicle.  \"We thought it was his blood sugar, so we fed him breakfast.\"    Lives alone.  Independent with ADL's. Does not require ambulation assistance. 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 Per daughter and family friend he enjoys swap meets and car shows.     Daughter confirmed there is NO AMD ON FILE    Resuscitation Status:  FULL CODE    Documentation Completed:  -No new documents completed.    Palliative Medicine will continue to follow Isidro Reardon and his family during his hospitalization and support them as they make healthcare decisions and define goals of care.     Ghislaine Esqueda RN  Palliative Medicine  [phone]

## 2025-05-16 NOTE — PROGRESS NOTES
Hospitalist Progress Note    Patient: Isidro Reardon MRN: [id number]  [facility]: [id number]    YOB: 1960  Age: 64 y.o.  Sex: male    DOA: 5/15/2025 LOS:  LOS: 1 day          Chief Complain :  Dizziness, lightheadedness.  64 y.o. male with CAD, aortic stenosis status post TAVR, diabetes mellitus, hypertension, hypertensive heart disease, neuropathy presents to ER with concerns of lightheadedness dizziness. In the ER he was noted to have heart rate in 30s to 40s. EKG showed complete heart block. He was given epinephrine and started on dopamine drip which improved his heart rate. He actually went into sinus tachycardia. Patient was subsequently admitted to intensive care unit. Later he suddenly became bradycardic and went into asystole. CPR initiated and ACLS protocol initiated. ROSC obtained. Patient intubated external pacers placed. Cardiology contacted s/pTVP.   Subjective:   Patient orally intubated, sedated.     Assessment/Plan     Active Hospital Problems    Diagnosis     S/P TAVR (transcatheter aortic valve replacement) [Z95.2]     Cardiac arrest (HCC) [I46.9]     Acute respiratory failure (HCC) [J96.00]     Hyperkalemia [E87.5]     Complete heart block (HCC) [I44.2]     Heart block [I45.9]     RENAE (acute kidney injury) [N17.9]     Type 2 diabetes with nephropathy (HCC) [E11.21]     Essential hypertension [I10]     COPD (chronic obstructive pulmonary disease) (HCC) [J44.9]     Coronary atherosclerosis of native coronary artery [I25.10]           CRITICAL CARE PLAN     Resp   Acute respiratory failure  After cardiac arrest  Orally intubated, sedated  See vent orders, VAP bundle. HOB>30 degrees.      ID   Leukocytosis  Wbc

## 2025-05-16 NOTE — ANESTHESIA PROCEDURE NOTES
Airway  Date/Time: 5/15/2025 11:03 PM  Urgency: emergent    Airway not difficult    General Information and Staff    Patient location during procedure: ICU  Resident/CRNA: Gisela Nassar APRN - CRNA  Performed by: Gisela Nassar APRN - CRNA  Authorized by: Gisela Nassar APRN - CRNA      Consent for Airway (if performed for an anesthetic, see related documentation for consents)  Patient identity confirmed: per hospital policy  Consent: The procedure was performed in an emergent situation. Verbal consent not obtained. Written consent not obtained.  Risks and benefits: risks, benefits and alternatives were not discussed      Code status verified:yes  Indications and Patient Condition  Indications for airway management: cardio/pulmonary arrest  Spontaneous ventilation: present  Expected sedation level: had to administer sedation due to return of spontaneous respirations and pt awakening after ROSC.  Preoxygenated: yes  Patient position: sniffing  MILS not maintained throughout  Mask difficulty assessment: vent by bag mask    Final Airway Details  Final airway type: endotracheal airway      Successful airway: ETT  Cuffed: yes   Successful intubation technique: video laryngoscopy (mod amt thin brown liquid suction from oropharynx noted upon DL)  Facilitating devices/methods: cricoid pressure  Endotracheal tube insertion site: oral  Blade: Peña  Blade size: #3  ETT size (mm): 7.5  Cormack-Lehane Classification: grade IIb - view of arytenoids or posterior of glottis only  Placement verified by: chest auscultation   Placement verification comments: (CO2 indicator)  Measured from: teeth  ETT to teeth (cm): 22  Number of attempts at approach: 1  Ventilation between attempts: bag mask  Number of other approaches attempted: 0    no

## 2025-05-16 NOTE — CONSULTS
daughter    Stephen Lang MD, FASN  [facility]       Total of 65     minutes of critical care time spent in the direct evaluation and formulation of treatment plan of this high risk patient. The reason for providing this level of medical care was due a critical illness that impaired one or more vital organ systems such that there was a high probability of imminent or life threatening deterioration in the patients condition. This care involved high complexity decision making to assess, manipulate, and support vital system functions, to treat this degreee vital organ system failure and to prevent further life threatening deterioration of the patient’s condition.    Examination, review and interpretation of labs scan/CXR/USG , medicine , order labs/medicine  and care coordination

## 2025-05-16 NOTE — PROGRESS NOTES
Care  assisting Care Mgr ZOË Bobby with Discharge Planning needs for patient.  Referrals sent to area [facility] ([facility]) for review and consideration for placement.    Will follow along for further discharge planning needs.

## 2025-05-16 NOTE — CONSULTS
the past 6 hrs:   Temp Pulse Resp BP SpO2   05/15/25 2310 -- 65 24 -- 100 %   05/15/25 2215 -- 85 16 97/60 95 %   05/15/25 2200 -- 88 15 96/72 96 %   05/15/25 2145 -- 87 18 114/68 94 %   05/15/25 2130 -- 94 25 102/66 98 %   05/15/25 2100 98 °F (36.7 °C) 97 18 101/64 93 %   05/15/25 2055 -- 98 17 91/62 95 %   05/15/25 2050 -- (!) 101 15 90/62 95 %   05/15/25 2045 -- 95 16 (!) 85/69 95 %   05/15/25 2040 -- (!) 105 21 (!) 98/54 91 %   05/15/25 2035 -- (!) 101 20 92/69 94 %   05/15/25 2030 -- (!) 102 16 127/62 --   05/15/25 2025 -- (!) 104 24 114/80 --   05/15/25 2020 -- (!) 113 19 101/73 --   05/15/25 2015 -- (!) 119 21 (!) 184/147 95 %   05/15/25 2010 -- (!) 112 17 (!) 202/165 94 %   05/15/25 2005 -- (!) 107 17 (!) 190/94 95 %   05/15/25 2000 -- 53 25 (!) 141/57 95 %   05/15/25 1955 -- (!) 43 13 111/74 93 %   05/15/25 1950 -- (!) 40 21 (!) 100/49 92 %   05/15/25 1945 -- (!) 39 16 (!) 104/42 90 %   05/15/25 1942 -- (!) 40 18 (!) 102/50 92 %   05/15/25 1915 -- (!) 39 14 (!) 99/53 --   05/15/25 1900 -- (!) 40 19 (!) 102/48 --   05/15/25 1857 -- (!) 41 16 108/62 94 %   05/15/25 1850 98.5 °F (36.9 °C) (!) 44 16 (!) 131/53 95 %         Exam:   General Appearance: Intubated  HEENT: RINA.   HEAD: Atraumatic  NECK: No JVD, no thyroidomeglay.   CAROTIDS:  LUNGS: Bilateral air entry positive, no crackles  HEART: S1+S2     ABD: Non-tender, BS Audible    EXT: No edema, and no cysnosis.  VASCULAR EXAM: Pulses are intact.    PSYCHIATRIC EXAM: Intubated  MUSCULOSKELETAL: Grossly no joint deformity.  NEUROLOGICAL: Intubated    Review of Data:   LABS:   Lab Results   Component Value Date/Time    WBC 16.0 05/15/2025 06:56 PM    HGB 15.2 05/15/2025 06:56 PM    HCT 47.9 05/15/2025 06:56 PM     05/15/2025 06:56 PM     Lab Results   Component Value Date/Time     05/15/2025 06:56 PM    K 5.1 05/15/2025 06:56 PM     05/15/2025 06:56 PM    CO2 23 05/15/2025 06:56 PM    BUN 33 05/15/2025 06:56 PM     Lab Results   Component  Value Date/Time    CHOL 348 02/05/2025 09:42 AM    HDL 31 02/05/2025 09:42 AM    LDL  02/05/2025 09:42 AM      Comment:      Triglyceride value is too high to calculate LDL. Direct LDL is being performed        LDL 78 12/14/2023 09:36 AM     No components found for: \"GPT\"  Hemoglobin A1C   Date Value Ref Range Status   02/05/2025 12.6 (H) 4.8 - 5.6 % Final       RADIOLOGY:  [unfilled]  [unfilled]      Cardiology Procedures: [unfilled] [unfilled] Cardiolite (Tc-99m Sestamibi) stress test        Impression / Plan:    Patient Active Problem List   Diagnosis    S/P cardiac catheterization    Arthritis    Coronary atherosclerosis of native coronary artery    Type 2 diabetes mellitus with diabetic polyneuropathy, without long-term current use of insulin (HCC)    Pneumonia    Chronic cough    Tobacco abuse    Dyslipidemia    FERRARI (dyspnea on exertion)    GERD (gastroesophageal reflux disease)    Indigestion    COPD (chronic obstructive pulmonary disease) (HCC)    Essential hypertension    Benign hypertensive heart disease without heart failure    Type 2 diabetes with nephropathy (HCC)    AS (aortic stenosis)    Primary osteoarthritis of right elbow    Cardiac murmur, unspecified    RENAE (acute kidney injury)    Cellulitis of right lower limb    Cellulitis of right upper extremity    Complete heart block (HCC)    Heart block    S/P TAVR (transcatheter aortic valve replacement)    Cardiac arrest ([facility])       Assessment and plan    Bradycardia  Complete heart block  Cardiac arrest due to asystole status post intubated  History of aortic valve stenosis status post TAVR  CAD  Hypertension  COPD      Plan.  Patient was taken to the Cath Lab and transvenous pacemaker was placed successfully without any complication.  Pacemaker setting was 80 bpm   Patient hemodynamic stable after transvenous pacemaker  Discussed with patient's daughter  Discussed with ICU nurse  Will continue to monitor  Will consult Dr. JERZY Zamora in the morning for

## 2025-05-16 NOTE — CONSULTS
Comprehensive High Risk Nutrition Assessment Initial    Type and Reason for Visit:  Initial    Nutrition Recommendations/Plan:   If stable begin TF Diabetic/Glucerna 1.5 @ 20ml/hr adv 10 ml/hr Q6hrs to 50ml/hr goal rate via OGT as edwin provides 1800kcal, 100g pro, 910ml FW  If w/o IVF suggest FWF ~145ml Q4hrs - adjust defer to MD  Will likely only receive ~80% TF r/t frequent interruptions  Cont to check Phos, Mg, and K and replete as needed  K elevated s/p lokelma noted   Tight BG control >70 <180 A1c 12.6  If no BM add bowel regimen  Consider adding Nephro-rocio/Wilma-rocio daily   Cont to monitor POC, wt trends, renal fx, lytes, UOP, bowel fx, skin integrity/wound healing and adjust recs as needed     Malnutrition Assessment:  Malnutrition Status:  At risk for malnutrition (05/16/25 1037)      Nutrition Assessment:    6yo M in ICU with CAD, AS s/p TAVR, DM, HTN, hypertensive heart disease, neuropathy presented w/lightheadedness dizziness found bradycardic in ER and later asystole cardiac arrest, complete heart block, intubated and admitted in ICU after CPR, ARF per MD. seen by Nephroloyg no indication for dialysis noted. sedated on vent with fentanyl and versed. +dopamine gtt, EPI held noted. +OGT. nutrition consulted for TF. slight variable wt hx trends likely fluid vs accuracy overall wt appears relatively stable if correct. no recent sig wt loss or poor PO PTA noted. wt hx trends 84-86kg Feb 2025 and 89kg Nov 2024. high K lokelma and high BG levels A1c 12.6.    Nutrition Related Findings:      Wound Type: None       Current Nutrition Intake & Therapies:    Average Meal Intake: NPO (vent)  Average Supplements Intake: NPO  Diet NPO    Anthropometric Measures:  Height: 180.3 cm (5' 10.98\")  Ideal Body Weight (IBW): 172 lbs (78 kg)    Admission Body Weight: 86 kg (189 lb 9.5 oz)  Current Body Weight: 86 kg (189 lb 9.5 oz), 110.2 % IBW. Weight Source: Bed scale  Current BMI (kg/m2): 26.5  Usual Body Weight: 89 kg (196

## 2025-05-16 NOTE — PLAN OF CARE
Problem: Safety - Medical Restraint  Goal: Remains free of injury from restraints (Restraint for Interference with Medical Device)  Description: INTERVENTIONS:1. Determine that other, less restrictive measures have been tried or would not be effective before applying the restraint2. Evaluate the patient's condition at the time of restraint application3. Inform patient/family regarding the reason for restraint4. Q2H: Monitor safety, psychosocial status, comfort, nutrition and hydration  Outcome: Progressing  Flowsheets (Taken 5/16/2025 1200)  Remains free of injury from restraints (restraint for interference with medical device): Determine that other, less restrictive measures have been tried or would not be effective before applying the restraint

## 2025-05-16 NOTE — PROGRESS NOTES
2311 Received phone call from Dr. Giraldo. He asked me to call In the echo [employer] for stat echo.     2325 return call from [employer] echo tech she is aware and will been in asap.

## 2025-05-16 NOTE — PROCEDURES
PROCEDURE NOTE  Date: 5/16/2025   Name: Isidro Reardon  YOB: 1960    Procedures                  [facility]                  Pulmonary, Critical Care, and Sleep Medicine     Central Venous Line Placement Procedure with Ultrasound Guidance    Indication: Need for vasopressors, shock, cardiac arrest.  Shock R57.9.  Hypotension I95.9.    Cardiac arrest I46.9.    Risks, benefits, alternatives explained and consent obtained.    Patient positioned in Trendelenburg.   Timeout called with RN and confirmed patient ID, procedure, site, allergy, consent etc.     Central line Bundle:  Full sterile barrier precautions used.  7-Step Sterility Protocol followed.  (cap, mask sterile gown, sterile gloves, large sterile sheet, hand hygiene, 2% chlorhexidine for cutaneous antisepsis)  5 mL 1% Lidocaine used at insertion site.      Using ultrasound guidance,   Left femoral cannulated x 1 attempt(s) utilizing the modified Seldinger technique.    no difficulty. Position of wire confirmed in vein using US before dilating. Wire was removed.   Good blood return.  Catheter secured & Biopatch applied.  Sterile Tegaderm placed.            Seema Yousif MD   5/16/2025 12:21 PM

## 2025-05-16 NOTE — PROGRESS NOTES
Pt intubated due to code blue in ICU by anesthesia staff and placed on mechanical ventilator at this time:       05/15/25 2310   Patient Observation   Pulse 65   Respirations 24   SpO2 100 %   Breath Sounds   Respiratory Pattern Tachypneic   Breath Sounds Bilateral Other (comment)  (coarse)   Vent Information   Ventilator Day(s) 1   Ventilator ID [id number]   Ventilator Safety Check Performed Pre-Use Yes   Equipment Changed Airway securing device;Expiratory Filter;Humidification;Suction catheter;Vent Circuit   Ventilator Initiate Yes   Vent Mode AC/PRVC   $Ventilation $Initial Day   Ventilator Settings   Target Vt 500   Resp Rate (Set) 16 bpm   PEEP/CPAP (cmH2O) 5   FiO2  100 %   Insp Time (sec) 0.9 sec   Vent Patient Data (Readings)   Vt (Measured) 510 mL   Peak Inspiratory Pressure (cmH2O) 24 cmH2O   Rate Measured 24 br/min   Minute Volume (L/min) 12.8 Liters   Mean Airway Pressure (cmH2O) 12 cmH20   Plateau Pressure (cm H2O) 18 cm H2O   Driving Pressure 13   I:E Ratio 1:1.6   Flow Sensitivity 3 L/min   Static Compliance (L/cm H2O) 16   Insp Rise Time (%) 70 %   Backup Apnea On   Backup Rate 16 Breaths Per Minute   Backup Vt 500   Backup I Time   (0.9)   Vent Alarm Settings   Low Pressure (cmH2O) 8.5 cmH2O   High Pressure (cmH2O) 40 cmH2O   Low Minute Volume (lpm) 3 L/min   High Minute Volume (lpm) 17.5 L/min   Low Exhaled Vt (ml) 305 mL   High Exhaled Vt (ml) 900 mL   RR Low (bpm) 16   RR High (bpm) 40 br/min   Apnea (secs) 20 secs   Additional Respiratoray Assessments   Humidification Source Heated wire   Humidification Temp 27.2  (heater on)   Ambu Bag With Mask At Bedside Yes   ETT    Placement Date/Time: 05/15/25 2305   Present on Admission/Arrival: No  Placed By: Licensed provider  Placement Verified By: Auscultation;Chest X-ray;Colorimetric ETCO2 device  Preoxygenation: Yes  Mask Ventilation: Ventilated by mask (1)  Technique: V...   $ Intubation Emergent  $ Yes   Secured At 24 cm   Measured From Teeth   ETT  Placement Right   Secured By Commercial tube fleming   Cuff Pressure 30 cm H2O   Tie/Fleming Changed Yes   Ventilator Associated Pneumonia Bundle   Elevation of Head of Bed to 30-45 Degrees  Contraindicated

## 2025-05-16 NOTE — PROGRESS NOTES
I was called by ICU nurse pacemaker spikes are not visible on the cardiac telemetry  Patient heart rate is above 85 bpm  Pacemaker setting was backup heart rate of 80 bpm EKG done normal sinus rhythm right bundle branch block normal TX interval  AV block has resolved  I have attempted all maneuver including changing the battery, changing the pacemaker external device, external leads none of them able to have a paced rhythm after increasing heart rate above 100.  I have changed the position of the pacemaker wire slightly without any improvement  Chest x-ray have shown stable pacemaker lead in the RV  Patient will need replacement of pacemaker lead  Discussed with patient's daughter and family  Discussed Dr. Yousif  Discussed with Dr. Maldonado

## 2025-05-17 ENCOUNTER — APPOINTMENT (OUTPATIENT)
Facility: HOSPITAL | Age: 65
DRG: 242 | End: 2025-05-17
Payer: MEDICARE

## 2025-05-17 LAB
ALBUMIN SERPL-MCNC: 3.3 G/DL (ref 3.4–5)
ALBUMIN/GLOB SERPL: 1.2 (ref 0.8–1.7)
ALP SERPL-CCNC: 59 U/L (ref 45–117)
ALT SERPL-CCNC: 12 U/L (ref 10–50)
ANION GAP SERPL CALC-SCNC: 10 MMOL/L (ref 3–18)
ARTERIAL PATENCY WRIST A: POSITIVE
AST SERPL-CCNC: 18 U/L (ref 10–38)
BASE EXCESS BLD CALC-SCNC: 2.4 MMOL/L
BASOPHILS # BLD: 0.14 K/UL (ref 0–0.1)
BASOPHILS NFR BLD: 0.8 % (ref 0–2)
BDY SITE: ABNORMAL
BILIRUB SERPL-MCNC: 0.7 MG/DL (ref 0.2–1)
BODY TEMPERATURE: 100.2
BUN SERPL-MCNC: 32 MG/DL (ref 6–23)
BUN/CREAT SERPL: 16 (ref 12–20)
CALCIUM SERPL-MCNC: 9.2 MG/DL (ref 8.5–10.1)
CHLORIDE SERPL-SCNC: 99 MMOL/L (ref 98–107)
CO2 SERPL-SCNC: 28 MMOL/L (ref 21–32)
CREAT SERPL-MCNC: 2.03 MG/DL (ref 0.6–1.3)
DIFFERENTIAL METHOD BLD: ABNORMAL
EOSINOPHIL # BLD: 0.04 K/UL (ref 0–0.4)
EOSINOPHIL NFR BLD: 0.2 % (ref 0–5)
ERYTHROCYTE [DISTWIDTH] IN BLOOD BY AUTOMATED COUNT: 13.4 % (ref 11.6–14.5)
GAS FLOW.O2 O2 DELIVERY SYS: ABNORMAL
GAS FLOW.O2 SETTING OXYMISER: 18 BPM
GLOBULIN SER CALC-MCNC: 2.8 G/DL (ref 2–4)
GLUCOSE BLD STRIP.AUTO-MCNC: 198 MG/DL (ref 70–110)
GLUCOSE BLD STRIP.AUTO-MCNC: 207 MG/DL (ref 70–110)
GLUCOSE BLD STRIP.AUTO-MCNC: 210 MG/DL (ref 70–110)
GLUCOSE BLD STRIP.AUTO-MCNC: 214 MG/DL (ref 70–110)
GLUCOSE BLD STRIP.AUTO-MCNC: 233 MG/DL (ref 70–110)
GLUCOSE SERPL-MCNC: 226 MG/DL (ref 74–108)
HCO3 BLD-SCNC: 29.1 MMOL/L (ref 21–28)
HCT VFR BLD AUTO: 42.5 % (ref 36–48)
HGB BLD-MCNC: 13.3 G/DL (ref 13–16)
IMM GRANULOCYTES # BLD AUTO: 0.07 K/UL (ref 0–0.04)
IMM GRANULOCYTES NFR BLD AUTO: 0.4 % (ref 0–0.5)
INSPIRATION.DURATION SETTING TIME VENT: 0.9 SEC
IPAP/PIP/HIGH PEEP: 29
LYMPHOCYTES # BLD: 2.04 K/UL (ref 0.9–3.3)
LYMPHOCYTES NFR BLD: 11.5 % (ref 21–52)
MAGNESIUM SERPL-MCNC: 1.7 MG/DL (ref 1.6–2.6)
MCH RBC QN AUTO: 28.7 PG (ref 24–34)
MCHC RBC AUTO-ENTMCNC: 31.3 G/DL (ref 31–37)
MCV RBC AUTO: 91.8 FL (ref 78–100)
MONOCYTES # BLD: 1.06 K/UL (ref 0.05–1.2)
MONOCYTES NFR BLD: 6 % (ref 3–10)
NEUTS SEG # BLD: 14.45 K/UL (ref 1.8–8)
NEUTS SEG NFR BLD: 81.1 % (ref 40–73)
NRBC # BLD: 0 K/UL (ref 0–0.01)
NRBC BLD-RTO: 0 PER 100 WBC
O2/TOTAL GAS SETTING VFR VENT: 60 %
PAW @ MEAN EXP FLOW ON VENT: 12 CMH2O
PCO2 BLD: 53.8 MMHG (ref 35–48)
PEEP RESPIRATORY: 5 CMH2O
PH BLD: 7.35 (ref 7.35–7.45)
PLATELET # BLD AUTO: 187 K/UL (ref 135–420)
PMV BLD AUTO: 9.5 FL (ref 9.2–11.8)
PO2 BLD: 91 MMHG (ref 83–108)
POTASSIUM SERPL-SCNC: 4.7 MMOL/L (ref 3.5–5.5)
PROT SERPL-MCNC: 6.1 G/DL (ref 6.4–8.2)
RBC # BLD AUTO: 4.63 M/UL (ref 4.35–5.65)
RESPIRATORY RATE, POC: 18 (ref 5–40)
SAO2 % BLD: 95.9 % (ref 92–97)
SERVICE CMNT-IMP: ABNORMAL
SODIUM SERPL-SCNC: 136 MMOL/L (ref 136–145)
SPECIMEN TYPE: ABNORMAL
TROPONIN T SERPL HS-MCNC: 77.9 NG/L (ref 0–22)
VENTILATION MODE VENT: ABNORMAL
VT SETTING VENT: 500 ML
WBC # BLD AUTO: 17.8 K/UL (ref 4.6–13.2)

## 2025-05-17 PROCEDURE — 94003 VENT MGMT INPAT SUBQ DAY: CPT

## 2025-05-17 PROCEDURE — 82962 GLUCOSE BLOOD TEST: CPT

## 2025-05-17 PROCEDURE — 2500000003 HC RX 250 WO HCPCS: Performed by: INTERNAL MEDICINE

## 2025-05-17 PROCEDURE — 94640 AIRWAY INHALATION TREATMENT: CPT

## 2025-05-17 PROCEDURE — 87070 CULTURE OTHR SPECIMN AEROBIC: CPT

## 2025-05-17 PROCEDURE — 6360000002 HC RX W HCPCS: Performed by: INTERNAL MEDICINE

## 2025-05-17 PROCEDURE — 6370000000 HC RX 637 (ALT 250 FOR IP): Performed by: INTERNAL MEDICINE

## 2025-05-17 PROCEDURE — 2580000003 HC RX 258: Performed by: INTERNAL MEDICINE

## 2025-05-17 PROCEDURE — 84484 ASSAY OF TROPONIN QUANT: CPT

## 2025-05-17 PROCEDURE — 6370000000 HC RX 637 (ALT 250 FOR IP): Performed by: STUDENT IN AN ORGANIZED HEALTH CARE EDUCATION/TRAINING PROGRAM

## 2025-05-17 PROCEDURE — 71250 CT THORAX DX C-: CPT

## 2025-05-17 PROCEDURE — 87205 SMEAR GRAM STAIN: CPT

## 2025-05-17 PROCEDURE — 2700000000 HC OXYGEN THERAPY PER DAY

## 2025-05-17 PROCEDURE — 6360000002 HC RX W HCPCS: Performed by: EMERGENCY MEDICINE

## 2025-05-17 PROCEDURE — 36600 WITHDRAWAL OF ARTERIAL BLOOD: CPT

## 2025-05-17 PROCEDURE — 71045 X-RAY EXAM CHEST 1 VIEW: CPT

## 2025-05-17 PROCEDURE — 92953 TEMPORARY EXTERNAL PACING: CPT

## 2025-05-17 PROCEDURE — P9047 ALBUMIN (HUMAN), 25%, 50ML: HCPCS | Performed by: INTERNAL MEDICINE

## 2025-05-17 PROCEDURE — 2000000000 HC ICU R&B

## 2025-05-17 PROCEDURE — 82803 BLOOD GASES ANY COMBINATION: CPT

## 2025-05-17 PROCEDURE — 85025 COMPLETE CBC W/AUTO DIFF WBC: CPT

## 2025-05-17 PROCEDURE — 2500000003 HC RX 250 WO HCPCS: Performed by: HOSPITALIST

## 2025-05-17 PROCEDURE — 80053 COMPREHEN METABOLIC PANEL: CPT

## 2025-05-17 PROCEDURE — 6360000002 HC RX W HCPCS: Performed by: HOSPITALIST

## 2025-05-17 PROCEDURE — 2580000003 HC RX 258: Performed by: HOSPITALIST

## 2025-05-17 PROCEDURE — 6370000000 HC RX 637 (ALT 250 FOR IP): Performed by: HOSPITALIST

## 2025-05-17 PROCEDURE — 83735 ASSAY OF MAGNESIUM: CPT

## 2025-05-17 RX ORDER — LACTULOSE 10 G/15ML
20 SOLUTION ORAL DAILY
Status: DISCONTINUED | OUTPATIENT
Start: 2025-05-17 | End: 2025-05-29 | Stop reason: HOSPADM

## 2025-05-17 RX ORDER — FUROSEMIDE 10 MG/ML
20 INJECTION INTRAMUSCULAR; INTRAVENOUS 2 TIMES DAILY
Status: DISCONTINUED | OUTPATIENT
Start: 2025-05-17 | End: 2025-05-18

## 2025-05-17 RX ORDER — IPRATROPIUM BROMIDE AND ALBUTEROL SULFATE 2.5; .5 MG/3ML; MG/3ML
1 SOLUTION RESPIRATORY (INHALATION) EVERY 8 HOURS
Status: DISCONTINUED | OUTPATIENT
Start: 2025-05-17 | End: 2025-05-17

## 2025-05-17 RX ORDER — BUDESONIDE 0.5 MG/2ML
0.5 INHALANT ORAL
Status: DISCONTINUED | OUTPATIENT
Start: 2025-05-17 | End: 2025-05-29 | Stop reason: HOSPADM

## 2025-05-17 RX ORDER — IPRATROPIUM BROMIDE AND ALBUTEROL SULFATE 2.5; .5 MG/3ML; MG/3ML
1 SOLUTION RESPIRATORY (INHALATION)
Status: DISCONTINUED | OUTPATIENT
Start: 2025-05-17 | End: 2025-05-19

## 2025-05-17 RX ORDER — DOCUSATE SODIUM 100 MG/1
100 CAPSULE, LIQUID FILLED ORAL DAILY
Status: DISCONTINUED | OUTPATIENT
Start: 2025-05-17 | End: 2025-05-29 | Stop reason: HOSPADM

## 2025-05-17 RX ADMIN — FUROSEMIDE 20 MG: 10 INJECTION, SOLUTION INTRAMUSCULAR; INTRAVENOUS at 17:40

## 2025-05-17 RX ADMIN — GABAPENTIN 300 MG: 300 CAPSULE ORAL at 15:48

## 2025-05-17 RX ADMIN — FAMOTIDINE 20 MG: 10 INJECTION, SOLUTION INTRAVENOUS at 09:25

## 2025-05-17 RX ADMIN — FENTANYL CITRATE 50 MCG: 50 INJECTION INTRAMUSCULAR; INTRAVENOUS at 05:25

## 2025-05-17 RX ADMIN — SODIUM ZIRCONIUM CYCLOSILICATE 10 G: 5 POWDER, FOR SUSPENSION ORAL at 09:00

## 2025-05-17 RX ADMIN — ALBUMIN (HUMAN) 12.5 G: 0.25 INJECTION, SOLUTION INTRAVENOUS at 06:20

## 2025-05-17 RX ADMIN — ARFORMOTEROL TARTRATE 15 MCG: 15 SOLUTION RESPIRATORY (INHALATION) at 07:29

## 2025-05-17 RX ADMIN — ACETAMINOPHEN 650 MG: 325 TABLET ORAL at 05:31

## 2025-05-17 RX ADMIN — MINERAL OIL, PETROLATUM: 425; 568 OINTMENT OPHTHALMIC at 21:16

## 2025-05-17 RX ADMIN — ROSUVASTATIN CALCIUM 20 MG: 10 TABLET, FILM COATED ORAL at 09:23

## 2025-05-17 RX ADMIN — ASPIRIN 81 MG: 81 TABLET, CHEWABLE ORAL at 09:22

## 2025-05-17 RX ADMIN — CHLORHEXIDINE GLUCONATE 15 ML: 1.2 RINSE BUCCAL at 21:16

## 2025-05-17 RX ADMIN — MINERAL OIL, PETROLATUM: 425; 568 OINTMENT OPHTHALMIC at 04:00

## 2025-05-17 RX ADMIN — CHLORHEXIDINE GLUCONATE 15 ML: 1.2 RINSE BUCCAL at 09:23

## 2025-05-17 RX ADMIN — FENTANYL CITRATE 50 MCG: 50 INJECTION INTRAMUSCULAR; INTRAVENOUS at 08:45

## 2025-05-17 RX ADMIN — GABAPENTIN 300 MG: 300 CAPSULE ORAL at 21:16

## 2025-05-17 RX ADMIN — IPRATROPIUM BROMIDE AND ALBUTEROL SULFATE 1 DOSE: .5; 3 SOLUTION RESPIRATORY (INHALATION) at 10:04

## 2025-05-17 RX ADMIN — BUDESONIDE 500 MCG: 0.5 INHALANT RESPIRATORY (INHALATION) at 10:03

## 2025-05-17 RX ADMIN — MINERAL OIL, PETROLATUM: 425; 568 OINTMENT OPHTHALMIC at 00:45

## 2025-05-17 RX ADMIN — SODIUM CHLORIDE, PRESERVATIVE FREE 10 ML: 5 INJECTION INTRAVENOUS at 09:23

## 2025-05-17 RX ADMIN — MINERAL OIL, PETROLATUM: 425; 568 OINTMENT OPHTHALMIC at 17:41

## 2025-05-17 RX ADMIN — ARFORMOTEROL TARTRATE 15 MCG: 15 SOLUTION RESPIRATORY (INHALATION) at 20:57

## 2025-05-17 RX ADMIN — FENTANYL CITRATE 100 MCG/HR: 0.05 INJECTION, SOLUTION INTRAMUSCULAR; INTRAVENOUS at 05:27

## 2025-05-17 RX ADMIN — DOPAMINE HYDROCHLORIDE 15 MCG/KG/MIN: 160 INJECTION, SOLUTION INTRAVENOUS at 21:32

## 2025-05-17 RX ADMIN — SODIUM CHLORIDE, PRESERVATIVE FREE 10 ML: 5 INJECTION INTRAVENOUS at 21:16

## 2025-05-17 RX ADMIN — Medication 4 UNITS: at 21:15

## 2025-05-17 RX ADMIN — Medication 4 UNITS: at 06:20

## 2025-05-17 RX ADMIN — LACTULOSE 20 G: 10 SOLUTION ORAL at 17:43

## 2025-05-17 RX ADMIN — DOPAMINE HYDROCHLORIDE 15 MCG/KG/MIN: 160 INJECTION, SOLUTION INTRAVENOUS at 09:50

## 2025-05-17 RX ADMIN — INSULIN GLARGINE 5 UNITS: 100 INJECTION, SOLUTION SUBCUTANEOUS at 21:15

## 2025-05-17 RX ADMIN — MINERAL OIL, PETROLATUM: 425; 568 OINTMENT OPHTHALMIC at 23:38

## 2025-05-17 RX ADMIN — IPRATROPIUM BROMIDE AND ALBUTEROL SULFATE 1 DOSE: .5; 3 SOLUTION RESPIRATORY (INHALATION) at 15:24

## 2025-05-17 RX ADMIN — CEFTRIAXONE 1000 MG: 1 INJECTION, POWDER, FOR SOLUTION INTRAMUSCULAR; INTRAVENOUS at 09:24

## 2025-05-17 RX ADMIN — ENOXAPARIN SODIUM 40 MG: 100 INJECTION SUBCUTANEOUS at 09:27

## 2025-05-17 RX ADMIN — FENTANYL CITRATE 50 MCG: 50 INJECTION INTRAMUSCULAR; INTRAVENOUS at 02:01

## 2025-05-17 RX ADMIN — Medication 4 UNITS: at 11:28

## 2025-05-17 RX ADMIN — BUDESONIDE 500 MCG: 0.5 INHALANT RESPIRATORY (INHALATION) at 20:57

## 2025-05-17 RX ADMIN — MINERAL OIL, PETROLATUM: 425; 568 OINTMENT OPHTHALMIC at 12:15

## 2025-05-17 RX ADMIN — MINERAL OIL, PETROLATUM: 425; 568 OINTMENT OPHTHALMIC at 09:23

## 2025-05-17 RX ADMIN — ACETAMINOPHEN 650 MG: 325 TABLET ORAL at 17:39

## 2025-05-17 RX ADMIN — Medication 4 UNITS: at 17:50

## 2025-05-17 RX ADMIN — MIDAZOLAM HYDROCHLORIDE 3 MG/HR: 5 INJECTION, SOLUTION INTRAMUSCULAR; INTRAVENOUS at 01:55

## 2025-05-17 RX ADMIN — FENTANYL CITRATE 100 MCG/HR: 0.05 INJECTION, SOLUTION INTRAMUSCULAR; INTRAVENOUS at 15:04

## 2025-05-17 ASSESSMENT — PULMONARY FUNCTION TESTS
PIF_VALUE: 25
PIF_VALUE: 26
PIF_VALUE: 31
PIF_VALUE: 27
PIF_VALUE: 27
PIF_VALUE: 29

## 2025-05-17 ASSESSMENT — PAIN SCALES - GENERAL: PAINLEVEL_OUTOF10: 0

## 2025-05-17 NOTE — PROGRESS NOTES
Cardiology Progress Note        Patient: Isidro Reardon        Sex: male          DOA: 5/15/2025  YOB: 1960      Age:  64 y.o.        LOS:  LOS: 2 days   Assessment/Plan     Principal Problem:    Complete heart block (HCC)  Active Problems:    Coronary atherosclerosis of native coronary artery    COPD (chronic obstructive pulmonary disease) (HCC)    Essential hypertension    Type 2 diabetes with nephropathy (HCC)    RENAE (acute kidney injury)    Heart block    S/P TAVR (transcatheter aortic valve replacement)    Cardiac arrest (HCC)    Acute respiratory failure (HCC)    Hyperkalemia  Resolved Problems:    * No resolved hospital problems. *      Plan:  Patient remained intubated  Patient is not dependent on transvenous pacemaker  WBC count is improving  Patient can have CT scan from cardiac standpoint  Patient can also have SBT/possible extubation  Will continue to monitor from cardiac standpoint.  Patient heart rate is 88 bpm and I have tested TVP by increasing heart rate to 100 with normal TVP paced rhythm.  TVP backup rate decreased to a 70 bpm  Continue with antibiotic treatment  Patient's daughter in the room  Management plan reviewed with patient's daughter  Discussed with Dr. Yousif        Patient remained intubated  EF is normal  Prosthetic valve not well-visualized but stable gradient  Overall RV looks normal in size and function  At this point there is no urgent indication of putting a permanent pacemaker especially with suspected infection.  Patient's daughter updated  Discussed with RN  Discussed with Dr. Barrientos  Critical care time spent more than 45-minute in taking care of the patient and working was transvenous pacemaker  Discussed with patient's daughter  Critical care time spent more than 45-minute      Transient complete heart block  Cardiac arrest  Leukocytosis  TAVR  Acute kidney injury      Patient heart rate is above 88 bpm with intrinsic sinus rhythm and right bundle branch

## 2025-05-17 NOTE — PROGRESS NOTES
Assessment:     Isidro Reardon is a 64 y.o. year old male with  CAD, AS s/p  TAVR, diabetes mellitus, hypertension, hypertensive heart disease, neuropathy presented to he ER for  lightheadedness dizziness.Later on had complete heart block, asystole cardiac arrest   Now has acute renal failure due to cardiac arrest/bradycardia and hyperkalemia is due to RENAE and CPR .     Acute renal failure  Hyperkalemia  Hypocalcemia   S/p Cardiac arrest  Complete heart block   Respiratory failure     Plan:     Urine out put is picking up, stop K binders  Continue supportive care  D/w daughter at the bedside.   Monitor ins and out, risk of post ATN diuresis.     CC: RENAE,   Interval History: no interval change since yesterday, pt is intubated on Dabuamin            Blood pressure 126/74, pulse 84, temperature 98.2 °F (36.8 °C), temperature source Axillary, resp. rate 18, height 1.803 m (5' 10.98\"), weight 85 kg (187 lb 6.3 oz), SpO2 93%.    Intake/Output Summary (Last 24 hours) at 5/17/2025 1310  Last data filed at 5/17/2025 1200  Gross per 24 hour   Intake 2257.05 ml   Output 2438 ml   Net -180.95 ml        On vent 55% Fio2  Abdomen is soft  No edema    Moon in place clear logan urine        Intake/Output Summary (Last 24 hours) at 5/17/2025 1310  Last data filed at 5/17/2025 1200  Gross per 24 hour   Intake 2257.05 ml   Output 2438 ml   Net -180.95 ml      Recent Labs     05/17/25  0559   WBC 17.8*     Lab Results   Component Value Date/Time     05/17/2025 05:59 AM    K 4.7 05/17/2025 05:59 AM    CL 99 05/17/2025 05:59 AM    CO2 28 05/17/2025 05:59 AM    BUN 32 05/17/2025 05:59 AM    GFRAA >60 10/29/2021 10:58 AM        Current Facility-Administered Medications   Medication Dose Route Frequency Provider Last Rate Last Admin    budesonide (PULMICORT) nebulizer suspension 500 mcg  0.5 mg Nebulization BID RT Seema Yousif MD   500 mcg at 05/17/25 1003    ipratropium 0.5 mg-albuterol 2.5 mg (DUONEB) nebulizer solution 1

## 2025-05-17 NOTE — PROGRESS NOTES
[facility]  Pulmonary, Critical Care, and Sleep Medicine    Name: Isidro Reardon MRN: [id number]   : 1960 Hospital: [facility]    Date: 2025  Room: [address] [place] [address] Note                                              Consult requesting physician: Dr. Pak  Reason for Consult: Respiratory failure    IMPRESSION:   Acute respiratory failure   J90.00  Cardiac arrest   I 46.9  Complete heart block   I45 .9  Hypotension  COPD  CAD  Status post TAVR  GERD        Active Hospital Problems    Diagnosis Date Noted    S/P TAVR (transcatheter aortic valve replacement) [Z95.2] 2025    Cardiac arrest (HCC) [I46.9] 2025    Acute respiratory failure (HCC) [J96.00] 2025    Hyperkalemia [E87.5] 2025    Complete heart block (HCC) [I44.2] 05/15/2025    Heart block [I45.9] 05/15/2025    RENAE (acute kidney injury) [N17.9] 2024    Type 2 diabetes with nephropathy ([facility]) [E11.21] 2018    Essential hypertension [I10] 2018    COPD (chronic obstructive pulmonary disease) ([facility]) [J44.9]     Coronary atherosclerosis of native coronary artery [I25.10] 2011        Code status: Full Code       RECOMMENDATIONS:     Respiratory:   Acute hypoxemic respiratory failure in the setting of cardiac arrest.  Intubated during cardiac arrest.  Patient has a history of mild COPD as per the records he is only on albuterol.  Patient is on full ventilatory support   /18/60%/5.  Lower FiO2 to 55.  ABG 7.3 /91/95.    Wean down FiO2, repeat ABG at noon.  Bronchodilators :  Brovana.  Pulmicort  Respiratory culture  Sedation: Versed and fentanyl drip  As needed Fentanyl for pain.  Ventilator bundle & Sedation protocol followed. Daily sedation holiday, assessment for readiness for SBT and then re-titrate if required. Chlorhexidine mouth washes.   Keep SPO2 >=92%. HOB 30 degree elevation all the time. Aggressive pulmonary toileting. Aspiration precautions.  commands  Cooperative next    Psychiatry: Patient is on Cymbalta hold due to irregular heartbeats    Toxicology: none    Pain/Sedation: prn    Skin/Wound: none    Electrolytes: Replace electrolytes per ICU electrolyte replacement protocol.     IVF: Continue hydration, albumins    Nutrition: Start tube feed    Prophylaxis: DVT Prophylaxis: SCD/prophylaxis. GI Prophylaxis: Prophylaxis.     Restraints:   Wrist soft restraints for patient interfering with medical therapy/management and patient safety.     Lines/Tubes: PIV    ETT: In place intubated on 5/16/2025  OGT: Placed 5/16/2025.  Left femoral central line placed for pressors on 5/16/2025. No issues site is clean no bleeding    Right femoral single-lumen catheter for transvenous pacemaker placed by cardiologist on 5/16 2025  Moon: 5/16/2025 (Medically necessary for strict input/output monitoring in critically ill patient, will remove it when not needed. Moon bundle followed).          Advance Directive/Palliative Care: Consulted.     Quality Care: PPI, DVT prophylaxis, HOB elevated, Infection control all reviewed and addressed.    Care of plan d/w RN, RT, MDR, hospitalist team  Daughter is at the bedside, she is healthcare proxy patient's spouse passed away, family- (answered all questions to satisfaction).     High complexity decision making was performed during the evaluation of this patient at high risk for decompensation with multiple organ involvement.    Total critical care time spent rendering care exclusive of procedures/family discussion/coordination of care: 59 minutes.  I have discussed case with cardiology, hospitalist, revise previous admissions, previous test performed at [facility], previous procedures, current admission current procedures      Subjective/History of Present Illness:     64 years old male with past medical history of diabetes, hypertension, coronary artery disease, TAVR at [facility] in 2021, GERD, , COPD presents with presyncopal

## 2025-05-17 NOTE — PROGRESS NOTES
Hospitalist Progress Note    Patient: Isidro Reardon MRN: [id number]  [facility]: [id number]    YOB: 1960  Age: 64 y.o.  Sex: male    DOA: 5/15/2025 LOS:  LOS: 2 days          Chief Complain :  Dizziness, lightheadedness.  64 y.o. male with CAD, aortic stenosis status post TAVR, diabetes mellitus, hypertension, hypertensive heart disease, neuropathy presents to ER with concerns of lightheadedness dizziness. In the ER he was noted to have heart rate in 30s to 40s. EKG showed complete heart block. He was given epinephrine and started on dopamine drip which improved his heart rate. He actually went into sinus tachycardia. Patient was subsequently admitted to intensive care unit. Later he suddenly became bradycardic and went into asystole. CPR initiated and ACLS protocol initiated. ROSC obtained. Patient intubated external pacers placed. Cardiology contacted s/pTVP.   Subjective:   Patient orally intubated, sedated.     Assessment/Plan     Active Hospital Problems    Diagnosis     S/P TAVR (transcatheter aortic valve replacement) [Z95.2]     Cardiac arrest (HCC) [I46.9]     Acute respiratory failure (HCC) [J96.00]     Hyperkalemia [E87.5]     Complete heart block (HCC) [I44.2]     Heart block [I45.9]     RENAE (acute kidney injury) [N17.9]     Type 2 diabetes with nephropathy (HCC) [E11.21]     Essential hypertension [I10]     COPD (chronic obstructive pulmonary disease) (HCC) [J44.9]     Coronary atherosclerosis of native coronary artery [I25.10]           CRITICAL CARE PLAN     Resp   Acute respiratory failure  After cardiac arrest  Orally intubated, sedated  See vent orders, VAP bundle. HOB>30 degrees.   SBT and extubation per

## 2025-05-18 ENCOUNTER — APPOINTMENT (OUTPATIENT)
Facility: HOSPITAL | Age: 65
DRG: 242 | End: 2025-05-18
Payer: MEDICARE

## 2025-05-18 LAB
ALBUMIN SERPL-MCNC: 3.1 G/DL (ref 3.4–5)
ALBUMIN/GLOB SERPL: 0.8 (ref 0.8–1.7)
ALP SERPL-CCNC: 63 U/L (ref 45–117)
ALT SERPL-CCNC: 11 U/L (ref 10–50)
ANION GAP SERPL CALC-SCNC: 13 MMOL/L (ref 3–18)
ARTERIAL PATENCY WRIST A: POSITIVE
AST SERPL-CCNC: 16 U/L (ref 10–38)
BASE DEFICIT BLD-SCNC: 1 MMOL/L
BASE EXCESS BLD CALC-SCNC: 0.4 MMOL/L
BASOPHILS # BLD: 0.08 K/UL (ref 0–0.1)
BASOPHILS NFR BLD: 0.5 % (ref 0–2)
BDY SITE: ABNORMAL
BDY SITE: ABNORMAL
BILIRUB SERPL-MCNC: 1.1 MG/DL (ref 0.2–1)
BODY TEMPERATURE: 99.4
BUN SERPL-MCNC: 38 MG/DL (ref 6–23)
BUN/CREAT SERPL: 14 (ref 12–20)
CALCIUM SERPL-MCNC: 9.5 MG/DL (ref 8.5–10.1)
CHLORIDE SERPL-SCNC: 96 MMOL/L (ref 98–107)
CO2 SERPL-SCNC: 24 MMOL/L (ref 21–32)
CREAT SERPL-MCNC: 2.71 MG/DL (ref 0.6–1.3)
CREAT UR-MCNC: 129 MG/DL (ref 30–125)
DIFFERENTIAL METHOD BLD: ABNORMAL
EKG ATRIAL RATE: 100 BPM
EKG ATRIAL RATE: 119 BPM
EKG ATRIAL RATE: 133 BPM
EKG ATRIAL RATE: 45 BPM
EKG ATRIAL RATE: 86 BPM
EKG DIAGNOSIS: NORMAL
EKG P AXIS: 47 DEGREES
EKG P AXIS: 72 DEGREES
EKG P AXIS: 73 DEGREES
EKG P AXIS: 77 DEGREES
EKG P-R INTERVAL: 148 MS
EKG P-R INTERVAL: 166 MS
EKG Q-T INTERVAL: 336 MS
EKG Q-T INTERVAL: 356 MS
EKG Q-T INTERVAL: 414 MS
EKG Q-T INTERVAL: 422 MS
EKG Q-T INTERVAL: 480 MS
EKG QRS DURATION: 108 MS
EKG QRS DURATION: 108 MS
EKG QRS DURATION: 118 MS
EKG QRS DURATION: 126 MS
EKG QRS DURATION: 150 MS
EKG QTC CALCULATION (BAZETT): 365 MS
EKG QTC CALCULATION (BAZETT): 402 MS
EKG QTC CALCULATION (BAZETT): 415 MS
EKG QTC CALCULATION (BAZETT): 459 MS
EKG QTC CALCULATION (BAZETT): 489 MS
EKG R AXIS: -83 DEGREES
EKG R AXIS: 38 DEGREES
EKG R AXIS: 60 DEGREES
EKG R AXIS: 68 DEGREES
EKG R AXIS: 73 DEGREES
EKG T AXIS: 66 DEGREES
EKG T AXIS: 67 DEGREES
EKG T AXIS: 69 DEGREES
EKG T AXIS: 78 DEGREES
EKG T AXIS: 92 DEGREES
EKG VENTRICULAR RATE: 100 BPM
EKG VENTRICULAR RATE: 45 BPM
EKG VENTRICULAR RATE: 45 BPM
EKG VENTRICULAR RATE: 84 BPM
EKG VENTRICULAR RATE: 86 BPM
EOSINOPHIL # BLD: 0.01 K/UL (ref 0–0.4)
EOSINOPHIL NFR BLD: 0.1 % (ref 0–5)
ERYTHROCYTE [DISTWIDTH] IN BLOOD BY AUTOMATED COUNT: 13.6 % (ref 11.6–14.5)
GAS FLOW.O2 O2 DELIVERY SYS: ABNORMAL
GAS FLOW.O2 O2 DELIVERY SYS: ABNORMAL
GAS FLOW.O2 SETTING OXYMISER: 18 BPM
GAS FLOW.O2 SETTING OXYMISER: 18 BPM
GLOBULIN SER CALC-MCNC: 4.1 G/DL (ref 2–4)
GLUCOSE BLD STRIP.AUTO-MCNC: 184 MG/DL (ref 70–110)
GLUCOSE BLD STRIP.AUTO-MCNC: 207 MG/DL (ref 70–110)
GLUCOSE BLD STRIP.AUTO-MCNC: 209 MG/DL (ref 70–110)
GLUCOSE BLD STRIP.AUTO-MCNC: 212 MG/DL (ref 70–110)
GLUCOSE BLD STRIP.AUTO-MCNC: 234 MG/DL (ref 70–110)
GLUCOSE BLD STRIP.AUTO-MCNC: 241 MG/DL (ref 70–110)
GLUCOSE BLD STRIP.AUTO-MCNC: 254 MG/DL (ref 70–110)
GLUCOSE SERPL-MCNC: 222 MG/DL (ref 74–108)
HCO3 BLD-SCNC: 25.7 MMOL/L (ref 21–28)
HCO3 BLD-SCNC: 27.2 MMOL/L (ref 21–28)
HCT VFR BLD AUTO: 42 % (ref 36–48)
HGB BLD-MCNC: 13 G/DL (ref 13–16)
IMM GRANULOCYTES # BLD AUTO: 0.07 K/UL (ref 0–0.04)
IMM GRANULOCYTES NFR BLD AUTO: 0.5 % (ref 0–0.5)
INSPIRATION.DURATION SETTING TIME VENT: 1 SEC
IPAP/PIP/HIGH PEEP: 21
LYMPHOCYTES # BLD: 0.9 K/UL (ref 0.9–3.3)
LYMPHOCYTES NFR BLD: 6.1 % (ref 21–52)
MAGNESIUM SERPL-MCNC: 1.8 MG/DL (ref 1.6–2.6)
MCH RBC QN AUTO: 28.4 PG (ref 24–34)
MCHC RBC AUTO-ENTMCNC: 31 G/DL (ref 31–37)
MCV RBC AUTO: 91.7 FL (ref 78–100)
MONOCYTES # BLD: 0.87 K/UL (ref 0.05–1.2)
MONOCYTES NFR BLD: 5.9 % (ref 3–10)
NEUTS SEG # BLD: 12.89 K/UL (ref 1.8–8)
NEUTS SEG NFR BLD: 86.9 % (ref 40–73)
NRBC # BLD: 0 K/UL (ref 0–0.01)
NRBC BLD-RTO: 0 PER 100 WBC
NT PRO BNP: 434 PG/ML (ref 36–900)
O2/TOTAL GAS SETTING VFR VENT: 100 %
O2/TOTAL GAS SETTING VFR VENT: 60 %
PAW @ MEAN EXP FLOW ON VENT: 14 CMH2O
PCO2 BLD: 50.3 MMHG (ref 35–48)
PCO2 BLD: 51.4 MMHG (ref 35–48)
PEEP RESPIRATORY: 10 CMH2O
PEEP RESPIRATORY: 6 CMH2O
PH BLD: 7.32 (ref 7.35–7.45)
PH BLD: 7.33 (ref 7.35–7.45)
PLATELET # BLD AUTO: 175 K/UL (ref 135–420)
PMV BLD AUTO: 9.9 FL (ref 9.2–11.8)
PO2 BLD: 363 MMHG (ref 83–108)
PO2 BLD: 92 MMHG (ref 83–108)
POTASSIUM SERPL-SCNC: 4.8 MMOL/L (ref 3.5–5.5)
PRESSURE SUPPORT SETTING VENT: 14 CMH2O
PROCALCITONIN SERPL-MCNC: 1.17 NG/ML
PROCALCITONIN SERPL-MCNC: 1.25 NG/ML
PROT SERPL-MCNC: 7.1 G/DL (ref 6.4–8.2)
RBC # BLD AUTO: 4.58 M/UL (ref 4.35–5.65)
RESPIRATORY RATE, POC: 18 (ref 5–40)
SAO2 % BLD: 96.4 % (ref 92–97)
SAO2 % BLD: 99.9 % (ref 92–97)
SERVICE CMNT-IMP: ABNORMAL
SERVICE CMNT-IMP: ABNORMAL
SODIUM SERPL-SCNC: 132 MMOL/L (ref 136–145)
SODIUM UR-SCNC: <20 MMOL/L (ref 40–220)
SPECIMEN TYPE: ABNORMAL
SPECIMEN TYPE: ABNORMAL
TROPONIN T SERPL HS-MCNC: 80.5 NG/L (ref 0–22)
VENTILATION MODE VENT: ABNORMAL
VENTILATION MODE VENT: ABNORMAL
VT SETTING VENT: 500 ML
VT SETTING VENT: 588 ML
WBC # BLD AUTO: 14.8 K/UL (ref 4.6–13.2)

## 2025-05-18 PROCEDURE — 94003 VENT MGMT INPAT SUBQ DAY: CPT

## 2025-05-18 PROCEDURE — 85025 COMPLETE CBC W/AUTO DIFF WBC: CPT

## 2025-05-18 PROCEDURE — 83880 ASSAY OF NATRIURETIC PEPTIDE: CPT

## 2025-05-18 PROCEDURE — 6370000000 HC RX 637 (ALT 250 FOR IP): Performed by: INTERNAL MEDICINE

## 2025-05-18 PROCEDURE — 6360000002 HC RX W HCPCS: Performed by: HOSPITALIST

## 2025-05-18 PROCEDURE — 80053 COMPREHEN METABOLIC PANEL: CPT

## 2025-05-18 PROCEDURE — 84484 ASSAY OF TROPONIN QUANT: CPT

## 2025-05-18 PROCEDURE — 71045 X-RAY EXAM CHEST 1 VIEW: CPT

## 2025-05-18 PROCEDURE — 87070 CULTURE OTHR SPECIMN AEROBIC: CPT

## 2025-05-18 PROCEDURE — 6360000002 HC RX W HCPCS: Performed by: INTERNAL MEDICINE

## 2025-05-18 PROCEDURE — 92953 TEMPORARY EXTERNAL PACING: CPT

## 2025-05-18 PROCEDURE — 6370000000 HC RX 637 (ALT 250 FOR IP): Performed by: HOSPITALIST

## 2025-05-18 PROCEDURE — 36600 WITHDRAWAL OF ARTERIAL BLOOD: CPT

## 2025-05-18 PROCEDURE — 82962 GLUCOSE BLOOD TEST: CPT

## 2025-05-18 PROCEDURE — 82570 ASSAY OF URINE CREATININE: CPT

## 2025-05-18 PROCEDURE — 6360000002 HC RX W HCPCS: Performed by: EMERGENCY MEDICINE

## 2025-05-18 PROCEDURE — 87205 SMEAR GRAM STAIN: CPT

## 2025-05-18 PROCEDURE — 2000000000 HC ICU R&B

## 2025-05-18 PROCEDURE — 2500000003 HC RX 250 WO HCPCS: Performed by: HOSPITALIST

## 2025-05-18 PROCEDURE — 94640 AIRWAY INHALATION TREATMENT: CPT

## 2025-05-18 PROCEDURE — 84145 PROCALCITONIN (PCT): CPT

## 2025-05-18 PROCEDURE — 2580000003 HC RX 258: Performed by: HOSPITALIST

## 2025-05-18 PROCEDURE — 82803 BLOOD GASES ANY COMBINATION: CPT

## 2025-05-18 PROCEDURE — 2700000000 HC OXYGEN THERAPY PER DAY

## 2025-05-18 PROCEDURE — 2580000003 HC RX 258: Performed by: INTERNAL MEDICINE

## 2025-05-18 PROCEDURE — 83735 ASSAY OF MAGNESIUM: CPT

## 2025-05-18 PROCEDURE — 84300 ASSAY OF URINE SODIUM: CPT

## 2025-05-18 RX ORDER — INSULIN LISPRO 100 [IU]/ML
0-16 INJECTION, SOLUTION INTRAVENOUS; SUBCUTANEOUS EVERY 6 HOURS
Status: DISCONTINUED | OUTPATIENT
Start: 2025-05-18 | End: 2025-05-29 | Stop reason: HOSPADM

## 2025-05-18 RX ORDER — SODIUM CHLORIDE 1 G/1
1 TABLET ORAL 2 TIMES DAILY
Status: DISCONTINUED | OUTPATIENT
Start: 2025-05-18 | End: 2025-05-23

## 2025-05-18 RX ADMIN — VANCOMYCIN HYDROCHLORIDE 1500 MG: 10 INJECTION, POWDER, LYOPHILIZED, FOR SOLUTION INTRAVENOUS at 13:06

## 2025-05-18 RX ADMIN — Medication 4 UNITS: at 12:54

## 2025-05-18 RX ADMIN — IPRATROPIUM BROMIDE AND ALBUTEROL SULFATE 1 DOSE: .5; 3 SOLUTION RESPIRATORY (INHALATION) at 00:38

## 2025-05-18 RX ADMIN — MINERAL OIL, PETROLATUM: 425; 568 OINTMENT OPHTHALMIC at 03:02

## 2025-05-18 RX ADMIN — FUROSEMIDE 20 MG: 10 INJECTION, SOLUTION INTRAMUSCULAR; INTRAVENOUS at 09:32

## 2025-05-18 RX ADMIN — ROSUVASTATIN CALCIUM 20 MG: 10 TABLET, FILM COATED ORAL at 09:30

## 2025-05-18 RX ADMIN — CHLORHEXIDINE GLUCONATE 15 ML: 1.2 RINSE BUCCAL at 09:33

## 2025-05-18 RX ADMIN — GABAPENTIN 300 MG: 300 CAPSULE ORAL at 09:30

## 2025-05-18 RX ADMIN — PIPERACILLIN AND TAZOBACTAM 3375 MG: 3; .375 INJECTION, POWDER, LYOPHILIZED, FOR SOLUTION INTRAVENOUS at 13:21

## 2025-05-18 RX ADMIN — GABAPENTIN 300 MG: 300 CAPSULE ORAL at 13:16

## 2025-05-18 RX ADMIN — SODIUM CHLORIDE, PRESERVATIVE FREE 10 ML: 5 INJECTION INTRAVENOUS at 23:38

## 2025-05-18 RX ADMIN — MINERAL OIL, PETROLATUM: 425; 568 OINTMENT OPHTHALMIC at 20:56

## 2025-05-18 RX ADMIN — INSULIN LISPRO 4 UNITS: 100 INJECTION, SOLUTION INTRAVENOUS; SUBCUTANEOUS at 23:39

## 2025-05-18 RX ADMIN — FAMOTIDINE 20 MG: 10 INJECTION, SOLUTION INTRAVENOUS at 09:31

## 2025-05-18 RX ADMIN — ENOXAPARIN SODIUM 40 MG: 100 INJECTION SUBCUTANEOUS at 09:31

## 2025-05-18 RX ADMIN — Medication 1 G: at 23:28

## 2025-05-18 RX ADMIN — DOPAMINE HYDROCHLORIDE 17.5 MCG/KG/MIN: 160 INJECTION, SOLUTION INTRAVENOUS at 09:16

## 2025-05-18 RX ADMIN — DOPAMINE HYDROCHLORIDE 15 MCG/KG/MIN: 160 INJECTION, SOLUTION INTRAVENOUS at 20:29

## 2025-05-18 RX ADMIN — ARFORMOTEROL TARTRATE 15 MCG: 15 SOLUTION RESPIRATORY (INHALATION) at 07:28

## 2025-05-18 RX ADMIN — MINERAL OIL, PETROLATUM: 425; 568 OINTMENT OPHTHALMIC at 13:17

## 2025-05-18 RX ADMIN — MINERAL OIL, PETROLATUM: 425; 568 OINTMENT OPHTHALMIC at 23:39

## 2025-05-18 RX ADMIN — CHLORHEXIDINE GLUCONATE 15 ML: 1.2 RINSE BUCCAL at 21:09

## 2025-05-18 RX ADMIN — BUDESONIDE 500 MCG: 0.5 INHALANT RESPIRATORY (INHALATION) at 21:51

## 2025-05-18 RX ADMIN — DOXYCYCLINE 100 MG: 100 INJECTION, POWDER, LYOPHILIZED, FOR SOLUTION INTRAVENOUS at 23:31

## 2025-05-18 RX ADMIN — Medication 4 UNITS: at 09:27

## 2025-05-18 RX ADMIN — IPRATROPIUM BROMIDE AND ALBUTEROL SULFATE 1 DOSE: .5; 3 SOLUTION RESPIRATORY (INHALATION) at 15:46

## 2025-05-18 RX ADMIN — GABAPENTIN 300 MG: 300 CAPSULE ORAL at 21:09

## 2025-05-18 RX ADMIN — SODIUM CHLORIDE, PRESERVATIVE FREE 10 ML: 5 INJECTION INTRAVENOUS at 09:33

## 2025-05-18 RX ADMIN — IPRATROPIUM BROMIDE AND ALBUTEROL SULFATE 1 DOSE: .5; 3 SOLUTION RESPIRATORY (INHALATION) at 07:28

## 2025-05-18 RX ADMIN — LACTULOSE 20 G: 10 SOLUTION ORAL at 09:32

## 2025-05-18 RX ADMIN — DOXYCYCLINE 100 MG: 100 INJECTION, POWDER, LYOPHILIZED, FOR SOLUTION INTRAVENOUS at 09:35

## 2025-05-18 RX ADMIN — ASPIRIN 81 MG: 81 TABLET, CHEWABLE ORAL at 09:32

## 2025-05-18 RX ADMIN — Medication 1 G: at 13:16

## 2025-05-18 RX ADMIN — MIDAZOLAM HYDROCHLORIDE 3 MG/HR: 5 INJECTION, SOLUTION INTRAMUSCULAR; INTRAVENOUS at 04:09

## 2025-05-18 RX ADMIN — ARFORMOTEROL TARTRATE 15 MCG: 15 SOLUTION RESPIRATORY (INHALATION) at 21:51

## 2025-05-18 RX ADMIN — FENTANYL CITRATE 50 MCG/HR: 0.05 INJECTION, SOLUTION INTRAMUSCULAR; INTRAVENOUS at 21:50

## 2025-05-18 RX ADMIN — MINERAL OIL, PETROLATUM: 425; 568 OINTMENT OPHTHALMIC at 09:34

## 2025-05-18 RX ADMIN — BUDESONIDE 500 MCG: 0.5 INHALANT RESPIRATORY (INHALATION) at 07:28

## 2025-05-18 RX ADMIN — INSULIN GLARGINE 5 UNITS: 100 INJECTION, SOLUTION SUBCUTANEOUS at 21:10

## 2025-05-18 RX ADMIN — INSULIN LISPRO 4 UNITS: 100 INJECTION, SOLUTION INTRAVENOUS; SUBCUTANEOUS at 18:09

## 2025-05-18 RX ADMIN — FENTANYL CITRATE 75 MCG/HR: 0.05 INJECTION, SOLUTION INTRAMUSCULAR; INTRAVENOUS at 04:08

## 2025-05-18 RX ADMIN — PIPERACILLIN AND TAZOBACTAM 3375 MG: 3; .375 INJECTION, POWDER, LYOPHILIZED, FOR SOLUTION INTRAVENOUS at 21:33

## 2025-05-18 ASSESSMENT — PULMONARY FUNCTION TESTS
PIF_VALUE: 23
PIF_VALUE: 22
PIF_VALUE: 21
PIF_VALUE: 22
PIF_VALUE: 24
PIF_VALUE: 23

## 2025-05-18 ASSESSMENT — PAIN SCALES - GENERAL: PAINLEVEL_OUTOF10: 0

## 2025-05-18 NOTE — PROGRESS NOTES
[facility]   Pharmacy Pharmacokinetic Monitoring Service - Vancomycin     Isidro Reardon is a 64 y.o. male starting on vancomycin therapy for CAP (3 days). Pharmacy consulted by Dr. Yousif for monitoring and adjustment.    Target Concentration:  trough of 15 -  20     Additional Antimicrobials: Zosyn  / doxycyline    Pertinent Laboratory Values:   Wt Readings from Last 1 Encounters:   05/17/25 85 kg (187 lb 6.3 oz)     Temp Readings from Last 1 Encounters:   05/18/25 97.9 °F (36.6 °C) (Axillary)     Estimated Creatinine Clearance: 29 mL/min (A) (based on SCr of 2.71 mg/dL (H)).  Recent Labs     05/17/25  0559 05/18/25  0300   CREATININE 2.03* 2.71*   BUN 32* 38*   WBC 17.8* 14.8*     Procalcitonin: 1.25  (5/18/25)     MRSA Nasal Swab: was ordered by provider, awaiting results.    Plan:  Concentration-guided dosing due to renal impairment/insufficiency  Start Vancomycin 1500 mg IV once, scheduled for 5/18 at 10:30  Renal labs as indicated   Will obtain Vancomycin Random level within 24 hours of first dose.   Pharmacy will continue to monitor patient and adjust therapy as indicated    Thank you for the consult,  Caro Guerrero, PharmD  5/18/2025 9:32 AM

## 2025-05-18 NOTE — PLAN OF CARE
Problem: Chronic Conditions and Co-morbidities  Goal: Patient's chronic conditions and co-morbidity symptoms are monitored and maintained or improved  Outcome: Progressing  Flowsheets (Taken 5/17/2025 2000)  Care Plan - Patient's Chronic Conditions and Co-Morbidity Symptoms are Monitored and Maintained or Improved:   Monitor and assess patient's chronic conditions and comorbid symptoms for stability, deterioration, or improvement   Collaborate with multidisciplinary team to address chronic and comorbid conditions and prevent exacerbation or deterioration   Update acute care plan with appropriate goals if chronic or comorbid symptoms are exacerbated and prevent overall improvement and discharge     Problem: Discharge Planning  Goal: Discharge to home or other facility with appropriate resources  Outcome: Progressing  Flowsheets (Taken 5/17/2025 2000)  Discharge to home or other facility with appropriate resources:   Identify barriers to discharge with patient and caregiver   Arrange for needed discharge resources and transportation as appropriate   Identify discharge learning needs (meds, wound care, etc)   Refer to discharge planning if patient needs post-hospital services based on physician order or complex needs related to functional status, cognitive ability or social support system     Problem: Safety - Medical Restraint  Goal: Remains free of injury from restraints (Restraint for Interference with Medical Device)  Description: INTERVENTIONS:1. Determine that other, less restrictive measures have been tried or would not be effective before applying the restraint2. Evaluate the patient's condition at the time of restraint application3. Inform patient/family regarding the reason for restraint4. Q2H: Monitor safety, psychosocial status, comfort, nutrition and hydration  Outcome: Progressing     Problem: Safety - Adult  Goal: Free from fall injury  Outcome: Progressing     Problem: Pain  Goal: Verbalizes/displays

## 2025-05-18 NOTE — PROGRESS NOTES
[facility]  Pulmonary, Critical Care, and Sleep Medicine    Name: Isidro Reardon MRN: [id number]   : 1960 Hospital: [facility]    Date: 2025  Room: [address] [place] [address] Note                                              Consult requesting physician: Dr. Pak  Reason for Consult: Respiratory failure    IMPRESSION:   Acute respiratory failure   J90.00  Cardiac arrest   I 46.9  Complete heart block   I45 .9  Hypotension  Pneumonia ? Aspiration ?  COPD  CAD  Status post TAVR  GERD        Active Hospital Problems    Diagnosis Date Noted    S/P TAVR (transcatheter aortic valve replacement) [Z95.2] 2025    Cardiac arrest (HCC) [I46.9] 2025    Acute respiratory failure (HCC) [J96.00] 2025    Hyperkalemia [E87.5] 2025    Complete heart block (HCC) [I44.2] 05/15/2025    Heart block [I45.9] 05/15/2025    RENAE (acute kidney injury) [N17.9] 2024    Type 2 diabetes with nephropathy (HCC) [E11.21] 2018    Essential hypertension [I10] 2018    COPD (chronic obstructive pulmonary disease) (HCC) [J44.9]     Pneumonia [J18.9]     Coronary atherosclerosis of native coronary artery [I25.10] 2011        Code status: Full Code       RECOMMENDATIONS:     Respiratory:   Acute hypoxemic respiratory failure in the setting of cardiac arrest.  Intubated during cardiac arrest.  Patient has a history of mild COPD as per the records he is only on albuterol.  Patient is on full ventilatory support   Overnight patient had an episode of hypoxemia was temporarily on bilevel ventilation now back to AC 18/500/60%/6.  ABG 7.3 3/51/92/96  Increased respiratory rate to 20.  Patient had brief spontaneous breathing trial on pressure support 12  Once FiO2 is 50 and PEEP is 5 we will do full spontaneous breathing trial next  Chest x-ray 2025 stable.  ET tube in place    Aspiration pneumonia versus community-acquired pneumonia  CT chest 2025  1.  83.5 kg (184 lb)   02/11/25 86.2 kg (190 lb)          Ventilator Settings:  Lab Results   Component Value Date/Time    MODE ASSIST CONTROL 05/18/2025 10:51 AM       VENT SETTINGS (Comprehensive)  Vent Information  Ventilator Day(s): 4  Ventilator ID: [id number]  Ventilator Safety Check Performed Pre-Use: Yes  Equipment Changed: Airway securing device, Expiratory Filter, Humidification, Suction catheter, Vent Circuit  Ventilator Initiate: Yes  Vent Mode: AC/VC  Additional Respiratory Assessments  Pulse: 96  Respirations: 18  SpO2: 90 %  Humidification Source: Heated wire  Humidification Temp: 37  Circuit Condensation: Drained      Physical Exam:     General/Neurology: Intubated lightly sedated, opens eyes follows simple commands  Head:   Normocephalic, without obvious abnormality, atraumatic.  Eye:   EOM intact. No scleral icterus, no pallor, no cyanosis.  Nose:   No sinus tenderness  Throat:  Lips, mucosa, and tongue normal. No oral thrush.  Neck:   Supple, symmetric. No lymphadenopathy. Trachea midline.  Lung:   Moderate air entry bilateral equal.  Bilateral rales at the bases . No rhonchi.  Mild expiratory wheezing right more than left wheezing. No stridors. No prolongded expiration. No accessory muscle use.  Heart:   Regular rate & rhythm. S1 S2 present. 3/6No murmur.  No JVD.  Abdomen:  Soft. NT. ND. +BS. No masses.  Extremities:  No edema. No cyanosis. No clubbing.  Pulses: 2+ and symmetric in DP. Capillary refill: normal.   Lymphatic:  No cervical or supraclavicular palpable lymphadenopathy.  Musculoskeletal: No joint swelling. No tenderness.   Skin:   Color, texture, turgor normal. No rashes or lesions.     Data:               ABG Recent Labs     05/18/25  1051   MODE ASSIST CONTROL   POCTV 500   POCFIO2 60          CMP Recent Labs     05/16/25  0656 05/16/25  1158 05/16/25  1441 05/17/25  0559 05/18/25  0300   * 136  --  136 132*   K 6.6* 5.3 5.0 4.7 4.8   CL 99 98  --  99 96*   CO2 23 26  --  28 24

## 2025-05-18 NOTE — PROGRESS NOTES
Assessment:     Isidro Reardon is a 64 y.o. year old male with  CAD, AS s/p  TAVR, diabetes mellitus, hypertension, hypertensive heart disease, neuropathy presented to he ER for  lightheadedness dizziness.Later on had complete heart block, asystole cardiac arrest   Now has acute renal failure due to cardiac arrest/bradycardia and hyperkalemia is due to RENAE and CPR .     Acute renal failure  Hyponatremia  S/p Cardiac arrest  Complete heart block   Respiratory failure      Plan:      Urine out put is low today  Continue supportive care  D/w daughter at the bedside.   Urine studies, probably cardiorenal syndrome vs hypovolemia  Change to NS base solution for Dopamine, keep SBP above 110 if possible  If unable to change the base solution then one gm of salt BID     CC: RENAE,   Interval History: no interval change since yesterday, , pt is intubated on Dabuamin            Blood pressure (!) 151/76, pulse 96, temperature 97.9 °F (36.6 °C), temperature source Axillary, resp. rate 18, height 1.803 m (5' 10.98\"), weight 85 kg (187 lb 6.3 oz), SpO2 90%.    Intake/Output Summary (Last 24 hours) at 5/18/2025 1122  Last data filed at 5/18/2025 0944  Gross per 24 hour   Intake 1275.06 ml   Output 945 ml   Net 330.06 ml        On vent 65% Fio2  Abdomen is soft  No edema   Dopamine 15 mcg.   Moon in place clear logan urine           Intake/Output Summary (Last 24 hours) at 5/18/2025 1122  Last data filed at 5/18/2025 0944  Gross per 24 hour   Intake 1275.06 ml   Output 945 ml   Net 330.06 ml      Recent Labs     05/18/25  0300   WBC 14.8*     Lab Results   Component Value Date/Time     05/18/2025 03:00 AM    K 4.8 05/18/2025 03:00 AM    CL 96 05/18/2025 03:00 AM    CO2 24 05/18/2025 03:00 AM    BUN 38 05/18/2025 03:00 AM    GFRAA >60 10/29/2021 10:58 AM     Current Facility-Administered Medications   Medication Dose Route Frequency Provider Last Rate Last Admin    doxycycline (VIBRAMYCIN) 100 mg in sodium chloride 0.9 % 100 mL  solution 15 mcg  15 mcg Nebulization BID RT Seema Yousif MD   15 mcg at 05/18/25 0728    chlorhexidine (PERIDEX) 0.12 % solution 15 mL  15 mL Mouth/Throat BID Seema Yousif MD   15 mL at 05/18/25 0933    famotidine (PEPCID) 20 MG/2ML 20 mg in sodium chloride (PF) 0.9 % 10 mL injection  20 mg IntraVENous Daily Seema Yousif MD   20 mg at 05/18/25 0931    Polyvinyl Alcohol-Povidone PF (REFRESH) 1.4-0.6 % ophthalmic solution 1 drop  1 drop Both Eyes Q4H Yael Pak MD        Or    lubrifresh P.M. (artificial tears) ophthalmic ointment   Both Eyes Q4H Yael Pak MD   Given at 05/18/25 0934    fentaNYL (SUBLIMAZE) injection 50 mcg  50 mcg IntraVENous Q2H PRN Seema Yousif MD   50 mcg at 05/17/25 0845    [Held by provider] EPINEPHrine 5 mg in sodium chloride 0.9 % 250 mL infusion  1-20 mcg/min IntraVENous Continuous Jay Corona MD   Stopped at 05/16/25 0018    DOPamine (INTROPIN) 1600 mcg/mL in dextrose 5% infusion  1-20 mcg/kg/min IntraVENous Continuous Jay Corona MD 48.5 mL/hr at 05/18/25 0929 15 mcg/kg/min at 05/18/25 0929    aspirin chewable tablet 81 mg  81 mg Oral Daily AmYael lares MD   81 mg at 05/18/25 0932    [Held by provider] DULoxetine (CYMBALTA) extended release capsule 60 mg  60 mg Oral Daily AmYael lares MD        gabapentin (NEURONTIN) capsule 300 mg  300 mg Oral TID Seema Yousif MD   300 mg at 05/18/25 0930    [Held by provider] losartan (COZAAR) tablet 100 mg  100 mg Oral Daily AmYael lares MD        rosuvastatin (CRESTOR) tablet 20 mg  20 mg Oral Daily AmYael lares MD   20 mg at 05/18/25 0930    sodium chloride flush 0.9 % injection 5-40 mL  5-40 mL IntraVENous 2 times per day Yael Pak MD   10 mL at 05/18/25 0933    sodium chloride flush 0.9 % injection 5-40 mL  5-40 mL IntraVENous PRN Yael Pak MD        potassium

## 2025-05-18 NOTE — PROGRESS NOTES
Zosyn (Piperacillin/Tazobactam) Extended Infusion    Isidro Reardon, a 64 y.o. yo male, has been converted to an extended infusion of Zosyn while in the intensive care unit.    Extended infusions will run over 4 hours (240 minutes).  Dose adjusted to:  Zosyn 3.375 grams IV q8h  (each dose to infuse over 240 minutes) x 5 days    Indication: CAP   Prior order:   Zosyn 2.25 grams IV q6h x 5 days    Invalid input(s): \"CREA\"  Ht Readings from Last 1 Encounters:   05/16/25 1.803 m (5' 10.98\")     Wt Readings from Last 1 Encounters:   05/17/25 85 kg (187 lb 6.3 oz)       CrCl : Serum creatinine: 2.71 mg/dL (H) 05/18/25 0300  Estimated creatinine clearance: 29 mL/min (A)    Renal adjustment of extended infusion of Zosyn  3.375 or 4.5 gm every 8 hours for CrCl >/= 20 ml/min  3.375 or 4.5 gm every 12 hours for CrCl < 20 ml/min, intermittent HD or PD    Pharmacy to continue to monitor daily.    Caro Guerrero, PharmD

## 2025-05-18 NOTE — PROGRESS NOTES
Hospitalist Progress Note    Patient: Isidro Reardon MRN: [id number]  [facility]: [id number]    YOB: 1960  Age: 64 y.o.  Sex: male    DOA: 5/15/2025 LOS:  LOS: 3 days          Chief Complain :  Dizziness, lightheadedness.  64 y.o. male with CAD, aortic stenosis status post TAVR, diabetes mellitus, hypertension, hypertensive heart disease, neuropathy presents to ER with concerns of lightheadedness dizziness. In the ER he was noted to have heart rate in 30s to 40s. EKG showed complete heart block. He was given epinephrine and started on dopamine drip which improved his heart rate. He actually went into sinus tachycardia. Patient was subsequently admitted to intensive care unit. Later he suddenly became bradycardic and went into asystole. CPR initiated and ACLS protocol initiated. ROSC obtained. Patient intubated external pacers placed. Cardiology contacted s/pTVP.   Subjective:   Patient orally intubated, sedated.     Assessment/Plan     Active Hospital Problems    Diagnosis     S/P TAVR (transcatheter aortic valve replacement) [Z95.2]     Cardiac arrest (HCC) [I46.9]     Acute respiratory failure (HCC) [J96.00]     Hyperkalemia [E87.5]     Complete heart block (HCC) [I44.2]     Heart block [I45.9]     RENAE (acute kidney injury) [N17.9]     Type 2 diabetes with nephropathy (HCC) [E11.21]     Essential hypertension [I10]     COPD (chronic obstructive pulmonary disease) (HCC) [J44.9]     Pneumonia [J18.9]     Coronary atherosclerosis of native coronary artery [I25.10]           CRITICAL CARE PLAN     Resp   Acute respiratory failure  After cardiac arrest  Orally intubated, sedated  See vent orders, VAP bundle. HOB>30 degrees.

## 2025-05-18 NOTE — PROGRESS NOTES
Cardiology Progress Note        Patient: Isidro Reardon        Sex: male          DOA: 5/15/2025  YOB: 1960      Age:  64 y.o.        LOS:  LOS: 3 days   Assessment/Plan     Principal Problem:    Complete heart block (HCC)  Active Problems:    Coronary atherosclerosis of native coronary artery    Pneumonia    COPD (chronic obstructive pulmonary disease) (HCC)    Essential hypertension    Type 2 diabetes with nephropathy (HCC)    RENAE (acute kidney injury)    Heart block    S/P TAVR (transcatheter aortic valve replacement)    Cardiac arrest (HCC)    Acute respiratory failure (HCC)    Hyperkalemia  Resolved Problems:    * No resolved hospital problems. *      Plan:  Cardiac telemetry normal sinus rhythm  CT scan has shown possible infiltrate/pneumonia/infectious process  Normal sinus rhythm  Still requiring higher oxygenation  Discussed with Dr. Yousif may consider extubation tomorrow if oxygen requirement reduced  Continue with antibiotic treatment    Discussed with patient's daughter                      Patient remained intubated  Patient is not dependent on transvenous pacemaker  WBC count is improving  Patient can have CT scan from cardiac standpoint  Patient can also have SBT/possible extubation  Will continue to monitor from cardiac standpoint.  Patient heart rate is 88 bpm and I have tested TVP by increasing heart rate to 100 with normal TVP paced rhythm.  TVP backup rate decreased to a 70 bpm  Continue with antibiotic treatment  Patient's daughter in the room  Management plan reviewed with patient's daughter  Discussed with Dr. Yousif        Patient remained intubated  EF is normal  Prosthetic valve not well-visualized but stable gradient  Overall RV looks normal in size and function  At this point there is no urgent indication of putting a permanent pacemaker especially with suspected infection.  Patient's daughter updated  Discussed with RN  Discussed with Dr. Barrientos  Critical care time

## 2025-05-19 ENCOUNTER — APPOINTMENT (OUTPATIENT)
Facility: HOSPITAL | Age: 65
DRG: 242 | End: 2025-05-19
Payer: MEDICARE

## 2025-05-19 PROBLEM — J96.01 ACUTE RESPIRATORY FAILURE WITH HYPOXIA (HCC): Status: ACTIVE | Noted: 2025-05-16

## 2025-05-19 PROBLEM — N18.2 CHRONIC KIDNEY DISEASE (CKD), STAGE II (MILD): Status: ACTIVE | Noted: 2025-05-19

## 2025-05-19 PROBLEM — I95.9 HYPOTENSION: Status: ACTIVE | Noted: 2025-05-19

## 2025-05-19 LAB
ALBUMIN SERPL-MCNC: 2.6 G/DL (ref 3.4–5)
ALBUMIN/GLOB SERPL: 0.7 (ref 0.8–1.7)
ALP SERPL-CCNC: 53 U/L (ref 45–117)
ALT SERPL-CCNC: 10 U/L (ref 10–50)
ANION GAP SERPL CALC-SCNC: 12 MMOL/L (ref 3–18)
ARTERIAL PATENCY WRIST A: POSITIVE
AST SERPL-CCNC: 19 U/L (ref 10–38)
BASE EXCESS BLD CALC-SCNC: 0.4 MMOL/L
BASOPHILS # BLD: 0.06 K/UL (ref 0–0.1)
BASOPHILS NFR BLD: 0.5 % (ref 0–2)
BDY SITE: ABNORMAL
BILIRUB SERPL-MCNC: 1 MG/DL (ref 0.2–1)
BODY TEMPERATURE: 98.7
BUN SERPL-MCNC: 40 MG/DL (ref 6–23)
BUN/CREAT SERPL: 16 (ref 12–20)
CALCIUM SERPL-MCNC: 9 MG/DL (ref 8.5–10.1)
CHLORIDE SERPL-SCNC: 98 MMOL/L (ref 98–107)
CO2 SERPL-SCNC: 24 MMOL/L (ref 21–32)
CREAT SERPL-MCNC: 2.54 MG/DL (ref 0.6–1.3)
DIFFERENTIAL METHOD BLD: ABNORMAL
EOSINOPHIL # BLD: 0.1 K/UL (ref 0–0.4)
EOSINOPHIL NFR BLD: 0.8 % (ref 0–5)
ERYTHROCYTE [DISTWIDTH] IN BLOOD BY AUTOMATED COUNT: 13.6 % (ref 11.6–14.5)
GAS FLOW.O2 O2 DELIVERY SYS: ABNORMAL
GAS FLOW.O2 SETTING OXYMISER: 18 BPM
GLOBULIN SER CALC-MCNC: 3.4 G/DL (ref 2–4)
GLUCOSE BLD STRIP.AUTO-MCNC: 184 MG/DL (ref 70–110)
GLUCOSE BLD STRIP.AUTO-MCNC: 189 MG/DL (ref 70–110)
GLUCOSE BLD STRIP.AUTO-MCNC: 198 MG/DL (ref 70–110)
GLUCOSE BLD STRIP.AUTO-MCNC: 207 MG/DL (ref 70–110)
GLUCOSE BLD STRIP.AUTO-MCNC: 212 MG/DL (ref 70–110)
GLUCOSE SERPL-MCNC: 264 MG/DL (ref 74–108)
HCO3 BLD-SCNC: 27.3 MMOL/L (ref 21–28)
HCT VFR BLD AUTO: 37.6 % (ref 36–48)
HGB BLD-MCNC: 11.8 G/DL (ref 13–16)
IMM GRANULOCYTES # BLD AUTO: 0.08 K/UL (ref 0–0.04)
IMM GRANULOCYTES NFR BLD AUTO: 0.6 % (ref 0–0.5)
INSPIRATION.DURATION SETTING TIME VENT: 0.9 SEC
IPAP/PIP/HIGH PEEP: 24
LYMPHOCYTES # BLD: 0.94 K/UL (ref 0.9–3.3)
LYMPHOCYTES NFR BLD: 7.2 % (ref 21–52)
MAGNESIUM SERPL-MCNC: 1.9 MG/DL (ref 1.6–2.6)
MCH RBC QN AUTO: 28.4 PG (ref 24–34)
MCHC RBC AUTO-ENTMCNC: 31.4 G/DL (ref 31–37)
MCV RBC AUTO: 90.6 FL (ref 78–100)
MONOCYTES # BLD: 0.97 K/UL (ref 0.05–1.2)
MONOCYTES NFR BLD: 7.4 % (ref 3–10)
NEUTS SEG # BLD: 10.93 K/UL (ref 1.8–8)
NEUTS SEG NFR BLD: 83.5 % (ref 40–73)
NRBC # BLD: 0 K/UL (ref 0–0.01)
NRBC BLD-RTO: 0 PER 100 WBC
NT PRO BNP: 159 PG/ML (ref 36–900)
O2/TOTAL GAS SETTING VFR VENT: 60 %
PAW @ MEAN EXP FLOW ON VENT: 11 CMH2O
PCO2 BLD: 52.4 MMHG (ref 35–48)
PEEP RESPIRATORY: 6 CMH2O
PH BLD: 7.33 (ref 7.35–7.45)
PLATELET # BLD AUTO: 159 K/UL (ref 135–420)
PMV BLD AUTO: 10.5 FL (ref 9.2–11.8)
PO2 BLD: 95 MMHG (ref 83–108)
POTASSIUM SERPL-SCNC: 4.7 MMOL/L (ref 3.5–5.5)
PROT SERPL-MCNC: 6 G/DL (ref 6.4–8.2)
RBC # BLD AUTO: 4.15 M/UL (ref 4.35–5.65)
RESPIRATORY RATE, POC: 18 (ref 5–40)
SAO2 % BLD: 96.6 % (ref 92–97)
SERVICE CMNT-IMP: ABNORMAL
SODIUM SERPL-SCNC: 134 MMOL/L (ref 136–145)
SPECIMEN TYPE: ABNORMAL
TROPONIN T SERPL HS-MCNC: 49.7 NG/L (ref 0–22)
VANCOMYCIN SERPL-MCNC: 7.6 UG/ML (ref 5–40)
VENTILATION MODE VENT: ABNORMAL
VT SETTING VENT: 500 ML
WBC # BLD AUTO: 13.1 K/UL (ref 4.6–13.2)

## 2025-05-19 PROCEDURE — 6360000002 HC RX W HCPCS: Performed by: HOSPITALIST

## 2025-05-19 PROCEDURE — 80202 ASSAY OF VANCOMYCIN: CPT

## 2025-05-19 PROCEDURE — 83735 ASSAY OF MAGNESIUM: CPT

## 2025-05-19 PROCEDURE — 82962 GLUCOSE BLOOD TEST: CPT

## 2025-05-19 PROCEDURE — 6370000000 HC RX 637 (ALT 250 FOR IP): Performed by: HOSPITALIST

## 2025-05-19 PROCEDURE — 6370000000 HC RX 637 (ALT 250 FOR IP): Performed by: INTERNAL MEDICINE

## 2025-05-19 PROCEDURE — 83880 ASSAY OF NATRIURETIC PEPTIDE: CPT

## 2025-05-19 PROCEDURE — 6360000002 HC RX W HCPCS: Performed by: EMERGENCY MEDICINE

## 2025-05-19 PROCEDURE — 80053 COMPREHEN METABOLIC PANEL: CPT

## 2025-05-19 PROCEDURE — 85025 COMPLETE CBC W/AUTO DIFF WBC: CPT

## 2025-05-19 PROCEDURE — 2580000003 HC RX 258: Performed by: INTERNAL MEDICINE

## 2025-05-19 PROCEDURE — 84484 ASSAY OF TROPONIN QUANT: CPT

## 2025-05-19 PROCEDURE — 94003 VENT MGMT INPAT SUBQ DAY: CPT

## 2025-05-19 PROCEDURE — 2500000003 HC RX 250 WO HCPCS: Performed by: HOSPITALIST

## 2025-05-19 PROCEDURE — 94640 AIRWAY INHALATION TREATMENT: CPT

## 2025-05-19 PROCEDURE — 2000000000 HC ICU R&B

## 2025-05-19 PROCEDURE — 6360000002 HC RX W HCPCS: Performed by: INTERNAL MEDICINE

## 2025-05-19 PROCEDURE — 82803 BLOOD GASES ANY COMBINATION: CPT

## 2025-05-19 PROCEDURE — 36600 WITHDRAWAL OF ARTERIAL BLOOD: CPT

## 2025-05-19 PROCEDURE — 71045 X-RAY EXAM CHEST 1 VIEW: CPT

## 2025-05-19 RX ORDER — IPRATROPIUM BROMIDE AND ALBUTEROL SULFATE 2.5; .5 MG/3ML; MG/3ML
1 SOLUTION RESPIRATORY (INHALATION) EVERY 4 HOURS PRN
Status: DISCONTINUED | OUTPATIENT
Start: 2025-05-19 | End: 2025-05-29 | Stop reason: HOSPADM

## 2025-05-19 RX ADMIN — GABAPENTIN 300 MG: 300 CAPSULE ORAL at 13:30

## 2025-05-19 RX ADMIN — MINERAL OIL, PETROLATUM: 425; 568 OINTMENT OPHTHALMIC at 20:26

## 2025-05-19 RX ADMIN — DOXYCYCLINE 100 MG: 100 INJECTION, POWDER, LYOPHILIZED, FOR SOLUTION INTRAVENOUS at 10:26

## 2025-05-19 RX ADMIN — ROSUVASTATIN CALCIUM 20 MG: 10 TABLET, FILM COATED ORAL at 09:46

## 2025-05-19 RX ADMIN — ASPIRIN 81 MG: 81 TABLET, CHEWABLE ORAL at 09:47

## 2025-05-19 RX ADMIN — ARFORMOTEROL TARTRATE 15 MCG: 15 SOLUTION RESPIRATORY (INHALATION) at 07:53

## 2025-05-19 RX ADMIN — SODIUM CHLORIDE, PRESERVATIVE FREE 10 ML: 5 INJECTION INTRAVENOUS at 10:01

## 2025-05-19 RX ADMIN — DOXYCYCLINE 100 MG: 100 INJECTION, POWDER, LYOPHILIZED, FOR SOLUTION INTRAVENOUS at 21:59

## 2025-05-19 RX ADMIN — VANCOMYCIN HYDROCHLORIDE 1500 MG: 10 INJECTION, POWDER, LYOPHILIZED, FOR SOLUTION INTRAVENOUS at 10:16

## 2025-05-19 RX ADMIN — PIPERACILLIN AND TAZOBACTAM 3375 MG: 3; .375 INJECTION, POWDER, LYOPHILIZED, FOR SOLUTION INTRAVENOUS at 13:34

## 2025-05-19 RX ADMIN — IPRATROPIUM BROMIDE AND ALBUTEROL SULFATE 1 DOSE: .5; 3 SOLUTION RESPIRATORY (INHALATION) at 00:46

## 2025-05-19 RX ADMIN — MINERAL OIL, PETROLATUM: 425; 568 OINTMENT OPHTHALMIC at 04:15

## 2025-05-19 RX ADMIN — INSULIN LISPRO 4 UNITS: 100 INJECTION, SOLUTION INTRAVENOUS; SUBCUTANEOUS at 12:10

## 2025-05-19 RX ADMIN — ENOXAPARIN SODIUM 40 MG: 100 INJECTION SUBCUTANEOUS at 09:58

## 2025-05-19 RX ADMIN — CHLORHEXIDINE GLUCONATE 15 ML: 1.2 RINSE BUCCAL at 09:47

## 2025-05-19 RX ADMIN — IPRATROPIUM BROMIDE AND ALBUTEROL SULFATE 1 DOSE: .5; 3 SOLUTION RESPIRATORY (INHALATION) at 07:53

## 2025-05-19 RX ADMIN — BUDESONIDE 500 MCG: 0.5 INHALANT RESPIRATORY (INHALATION) at 20:58

## 2025-05-19 RX ADMIN — SODIUM CHLORIDE, PRESERVATIVE FREE 10 ML: 5 INJECTION INTRAVENOUS at 21:49

## 2025-05-19 RX ADMIN — LACTULOSE 20 G: 10 SOLUTION ORAL at 09:47

## 2025-05-19 RX ADMIN — MINERAL OIL, PETROLATUM: 425; 568 OINTMENT OPHTHALMIC at 12:11

## 2025-05-19 RX ADMIN — FAMOTIDINE 20 MG: 10 INJECTION, SOLUTION INTRAVENOUS at 10:18

## 2025-05-19 RX ADMIN — INSULIN LISPRO 4 UNITS: 100 INJECTION, SOLUTION INTRAVENOUS; SUBCUTANEOUS at 05:41

## 2025-05-19 RX ADMIN — GABAPENTIN 300 MG: 300 CAPSULE ORAL at 09:46

## 2025-05-19 RX ADMIN — MINERAL OIL, PETROLATUM: 425; 568 OINTMENT OPHTHALMIC at 23:35

## 2025-05-19 RX ADMIN — PIPERACILLIN AND TAZOBACTAM 3375 MG: 3; .375 INJECTION, POWDER, LYOPHILIZED, FOR SOLUTION INTRAVENOUS at 05:34

## 2025-05-19 RX ADMIN — MINERAL OIL, PETROLATUM: 425; 568 OINTMENT OPHTHALMIC at 09:48

## 2025-05-19 RX ADMIN — Medication 1 G: at 09:47

## 2025-05-19 RX ADMIN — FENTANYL CITRATE 50 MCG: 50 INJECTION INTRAMUSCULAR; INTRAVENOUS at 19:30

## 2025-05-19 RX ADMIN — GABAPENTIN 300 MG: 300 CAPSULE ORAL at 20:37

## 2025-05-19 RX ADMIN — DOPAMINE HYDROCHLORIDE 14 MCG/KG/MIN: 160 INJECTION, SOLUTION INTRAVENOUS at 20:37

## 2025-05-19 RX ADMIN — MINERAL OIL, PETROLATUM: 425; 568 OINTMENT OPHTHALMIC at 18:01

## 2025-05-19 RX ADMIN — CHLORHEXIDINE GLUCONATE 15 ML: 1.2 RINSE BUCCAL at 20:25

## 2025-05-19 RX ADMIN — IPRATROPIUM BROMIDE AND ALBUTEROL SULFATE 1 DOSE: .5; 3 SOLUTION RESPIRATORY (INHALATION) at 20:58

## 2025-05-19 RX ADMIN — INSULIN GLARGINE 5 UNITS: 100 INJECTION, SOLUTION SUBCUTANEOUS at 20:37

## 2025-05-19 RX ADMIN — INSULIN LISPRO 4 UNITS: 100 INJECTION, SOLUTION INTRAVENOUS; SUBCUTANEOUS at 23:38

## 2025-05-19 RX ADMIN — PIPERACILLIN AND TAZOBACTAM 3375 MG: 3; .375 INJECTION, POWDER, LYOPHILIZED, FOR SOLUTION INTRAVENOUS at 21:50

## 2025-05-19 RX ADMIN — DOPAMINE HYDROCHLORIDE 15 MCG/KG/MIN: 160 INJECTION, SOLUTION INTRAVENOUS at 08:34

## 2025-05-19 RX ADMIN — Medication 1 G: at 20:24

## 2025-05-19 RX ADMIN — BUDESONIDE 500 MCG: 0.5 INHALANT RESPIRATORY (INHALATION) at 07:53

## 2025-05-19 RX ADMIN — INSULIN LISPRO 4 UNITS: 100 INJECTION, SOLUTION INTRAVENOUS; SUBCUTANEOUS at 18:02

## 2025-05-19 ASSESSMENT — PAIN SCALES - GENERAL
PAINLEVEL_OUTOF10: 0

## 2025-05-19 ASSESSMENT — PULMONARY FUNCTION TESTS
PIF_VALUE: 23
PIF_VALUE: 24
PIF_VALUE: 23
PIF_VALUE: 22
PIF_VALUE: 30
PIF_VALUE: 22

## 2025-05-19 NOTE — PROGRESS NOTES
since on dopamine infusion.  Avoid LABA to prevent cardiac arrhythmias; budesonide nebs twice daily; ipratropium/albuterol nebs as needed.  Keep SPO2 >=92%. HOB 30 degree elevation all the time. Aggressive pulmonary toileting. Aspiration precautions. Incentive spirometry.    CVS: Patient with complete heart block and hypotension.  Right femoral pacing wire.  Pressor-dopamine.  Echo-normal LV systolic function; EF 55 to 60%; diastolic dysfunction present; normal RV size and function; status post TAVR; no paravalvular regurgitation; RVSP 44 mmHg-mild pulmonary hypertension likely group 2/cardiac.  Cardiology obevsr-pm-Bq. Ahmed.    ID: Microbiology reviewed-negative.  Empiric antibiotic therapy for aspiration pneumonia-IV Zosyn, doxycycline and IV vancomycin; complete 7 days of antibiotic therapy.  Lactic acid-not elevated.  Procalcitonin 1.17-elevated.  Deescalate antibiotic when appropriate.    Hematology/Oncology: Monitor hemoglobin and platelets.  No active bleeding issues.    Renal: Monitor renal function and urine output.  Patient with CKD stage II; acute renal failure.  Ultrasound kidneys 5/16-medical renal disease without hydronephrosis.  Nephrology [id number]-Cw. Amini.    GI: Tube feeding on hold since patient on pressor.  LFTs-normal transaminases.    Endocrine: Monitor blood sugars-stable.  Known diabetic; patient on Lantus and sliding scale insulin.    Neurology: Status post cardiac arrest 6 minutes.  Patient currently sedated; plan for weaning sedation when clinically stable.    Toxicology: UDS negative for cocaine or amphetamines on admission.      Skin/Wound: ICU nursing care.    Electrolytes: Replace electrolytes per ICU electrolyte replacement protocol.     Nutrition: N.p.o. currently as reviewed above.    Prophylaxis: DVT Prophylaxis: Sc enoxaparin. GI Prophylaxis: Famotidine.      Restraints: Bilateral soft wrist restraints for patient interfering with medical therapy/management and patient safety.  MD        enoxaparin (LOVENOX) injection 40 mg  40 mg SubCUTAneous Daily Yael Pak MD   40 mg at 05/18/25 0931    ondansetron (ZOFRAN-ODT) disintegrating tablet 4 mg  4 mg Oral Q8H PRN Yael Pak MD        Or    ondansetron (ZOFRAN) injection 4 mg  4 mg IntraVENous Q6H PRN Yael Pak MD        polyethylene glycol (GLYCOLAX) packet 17 g  17 g Oral Daily PRN Yael Pak MD        acetaminophen (TYLENOL) tablet 650 mg  650 mg Oral Q6H PRN Yael Pak MD   650 mg at 05/17/25 1739    Or    acetaminophen (TYLENOL) suppository 650 mg  650 mg Rectal Q6H PRN Yale Pak MD        glucose chewable tablet 16 g  4 tablet Oral PRN Yael Pak MD        dextrose bolus 10% 125 mL  125 mL IntraVENous PRN Yael Pak MD        Or    dextrose bolus 10% 250 mL  250 mL IntraVENous PRN Yael Pak MD        glucagon injection 1 mg  1 mg SubCUTAneous PRN Yael Pak MD        dextrose 10 % infusion   IntraVENous Continuous PRN Yael Pak MD        midazolam (VERSED) 100mg/100mL in NS infusion  1-10 mg/hr IntraVENous Continuous Yael Pak MD 1 mL/hr at 05/19/25 0739 1 mg/hr at 05/19/25 0739    fentaNYL (SUBLIMAZE) 1,000 mcg in sodium chloride 0.9% 100 mL infusion   mcg/hr IntraVENous Continuous Yael Pak MD 2.5 mL/hr at 05/19/25 0611 25 mcg/hr at 05/19/25 0611           Objective:   Intake/Output:     Intake/Output Summary (Last 24 hours) at 5/19/2025 0831  Last data filed at 5/19/2025 0604  Gross per 24 hour   Intake 1544.9 ml   Output 1795 ml   Net -250.1 ml       Vital Signs:    /64   Pulse 92   Temp 98.2 °F (36.8 °C) (Axillary)   Resp 18   Ht 1.803 m (5' 10.98\")   Wt 85 kg (187 lb 6.3 oz)   SpO2 94%   BMI 26.15 kg/m²     Weight:  Wt Readings from Last 3 Encounters:   05/17/25 85 kg (187 lb 6.3 oz)

## 2025-05-19 NOTE — PLAN OF CARE
Problem: Chronic Conditions and Co-morbidities  Goal: Patient's chronic conditions and co-morbidity symptoms are monitored and maintained or improved  Outcome: Progressing  Flowsheets (Taken 5/19/2025 0800)  Care Plan - Patient's Chronic Conditions and Co-Morbidity Symptoms are Monitored and Maintained or Improved: Monitor and assess patient's chronic conditions and comorbid symptoms for stability, deterioration, or improvement     Problem: Discharge Planning  Goal: Discharge to home or other facility with appropriate resources  Outcome: Progressing  Flowsheets (Taken 5/19/2025 0800)  Discharge to home or other facility with appropriate resources: Identify barriers to discharge with patient and caregiver     Problem: Safety - Medical Restraint  Goal: Remains free of injury from restraints (Restraint for Interference with Medical Device)  Description: INTERVENTIONS:1. Determine that other, less restrictive measures have been tried or would not be effective before applying the restraint2. Evaluate the patient's condition at the time of restraint application3. Inform patient/family regarding the reason for restraint4. Q2H: Monitor safety, psychosocial status, comfort, nutrition and hydration  Outcome: Progressing  Flowsheets  Taken 5/19/2025 1200 by Jessica Sanchez RN  Remains free of injury from restraints (restraint for interference with medical device):   Determine that other, less restrictive measures have been tried or would not be effective before applying the restraint   Evaluate the patient's condition at the time of restraint application   Inform patient/family regarding the reason for restraint  Taken 5/19/2025 0915 by Jessica Sanchez RN  Remains free of injury from restraints (restraint for interference with medical device):   Determine that other, less restrictive measures have been tried or would not be effective before applying the restraint   Evaluate the patient's condition at the time of

## 2025-05-19 NOTE — CODE DOCUMENTATION
2100 Patient arrived to unit alert and oriented and on 3L via NC. NSR on monitor with HR 97 bpm. Epinephrine and Dopamine infusing. Nursing assessment and orders in progress (see flowsheets).    2250 Dr. Pak at bedside for assessment    2253 HR 30s on monitor. Patient unresponsive with agonal breathing on arrival. Pulse unable to be detected. CPR started. Oxygenation given via BVM.     2256 Pulse check - thready. Patient remains unresponsive with agonal breathing. CPR resumed. Epi given.     2259 Patient heard grunting. Femoral pulse heard via doppler. CRNA and RT at bedside to intubate. Dr. Pak at bedside. \    2304 Successfully intubated and on ventilator. Transcutaneous pacing started. Dr. Giraldo updated; will plan for emergent transvenous pacemaker insertion.     2345 Patient off the unit with cath lab team.     0046 Patient returned to unit. TVP to right femoral vein. Settings: 80 bpm, milliamps 5, sensitivity 2. HR 80s.     0430 Reassessment. No changes to previous assessment.

## 2025-05-19 NOTE — PLAN OF CARE
Problem: Chronic Conditions and Co-morbidities  Goal: Patient's chronic conditions and co-morbidity symptoms are monitored and maintained or improved  Outcome: Progressing  Flowsheets (Taken 5/18/2025 0800)  Care Plan - Patient's Chronic Conditions and Co-Morbidity Symptoms are Monitored and Maintained or Improved:   Monitor and assess patient's chronic conditions and comorbid symptoms for stability, deterioration, or improvement   Collaborate with multidisciplinary team to address chronic and comorbid conditions and prevent exacerbation or deterioration   Update acute care plan with appropriate goals if chronic or comorbid symptoms are exacerbated and prevent overall improvement and discharge     Problem: Discharge Planning  Goal: Discharge to home or other facility with appropriate resources  Outcome: Progressing  Flowsheets (Taken 5/18/2025 0800)  Discharge to home or other facility with appropriate resources:   Identify barriers to discharge with patient and caregiver   Arrange for needed discharge resources and transportation as appropriate   Identify discharge learning needs (meds, wound care, etc)   Refer to discharge planning if patient needs post-hospital services based on physician order or complex needs related to functional status, cognitive ability or social support system     Problem: Safety - Medical Restraint  Goal: Remains free of injury from restraints (Restraint for Interference with Medical Device)  Description: INTERVENTIONS:1. Determine that other, less restrictive measures have been tried or would not be effective before applying the restraint2. Evaluate the patient's condition at the time of restraint application3. Inform patient/family regarding the reason for restraint4. Q2H: Monitor safety, psychosocial status, comfort, nutrition and hydration  Outcome: Progressing  Flowsheets (Taken 5/18/2025 1012)  Remains free of injury from restraints (restraint for interference with medical  change or resting period  Outcome: Progressing  Flowsheets (Taken 5/18/2025 0800)  Skin Integrity Remains Intact:   Monitor for areas of redness and/or skin breakdown   Assess vascular access sites hourly   Every 4-6 hours minimum:  Change oxygen saturation probe site     Problem: Musculoskeletal - Adult  Goal: Return mobility to safest level of function  Outcome: Progressing  Flowsheets (Taken 5/18/2025 0800)  Return Mobility to Safest Level of Function:   Assess patient stability and activity tolerance for standing, transferring and ambulating with or without assistive devices   Assist with transfers and ambulation using safe patient handling equipment as needed     Problem: Gastrointestinal - Adult  Goal: Maintains or returns to baseline bowel function  Outcome: Progressing  Flowsheets (Taken 5/18/2025 0800)  Maintains or returns to baseline bowel function:   Assess bowel function   Encourage oral fluids to ensure adequate hydration   Administer IV fluids as ordered to ensure adequate hydration  Goal: Maintains adequate nutritional intake  Outcome: Progressing  Flowsheets (Taken 5/18/2025 0800)  Maintains adequate nutritional intake:   Identify factors contributing to decreased intake, treat as appropriate   Monitor percentage of each meal consumed   Monitor intake and output, weight and lab values     Problem: Genitourinary - Adult  Goal: Absence of urinary retention  Outcome: Progressing  Flowsheets (Taken 5/18/2025 0800)  Absence of urinary retention:   Assess patient’s ability to void and empty bladder   Monitor intake/output and perform bladder scan as needed  Goal: Urinary catheter remains patent  Outcome: Progressing  Flowsheets (Taken 5/18/2025 0800)  Urinary catheter remains patent:   Assess patency of urinary catheter   Irrigate catheter per Licensed Independent Practitioner order if indicated and notify Licensed Independent Practitioner if unable to irrigate     Problem: Infection - Adult  Goal:

## 2025-05-19 NOTE — PROGRESS NOTES
[facility]   Pharmacy Pharmacokinetic Monitoring Service - Vancomycin    Consulting Provider: Dr. Yousif   Indication: CAP x 3 days  Target Concentration: Goal AUC/VERÓNICA 400-600 mg*hr/L  Day of Therapy: 2  Additional Antimicrobials: Zosyn/Doxycycline    Pertinent Laboratory Values:   Wt Readings from Last 1 Encounters:   05/17/25 85 kg (187 lb 6.3 oz)     Temp Readings from Last 1 Encounters:   05/19/25 98.7 °F (37.1 °C) (Oral)     Estimated Creatinine Clearance: 31 mL/min (A) (based on SCr of 2.54 mg/dL (H)).  Recent Labs     05/18/25  0300 05/19/25  0532   CREATININE 2.71* 2.54*   BUN 38* 40*   WBC 14.8* 13.1     Recent vancomycin administrations                     vancomycin (VANCOCIN) 1500 mg in sodium chloride 0.9% 500 mL IVPB (mg) 1,500 mg New Bag 05/18/25 1306             Plan:  Random level @ 0532 = 7.6 mg/L  Give vancomycin 1500 mg IV x1 this AM (17 mg/kg) to obtain therapeutic AUCss  of 433 mg/L.hr  Repeat vancomycin concentration ordered for 5/20/25 @ 0600   Pharmacy will continue to monitor patient and adjust therapy as indicated    Thank you for the consult,  Scott Mccurdy RPH  5/19/2025 7:58 AM

## 2025-05-19 NOTE — PROGRESS NOTES
Care  assisting Care Mgr Amanda Weston RN with Discharge Planning needs for patient.  Updates sent to area LTAC's ([facility] Acute [facility]) for review and consideration for placement.    Will follow along for further needs.

## 2025-05-19 NOTE — PROGRESS NOTES
Hospitalist Progress Note    Patient: Isidro Reardon MRN: [id number]  [facility]: [id number]    YOB: 1960  Age: 64 y.o.  Sex: male    DOA: 5/15/2025 LOS:  LOS: 4 days          Chief Complain :  Dizziness, lightheadedness.  64 y.o. male with CAD, aortic stenosis status post TAVR, diabetes mellitus, hypertension, hypertensive heart disease, neuropathy presents to ER with concerns of lightheadedness dizziness. In the ER he was noted to have heart rate in 30s to 40s. EKG showed complete heart block. He was given epinephrine and started on dopamine drip which improved his heart rate. He actually went into sinus tachycardia. Patient was subsequently admitted to intensive care unit. Later he suddenly became bradycardic and went into asystole. CPR initiated and ACLS protocol initiated. ROSC obtained. Patient intubated external pacers placed. Cardiology contacted s/pTVP.   Subjective:   Patient orally intubated, sedated.     Assessment/Plan     Active Hospital Problems    Diagnosis     Hypotension [I95.9]     Chronic kidney disease (CKD), stage II (mild) [N18.2]     S/P TAVR (transcatheter aortic valve replacement) [Z95.2]     Cardiac arrest (HCC) [I46.9]     Acute respiratory failure with hypoxia (HCC) [J96.01]     Hyperkalemia [E87.5]     Complete heart block (HCC) [I44.2]     Heart block [I45.9]     Acute renal failure [N17.9]     Type 2 diabetes with nephropathy (HCC) [E11.21]     Essential hypertension [I10]     COPD (chronic obstructive pulmonary disease) (HCC) [J44.9]     Aspiration pneumonia (HCC) [J69.0]     Coronary atherosclerosis of native coronary artery [I25.10]           CRITICAL CARE PLAN     Resp   Acute respiratory

## 2025-05-19 NOTE — PROGRESS NOTES
RENAL CONSULT  PROGRESS NOTE   2025    Patient:  Isidro Reardon  :  1960  Gender:  male  MRN #:  [id number]    Reason for Consult: Acute renal failure and Hyperkalemia requested by intensivist Dr Barrientos     Relevant events:   Still on mechanical vent   BP stable on vasopressors  Decent urine output   Labs, overnight events and consultant noted reviewed       Review of Symptoms:     Unable to obtained from patient due to his condition     Objective:    /73   Pulse 92   Temp 98.2 °F (36.8 °C) (Axillary)   Resp 18   Ht 1.803 m (5' 10.98\")   Wt 85 kg (187 lb 6.3 oz)   SpO2 94%   BMI 26.15 kg/m²     Physical Exam:    Pt on mechanical vent   HEENT: ET tube in place, no neck swelling  Lung: clear to auscultation  Heart: s1s2 regualr no rubs or murmur  Abdomen: soft, normal bowel sounds.  Ext: no edema   CNS- unconscious     Laboratory Data:    Lab Results   Component Value Date    BUN 40 (H) 2025    BUN 38 (H) 2025    BUN 32 (H) 2025     (L) 2025     (L) 2025     2025    CO2 24 2025    CO2 24 2025    CO2 28 2025    GLUCOSE 264 (H) 2025    GLUCOSE 222 (H) 2025    GLUCOSE 226 (H) 2025     Lab Results   Component Value Date    WBC 13.1 2025    HGB 11.8 (L) 2025    HCT 37.6 2025     Lab Results   Component Value Date    CALCIUM 9.0 2025     Lab Results   Component Value Date    HDL 31 (L) 2025     No results found for: \"TURBIDITY\"    Imaging Reveiwed:      EKG  Renal ultrasound  Xray  chest- IMPRESSION:     Satisfactory endotracheal tube and enteric tube placement. Low lung volumes with  mild perihilar opacities may represent atelectasis or vascular congestion.         Assessment:  Isidro Reardon is a 64 y.o. year old male with  CAD, AS s/p  TAVR, diabetes mellitus, hypertension, hypertensive heart disease, neuropathy presented to he ER for  lightheadedness dizziness.Later on had  complete heart block, asystole cardiac arrest   Now has acute renal failure due to cardiac arrest/bradycardia and hyperkalemia is due to RENAE and CPR .    Acute renal failure  Hyperkalemia  Hypocalcemia   Cardiac arrest  Complete heart block   Respiratory failure        Plan:    Making decent urine  Salt tablets and drip in normal saline   Management of cardiac arrest per ICU team and cardiology   Vasopressors to keep  mmhg around    Strict intake and output recording , especially urine output   Ensure that patient does not retain urine   Bladder scan daily prn   Avoid NSAIDS, contrast , nephrotoxin   Dose all medicine for current eGFR   No clinical and metabolic indication of dialysis       Stephen Lang MD, FASN  [facility]- Nephrology       Total of 35 minutes of critical care time spent in the direct evaluation and formulation of treatment plan of this high risk patient. The reason for providing this level of medical care was due a critical illness that impaired one or more vital organ systems such that there was a high probability of imminent or life threatening deterioration in the patients condition. This care involved high complexity decision making to assess, manipulate, and support vital system functions, to treat this degreee vital organ system failure and to prevent further life threatening deterioration of the patient’s condition.    Examination, review and interpretation of labs scan/CXR/USG , medicine , order labs/medicine  and care coordination

## 2025-05-19 NOTE — PROGRESS NOTES
PALLIATIVE MEDICINE    AMD STATUS: NO AMD ON FILE    Daughter, Priyanka Craven, according to [facility] is Health Care Decision  Maker.    CODE STATUS: FULL CODE     Seen today in Rm 105, along with Loraine Rodriguez NP.  Daughter, Priyanka at bedside. Patient lying in bed supine with head of bed raised.  Sedated: Fentanyl and Versed. Vent Settings: 50%O2, PEEP 6. Dopamine gtt.     Patient not dependent on R femoral pacing wire.  SBT trials over the weekend.     Will continue to follow Isidro Reardon and family during his hospitalization.     Ghislaine Esqueda, RN  Palliative Medicine  [phone]

## 2025-05-19 NOTE — PROGRESS NOTES
Cardiology Progress Note        Patient: Isidro Reardon        Sex: male          DOA: 5/15/2025  YOB: 1960      Age:  64 y.o.        LOS:  LOS: 4 days   Assessment/Plan     Principal Problem:    Complete heart block (HCC)  Active Problems:    Coronary atherosclerosis of native coronary artery    Aspiration pneumonia (HCC)    COPD (chronic obstructive pulmonary disease) (HCC)    Essential hypertension    Type 2 diabetes with nephropathy (HCC)    Acute renal failure    Heart block    S/P TAVR (transcatheter aortic valve replacement)    Cardiac arrest (HCC)    Acute respiratory failure with hypoxia (HCC)    Hyperkalemia    Hypotension    Chronic kidney disease (CKD), stage II (mild)  Resolved Problems:    * No resolved hospital problems. *      Plan:  5/18/2025  Patient remained intubated  Good urine output  Maintaining sinus rhythm without paced complexes/rhythm  Discussed in detail with patient's daughters  Discussed with Dr. Coates who recommended 48 hours more of antibiotic treatment before considering permanent pacemaker implantation  Continue to wean off slowly midodrine  Cussed with RN                                Cardiac telemetry normal sinus rhythm  CT scan has shown possible infiltrate/pneumonia/infectious process  Normal sinus rhythm  Still requiring higher oxygenation  Discussed with Dr. Yousif may consider extubation tomorrow if oxygen requirement reduced  Continue with antibiotic treatment    Discussed with patient's daughter                      Patient remained intubated  Patient is not dependent on transvenous pacemaker  WBC count is improving  Patient can have CT scan from cardiac standpoint  Patient can also have SBT/possible extubation  Will continue to monitor from cardiac standpoint.  Patient heart rate is 88 bpm and I have tested TVP by increasing heart rate to 100 with normal TVP paced rhythm.  TVP backup rate decreased to a 70 bpm  Continue with antibiotic  represent atelectasis or vascular congestion. Electronically signed by Hema Orozco    XR CHEST PORTABLE  Result Date: 5/15/2025  No acute cardiopulmonary process. Electronically signed by Rolf Serrano          Cardiology Procedures:   No results found for this or any previous visit. 05/15/25    ECHO (TTE) COMPLETE (PRN CONTRAST/BUBBLE/STRAIN/3D) 05/16/2025 10:48 AM (Final)    Interpretation Summary    Left Ventricle: Normal left ventricular systolic function with a visually estimated EF of 55 - 60%. EF by 2D Simpsons Biplane is 55%. Left ventricle size is normal. Normal wall thickness. No regional wall motion abnormalities identified. Decreased sensitivity due to poor endocardial definition in short axis view. Diastolic dysfunction present with normal LV EF.    Right Ventricle: Right ventricle size is normal. Normal systolic function. TAPSE is 2.2 cm.    Aortic Valve: Appropriately positioned transcatheter bioprosthetic valve that is well-seated. AV mean gradient is 7 mmHg. No paravalvular regurgitation. No stenosis. AV Mean Gradient is 7 mmHg.    Tricuspid Valve: Mild regurgitation. Estimated RVSP is 44 mmHg (patient is ventilated).    Right Atrium: Single lead present in the right atrium. Right atrium size is normal.    IVC/SVC: Patient is ventilated, cannot use IVC diameter to estimate right atrial pressure.    Image quality is fair. Procedure performed with the patient in a supine position.    Signed by: Cristhian Nunez MD on 5/16/2025 10:48 AM    No results found for this or any previous visit.                Signed By: CRISTHIAN NUNEZ MD     May 19, 2025

## 2025-05-20 ENCOUNTER — APPOINTMENT (OUTPATIENT)
Facility: HOSPITAL | Age: 65
DRG: 242 | End: 2025-05-20
Payer: MEDICARE

## 2025-05-20 PROBLEM — R40.20 COMA (HCC): Status: ACTIVE | Noted: 2025-05-20

## 2025-05-20 LAB
ALBUMIN SERPL-MCNC: 2.4 G/DL (ref 3.4–5)
ALBUMIN/GLOB SERPL: 0.7 (ref 0.8–1.7)
ALP SERPL-CCNC: 58 U/L (ref 45–117)
ALT SERPL-CCNC: 12 U/L (ref 10–50)
ANION GAP SERPL CALC-SCNC: 14 MMOL/L (ref 3–18)
ARTERIAL PATENCY WRIST A: POSITIVE
AST SERPL-CCNC: 18 U/L (ref 10–38)
BACTERIA SPEC CULT: NORMAL
BASE DEFICIT BLD-SCNC: 1.6 MMOL/L
BASOPHILS # BLD: 0.06 K/UL (ref 0–0.1)
BASOPHILS NFR BLD: 0.5 % (ref 0–2)
BDY SITE: ABNORMAL
BILIRUB SERPL-MCNC: 0.9 MG/DL (ref 0.2–1)
BODY TEMPERATURE: 97.7
BUN SERPL-MCNC: 41 MG/DL (ref 6–23)
BUN/CREAT SERPL: 17 (ref 12–20)
CALCIUM SERPL-MCNC: 9.1 MG/DL (ref 8.5–10.1)
CHLORIDE SERPL-SCNC: 101 MMOL/L (ref 98–107)
CO2 SERPL-SCNC: 22 MMOL/L (ref 21–32)
CREAT SERPL-MCNC: 2.38 MG/DL (ref 0.6–1.3)
DIFFERENTIAL METHOD BLD: ABNORMAL
EOSINOPHIL # BLD: 0.04 K/UL (ref 0–0.4)
EOSINOPHIL NFR BLD: 0.3 % (ref 0–5)
ERYTHROCYTE [DISTWIDTH] IN BLOOD BY AUTOMATED COUNT: 14 % (ref 11.6–14.5)
GAS FLOW.O2 O2 DELIVERY SYS: ABNORMAL
GAS FLOW.O2 SETTING OXYMISER: 18 BPM
GLOBULIN SER CALC-MCNC: 3.5 G/DL (ref 2–4)
GLUCOSE BLD STRIP.AUTO-MCNC: 141 MG/DL (ref 70–110)
GLUCOSE BLD STRIP.AUTO-MCNC: 161 MG/DL (ref 70–110)
GLUCOSE BLD STRIP.AUTO-MCNC: 171 MG/DL (ref 70–110)
GLUCOSE BLD STRIP.AUTO-MCNC: 173 MG/DL (ref 70–110)
GLUCOSE BLD STRIP.AUTO-MCNC: 188 MG/DL (ref 70–110)
GLUCOSE BLD STRIP.AUTO-MCNC: 212 MG/DL (ref 70–110)
GLUCOSE SERPL-MCNC: 217 MG/DL (ref 74–108)
GRAM STN SPEC: NORMAL
HCO3 BLD-SCNC: 24.4 MMOL/L (ref 21–28)
HCT VFR BLD AUTO: 35.9 % (ref 36–48)
HGB BLD-MCNC: 11.3 G/DL (ref 13–16)
IMM GRANULOCYTES # BLD AUTO: 0.04 K/UL (ref 0–0.04)
IMM GRANULOCYTES NFR BLD AUTO: 0.3 % (ref 0–0.5)
INSPIRATION.DURATION SETTING TIME VENT: 0.9 SEC
IPAP/PIP/HIGH PEEP: 20
LYMPHOCYTES # BLD: 0.58 K/UL (ref 0.9–3.3)
LYMPHOCYTES NFR BLD: 4.8 % (ref 21–52)
MAGNESIUM SERPL-MCNC: 2.2 MG/DL (ref 1.6–2.6)
MCH RBC QN AUTO: 28.5 PG (ref 24–34)
MCHC RBC AUTO-ENTMCNC: 31.5 G/DL (ref 31–37)
MCV RBC AUTO: 90.4 FL (ref 78–100)
MONOCYTES # BLD: 1.01 K/UL (ref 0.05–1.2)
MONOCYTES NFR BLD: 8.3 % (ref 3–10)
NEUTS SEG # BLD: 10.4 K/UL (ref 1.8–8)
NEUTS SEG NFR BLD: 85.8 % (ref 40–73)
NRBC # BLD: 0 K/UL (ref 0–0.01)
NRBC BLD-RTO: 0 PER 100 WBC
O2/TOTAL GAS SETTING VFR VENT: 50 %
PAW @ MEAN EXP FLOW ON VENT: 9.7 CMH2O
PCO2 BLD: 44.7 MMHG (ref 35–48)
PEEP RESPIRATORY: 6 CMH2O
PH BLD: 7.34 (ref 7.35–7.45)
PLATELET # BLD AUTO: 186 K/UL (ref 135–420)
PMV BLD AUTO: 10.5 FL (ref 9.2–11.8)
PO2 BLD: 78 MMHG (ref 83–108)
POTASSIUM SERPL-SCNC: 5 MMOL/L (ref 3.5–5.5)
PROT SERPL-MCNC: 5.9 G/DL (ref 6.4–8.2)
RBC # BLD AUTO: 3.97 M/UL (ref 4.35–5.65)
RESPIRATORY RATE, POC: 18 (ref 5–40)
SAO2 % BLD: 95 % (ref 92–97)
SERVICE CMNT-IMP: ABNORMAL
SERVICE CMNT-IMP: NORMAL
SERVICE CMNT-IMP: NORMAL
SODIUM SERPL-SCNC: 137 MMOL/L (ref 136–145)
SPECIMEN TYPE: ABNORMAL
VANCOMYCIN SERPL-MCNC: 10.5 UG/ML (ref 5–40)
VENTILATION MODE VENT: ABNORMAL
VT SETTING VENT: 500 ML
WBC # BLD AUTO: 12.1 K/UL (ref 4.6–13.2)

## 2025-05-20 PROCEDURE — 71045 X-RAY EXAM CHEST 1 VIEW: CPT

## 2025-05-20 PROCEDURE — 85025 COMPLETE CBC W/AUTO DIFF WBC: CPT

## 2025-05-20 PROCEDURE — 2000000000 HC ICU R&B

## 2025-05-20 PROCEDURE — 6370000000 HC RX 637 (ALT 250 FOR IP): Performed by: INTERNAL MEDICINE

## 2025-05-20 PROCEDURE — 2580000003 HC RX 258: Performed by: INTERNAL MEDICINE

## 2025-05-20 PROCEDURE — 6360000002 HC RX W HCPCS: Performed by: HOSPITALIST

## 2025-05-20 PROCEDURE — 6360000002 HC RX W HCPCS: Performed by: INTERNAL MEDICINE

## 2025-05-20 PROCEDURE — 2700000000 HC OXYGEN THERAPY PER DAY

## 2025-05-20 PROCEDURE — 80202 ASSAY OF VANCOMYCIN: CPT

## 2025-05-20 PROCEDURE — 80053 COMPREHEN METABOLIC PANEL: CPT

## 2025-05-20 PROCEDURE — 83735 ASSAY OF MAGNESIUM: CPT

## 2025-05-20 PROCEDURE — 6360000002 HC RX W HCPCS: Performed by: EMERGENCY MEDICINE

## 2025-05-20 PROCEDURE — 6370000000 HC RX 637 (ALT 250 FOR IP): Performed by: HOSPITALIST

## 2025-05-20 PROCEDURE — 94003 VENT MGMT INPAT SUBQ DAY: CPT

## 2025-05-20 PROCEDURE — 87449 NOS EACH ORGANISM AG IA: CPT

## 2025-05-20 PROCEDURE — 2500000003 HC RX 250 WO HCPCS: Performed by: HOSPITALIST

## 2025-05-20 PROCEDURE — 94640 AIRWAY INHALATION TREATMENT: CPT

## 2025-05-20 PROCEDURE — 70450 CT HEAD/BRAIN W/O DYE: CPT

## 2025-05-20 PROCEDURE — 82803 BLOOD GASES ANY COMBINATION: CPT

## 2025-05-20 PROCEDURE — 36600 WITHDRAWAL OF ARTERIAL BLOOD: CPT

## 2025-05-20 PROCEDURE — 87899 AGENT NOS ASSAY W/OPTIC: CPT

## 2025-05-20 RX ORDER — FENTANYL CITRATE 50 UG/ML
50 INJECTION, SOLUTION INTRAMUSCULAR; INTRAVENOUS EVERY 4 HOURS PRN
Status: DISCONTINUED | OUTPATIENT
Start: 2025-05-20 | End: 2025-05-29 | Stop reason: HOSPADM

## 2025-05-20 RX ORDER — MIDODRINE HYDROCHLORIDE 2.5 MG/1
5 TABLET ORAL
Status: DISCONTINUED | OUTPATIENT
Start: 2025-05-20 | End: 2025-05-22

## 2025-05-20 RX ADMIN — MIDODRINE HYDROCHLORIDE 5 MG: 2.5 TABLET ORAL at 18:12

## 2025-05-20 RX ADMIN — GABAPENTIN 300 MG: 300 CAPSULE ORAL at 13:58

## 2025-05-20 RX ADMIN — FENTANYL CITRATE 50 MCG: 50 INJECTION INTRAMUSCULAR; INTRAVENOUS at 15:03

## 2025-05-20 RX ADMIN — IPRATROPIUM BROMIDE AND ALBUTEROL SULFATE 1 DOSE: .5; 3 SOLUTION RESPIRATORY (INHALATION) at 01:36

## 2025-05-20 RX ADMIN — SODIUM CHLORIDE, PRESERVATIVE FREE 10 ML: 5 INJECTION INTRAVENOUS at 21:04

## 2025-05-20 RX ADMIN — DOXYCYCLINE 100 MG: 100 INJECTION, POWDER, LYOPHILIZED, FOR SOLUTION INTRAVENOUS at 22:16

## 2025-05-20 RX ADMIN — POLYVINYL ALCOHOL, POVIDONE 1 DROP: 14; 6 SOLUTION/ DROPS OPHTHALMIC at 17:36

## 2025-05-20 RX ADMIN — ASPIRIN 81 MG: 81 TABLET, CHEWABLE ORAL at 09:48

## 2025-05-20 RX ADMIN — SODIUM CHLORIDE, PRESERVATIVE FREE 10 ML: 5 INJECTION INTRAVENOUS at 10:02

## 2025-05-20 RX ADMIN — GABAPENTIN 300 MG: 300 CAPSULE ORAL at 20:50

## 2025-05-20 RX ADMIN — BUDESONIDE 500 MCG: 0.5 INHALANT RESPIRATORY (INHALATION) at 08:13

## 2025-05-20 RX ADMIN — INSULIN LISPRO 4 UNITS: 100 INJECTION, SOLUTION INTRAVENOUS; SUBCUTANEOUS at 05:43

## 2025-05-20 RX ADMIN — CHLORHEXIDINE GLUCONATE 15 ML: 1.2 RINSE BUCCAL at 10:23

## 2025-05-20 RX ADMIN — Medication 1 G: at 09:48

## 2025-05-20 RX ADMIN — BUDESONIDE 500 MCG: 0.5 INHALANT RESPIRATORY (INHALATION) at 20:37

## 2025-05-20 RX ADMIN — FAMOTIDINE 20 MG: 10 INJECTION, SOLUTION INTRAVENOUS at 09:58

## 2025-05-20 RX ADMIN — INSULIN GLARGINE 5 UNITS: 100 INJECTION, SOLUTION SUBCUTANEOUS at 20:59

## 2025-05-20 RX ADMIN — PIPERACILLIN AND TAZOBACTAM 3375 MG: 3; .375 INJECTION, POWDER, LYOPHILIZED, FOR SOLUTION INTRAVENOUS at 05:42

## 2025-05-20 RX ADMIN — ROSUVASTATIN CALCIUM 20 MG: 10 TABLET, FILM COATED ORAL at 09:48

## 2025-05-20 RX ADMIN — DOXYCYCLINE 100 MG: 100 INJECTION, POWDER, LYOPHILIZED, FOR SOLUTION INTRAVENOUS at 10:18

## 2025-05-20 RX ADMIN — POLYVINYL ALCOHOL, POVIDONE 1 DROP: 14; 6 SOLUTION/ DROPS OPHTHALMIC at 12:44

## 2025-05-20 RX ADMIN — VANCOMYCIN HYDROCHLORIDE 1250 MG: 10 INJECTION, POWDER, LYOPHILIZED, FOR SOLUTION INTRAVENOUS at 10:09

## 2025-05-20 RX ADMIN — IPRATROPIUM BROMIDE AND ALBUTEROL SULFATE 1 DOSE: .5; 3 SOLUTION RESPIRATORY (INHALATION) at 20:38

## 2025-05-20 RX ADMIN — POLYVINYL ALCOHOL, POVIDONE 1 DROP: 14; 6 SOLUTION/ DROPS OPHTHALMIC at 10:00

## 2025-05-20 RX ADMIN — PIPERACILLIN AND TAZOBACTAM 3375 MG: 3; .375 INJECTION, POWDER, LYOPHILIZED, FOR SOLUTION INTRAVENOUS at 21:02

## 2025-05-20 RX ADMIN — MINERAL OIL, PETROLATUM: 425; 568 OINTMENT OPHTHALMIC at 04:25

## 2025-05-20 RX ADMIN — GABAPENTIN 300 MG: 300 CAPSULE ORAL at 09:48

## 2025-05-20 RX ADMIN — DOPAMINE HYDROCHLORIDE 5 MCG/KG/MIN: 160 INJECTION, SOLUTION INTRAVENOUS at 15:50

## 2025-05-20 RX ADMIN — ENOXAPARIN SODIUM 40 MG: 100 INJECTION SUBCUTANEOUS at 10:19

## 2025-05-20 RX ADMIN — PIPERACILLIN AND TAZOBACTAM 3375 MG: 3; .375 INJECTION, POWDER, LYOPHILIZED, FOR SOLUTION INTRAVENOUS at 12:50

## 2025-05-20 RX ADMIN — Medication 1 G: at 20:50

## 2025-05-20 RX ADMIN — LACTULOSE 20 G: 10 SOLUTION ORAL at 10:11

## 2025-05-20 RX ADMIN — CHLORHEXIDINE GLUCONATE 15 ML: 1.2 RINSE BUCCAL at 21:04

## 2025-05-20 ASSESSMENT — PAIN SCALES - GENERAL
PAINLEVEL_OUTOF10: 0

## 2025-05-20 ASSESSMENT — PULMONARY FUNCTION TESTS
PIF_VALUE: 20
PIF_VALUE: 21
PIF_VALUE: 22
PIF_VALUE: 21
PIF_VALUE: 20
PIF_VALUE: 21

## 2025-05-20 NOTE — PLAN OF CARE
Position to facilitate oxygenation and minimize respiratory effort   Oxygen supplementation based on oxygen saturation or arterial blood gases   Initiate smoking cessation protocol as indicated   Encourage broncho-pulmonary hygiene including cough, deep breathe, incentive spirometry   Assess the need for suctioning and aspirate as needed   Assess and instruct to report shortness of breath or any respiratory difficulty   Respiratory therapy support as indicated     Problem: Cardiovascular - Adult  Goal: Maintains optimal cardiac output and hemodynamic stability  5/20/2025 1424 by Deshawn Vo RN  Outcome: Progressing  Flowsheets (Taken 5/20/2025 0800)  Maintains optimal cardiac output and hemodynamic stability:   Monitor blood pressure and heart rate   Monitor urine output and notify Licensed Independent Practitioner for values outside of normal range   Assess for signs of decreased cardiac output  5/20/2025 0221 by Troy Aguila RN  Outcome: Progressing  Flowsheets (Taken 5/19/2025 2000)  Maintains optimal cardiac output and hemodynamic stability:   Monitor blood pressure and heart rate   Monitor urine output and notify Licensed Independent Practitioner for values outside of normal range   Assess for signs of decreased cardiac output   Administer fluid and/or volume expanders as ordered   Administer vasoactive medications as ordered  Goal: Absence of cardiac dysrhythmias or at baseline  5/20/2025 1424 by Deshawn Vo RN  Outcome: Progressing  Flowsheets (Taken 5/20/2025 0800)  Absence of cardiac dysrhythmias or at baseline:   Monitor cardiac rate and rhythm   Assess for signs of decreased cardiac output   Administer antiarrhythmia medication and electrolyte replacement as ordered  5/20/2025 0221 by Troy Aguila RN  Outcome: Progressing  Flowsheets (Taken 5/19/2025 2000)  Absence of cardiac dysrhythmias or at baseline:   Administer antiarrhythmia medication and electrolyte replacement as ordered    Assess for signs of decreased cardiac output   Monitor cardiac rate and rhythm     Problem: Skin/Tissue Integrity - Adult  Goal: Skin integrity remains intact  Description: 1.  Monitor for areas of redness and/or skin breakdown2.  Assess vascular access sites hourly3.  Every 4-6 hours minimum:  Change oxygen saturation probe site4.  Every 4-6 hours:  If on nasal continuous positive airway pressure, respiratory therapy assess nares and determine need for appliance change or resting period  5/20/2025 1424 by Deshawn Vo, RN  Outcome: Progressing  Flowsheets (Taken 5/20/2025 0800)  Skin Integrity Remains Intact:   Monitor for areas of redness and/or skin breakdown   Assess vascular access sites hourly   Every 4-6 hours minimum:  Change oxygen saturation probe site   Every 4-6 hours:  If on nasal continuous positive airway pressure, assess nares and determine need for appliance change or resting period  5/20/2025 0221 by Troy Aguila, RN  Outcome: Progressing  Flowsheets (Taken 5/19/2025 2000)  Skin Integrity Remains Intact:   Monitor for areas of redness and/or skin breakdown   Assess vascular access sites hourly   Every 4-6 hours minimum:  Change oxygen saturation probe site   Turn and reposition as indicated   Every 4-6 hours:  If on nasal continuous positive airway pressure, assess nares and determine need for appliance change or resting period   Assess need for specialty bed   Positioning devices   Pressure redistribution bed/mattress (bed type)   Check visual cues for pain     Problem: Musculoskeletal - Adult  Goal: Return mobility to safest level of function  5/20/2025 1424 by Deshawn Vo, RN  Outcome: Progressing  Flowsheets (Taken 5/20/2025 0800)  Return Mobility to Safest Level of Function:   Assess patient stability and activity tolerance for standing, transferring and ambulating with or without assistive devices   Assist with transfers and ambulation using safe patient handling equipment as

## 2025-05-20 NOTE — PROGRESS NOTES
Hospitalist Progress Note    Patient: Isidro Reardon MRN: [id number]  [facility]: [id number]    YOB: 1960  Age: 64 y.o.  Sex: male    DOA: 5/15/2025 LOS:  LOS: 5 days          Chief Complain :  Dizziness, lightheadedness.  64 y.o. male with CAD, aortic stenosis status post TAVR, diabetes mellitus, hypertension, hypertensive heart disease, neuropathy presents to ER with concerns of lightheadedness dizziness. In the ER he was noted to have heart rate in 30s to 40s. EKG showed complete heart block. He was given epinephrine and started on dopamine drip which improved his heart rate. He actually went into sinus tachycardia. Patient was subsequently admitted to intensive care unit. Later he suddenly became bradycardic and went into asystole. CPR initiated and ACLS protocol initiated. ROSC obtained. Patient intubated external pacers placed. Cardiology contacted s/pTVP.   Subjective:   Patient orally intubated, opens eyes, follows occasional commands. Off sedation since morning.     Assessment/Plan     Active Hospital Problems    Diagnosis     Coma (HCC) [R40.20]     Hypotension [I95.9]     Chronic kidney disease (CKD), stage II (mild) [N18.2]     S/P TAVR (transcatheter aortic valve replacement) [Z95.2]     Cardiac arrest (HCC) [I46.9]     Acute respiratory failure with hypoxia (HCC) [J96.01]     Hyperkalemia [E87.5]     Complete heart block (HCC) [I44.2]     Heart block [I45.9]     Acute renal failure [N17.9]     Type 2 diabetes with nephropathy (HCC) [E11.21]     Essential hypertension [I10]     COPD (chronic obstructive pulmonary disease) (HCC) [J44.9]     Aspiration pneumonia (HCC) [J69.0]     Coronary atherosclerosis of native

## 2025-05-20 NOTE — PROGRESS NOTES
[facility]   Pharmacy Pharmacokinetic Monitoring Service - Vancomycin    Consulting Provider: Dr. Yousif   Indication: Pneumonia ( CAP ) - 3 day tx  Target Concentration: Goal AUC/VERÓNICA 400-600 mg*hr/L  Day of Therapy: 3  Additional Antimicrobials: Zosyn    Pertinent Laboratory Values:   Wt Readings from Last 1 Encounters:   05/17/25 85 kg (187 lb 6.3 oz)     Temp Readings from Last 1 Encounters:   05/20/25 97.8 °F (36.6 °C) (Axillary)     Estimated Creatinine Clearance: 33 mL/min (A) (based on SCr of 2.38 mg/dL (H)).  Recent Labs     05/19/25  0532 05/20/25  0436 05/20/25  0458   CREATININE 2.54* 2.38*  --    BUN 40* 41*  --    WBC 13.1  --  12.1       Recent vancomycin administrations                     vancomycin (VANCOCIN) 1500 mg in sodium chloride 0.9% 500 mL IVPB (mg) 1,500 mg New Bag 05/19/25 1016    vancomycin (VANCOCIN) 1500 mg in sodium chloride 0.9% 500 mL IVPB (mg) 1,500 mg New Bag 05/18/25 1306             Plan:  Vancomycin Random Level 10.5 mg/l at 04:36 5/20/2025  Vancomycin 1250 mg IV ordered for 10:00 today to finish 3 day tx per consult  Please re-consult Pharmacy if Vancomycin is to continue  Pharmacy will continue to monitor patient and adjust therapy as indicated    SARAH GO RPH, BCPS  5/20/2025 9:40 AM   [phone]

## 2025-05-20 NOTE — PROGRESS NOTES
RENAL CONSULT  PROGRESS NOTE   2025    Patient:  Isidro Reardon  :  1960  Gender:  male  MRN #:  [id number]    Reason for Consult: Acute renal failure and Hyperkalemia requested by intensivist Dr Barrientos     Relevant events:   Still on mechanical vent   BP stable on vasopressors  Decent urine is okay   Labs, overnight events and consultant noted reviewed       Review of Symptoms:     Unable to obtained from patient due to his condition     Objective:    /66   Pulse 89   Temp 97.8 °F (36.6 °C) (Axillary)   Resp 18   Ht 1.803 m (5' 10.98\")   Wt 85 kg (187 lb 6.3 oz)   SpO2 92%   BMI 26.15 kg/m²     Physical Exam:    Pt on mechanical vent   HEENT: ET tube in place, no neck swelling  Abdomen: soft,  non distended   Ext: no edema   CNS- unconscious     Laboratory Data:    Lab Results   Component Value Date    BUN 41 (H) 2025    BUN 40 (H) 2025    BUN 38 (H) 2025     2025     (L) 2025     (L) 2025    CO2 22 2025    CO2 24 2025    CO2 24 2025    GLUCOSE 217 (H) 2025    GLUCOSE 264 (H) 2025    GLUCOSE 222 (H) 2025     Lab Results   Component Value Date    WBC 12.1 2025    HGB 11.3 (L) 2025    HCT 35.9 (L) 2025     Lab Results   Component Value Date    CALCIUM 9.1 2025     Lab Results   Component Value Date    HDL 31 (L) 2025     No results found for: \"TURBIDITY\"    Imaging Reveiwed:      EKG  Renal ultrasound  Xray  chest- IMPRESSION:     Satisfactory endotracheal tube and enteric tube placement. Low lung volumes with  mild perihilar opacities may represent atelectasis or vascular congestion.         Assessment:  Isidro Reardon is a 64 y.o. year old male with  CAD, AS s/p  TAVR, diabetes mellitus, hypertension, hypertensive heart disease, neuropathy presented to he ER for  lightheadedness dizziness.Later on had complete heart block, asystole cardiac arrest   Now has acute renal

## 2025-05-20 NOTE — PROGRESS NOTES
Wasted 20mL of Fentanyl With Magalis Bartlett RN   Wasted 45mL of Versed With Magalis Bartlett RN

## 2025-05-20 NOTE — CONSULTS
Comprehensive High Risk Nutrition Assessment Follow-Up    Type and Reason for Visit:  Reassess, Consult, Nutrition support    Nutrition Recommendations/Plan:   NPO day 5  Remains on dopamine gtt noted -- ?trickle feeds trials Glucerna 1.5 @ 15ml/hr as medically feasible defer to MD  ?consider EES given some OGT output emesis to help with GI motility   If stable begin TF Diabetic/Glucerna 1.5 @ 10ml/hr adv 10 ml/hr Q6hrs to 50ml/hr goal rate via OGT as edwin provides 1800kcal, 100g pro, 910ml FW  If w/o IVF suggest FWF ~145ml Q4hrs - adjust defer to MD  Will likely only receive ~80% TF r/t frequent interruptions  Cont to check Phos, Mg, and K and replete as needed  K was elevated s/p lokelma noted now K 5.0  Tight BG control >70 <180 A1c 12.6  Monitor for BM on lactulose  Consider adding Nephro-rocio/Wilma-rocio daily   Cont to monitor POC, wt trends, renal fx, lytes, UOP, bowel fx, skin integrity/wound healing and adjust recs as needed     Malnutrition Assessment:  Malnutrition Status: remains At risk for malnutrition (05/20/25 1033)      Nutrition Assessment:    63yo M with cardiac arrest complete heart block, remains on sedated on vent with fentanyl and versed, remains on pressor support w/dopamine gtt, +OGT placed 5/16 with output ~290ml emesis 5/19, 1675ml UOP 5/19, Day 5 no nutrition NPO no TF, Nephrology following noted ARF, hyperkalemia, hypocalcemia, making decent urine, Na tabs, NS gtt per MD. TF were ordered but never started 5/17. NPO day 5. wt appears stable since admit if correct. on admit slight variable wt hx trends likely fluid vs accuracy overall wt appears relatively stable if correct. no recent sig wt loss or poor PO PTA noted. wt hx trends 84-86kg Feb 2025 and 89kg Nov 2024. Wt slight down ?accuracy vs fluid.  No indication for dialysis per MD. Cr trends overall up from admit .  Last Weight Metrics:      5/17/2025     5:30 AM 5/16/2025    10:29 AM 5/16/2025     1:35 AM 5/15/2025     9:00 PM 5/15/2025

## 2025-05-20 NOTE — PROGRESS NOTES
Cardiology Progress Note        Patient: Isidro Reardon        Sex: male          DOA: 5/15/2025  YOB: 1960      Age:  64 y.o.        LOS:  LOS: 5 days   Assessment/Plan     Principal Problem:    Complete heart block (HCC)  Active Problems:    Coronary atherosclerosis of native coronary artery    Aspiration pneumonia (HCC)    COPD (chronic obstructive pulmonary disease) (HCC)    Essential hypertension    Type 2 diabetes with nephropathy (HCC)    Acute renal failure    Heart block    S/P TAVR (transcatheter aortic valve replacement)    Cardiac arrest (HCC)    Acute respiratory failure with hypoxia (HCC)    Hyperkalemia    Hypotension    Chronic kidney disease (CKD), stage II (mild)    Coma (HCC)  Resolved Problems:    * No resolved hospital problems. *      Plan:  5/20/2020  Patient remained intubated  Dopamine requirement is reducing  I will add midodrine 5 mg 3 times daily  Discussed with Dr. Pawel FLEMING, I will do transesophageal echocardiogram tomorrow to assess prosthetic aortic valve  If prosthetic aortic valve without vegetation then will clear for permanent pacemaker  Discussed with Dr. Coates                            5/18/2025  Patient remained intubated  Good urine output  Maintaining sinus rhythm without paced complexes/rhythm  Discussed in detail with patient's daughters  Discussed with Dr. Coates who recommended 48 hours more of antibiotic treatment before considering permanent pacemaker implantation  Continue to wean off slowly midodrine  Cussed with RN                                Cardiac telemetry normal sinus rhythm  CT scan has shown possible infiltrate/pneumonia/infectious process  Normal sinus rhythm  Still requiring higher oxygenation  Discussed with Dr. Yousif may consider extubation tomorrow if oxygen requirement reduced  Continue with antibiotic treatment    Discussed with patient's daughter                      Patient remained intubated  Patient is not dependent on  41*       RADIOLOGY:  XR ABDOMEN (KUB) (SINGLE AP VIEW)  Result Date: 5/16/2025  Nonobstructive bowel gas pattern. Gastric tube position as described. Electronically signed by DAYO WEI    XR CHEST PORTABLE  Result Date: 5/16/2025  Satisfactory endotracheal tube and enteric tube placement. Low lung volumes with mild perihilar opacities may represent atelectasis or vascular congestion. Electronically signed by Hema Orozco    XR CHEST PORTABLE  Result Date: 5/15/2025  No acute cardiopulmonary process. Electronically signed by Rolf Serrano          Cardiology Procedures:   No results found for this or any previous visit. 05/15/25    ECHO (TTE) COMPLETE (PRN CONTRAST/BUBBLE/STRAIN/3D) 05/16/2025 10:48 AM (Final)    Interpretation Summary    Left Ventricle: Normal left ventricular systolic function with a visually estimated EF of 55 - 60%. EF by 2D Simpsons Biplane is 55%. Left ventricle size is normal. Normal wall thickness. No regional wall motion abnormalities identified. Decreased sensitivity due to poor endocardial definition in short axis view. Diastolic dysfunction present with normal LV EF.    Right Ventricle: Right ventricle size is normal. Normal systolic function. TAPSE is 2.2 cm.    Aortic Valve: Appropriately positioned transcatheter bioprosthetic valve that is well-seated. AV mean gradient is 7 mmHg. No paravalvular regurgitation. No stenosis. AV Mean Gradient is 7 mmHg.    Tricuspid Valve: Mild regurgitation. Estimated RVSP is 44 mmHg (patient is ventilated).    Right Atrium: Single lead present in the right atrium. Right atrium size is normal.    IVC/SVC: Patient is ventilated, cannot use IVC diameter to estimate right atrial pressure.    Image quality is fair. Procedure performed with the patient in a supine position.    Signed by: Kirit Giraldo MD on 5/16/2025 10:48 AM    No results found for this or any previous visit.                Signed By: KIRIT GIRALDO MD     May 20, 2025

## 2025-05-20 NOTE — PROGRESS NOTES
met patient and daughter at bedside for an initial visit.     Patient was intubated at the time of the visit. Patients daughter said patient is in the hospital because his heart rate dropped. She said he has not been treated for this problem before. Patient's daughter thanked  for the visit.     provided presence and support for patient's daughter.     Chaplains will provide follow-up care for patient and family as needed.     Spiritual Health History and Assessment/Progress Note  [facility]    Palliative Care,  ,  ,      Name: Isidro Reardon MRN: [id number]    Age: 64 y.o.     Sex: male   Language: English   Faith: Gnosticism   Complete heart block (HCC)     Date: 5/20/2025            Total Time Calculated: 5 min              Spiritual Assessment began in [facility] INTENSIVE CARE        Referral/Consult From: Palliative Care   Encounter Overview/Reason: Palliative Care  Service Provided For: Family    Tari, Belief, Meaning:   Patient identifies as spiritual, is connected with a tari tradition or spiritual practice, and has beliefs or practices that help with coping during difficult times  Family/Friends identify as spiritual, are connected with a tari tradition or spiritual practice, and have beliefs or practices that help with coping during difficult times      Importance and Influence:  Patient has spiritual/personal beliefs that influence decisions regarding their health  Family/Friends have spiritual/personal beliefs that influence decisions regarding the patient's health    Community:  Patient is connected with a spiritual community and feels well-supported. Support system includes: Children and Tari Community  Family/Friends are connected with a spiritual community: and feel well-supported. Support system includes: Parent/s and Tari Community    Assessment and Plan of Care:     Patient Interventions include: Other: Unable to assess.  Family/Friends Interventions include:

## 2025-05-20 NOTE — PROGRESS NOTES
Pulmonary Specialists  Pulmonary, Critical Care, and Sleep Medicine    Name: Isidro Reardon MRN: [id number]   : 1960 Hospital: [facility]    Date: 2025  Room: [address] [place] [address] Note                                              Consult requesting physician: Dr. Pak  Reason for Consult: Respiratory failure    IMPRESSION:   Acute respiratory failure with hypoxia   J90.00  Cardiac arrest   I 46.9  Complete heart block   I45 .9  Hypotension I95.9  Aspiration pneumonia J69.0  Acute renal failure  CKD stage II  COPD J44.9  CAD I25.10  Status post TAVR  GERD        Active Hospital Problems    Diagnosis Date Noted    Hypotension [I95.9] 2025    Chronic kidney disease (CKD), stage II (mild) [N18.2] 2025    S/P TAVR (transcatheter aortic valve replacement) [Z95.2] 2025    Cardiac arrest (HCC) [I46.9] 2025    Acute respiratory failure with hypoxia (HCC) [J96.01] 2025    Hyperkalemia [E87.5] 2025    Complete heart block (HCC) [I44.2] 05/15/2025    Heart block [I45.9] 05/15/2025    Acute renal failure [N17.9] 2024    Type 2 diabetes with nephropathy (HCC) [E11.21] 2018    Essential hypertension [I10] 2018    COPD (chronic obstructive pulmonary disease) (HCC) [J44.9]     Aspiration pneumonia (HCC) [J69.0]     Coronary atherosclerosis of native coronary artery [I25.10] 2011        Code status: Full Code       RECOMMENDATIONS:     Respiratory: Patient remains on ventilator support, sedated.  ABG-mild respiratory acidosis; stable oxygenation;  VCV mode of ventilation-18/500/50%/PEEP 6.  Chest x-ray done today reviewed-ET tube and OG tube seems to be in stable position; improved aeration in the right upper lobe; mild haziness in the right base suggestive of atelectasis; no focal consolidations; no worsening pleural effusions; TAVR seen; pacing wire seen.  CT chest -findings suggestive of right upper lobe, and bibasilar  Culture NO GROWTH 4 DAYS       Culture, Blood 1 [[id number]] Collected: 05/16/25 0903    Order Status: Completed Specimen: Blood Updated: 05/19/25 2337     Special Requests LEFT        Gram Stain       ANAEROBIC BOTTLE Blood culture bottle flagged positive by instrumentation, no organisms seen on preliminary gram stain, subculture of bottle is being performed to rule out growth.                  SMEAR CALLED TO AND CORRECTLY REPEATED BY: MARBIN BUSH RN ICU, TO JAF AT 1640 5/19/2025           Culture       ONE OF TWO BOTTLES HAS BEEN FLAGGED POSITIVE BY INSTRUMENT.  BOTTLE HAS BEEN SENT TO [facility] TO ASSESS FOR POSSIBLE GROWTH. No organisms seen on gram stain. Multiple subcultures are in progress. NO GROWTH THUS FAR                   XR CHEST PORTABLE  Result Date: 5/20/2025  EXAM:  XR CHEST PORTABLE INDICATION: et tube Comparison to 5/19/2025. Portable exam obtained at 549 demonstrates interval advancement of the ET tube, projecting 2.5 cm above the doc. Mild pulmonary edema.     ET tube as above. Electronically signed by GERARDO FALK    XR CHEST PORTABLE  Result Date: 5/19/2025  EXAM:  XR CHEST PORTABLE INDICATION:   ET TUBE COMPARISON: 5/18/2025 radiograph. TECHNIQUE: Portable frontal view radiograph of the chest was acquired. FINDINGS: Endotracheal tube projects 3.9 cm above the doc; orogastric tube descending below the diaphragm to terminate with the tip over the gastric bubble and sideport about the level of esophagogastric junction, similar to prior study. Lungs/Pleura: No evidence of focal consolidation. No pleural effusion or pneumothorax. Mediastinum: Prosthetic aortic valve in place, similar to prior. Heart, joão, mediastinum are within normal limits. MSK: No acute osseous abnormalities. Upper abdomen: Redemonstration of positive enteric contrast within the stomach.     No significant change since prior study. Endotracheal tube and orogastric tube positions unchanged. Electronically signed by

## 2025-05-20 NOTE — CONSULTS
NEUROLOGY CONSULTATION NOTE    Patient: Isidro Reardon MRN: [id number]  CSN: [id number]    YOB: 1960  Age: 64 y.o.  Sex: male    DOA: 5/15/2025 LOS:  LOS: 5 days        Requesting Physician: Dr. Coates  Reason for Consultation:        Coma       HISTORY OF PRESENT ILLNESS:   Isidro Reardon is a 64 y.o. male with history of bradycardia and complete heart block who had cardiac arrest at ICU last week.  Sedation was on hold for some time, patient is less responsive.  PAST MEDICAL HISTORY:  Past Medical History:   Diagnosis Date    Acute coronary syndrome (HCC)     with non-ST-elevation myocardial infarction, s/p direct angioplasty of anomolous circumflex marginal coming off the right coronary cusp on 1/26/06    Arthritis     in hands    ASHD (arteriosclerotic heart disease)     Benign hypertensive heart disease without heart failure     CAD (coronary artery disease)     chronic underlying    Calculus of kidney     Chronic cough     coughing up sputum    Chronic lung disease     COPD (chronic obstructive pulmonary disease) ([facility])     moderate to severe    Coronary artery disease     Diabetes ([facility])     Diabetes mellitus ([facility])     FERRARI (dyspnea on exertion)     GERD (gastroesophageal reflux disease)     H/O: pneumonia     Heartburn     Hypertension     essential systemic     Indigestion     Left inguinal hernia     Neuropathy involving both lower extremities     Pneumonia 8/12    \"walking\"    Pure hypercholesterolemia     S/P cardiac cath 01/26/2006    LM mild.  RCA mild.  LAD tortuous.  CX mild.  CX erin 100%.  S/P balloon angioplasty of CX erin.  Residual 40%.  LVEDP 20.  EF 65%.    Tobacco abuse      PAST SURGICAL HISTORY:  Past Surgical History:   Procedure Laterality Date    CARDIAC CATHETERIZATION  01/26/06    CARDIAC PROCEDURE N/A 5/15/2025    Insert temporary pacemaker performed by Kirit Giraldo MD at [facility] CARDIAC CATH LAB    CARDIAC PROCEDURE N/A 5/16/2025    Insert temporary pacemaker performed by

## 2025-05-20 NOTE — PROGRESS NOTES
PALLIATIVE MEDICINE    AMD STATUS: NO AMD ON FILE  Daughter, Priyanka Craven, ([phone]) [facility] is Health Care Decision Maker.      CODE STATUS: FULL CODE     Seen today in Rm 104. Daughter Priyanka at bedside. Patient lying in bed supine. Vent Settings: Day 4, FiO2 50%, PEEP 6, Weaning trials continue. Sedation trials began this morning. Patient opened eyes and turned his head towards his daughter.   On 5/18, Dr. Kirit Giraldo recommended 48 hours more of antibiotic treatment before placing permanent pacemaker.   Daughter at bedside stated patient should get pacemaker tomorrow. Daughter is hopeful and positive for a full recovery.   Will continue to follow Isidro Reardon and family during his hospitalization.     Ghislaine Esqueda RN  Palliative Medicine  [phone]

## 2025-05-20 NOTE — CONSULTS
[facility]  (A Division of [facility])  Fidelina Armas MD   Office Tel:  [phone] Option #8                                                             Date of Admission: 5/15/2025       ID Consult for antimicrobial management antibiotic and comment regarding PPM PLACEMENT requested by Dr Coates/Dr Giraldo.      PCP: Phillip Rizzo MD    C/C: Presented to ED with bradycardia    Current Antimicrobials:    Prior Antimicrobials:  Pip-tazo 5/18 to date  Doxycycline 5/18 to date  Vancomycin 5/18 to date       Ceftriaxone 5/16-17     Allergy antibiotic: Non recorded     Assessment:     Critically ill patient with:    Possible bacteremia-1 out of 4 bottles bottle on 5/16.    Micro lab unable to give further info.  Suspect procurement contamination given delay in growth.    RUL opacity and tree -in bud appearance on CT-Possible PNA with leucocytosis on admission  Underlying COPD- mod to severe  Resp cutlures- no pathogenic bacteria, or MRSA X2  Procal 1.54-> 1.17    Acute respiratory failure, with complete heart block/bradycardia -- temp pacer in place  CAD history/TAVR in place on Echo- no mention of mass/vegetation\    RENAE on CKD    5/16-blood culture x2: 1/4- flagged + by MICROLAB-- further info pending        -UA clean         -Viral test /PCR for COVID 19/Influenza A and B -negative.   5/17- resp cx: normal kateryna  5/18- MRSA screen- negative         -resp culture- yeast growth    History of Flu A 02/2025    History of MSSA Infection of Olecranon Fossa    Diffuse hepatic steatosis- on CT    Diagnosis Date    Acute coronary syndrome (HCC)     with non-ST-elevation myocardial infarction, s/p direct angioplasty of anomolous circumflex marginal coming off the right coronary cusp on 1/26/06    Arthritis     in hands    ASHD (arteriosclerotic heart disease)     Benign hypertensive heart disease without heart failure     CAD (coronary artery disease)     chronic

## 2025-05-21 ENCOUNTER — APPOINTMENT (OUTPATIENT)
Facility: HOSPITAL | Age: 65
DRG: 242 | End: 2025-05-21
Attending: INTERNAL MEDICINE
Payer: MEDICARE

## 2025-05-21 ENCOUNTER — APPOINTMENT (OUTPATIENT)
Facility: HOSPITAL | Age: 65
DRG: 242 | End: 2025-05-21
Payer: MEDICARE

## 2025-05-21 LAB
ANION GAP SERPL CALC-SCNC: 14 MMOL/L (ref 3–18)
BACTERIA SPEC CULT: ABNORMAL
BACTERIA SPEC CULT: ABNORMAL
BASOPHILS # BLD: 0.05 K/UL (ref 0–0.1)
BASOPHILS NFR BLD: 0.5 % (ref 0–2)
BUN SERPL-MCNC: 43 MG/DL (ref 6–23)
BUN/CREAT SERPL: 20 (ref 12–20)
CALCIUM SERPL-MCNC: 9.6 MG/DL (ref 8.5–10.1)
CHLORIDE SERPL-SCNC: 105 MMOL/L (ref 98–107)
CO2 SERPL-SCNC: 22 MMOL/L (ref 21–32)
CREAT SERPL-MCNC: 2.14 MG/DL (ref 0.6–1.3)
DIFFERENTIAL METHOD BLD: ABNORMAL
ECHO BSA: 2.05 M2
EOSINOPHIL # BLD: 0.09 K/UL (ref 0–0.4)
EOSINOPHIL NFR BLD: 0.8 % (ref 0–5)
ERYTHROCYTE [DISTWIDTH] IN BLOOD BY AUTOMATED COUNT: 14.3 % (ref 11.6–14.5)
GLUCOSE BLD STRIP.AUTO-MCNC: 128 MG/DL (ref 70–110)
GLUCOSE BLD STRIP.AUTO-MCNC: 129 MG/DL (ref 70–110)
GLUCOSE BLD STRIP.AUTO-MCNC: 161 MG/DL (ref 70–110)
GLUCOSE BLD STRIP.AUTO-MCNC: 164 MG/DL (ref 70–110)
GLUCOSE SERPL-MCNC: 170 MG/DL (ref 74–108)
GRAM STN SPEC: ABNORMAL
HCT VFR BLD AUTO: 35.9 % (ref 36–48)
HGB BLD-MCNC: 11.3 G/DL (ref 13–16)
IMM GRANULOCYTES # BLD AUTO: 0.07 K/UL (ref 0–0.04)
IMM GRANULOCYTES NFR BLD AUTO: 0.7 % (ref 0–0.5)
L PNEUMO AG UR QL: NEGATIVE
LYMPHOCYTES # BLD: 0.97 K/UL (ref 0.9–3.3)
LYMPHOCYTES NFR BLD: 9.2 % (ref 21–52)
MCH RBC QN AUTO: 28.1 PG (ref 24–34)
MCHC RBC AUTO-ENTMCNC: 31.5 G/DL (ref 31–37)
MCV RBC AUTO: 89.3 FL (ref 78–100)
MONOCYTES # BLD: 1.05 K/UL (ref 0.05–1.2)
MONOCYTES NFR BLD: 9.9 % (ref 3–10)
NEGATIVE CONTROL: NEGATIVE
NEUTS SEG # BLD: 8.36 K/UL (ref 1.8–8)
NEUTS SEG NFR BLD: 78.9 % (ref 40–73)
NRBC # BLD: 0 K/UL (ref 0–0.01)
NRBC BLD-RTO: 0 PER 100 WBC
PH, URINE: 5 (ref 4.6–8)
PLATELET # BLD AUTO: 206 K/UL (ref 135–420)
PMV BLD AUTO: 10.5 FL (ref 9.2–11.8)
POSITIVE CONTROL: POSITIVE
POTASSIUM SERPL-SCNC: 4.4 MMOL/L (ref 3.5–5.5)
RBC # BLD AUTO: 4.02 M/UL (ref 4.35–5.65)
S PNEUM AG UR QL IA.RAPID: NEGATIVE
SERVICE CMNT-IMP: ABNORMAL
SODIUM SERPL-SCNC: 141 MMOL/L (ref 136–145)
WBC # BLD AUTO: 10.6 K/UL (ref 4.6–13.2)

## 2025-05-21 PROCEDURE — 6370000000 HC RX 637 (ALT 250 FOR IP): Performed by: INTERNAL MEDICINE

## 2025-05-21 PROCEDURE — 6360000002 HC RX W HCPCS: Performed by: INTERNAL MEDICINE

## 2025-05-21 PROCEDURE — 2580000003 HC RX 258: Performed by: INTERNAL MEDICINE

## 2025-05-21 PROCEDURE — 2500000003 HC RX 250 WO HCPCS: Performed by: HOSPITALIST

## 2025-05-21 PROCEDURE — 93325 DOPPLER ECHO COLOR FLOW MAPG: CPT

## 2025-05-21 PROCEDURE — 71045 X-RAY EXAM CHEST 1 VIEW: CPT

## 2025-05-21 PROCEDURE — 94003 VENT MGMT INPAT SUBQ DAY: CPT

## 2025-05-21 PROCEDURE — 80048 BASIC METABOLIC PNL TOTAL CA: CPT

## 2025-05-21 PROCEDURE — 82962 GLUCOSE BLOOD TEST: CPT

## 2025-05-21 PROCEDURE — 85025 COMPLETE CBC W/AUTO DIFF WBC: CPT

## 2025-05-21 PROCEDURE — 86618 LYME DISEASE ANTIBODY: CPT

## 2025-05-21 PROCEDURE — 2000000000 HC ICU R&B

## 2025-05-21 PROCEDURE — 36556 INSERT NON-TUNNEL CV CATH: CPT

## 2025-05-21 PROCEDURE — 6370000000 HC RX 637 (ALT 250 FOR IP): Performed by: HOSPITALIST

## 2025-05-21 PROCEDURE — 94640 AIRWAY INHALATION TREATMENT: CPT

## 2025-05-21 PROCEDURE — 2700000000 HC OXYGEN THERAPY PER DAY

## 2025-05-21 PROCEDURE — B24BZZ4 ULTRASONOGRAPHY OF HEART WITH AORTA, TRANSESOPHAGEAL: ICD-10-PCS | Performed by: INTERNAL MEDICINE

## 2025-05-21 RX ORDER — FENTANYL CITRATE 50 UG/ML
50 INJECTION, SOLUTION INTRAMUSCULAR; INTRAVENOUS ONCE
Refills: 0 | Status: CANCELLED | OUTPATIENT
Start: 2025-05-21

## 2025-05-21 RX ADMIN — Medication 1 G: at 08:10

## 2025-05-21 RX ADMIN — PIPERACILLIN AND TAZOBACTAM 3375 MG: 3; .375 INJECTION, POWDER, LYOPHILIZED, FOR SOLUTION INTRAVENOUS at 21:38

## 2025-05-21 RX ADMIN — SODIUM CHLORIDE, PRESERVATIVE FREE 10 ML: 5 INJECTION INTRAVENOUS at 20:32

## 2025-05-21 RX ADMIN — VANCOMYCIN HYDROCHLORIDE 1000 MG: 1 INJECTION, POWDER, LYOPHILIZED, FOR SOLUTION INTRAVENOUS at 12:51

## 2025-05-21 RX ADMIN — MINERAL OIL, PETROLATUM: 425; 568 OINTMENT OPHTHALMIC at 20:37

## 2025-05-21 RX ADMIN — LACTULOSE 20 G: 10 SOLUTION ORAL at 08:25

## 2025-05-21 RX ADMIN — DOXYCYCLINE 100 MG: 100 INJECTION, POWDER, LYOPHILIZED, FOR SOLUTION INTRAVENOUS at 08:39

## 2025-05-21 RX ADMIN — FENTANYL CITRATE 50 MCG: 50 INJECTION INTRAMUSCULAR; INTRAVENOUS at 15:26

## 2025-05-21 RX ADMIN — CHLORHEXIDINE GLUCONATE 15 ML: 1.2 RINSE BUCCAL at 08:25

## 2025-05-21 RX ADMIN — MIDODRINE HYDROCHLORIDE 5 MG: 2.5 TABLET ORAL at 17:02

## 2025-05-21 RX ADMIN — CHLORHEXIDINE GLUCONATE 15 ML: 1.2 RINSE BUCCAL at 20:34

## 2025-05-21 RX ADMIN — FENTANYL CITRATE 50 MCG: 50 INJECTION INTRAMUSCULAR; INTRAVENOUS at 05:17

## 2025-05-21 RX ADMIN — MINERAL OIL, PETROLATUM: 425; 568 OINTMENT OPHTHALMIC at 00:44

## 2025-05-21 RX ADMIN — PIPERACILLIN AND TAZOBACTAM 3375 MG: 3; .375 INJECTION, POWDER, LYOPHILIZED, FOR SOLUTION INTRAVENOUS at 05:15

## 2025-05-21 RX ADMIN — DOXYCYCLINE 100 MG: 100 INJECTION, POWDER, LYOPHILIZED, FOR SOLUTION INTRAVENOUS at 21:51

## 2025-05-21 RX ADMIN — GABAPENTIN 300 MG: 300 CAPSULE ORAL at 20:32

## 2025-05-21 RX ADMIN — POLYVINYL ALCOHOL, POVIDONE 1 DROP: 14; 6 SOLUTION/ DROPS OPHTHALMIC at 12:52

## 2025-05-21 RX ADMIN — FENTANYL CITRATE 50 MCG: 50 INJECTION INTRAMUSCULAR; INTRAVENOUS at 00:56

## 2025-05-21 RX ADMIN — SODIUM CHLORIDE, PRESERVATIVE FREE 10 ML: 5 INJECTION INTRAVENOUS at 08:26

## 2025-05-21 RX ADMIN — POLYVINYL ALCOHOL, POVIDONE 1 DROP: 14; 6 SOLUTION/ DROPS OPHTHALMIC at 08:47

## 2025-05-21 RX ADMIN — GABAPENTIN 300 MG: 300 CAPSULE ORAL at 15:14

## 2025-05-21 RX ADMIN — Medication 1 G: at 20:28

## 2025-05-21 RX ADMIN — FENTANYL CITRATE 50 MCG: 50 INJECTION INTRAMUSCULAR; INTRAVENOUS at 11:50

## 2025-05-21 RX ADMIN — FAMOTIDINE 20 MG: 10 INJECTION, SOLUTION INTRAVENOUS at 08:26

## 2025-05-21 RX ADMIN — ROSUVASTATIN CALCIUM 20 MG: 10 TABLET, FILM COATED ORAL at 08:10

## 2025-05-21 RX ADMIN — POLYVINYL ALCOHOL, POVIDONE 1 DROP: 14; 6 SOLUTION/ DROPS OPHTHALMIC at 16:13

## 2025-05-21 RX ADMIN — BUDESONIDE 500 MCG: 0.5 INHALANT RESPIRATORY (INHALATION) at 08:00

## 2025-05-21 RX ADMIN — GABAPENTIN 300 MG: 300 CAPSULE ORAL at 08:10

## 2025-05-21 RX ADMIN — MIDODRINE HYDROCHLORIDE 5 MG: 2.5 TABLET ORAL at 08:10

## 2025-05-21 RX ADMIN — FENTANYL CITRATE 50 MCG: 50 INJECTION INTRAMUSCULAR; INTRAVENOUS at 10:09

## 2025-05-21 RX ADMIN — INSULIN GLARGINE 5 UNITS: 100 INJECTION, SOLUTION SUBCUTANEOUS at 20:33

## 2025-05-21 RX ADMIN — PIPERACILLIN AND TAZOBACTAM 3375 MG: 3; .375 INJECTION, POWDER, LYOPHILIZED, FOR SOLUTION INTRAVENOUS at 14:18

## 2025-05-21 RX ADMIN — BUDESONIDE 500 MCG: 0.5 INHALANT RESPIRATORY (INHALATION) at 19:47

## 2025-05-21 ASSESSMENT — PULMONARY FUNCTION TESTS
PIF_VALUE: 18
PIF_VALUE: 24
PIF_VALUE: 21
PIF_VALUE: 22
PIF_VALUE: 24
PIF_VALUE: 19

## 2025-05-21 ASSESSMENT — PAIN SCALES - GENERAL
PAINLEVEL_OUTOF10: 0
PAINLEVEL_OUTOF10: 3
PAINLEVEL_OUTOF10: 0
PAINLEVEL_OUTOF10: 3

## 2025-05-21 NOTE — PROGRESS NOTES
Hospitalist Progress Note    Patient: Isidro Reardon MRN: [id number]  [facility]: [id number]    YOB: 1960  Age: 64 y.o.  Sex: male    DOA: 5/15/2025 LOS:  LOS: 6 days          Chief Complain :  Dizziness, lightheadedness.  64 y.o. male with CAD, aortic stenosis status post TAVR, diabetes mellitus, hypertension, hypertensive heart disease, neuropathy presents to ER with concerns of lightheadedness dizziness. In the ER he was noted to have heart rate in 30s to 40s. EKG showed complete heart block. He was given epinephrine and started on dopamine drip which improved his heart rate. He actually went into sinus tachycardia. Patient was subsequently admitted to intensive care unit. Later he suddenly became bradycardic and went into asystole. CPR initiated and ACLS protocol initiated. ROSC obtained. Patient intubated external pacers placed. Cardiology contacted s/pTVP.   Subjective:   Patient orally intubated, opens eyes, follows occasional commands. Off sedation since morning.     Assessment/Plan     Active Hospital Problems    Diagnosis     Coma (HCC) [R40.20]     Hypotension [I95.9]     Chronic kidney disease (CKD), stage II (mild) [N18.2]     S/P TAVR (transcatheter aortic valve replacement) [Z95.2]     Cardiac arrest (HCC) [I46.9]     Acute respiratory failure with hypoxia (HCC) [J96.01]     Hyperkalemia [E87.5]     Complete heart block (HCC) [I44.2]     Heart block [I45.9]     Acute renal failure [N17.9]     Type 2 diabetes with nephropathy (HCC) [E11.21]     Essential hypertension [I10]     COPD (chronic obstructive pulmonary disease) (HCC) [J44.9]     Aspiration pneumonia (HCC) [J69.0]     Coronary atherosclerosis of native  deterioration in the patients condition. This care involved high complexity decision making to assess, manipulate, and support vital system functions, discuss with specialists to treat this degreee vital organ system failure and to prevent further life threatening deterioration of the patient’s condition. This time does not include any procedures.        Case discussed with:  []Patient  [x]Family [x] Consultants  []Nursing  []Case Management   [] Others      Discharge to;  [] Home  [x] Home Health  [x] SNF/Rehab  [] LTAC. In TBD days    Review of systems : As above and,    Physical Exam: As above and,  General: As above    HEENT: NC, Atraumatic.  PERRLA, anicteric sclerae.  Lungs: CTA Bilaterally. No Wheezing/Rhonchi/Rales.  Heart:  S1 S2,  No murmur, No Rubs, No Gallops  Abdomen: Soft, Non distended, Non tender.  +Bowel sounds,   Extremities: No c/c/e           Vital signs/Intake and Output:  Visit Vitals  BP (!) 140/76   Pulse 89   Temp 98 °F (36.7 °C) (Axillary)   Resp 19   Ht 1.778 m (5' 10\")   Wt 84.8 kg (187 lb)   SpO2 95%   BMI 26.83 kg/m²     Current Shift:  05/21 0701 - 05/21 1900  In: 50   Out: 600 [Urine:600]  Last three shifts:  05/19 1901 - 05/21 0700  In: 1904.2 [I.V.:881.9]  Out: 2495 [Urine:2190]            Labs: Results:       Chemistry Recent Labs     05/19/25  0532 05/20/25  0436 05/21/25  0510   * 137 141   K 4.7 5.0 4.4   CL 98 101 105   CO2 24 22 22   BUN 40* 41* 43*   GLOB 3.4 3.5  --    ,  Lab Results   Component Value Date/Time    LACTA 1.3 05/16/2025 02:11 PM      CBC w/Diff Recent Labs     05/19/25  0532 05/20/25  0458 05/21/25  0510   WBC 13.1 12.1 10.6   RBC 4.15* 3.97* 4.02*   HGB 11.8* 11.3* 11.3*   HCT 37.6 35.9* 35.9*    186 206      Cardiac Enzymes No results found for: \"CKTOTAL\", \"CKMB\", \"CKMBINDEX\", \"TROPONINI\", \"TROPHS\",No results found for: \"BNP\"   Coagulation No results for input(s): \"INR\", \"APTT\" in the last 72 hours.    Invalid input(s): \"PTP\"      Lipid Panel

## 2025-05-21 NOTE — PROGRESS NOTES
Pharmacy - Vancomycin Dosing Consult ( 3 day - CAP )    3 day Vancomycin Regimen now complete.  Pharmacy to sign off.  Please re-consult Pharmacy if Vancomycin is to Continue.

## 2025-05-21 NOTE — PROGRESS NOTES
PALLIATIVE MEDICINE    AMD STATUS: NO AMD ON FILE  Daughter, Priyanka Craven, ([phone]) according to [facility]hy of Decision Making is Health Care Decision Maker.     CODE STATUS: FULL CODE    Per chart review patient will have permanent pacemaker placed tomorrow morning.     Palliative medicine will continue to follow Isidro Reardon and his family during his admission.         Ghislaine Esqueda RN  Palliative Medicine  [phone]

## 2025-05-21 NOTE — PROGRESS NOTES
NEUROLOGY PROGRESS NOTE        Patient: Isidro Reardon        Sex: male          DOA: 5/15/2025  YOB: 1960      Age:  64 y.o.         LOS: 6 days     Identification:  64-year-old male presents with unresponsiveness after coma.            SUBJECTIVE:   Patient has been much awake today according to nurse and family report.      REVIEW OF SYSTEMS: Denies chest pain, abdominal pain, nausea or vomiting. No fever or chills.    OBJECTIVE:    Visit Vitals  BP (!) 140/76   Pulse 89   Temp 98 °F (36.7 °C) (Axillary)   Resp 19   Ht 1.778 m (5' 10\")   Wt 84.8 kg (187 lb)   SpO2 95%   BMI 26.83 kg/m²     Physical Exam:  GEN: Alert, NAD  EYES: conjunctiva normal, lids with out lesions  HEENT: MMM.  HEART: RRR +S1 +S2  LUNGS: CTA B/L no rales or rhonchi.  ABDOMEN: Soft, non-tender.  EXTREMITIES: No edema cyanosis  SKIN: no rashes or skin breakdown, no nodules  NEUROLOGIC: Coma, arousable to verbal stimuli, he can track sound  Cranial nerves: Face is symmetric, spontaneous blink, PERRLA.  Motor: Not follow commands, right finger moves spontaneously.  Sensory: No withdrawal in four extremities on pain stimuli.  Coordination: Deferred due to unconsciousness.  Deep tendon reflexes: Deep tendon reflex are diminished throughout.  Gait assessment: Deferred due to unconsciousness.    Current Facility-Administered Medications   Medication Dose Route Frequency    Vancomycin - Pharmacy to Dose   Other RX Placeholder    vancomycin (VANCOCIN) 1,000 mg in sodium chloride 0.9 % 250 mL (addEASE) IVPB  1,000 mg IntraVENous Q24H    fentaNYL (SUBLIMAZE) injection 50 mcg  50 mcg IntraVENous Q4H PRN    midodrine (PROAMATINE) tablet 5 mg  5 mg Oral TID WC    ipratropium 0.5 mg-albuterol 2.5 mg (DUONEB) nebulizer solution 1 Dose  1 Dose Inhalation Q4H PRN    doxycycline (VIBRAMYCIN) 100 mg in sodium chloride 0.9 % 100 mL IVPB (addEASE)  100 mg IntraVENous Q12H    piperacillin-tazobactam (ZOSYN) 3,375 mg in sodium chloride 0.9 % 50 mL

## 2025-05-21 NOTE — PROGRESS NOTES
RENAL CONSULT  PROGRESS NOTE   2025    Patient:  Isidro Reardon  :  1960  Gender:  male  MRN #:  [id number]    Reason for Consult: Acute renal failure and Hyperkalemia requested by intensivist Dr Barrientos     Relevant events:   Still on mechanical vent   BP stable on vasopressors  Decent urine output  overnight, clinically not in volume overload   Labs, overnight events and consultant noted reviewed       Review of Symptoms:     Unable to obtained from patient due to his condition     Objective:    BP (!) 144/80   Pulse (!) 103   Temp 98.3 °F (36.8 °C) (Axillary)   Resp 16   Ht 1.803 m (5' 10.98\")   Wt 85 kg (187 lb 6.3 oz)   SpO2 96%   BMI 26.15 kg/m²     Physical Exam:    Pt on mechanical vent   HEENT: ET tube in place, no neck swelling  Abdomen: soft,  non distended   Ext: no edema   CNS- unconscious     Laboratory Data:    Lab Results   Component Value Date    BUN 43 (H) 2025    BUN 41 (H) 2025    BUN 40 (H) 2025     2025     2025     (L) 2025    CO2 22 2025    CO2 22 2025    CO2 24 2025    GLUCOSE 170 (H) 2025    GLUCOSE 217 (H) 2025    GLUCOSE 264 (H) 2025     Lab Results   Component Value Date    WBC 10.6 2025    HGB 11.3 (L) 2025    HCT 35.9 (L) 2025     Lab Results   Component Value Date    CALCIUM 9.6 2025     Lab Results   Component Value Date    HDL 31 (L) 2025     No results found for: \"TURBIDITY\"    Imaging Reveiwed:      EKG  Renal ultrasound  Xray  chest- IMPRESSION:     Satisfactory endotracheal tube and enteric tube placement. Low lung volumes with  mild perihilar opacities may represent atelectasis or vascular congestion.         Assessment:  Isidro Reardon is a 64 y.o. year old male with  CAD, AS s/p  TAVR, diabetes mellitus, hypertension, hypertensive heart disease, neuropathy presented to he ER for  lightheadedness dizziness.Later on had complete heart block,

## 2025-05-21 NOTE — CONSULTS
no diffuse rash  No peripheral IE finding on skin exam/ extremities     HEENT:  Normocephalic, atraumatic, EOMI, no scleral icterus or pallor; no conjunctival hemmohage, ET in place       Lungs:   non-labored, bilaterally clear   Heart:  RRR, s1 and s2; no murmurs   Abdomen:  soft, non-distended, active bowel sounds. Non-tender   Genitourinary: Moon in place   Extremities:   no clubbing, cyanosis   Neurologic:  No gross focal sensory abnormalities;  Cranial nerves intact   Psychiatric:   appropriate and interactive.     Results       Procedure Component Value Units Date/Time    Legionella antigen, urine [[id number]] Collected: 05/20/25 2317    Order Status: Canceled Specimen: Urine     Culture, Respiratory [[id number]]  (Abnormal) Collected: 05/18/25 1305    Order Status: Completed Specimen: Tracheostomy Site Updated: 05/21/25 0637     Special Requests NO SPECIAL REQUESTS        Gram Stain NO WBCS SEEN         NO EPITHELIAL CELLS SEEN         2+ YEAST        Culture       HEAVY Yeast, (apparent Candida albicans/Candida dubliniensis)                  NO NORMAL RESPIRATORY LINDA ISOLATED          Culture, MRSA, Screening [[id number]] Collected: 05/18/25 1303    Order Status: Completed Specimen: Nares Updated: 05/20/25 0849     Special Requests NO SPECIAL REQUESTS        Culture MRSA NOT PRESENT               Screening of patient nares for MRSA is for surveillance purposes and, if positive, to facilitate isolation considerations in high risk settings. It is not intended for automatic decolonization interventions per se as regimens are not sufficiently effective to warrant routine use.      Culture, Respiratory [[id number]] Collected: 05/17/25 0736    Order Status: Completed Specimen: Sputum Updated: 05/20/25 1109     Special Requests NO SPECIAL REQUESTS        Gram Stain OCCASIONAL WBCS SEEN         NO EPITHELIAL CELLS SEEN               RARE Gram positive cocci IN PAIRS           Culture       LIGHT NORMAL  RESPIRATORY LINDA          COVID-19 & Influenza Combo [[id number]] Collected: 05/16/25 1158    Order Status: Completed Specimen: Nasopharyngeal Updated: 05/16/25 1237     Source Nasopharyngeal        SARS-CoV-2, PCR Not detected        Comment: Not Detected results do not preclude SARS-CoV-2 infection and should not be used as the sole basis for patient management decisions.Results must be combined with clinical observations, patient history, and epidemiological information.        Rapid Influenza A By PCR Not detected        Rapid Influenza B By PCR Not detected        Comment: Testing was performed using [employer] SARS-CoV-2 and Influenza A/B nucleic acid assay.  This test is a multiplex Real-Time Reverse Transcriptase Polymerase Chain Reaction (RT-PCR) based in vitro diagnostic test intended for the qualitative detection of nucleic acids from SARS-CoV-2, Influenza A, and Influenza B in nasopharyngeal specimens.         Culture, Respiratory [[id number]]     Order Status: Canceled Specimen: Sputum, Suctioned     Culture, Blood 2 [[id number]] Collected: 05/16/25 0904    Order Status: Completed Specimen: Blood Updated: 05/21/25 0942     Special Requests RIGHT        Culture NO GROWTH 5 DAYS       Culture, Blood 1 [[id number]] Collected: 05/16/25 0903    Order Status: Completed Specimen: Blood Updated: 05/19/25 2337     Special Requests LEFT        Gram Stain       ANAEROBIC BOTTLE Blood culture bottle flagged positive by instrumentation, no organisms seen on preliminary gram stain, subculture of bottle is being performed to rule out growth.                  SMEAR CALLED TO AND CORRECTLY REPEATED BY: MARBIN BUSH RN ICU, TO JA AT 1640 5/19/2025           Culture       ONE OF TWO BOTTLES HAS BEEN FLAGGED POSITIVE BY INSTRUMENT.  BOTTLE HAS BEEN SENT TO [facility] TO ASSESS FOR POSSIBLE GROWTH. No organisms seen on gram stain. Multiple subcultures are in progress. NO GROWTH THUS FAR                 Labs: Results:

## 2025-05-21 NOTE — PROGRESS NOTES
FRIDA done without complication  FRIDA images are suboptimal  No obvious vegetation on the prosthetic valve  Complete report to follow  Discussed with the family   Discussed with Dr. Coates  Patient will be scheduled for permanent pacemaker tomorrow morning.     No

## 2025-05-21 NOTE — PROGRESS NOTES
Cardiology Progress Note        Patient: Isidro Reardon        Sex: male          DOA: 5/15/2025  YOB: 1960      Age:  64 y.o.        LOS:  LOS: 6 days   Assessment/Plan     Principal Problem:    Complete heart block (HCC)  Active Problems:    Coronary atherosclerosis of native coronary artery    Aspiration pneumonia (HCC)    COPD (chronic obstructive pulmonary disease) (HCC)    Essential hypertension    Type 2 diabetes with nephropathy (HCC)    Acute renal failure    Heart block    S/P TAVR (transcatheter aortic valve replacement)    Cardiac arrest (HCC)    Acute respiratory failure with hypoxia (HCC)    Hyperkalemia    Hypotension    Chronic kidney disease (CKD), stage II (mild)    Coma (HCC)  Resolved Problems:    * No resolved hospital problems. *      Plan:    5/21/2025    FRIDA today  Risk benefits and alternatives reviewed with the patient' daughter  Discussed with Dr. Coates  Patient is off dopamine                  5/20/2020  Patient remained intubated  Dopamine requirement is reducing  I will add midodrine 5 mg 3 times daily  Discussed with Dr. Pawel FLEMING, I will do transesophageal echocardiogram tomorrow to assess prosthetic aortic valve  If prosthetic aortic valve without vegetation then will clear for permanent pacemaker  Discussed with Dr. Coates  Discussed with patient's daughter who have consented for the procedure.                          5/18/2025  Patient remained intubated  Good urine output  Maintaining sinus rhythm without paced complexes/rhythm  Discussed in detail with patient's daughters  Discussed with Dr. Coates who recommended 48 hours more of antibiotic treatment before considering permanent pacemaker implantation  Continue to wean off slowly midodrine  Cussed with RN                                Cardiac telemetry normal sinus rhythm  CT scan has shown possible infiltrate/pneumonia/infectious process  Normal sinus rhythm  Still requiring higher  atrial pressure.    Image quality is fair. Procedure performed with the patient in a supine position.    Signed by: Kirit Giraldo MD on 5/16/2025 10:48 AM    No results found for this or any previous visit.                Signed By: KIRIT GIRALDO MD     May 21, 2025

## 2025-05-21 NOTE — WOUND CARE
Wound Care Note:    Chart audited for low almas score of 12, patient with high risk for skin breakdown.  Does patient have documentation of pressure injury?  no.  Almas score order set started on 5/16/2025     Skin Care & Pressure Relief Recommendations  Minimize layers of linen  Pads under patient to optimize support surface  Turn/reposition approximately every 2 hours  Use pillow wedges to maintain positioning but do not put pillow directly over wounds  Manage incontinence   Promote continence; Skin Protective lotion/cream to buttocks and sacrum daily and as needed with incontinence care  Offload heels pillows    Consult wound care if any wounds noted during admission

## 2025-05-21 NOTE — PROGRESS NOTES
[facility]   Pharmacy Pharmacokinetic Monitoring Service - Vancomycin    Consulting Provider: Dr. Coates   Indication: Sepsis of Unknown Etiology - 7 day tx  Target Concentration: Goal AUC/VERÓNICA 400-600 mg*hr/L  Day of Therapy:  4 ( day 1 of this Regimen with 3 days of prior treatment )  Additional Antimicrobials: Doxycycline, Zosyn    Pertinent Laboratory Values:   Wt Readings from Last 1 Encounters:   05/17/25 85 kg (187 lb 6.3 oz)     Temp Readings from Last 1 Encounters:   05/21/25 98.3 °F (36.8 °C) (Axillary)     Estimated Creatinine Clearance: 37 mL/min (A) (based on SCr of 2.14 mg/dL (H)).  Recent Labs     05/20/25  0436 05/20/25  0458 05/21/25  0510   CREATININE 2.38*  --  2.14*   BUN 41*  --  43*   WBC  --  12.1 10.6     Recent vancomycin administrations                     vancomycin (VANCOCIN) 1250 mg in sodium chloride 0.9% 250 mL IVPB (mg) 1,250 mg New Bag 05/20/25 1009    vancomycin (VANCOCIN) 1500 mg in sodium chloride 0.9% 500 mL IVPB (mg) 1,500 mg New Bag 05/19/25 1016    vancomycin (VANCOCIN) 1500 mg in sodium chloride 0.9% 500 mL IVPB (mg) 1,500 mg New Bag 05/18/25 1306             Plan:  Dr. Coates continued Vancomycin for 7 addition days  Will dose at 1000 mg IV q24hrs x 7 more days.  Estimated  mg/l.hr  Trough 15.5 mg/l  Pharmacy will continue to monitor patient and adjust therapy as indicated    SARAH GO RPH, [facility]  5/21/2025 11:33 AM   [phone]

## 2025-05-21 NOTE — PROGRESS NOTES
[facility]  Pulmonary, Critical Care, and Sleep Medicine    Name: Isidro Reardon MRN: [id number]   : 1960 Hospital: [facility]    Date: 2025  Room: [address] [place] [address] Note                                              Consult requesting physician: Dr. Pak  Reason for Consult: Respiratory failure    IMPRESSION:   Acute respiratory failure with hypoxia   J90.00  Cardiac arrest   I 46.9  Complete heart block   I45 .9  Hypotension I95.9  Aspiration pneumonia J69.0  Acute renal failure  CKD stage II  COPD J44.9  CAD I25.10  Status post TAVR  GERD        Active Hospital Problems    Diagnosis Date Noted    Coma (HCC) [R40.20] 2025    Hypotension [I95.9] 2025    Chronic kidney disease (CKD), stage II (mild) [N18.2] 2025    S/P TAVR (transcatheter aortic valve replacement) [Z95.2] 2025    Cardiac arrest (HCC) [I46.9] 2025    Acute respiratory failure with hypoxia (HCC) [J96.01] 2025    Hyperkalemia [E87.5] 2025    Complete heart block (HCC) [I44.2] 05/15/2025    Heart block [I45.9] 05/15/2025    Acute renal failure [N17.9] 2024    Type 2 diabetes with nephropathy (HCC) [E11.21] 2018    Essential hypertension [I10] 2018    COPD (chronic obstructive pulmonary disease) (HCC) [J44.9]     Aspiration pneumonia (HCC) [J69.0]     Coronary atherosclerosis of native coronary artery [I25.10] 2011        Code status: Full Code       RECOMMENDATIONS:     Respiratory: Patient remains on ventilator support.  VCV mode of ventilation-15/500/45%/PEEP 5.  Chest x-ray done today reviewed images and report-ET tube, OG tube in good positions; lungs clear-no focal infiltrates or consolidation; no pleural effusions; TAVR seen; pacing wire seen.  CT chest -findings suggestive of right upper lobe, and bibasilar aspiration pneumonia.  Ventilator sedation bundles reviewed.  Sedation-prn IV fentanyl.  Patient no longer on  20 mg  20 mg Oral Daily Yael Pak MD   20 mg at 05/21/25 0810    sodium chloride flush 0.9 % injection 5-40 mL  5-40 mL IntraVENous 2 times per day Yael Pak MD   10 mL at 05/21/25 0826    sodium chloride flush 0.9 % injection 5-40 mL  5-40 mL IntraVENous PRN Yael Pak MD        potassium chloride 20 mEq/50 mL IVPB (Central Line)  20 mEq IntraVENous PRN Yael Pak MD        enoxaparin (LOVENOX) injection 40 mg  40 mg SubCUTAneous Daily Jean-Claude Zamora MD   40 mg at 05/20/25 1019    ondansetron (ZOFRAN-ODT) disintegrating tablet 4 mg  4 mg Oral Q8H PRN Yael Pak MD        Or    ondansetron (ZOFRAN) injection 4 mg  4 mg IntraVENous Q6H PRN Yael Pak MD        polyethylene glycol (GLYCOLAX) packet 17 g  17 g Oral Daily PRN Yael Pak MD        acetaminophen (TYLENOL) tablet 650 mg  650 mg Oral Q6H PRN Yael Pak MD   650 mg at 05/17/25 1739    Or    acetaminophen (TYLENOL) suppository 650 mg  650 mg Rectal Q6H PRN Yael Pak MD        glucose chewable tablet 16 g  4 tablet Oral PRN Yael Pak MD        dextrose bolus 10% 125 mL  125 mL IntraVENous PRN Yael Pak MD        Or    dextrose bolus 10% 250 mL  250 mL IntraVENous PRN Yael Pak MD        glucagon injection 1 mg  1 mg SubCUTAneous PRN Yael Pak MD        dextrose 10 % infusion   IntraVENous Continuous PRN Yael Pak MD        [Held by provider] midazolam (VERSED) 100mg/100mL in NS infusion  1-10 mg/hr IntraVENous Continuous Yael Pak MD   Stopped at 05/20/25 0822           Objective:   Intake/Output:     Intake/Output Summary (Last 24 hours) at 5/21/2025 0950  Last data filed at 5/21/2025 0655  Gross per 24 hour   Intake 1103.36 ml   Output 1710 ml   Net -606.64 ml       Vital Signs:    BP (!) 144/80

## 2025-05-22 ENCOUNTER — APPOINTMENT (OUTPATIENT)
Facility: HOSPITAL | Age: 65
DRG: 242 | End: 2025-05-22
Payer: MEDICARE

## 2025-05-22 LAB
ANION GAP SERPL CALC-SCNC: 13 MMOL/L (ref 3–18)
ARTERIAL PATENCY WRIST A: POSITIVE
BACTERIA SPEC CULT: NORMAL
BASE EXCESS BLD CALC-SCNC: 1.9 MMOL/L
BASOPHILS # BLD: 0.06 K/UL (ref 0–0.1)
BASOPHILS NFR BLD: 0.6 % (ref 0–2)
BDY SITE: NORMAL
BUN SERPL-MCNC: 39 MG/DL (ref 6–23)
BUN/CREAT SERPL: 20 (ref 12–20)
CALCIUM SERPL-MCNC: 9.6 MG/DL (ref 8.5–10.1)
CHLORIDE SERPL-SCNC: 110 MMOL/L (ref 98–107)
CO2 SERPL-SCNC: 24 MMOL/L (ref 21–32)
CREAT SERPL-MCNC: 1.98 MG/DL (ref 0.6–1.3)
DIFFERENTIAL METHOD BLD: ABNORMAL
ECHO BSA: 2.05 M2
EOSINOPHIL # BLD: 0.22 K/UL (ref 0–0.4)
EOSINOPHIL NFR BLD: 2.3 % (ref 0–5)
ERYTHROCYTE [DISTWIDTH] IN BLOOD BY AUTOMATED COUNT: 14.6 % (ref 11.6–14.5)
GAS FLOW.O2 O2 DELIVERY SYS: NORMAL
GAS FLOW.O2 SETTING OXYMISER: 15 BPM
GLUCOSE BLD STRIP.AUTO-MCNC: 120 MG/DL (ref 70–110)
GLUCOSE BLD STRIP.AUTO-MCNC: 169 MG/DL (ref 70–110)
GLUCOSE BLD STRIP.AUTO-MCNC: 182 MG/DL (ref 70–110)
GLUCOSE BLD STRIP.AUTO-MCNC: 202 MG/DL (ref 70–110)
GLUCOSE SERPL-MCNC: 130 MG/DL (ref 74–108)
HCO3 BLD-SCNC: 26 MMOL/L (ref 21–28)
HCT VFR BLD AUTO: 34.7 % (ref 36–48)
HGB BLD-MCNC: 10.9 G/DL (ref 13–16)
IMM GRANULOCYTES # BLD AUTO: 0.05 K/UL (ref 0–0.04)
IMM GRANULOCYTES NFR BLD AUTO: 0.5 % (ref 0–0.5)
LYME ANTIBODY: NEGATIVE
LYMPHOCYTES # BLD: 1.51 K/UL (ref 0.9–3.3)
LYMPHOCYTES NFR BLD: 15.5 % (ref 21–52)
MCH RBC QN AUTO: 28.5 PG (ref 24–34)
MCHC RBC AUTO-ENTMCNC: 31.4 G/DL (ref 31–37)
MCV RBC AUTO: 90.6 FL (ref 78–100)
MONOCYTES # BLD: 1.13 K/UL (ref 0.05–1.2)
MONOCYTES NFR BLD: 11.6 % (ref 3–10)
NEUTS SEG # BLD: 6.76 K/UL (ref 1.8–8)
NEUTS SEG NFR BLD: 69.5 % (ref 40–73)
NRBC # BLD: 0 K/UL (ref 0–0.01)
NRBC BLD-RTO: 0 PER 100 WBC
O2/TOTAL GAS SETTING VFR VENT: 45 %
PCO2 BLD: 38 MMHG (ref 35–48)
PEEP RESPIRATORY: 5 CMH2O
PH BLD: 7.44 (ref 7.35–7.45)
PLATELET # BLD AUTO: 209 K/UL (ref 135–420)
PMV BLD AUTO: 9.8 FL (ref 9.2–11.8)
PO2 BLD: 83 MMHG (ref 83–108)
POTASSIUM SERPL-SCNC: 3.6 MMOL/L (ref 3.5–5.5)
RBC # BLD AUTO: 3.83 M/UL (ref 4.35–5.65)
RESPIRATORY RATE, POC: 19 (ref 5–40)
SAO2 % BLD: 96.6 % (ref 92–97)
SERVICE CMNT-IMP: NORMAL
SERVICE CMNT-IMP: NORMAL
SODIUM SERPL-SCNC: 146 MMOL/L (ref 136–145)
SPECIMEN TYPE: NORMAL
VENTILATION MODE VENT: NORMAL
VT SETTING VENT: 500 ML
WBC # BLD AUTO: 9.7 K/UL (ref 4.6–13.2)

## 2025-05-22 PROCEDURE — 2500000003 HC RX 250 WO HCPCS: Performed by: INTERNAL MEDICINE

## 2025-05-22 PROCEDURE — 2580000003 HC RX 258: Performed by: INTERNAL MEDICINE

## 2025-05-22 PROCEDURE — 6360000002 HC RX W HCPCS: Performed by: INTERNAL MEDICINE

## 2025-05-22 PROCEDURE — C1769 GUIDE WIRE: HCPCS | Performed by: INTERNAL MEDICINE

## 2025-05-22 PROCEDURE — 71045 X-RAY EXAM CHEST 1 VIEW: CPT

## 2025-05-22 PROCEDURE — 36600 WITHDRAWAL OF ARTERIAL BLOOD: CPT

## 2025-05-22 PROCEDURE — C1898 LEAD, PMKR, OTHER THAN TRANS: HCPCS | Performed by: INTERNAL MEDICINE

## 2025-05-22 PROCEDURE — 99152 MOD SED SAME PHYS/QHP 5/>YRS: CPT | Performed by: INTERNAL MEDICINE

## 2025-05-22 PROCEDURE — 33208 INSRT HEART PM ATRIAL & VENT: CPT | Performed by: INTERNAL MEDICINE

## 2025-05-22 PROCEDURE — 02H60JZ INSERTION OF PACEMAKER LEAD INTO RIGHT ATRIUM, OPEN APPROACH: ICD-10-PCS | Performed by: INTERNAL MEDICINE

## 2025-05-22 PROCEDURE — 82962 GLUCOSE BLOOD TEST: CPT

## 2025-05-22 PROCEDURE — 2000000000 HC ICU R&B

## 2025-05-22 PROCEDURE — 2500000003 HC RX 250 WO HCPCS: Performed by: HOSPITALIST

## 2025-05-22 PROCEDURE — 6370000000 HC RX 637 (ALT 250 FOR IP): Performed by: INTERNAL MEDICINE

## 2025-05-22 PROCEDURE — C1892 INTRO/SHEATH,FIXED,PEEL-AWAY: HCPCS | Performed by: INTERNAL MEDICINE

## 2025-05-22 PROCEDURE — 6360000004 HC RX CONTRAST MEDICATION: Performed by: INTERNAL MEDICINE

## 2025-05-22 PROCEDURE — 2709999900 HC NON-CHARGEABLE SUPPLY: Performed by: INTERNAL MEDICINE

## 2025-05-22 PROCEDURE — C1785 PMKR, DUAL, RATE-RESP: HCPCS | Performed by: INTERNAL MEDICINE

## 2025-05-22 PROCEDURE — 02HK0JZ INSERTION OF PACEMAKER LEAD INTO RIGHT VENTRICLE, OPEN APPROACH: ICD-10-PCS | Performed by: INTERNAL MEDICINE

## 2025-05-22 PROCEDURE — 94003 VENT MGMT INPAT SUBQ DAY: CPT

## 2025-05-22 PROCEDURE — 82803 BLOOD GASES ANY COMBINATION: CPT

## 2025-05-22 PROCEDURE — 99153 MOD SED SAME PHYS/QHP EA: CPT | Performed by: INTERNAL MEDICINE

## 2025-05-22 PROCEDURE — 80048 BASIC METABOLIC PNL TOTAL CA: CPT

## 2025-05-22 PROCEDURE — 85025 COMPLETE CBC W/AUTO DIFF WBC: CPT

## 2025-05-22 PROCEDURE — 94640 AIRWAY INHALATION TREATMENT: CPT

## 2025-05-22 PROCEDURE — C1894 INTRO/SHEATH, NON-LASER: HCPCS | Performed by: INTERNAL MEDICINE

## 2025-05-22 PROCEDURE — 6370000000 HC RX 637 (ALT 250 FOR IP): Performed by: HOSPITALIST

## 2025-05-22 PROCEDURE — 0JH606Z INSERTION OF PACEMAKER, DUAL CHAMBER INTO CHEST SUBCUTANEOUS TISSUE AND FASCIA, OPEN APPROACH: ICD-10-PCS | Performed by: INTERNAL MEDICINE

## 2025-05-22 DEVICE — LEAD 407658 CAPSUREFIX NOVUS US MRI
Type: IMPLANTABLE DEVICE | Status: FUNCTIONAL
Brand: CAPSUREFIX NOVUS MRI™ SURESCAN™

## 2025-05-22 DEVICE — LEAD 407652 CAPSUREFIX NOVUS US MRI
Type: IMPLANTABLE DEVICE | Site: SUBCLAVIAN | Status: FUNCTIONAL
Brand: CAPSUREFIX NOVUS MRI™ SURESCAN™

## 2025-05-22 DEVICE — IPG W1DR01 AZURE XT DR MRI USA
Type: IMPLANTABLE DEVICE | Site: SUBCLAVIAN | Status: FUNCTIONAL
Brand: AZURE™ XT DR MRI SURESCAN™

## 2025-05-22 RX ORDER — DEXMEDETOMIDINE HYDROCHLORIDE 4 UG/ML
.1-.7 INJECTION, SOLUTION INTRAVENOUS CONTINUOUS
Status: DISCONTINUED | OUTPATIENT
Start: 2025-05-22 | End: 2025-05-23

## 2025-05-22 RX ORDER — MIDAZOLAM HYDROCHLORIDE 1 MG/ML
INJECTION INTRAMUSCULAR; INTRAVENOUS PRN
Status: DISCONTINUED | OUTPATIENT
Start: 2025-05-22 | End: 2025-05-22 | Stop reason: HOSPADM

## 2025-05-22 RX ORDER — IOPAMIDOL 510 MG/ML
INJECTION, SOLUTION INTRAVASCULAR PRN
Status: DISCONTINUED | OUTPATIENT
Start: 2025-05-22 | End: 2025-05-22 | Stop reason: HOSPADM

## 2025-05-22 RX ORDER — SODIUM CHLORIDE 9 MG/ML
INJECTION, SOLUTION INTRAVENOUS PRN
Status: DISCONTINUED | OUTPATIENT
Start: 2025-05-22 | End: 2025-05-29 | Stop reason: HOSPADM

## 2025-05-22 RX ORDER — LIDOCAINE HYDROCHLORIDE 10 MG/ML
INJECTION, SOLUTION INFILTRATION; PERINEURAL PRN
Status: DISCONTINUED | OUTPATIENT
Start: 2025-05-22 | End: 2025-05-22 | Stop reason: HOSPADM

## 2025-05-22 RX ORDER — SODIUM CHLORIDE 0.9 % (FLUSH) 0.9 %
5-40 SYRINGE (ML) INJECTION EVERY 12 HOURS SCHEDULED
Status: DISCONTINUED | OUTPATIENT
Start: 2025-05-22 | End: 2025-05-29 | Stop reason: HOSPADM

## 2025-05-22 RX ORDER — SODIUM CHLORIDE 0.9 % (FLUSH) 0.9 %
5-40 SYRINGE (ML) INJECTION PRN
Status: DISCONTINUED | OUTPATIENT
Start: 2025-05-22 | End: 2025-05-29 | Stop reason: HOSPADM

## 2025-05-22 RX ORDER — CEFAZOLIN SODIUM/WATER 1 G/10 ML
SYRINGE (ML) INTRAVENOUS PRN
Status: DISCONTINUED | OUTPATIENT
Start: 2025-05-22 | End: 2025-05-22 | Stop reason: HOSPADM

## 2025-05-22 RX ORDER — ENOXAPARIN SODIUM 100 MG/ML
40 INJECTION SUBCUTANEOUS DAILY
Status: DISCONTINUED | OUTPATIENT
Start: 2025-05-22 | End: 2025-05-29 | Stop reason: HOSPADM

## 2025-05-22 RX ADMIN — DOXYCYCLINE 100 MG: 100 INJECTION, POWDER, LYOPHILIZED, FOR SOLUTION INTRAVENOUS at 12:30

## 2025-05-22 RX ADMIN — PIPERACILLIN AND TAZOBACTAM 3375 MG: 3; .375 INJECTION, POWDER, LYOPHILIZED, FOR SOLUTION INTRAVENOUS at 16:38

## 2025-05-22 RX ADMIN — PIPERACILLIN AND TAZOBACTAM 3375 MG: 3; .375 INJECTION, POWDER, LYOPHILIZED, FOR SOLUTION INTRAVENOUS at 05:09

## 2025-05-22 RX ADMIN — FENTANYL CITRATE 50 MCG: 50 INJECTION INTRAMUSCULAR; INTRAVENOUS at 12:30

## 2025-05-22 RX ADMIN — MINERAL OIL, PETROLATUM: 425; 568 OINTMENT OPHTHALMIC at 00:26

## 2025-05-22 RX ADMIN — INSULIN GLARGINE 5 UNITS: 100 INJECTION, SOLUTION SUBCUTANEOUS at 21:59

## 2025-05-22 RX ADMIN — BUDESONIDE 500 MCG: 0.5 INHALANT RESPIRATORY (INHALATION) at 19:18

## 2025-05-22 RX ADMIN — SODIUM CHLORIDE, PRESERVATIVE FREE 10 ML: 5 INJECTION INTRAVENOUS at 21:59

## 2025-05-22 RX ADMIN — DEXMEDETOMIDINE 0.2 MCG/KG/HR: 100 INJECTION, SOLUTION INTRAVENOUS at 13:37

## 2025-05-22 RX ADMIN — GABAPENTIN 300 MG: 300 CAPSULE ORAL at 16:38

## 2025-05-22 RX ADMIN — DOCUSATE SODIUM 100 MG: 100 CAPSULE, LIQUID FILLED ORAL at 10:18

## 2025-05-22 RX ADMIN — PIPERACILLIN AND TAZOBACTAM 3375 MG: 3; .375 INJECTION, POWDER, LYOPHILIZED, FOR SOLUTION INTRAVENOUS at 21:57

## 2025-05-22 RX ADMIN — MINERAL OIL, PETROLATUM: 425; 568 OINTMENT OPHTHALMIC at 16:32

## 2025-05-22 RX ADMIN — CHLORHEXIDINE GLUCONATE 15 ML: 1.2 RINSE BUCCAL at 21:59

## 2025-05-22 RX ADMIN — GABAPENTIN 300 MG: 300 CAPSULE ORAL at 10:18

## 2025-05-22 RX ADMIN — DOXYCYCLINE 100 MG: 100 INJECTION, POWDER, LYOPHILIZED, FOR SOLUTION INTRAVENOUS at 22:06

## 2025-05-22 RX ADMIN — SODIUM CHLORIDE, PRESERVATIVE FREE 10 ML: 5 INJECTION INTRAVENOUS at 21:52

## 2025-05-22 RX ADMIN — MINERAL OIL, PETROLATUM: 425; 568 OINTMENT OPHTHALMIC at 21:52

## 2025-05-22 RX ADMIN — MIDAZOLAM HYDROCHLORIDE 2 MG: 1 INJECTION INTRAMUSCULAR; INTRAVENOUS at 08:30

## 2025-05-22 RX ADMIN — MIDAZOLAM HYDROCHLORIDE 2 MG: 1 INJECTION INTRAMUSCULAR; INTRAVENOUS at 07:30

## 2025-05-22 RX ADMIN — FENTANYL CITRATE 50 MCG: 50 INJECTION INTRAMUSCULAR; INTRAVENOUS at 22:14

## 2025-05-22 RX ADMIN — FAMOTIDINE 20 MG: 10 INJECTION, SOLUTION INTRAVENOUS at 10:18

## 2025-05-22 RX ADMIN — MIDAZOLAM HYDROCHLORIDE 2 MG: 1 INJECTION INTRAMUSCULAR; INTRAVENOUS at 08:00

## 2025-05-22 RX ADMIN — ROSUVASTATIN CALCIUM 20 MG: 10 TABLET, FILM COATED ORAL at 10:18

## 2025-05-22 RX ADMIN — ACETAMINOPHEN 650 MG: 325 TABLET ORAL at 18:09

## 2025-05-22 RX ADMIN — CEFAZOLIN 2000 MG: 10 INJECTION, POWDER, FOR SOLUTION INTRAVENOUS at 16:38

## 2025-05-22 RX ADMIN — LACTULOSE 20 G: 10 SOLUTION ORAL at 10:18

## 2025-05-22 RX ADMIN — MIDODRINE HYDROCHLORIDE 5 MG: 2.5 TABLET ORAL at 10:18

## 2025-05-22 RX ADMIN — MINERAL OIL, PETROLATUM: 425; 568 OINTMENT OPHTHALMIC at 10:18

## 2025-05-22 RX ADMIN — MINERAL OIL, PETROLATUM: 425; 568 OINTMENT OPHTHALMIC at 23:58

## 2025-05-22 RX ADMIN — MIDAZOLAM HYDROCHLORIDE 5 MG/HR: 5 INJECTION, SOLUTION INTRAMUSCULAR; INTRAVENOUS at 06:57

## 2025-05-22 RX ADMIN — FENTANYL CITRATE 50 MCG: 50 INJECTION INTRAMUSCULAR; INTRAVENOUS at 16:37

## 2025-05-22 RX ADMIN — Medication 1 G: at 10:18

## 2025-05-22 RX ADMIN — Medication 1 G: at 21:52

## 2025-05-22 RX ADMIN — GABAPENTIN 300 MG: 300 CAPSULE ORAL at 21:52

## 2025-05-22 RX ADMIN — FENTANYL CITRATE 25 MCG: 50 INJECTION, SOLUTION INTRAMUSCULAR; INTRAVENOUS at 08:30

## 2025-05-22 RX ADMIN — MINERAL OIL, PETROLATUM: 425; 568 OINTMENT OPHTHALMIC at 04:44

## 2025-05-22 RX ADMIN — DEXMEDETOMIDINE 0.7 MCG/KG/HR: 100 INJECTION, SOLUTION INTRAVENOUS at 20:19

## 2025-05-22 ASSESSMENT — PULMONARY FUNCTION TESTS
PIF_VALUE: 21
PIF_VALUE: 19
PIF_VALUE: 21
PIF_VALUE: 30
PIF_VALUE: 18
PIF_VALUE: 21
PIF_VALUE: 22

## 2025-05-22 ASSESSMENT — PAIN SCALES - GENERAL
PAINLEVEL_OUTOF10: 0
PAINLEVEL_OUTOF10: 7

## 2025-05-22 ASSESSMENT — PAIN SCALES - WONG BAKER
WONGBAKER_NUMERICALRESPONSE: NO HURT

## 2025-05-22 NOTE — PROGRESS NOTES
[facility]   Pharmacy Pharmacokinetic Monitoring Service - Vancomycin    Consulting Provider: Dr. Coates - 7 day tx  Indication: Sepsis of Unknown Etiology  Target Concentration: Goal AUC/VERÓNICA 400-600 mg*hr/L  Day of Therapy: 2  Additional Antimicrobials: Doxycycline, Zosyn    Pertinent Laboratory Values:   Wt Readings from Last 1 Encounters:   05/21/25 84.8 kg (187 lb)     Temp Readings from Last 1 Encounters:   05/22/25 98.7 °F (37.1 °C) (Oral)     Estimated Creatinine Clearance: 38 mL/min (A) (based on SCr of 1.98 mg/dL (H)).  Recent Labs     05/21/25  0510 05/22/25  0443   CREATININE 2.14* 1.98*   BUN 43* 39*   WBC 10.6 9.7     Recent vancomycin administrations                     vancomycin (VANCOCIN) 1,000 mg in sodium chloride 0.9 % 250 mL (addEASE) IVPB (mg) 1,000 mg New Bag 05/21/25 1251    vancomycin (VANCOCIN) 1250 mg in sodium chloride 0.9% 250 mL IVPB (mg) 1,250 mg New Bag 05/20/25 1009    vancomycin (VANCOCIN) 1500 mg in sodium chloride 0.9% 500 mL IVPB (mg) 1,500 mg New Bag 05/19/25 1016               Plan:  Updated  mg/l.hr   Trough 14.1 mg/l  Vancomycin Random Level with am labs 5/23/2025  Pharmacy will continue to monitor patient and adjust therapy as indicated    SARAH GO RPH, BCPS  5/22/2025 9:24 AM   [phone]

## 2025-05-22 NOTE — PROGRESS NOTES
Handoff given to Turner FLORES cath lab over phone. 4 members of cath lab team at bedside and 1 RT. Ear ring removed from pt left ear and placed in bag with chart given to cath lab. Phone consent given by Priyanka Merissa witness by this RN and Cathlab RN.

## 2025-05-22 NOTE — PROGRESS NOTES
Cardiology Progress Note        Patient: Isidro Reardon        Sex: male          DOA: 5/15/2025  YOB: 1960      Age:  64 y.o.        LOS:  LOS: 7 days   Assessment/Plan     Principal Problem:    Complete heart block (HCC)  Active Problems:    Coronary atherosclerosis of native coronary artery    Aspiration pneumonia (HCC)    COPD (chronic obstructive pulmonary disease) (HCC)    Essential hypertension    Type 2 diabetes with nephropathy (HCC)    Acute renal failure    Heart block    S/P TAVR (transcatheter aortic valve replacement)    Cardiac arrest (HCC)    Acute respiratory failure with hypoxia (HCC)    Hyperkalemia    Hypotension    Chronic kidney disease (CKD), stage II (mild)    Coma (HCC)  Resolved Problems:    * No resolved hospital problems. *      Plan:  5/22/2025  Status post permanent pacemaker without any problem  Patient is on SBT protocol for possible extubation tomorrow  Hemodynamically stable blood pressure is trending up we will hold the midodrine  Discussed with patient's daughter  Discussed with RN                        5/21/2025    FRIDA today  Risk benefits and alternatives reviewed with the patient' daughter  Discussed with Dr. Coates  Patient is off dopamine                  5/20/2020  Patient remained intubated  Dopamine requirement is reducing  I will add midodrine 5 mg 3 times daily  Discussed with Dr. Pawel FLEMING, I will do transesophageal echocardiogram tomorrow to assess prosthetic aortic valve  If prosthetic aortic valve without vegetation then will clear for permanent pacemaker  Discussed with Dr. Coates  Discussed with patient's daughter who have consented for the procedure.                          5/18/2025  Patient remained intubated  Good urine output  Maintaining sinus rhythm without paced complexes/rhythm  Discussed in detail with patient's daughters  Discussed with Dr. Coates who recommended 48 hours more of antibiotic treatment before considering permanent  sodium (COLACE) capsule 100 mg  100 mg Oral Daily    lactulose (CHRONULAC) 10 GM/15ML solution 20 g  20 g Oral Daily    potassium chloride (KLOR-CON M) extended release tablet 40 mEq  40 mEq Oral PRN    Or    potassium bicarb-citric acid (EFFER-K) effervescent tablet 40 mEq  40 mEq Oral PRN    Or    potassium chloride 10 mEq/100 mL IVPB (Peripheral Line)  10 mEq IntraVENous PRN    magnesium sulfate 2000 mg in 50 mL IVPB premix  2,000 mg IntraVENous PRN    sodium phosphate 15 mmol in sodium chloride 0.9 % 250 mL IVPB  15 mmol IntraVENous PRN    dextrose 50 % IV solution  25 g IntraVENous PRN    insulin glargine (LANTUS) injection vial 5 Units  5 Units SubCUTAneous Nightly    chlorhexidine (PERIDEX) 0.12 % solution 15 mL  15 mL Mouth/Throat BID    famotidine (PEPCID) 20 MG/2ML 20 mg in sodium chloride (PF) 0.9 % 10 mL injection  20 mg IntraVENous Daily    Polyvinyl Alcohol-Povidone PF (REFRESH) 1.4-0.6 % ophthalmic solution 1 drop  1 drop Both Eyes Q4H    Or    lubrifresh P.M. (artificial tears) ophthalmic ointment   Both Eyes Q4H    [Held by provider] aspirin chewable tablet 81 mg  81 mg Oral Daily    [Held by provider] DULoxetine (CYMBALTA) extended release capsule 60 mg  60 mg Oral Daily    gabapentin (NEURONTIN) capsule 300 mg  300 mg Oral TID    rosuvastatin (CRESTOR) tablet 20 mg  20 mg Oral Daily    sodium chloride flush 0.9 % injection 5-40 mL  5-40 mL IntraVENous 2 times per day    sodium chloride flush 0.9 % injection 5-40 mL  5-40 mL IntraVENous PRN    potassium chloride 20 mEq/50 mL IVPB (Central Line)  20 mEq IntraVENous PRN    ondansetron (ZOFRAN-ODT) disintegrating tablet 4 mg  4 mg Oral Q8H PRN    Or    ondansetron (ZOFRAN) injection 4 mg  4 mg IntraVENous Q6H PRN    polyethylene glycol (GLYCOLAX) packet 17 g  17 g Oral Daily PRN    acetaminophen (TYLENOL) tablet 650 mg  650 mg Oral Q6H PRN    Or    acetaminophen (TYLENOL) suppository 650 mg  650 mg Rectal Q6H PRN    glucose chewable tablet 16 g  4

## 2025-05-22 NOTE — PROGRESS NOTES
Patient returned from the cath lab. Remains intubated and sedated. Pacemaker site CDI. Sheath removed by Jesús, pressure applied. Small amount of blood noted on dsg.

## 2025-05-22 NOTE — PROGRESS NOTES
Report received from MARK Ortega. Patient returning from the cath lab with a dual chamber pacemaker. Sheath will remain in place to the R fem, cath lab to pull later.     Informed by Bella (cath lab) that she administered boluses of Versed totaling 6 mg from the bag.

## 2025-05-22 NOTE — PROGRESS NOTES
Palliative Medicine    CODE STATUS: FULL CODE    AMD Status: none on file. His daughter, Priyanka, is his sole child and is medical surrogate     Seen today in room 104.  Lying supine with head of bed elevated. Three family members at bedside. Intubated/ventilated/sedated. Intubated 5/16/2025 FiO2 45% PEEP 5. Fentanyl gtt infusing    Permanent pacemaker placed today.    Clinical nurse explained to family that SBT was not tolerated well today but the plan is to try again tomorrow.    Disposition plan: to be determined based on response to medical treatment, medical recommendations,  and patient/family decisions    Palliative Medicine will continue to follow Isidro Reardon  and his family during his hospitalization and support them as they make healthcare decisions and define goals of care.    Krystle Waterman RN, MSN  Palliative Medicine  P: [phone]

## 2025-05-22 NOTE — PROGRESS NOTES
Report received from MARK Archuleta and MARK Carbone. Patient transferred to the cath lab for a permanent pacemaker.

## 2025-05-22 NOTE — PROGRESS NOTES
dextrose 10 % infusion   IntraVENous Continuous PRN Yael Pak MD        midazolam (VERSED) 100mg/100mL in NS infusion  1-10 mg/hr IntraVENous Continuous Arturo Coates MD 5 mL/hr at 05/22/25 0657 5 mg/hr at 05/22/25 0657           Objective:   Intake/Output:     Intake/Output Summary (Last 24 hours) at 5/22/2025 1013  Last data filed at 5/22/2025 0903  Gross per 24 hour   Intake 527.59 ml   Output 1890 ml   Net -1362.41 ml       Vital Signs:    /73   Pulse 97   Temp 98.7 °F (37.1 °C) (Oral)   Resp 17   Ht 1.778 m (5' 10\")   Wt 84.8 kg (187 lb)   SpO2 99%   BMI 26.83 kg/m²     Weight:  Wt Readings from Last 3 Encounters:   05/21/25 84.8 kg (187 lb)   02/26/25 83.5 kg (184 lb)   02/11/25 86.2 kg (190 lb)          Physical Exam:     Patient on ventilator support; intubated; acyanotic  HEENT: pupils not dilated, reactive, no scleral jaundice, moist oral mucosa, no nasal drainage  Neck: No adenopathy or thyroid swelling  Endotracheal and orotracheal tubes: No bleeding or secretions  CVS: S1-S2; no murmur; JVD not elevated; telemetry-sinus rhythm  RS: Good air entry bilateral; symmetrical breath sounds; no wheezing; few bibasal crackles; not tachypneic or in distress   Abd: Soft, no tenderness, no distention, no guarding or rigidity, bowel sounds present, no hepatosplenomegaly      Neuro: Intubated; sedated; limited exam   Extrm: No leg edema; no focal swelling or clubbing  Good DPs felt in both feet  Skin: no rash  Lymphatic: no cervical or supraclavicular adenopathy   Groin: no bleeding or hematoma  Left groin central line-no bleeding or hematoma      Data:       Recent Results (from the past 24 hours)   POCT Glucose    Collection Time: 05/21/25 11:43 AM   Result Value Ref Range    POC Glucose 161 (H) 70 - 110 mg/dL   FRIDA (PRN contrast/bubble/3D)    Collection Time: 05/21/25 12:29 PM   Result Value Ref Range    Body Surface Area 2.05 m2   POCT Glucose    Collection Time: 05/21/25   vasculature, and solid organs. ORAL CONTRAST: NONE. FINDINGS: MEDIASTINUM: No mass or lymphadenopathy. DIA: No gross pathology. THORACIC AORTA: Normal caliber MAIN PULMONARY ARTERY: Normal in caliber. TRACHEA/BRONCHI: Partially visualized endotracheal tube terminates at the level of the mid thoracic trachea. ESOPHAGUS: Endotracheal tube courses through the esophagus, which is mildly distended by fluid. HEART: Normal size. Partially visualized pacemaker leads. Aortic valve replacement. Coronary artery calcium: present. PLEURA: No effusion or pneumothorax. LUNGS: Tree-in-bud nodularity most prominent in the right upper lobe with consolidative opacities in the right upper lobe. Bilateral passive atelectasis of the dependent lungs. LIVER: Diffusely decreased attenuation. GALLBLADDER: Unremarkable. SPLEEN: Normal size. PANCREAS: No gross pathology. ADRENALS: Unremarkable. KIDNEYS: No nephrolithiasis or hydronephrosis. Right renal atrophy. STOMACH: Enteric tube terminates in the gastric lumen. SMALL BOWEL: No dilatation or wall thickening. COLON: No dilatation or wall thickening. APPENDIX: Not definitively visualized. PERITONEUM: No ascites or pneumoperitoneum. RETROPERITONEUM: No lymphadenopathy or aortic aneurysm. REPRODUCTIVE ORGANS: Within normal limits. URINARY BLADDER: Decompressed by a Moon catheter, which is likely the source of the intraluminal gas. BONES: No destructive bone lesion. ADDITIONAL COMMENTS: Bilateral femoral approach arterial lines. Partially visualized moderate right-sided inguinal hernia and small left fat and bowel containing inguinal hernia.     1. Tree-in-bud nodularity in the lungs with some consolidative opacities in the right upper lobe compatible with an infectious process. 2. No findings of an infectious process in the abdomen or pelvis. 3. Endotracheal tube and bilateral femoral arterial lines and Moon catheter are in appropriate position. 4. Diffuse hepatic steatosis. Electronically

## 2025-05-22 NOTE — PROGRESS NOTES
RENAL CONSULT  PROGRESS NOTE   2025    Patient:  Isidro Reardon  :  1960  Gender:  male  MRN #:  [id number]    Reason for Consult: Acute renal failure and Hyperkalemia requested by intensivist Dr Barrientos     Relevant events:   Still on mechanical vent   BP stable  without vasopressors  Decent urine output , clinically not in volume overload   Labs, overnight events and consultant noted reviewed       Review of Symptoms:     Unable to obtained from patient due to his condition     Objective:    /73   Pulse 97   Temp 98.7 °F (37.1 °C) (Oral)   Resp 17   Ht 1.778 m (5' 10\")   Wt 84.8 kg (187 lb)   SpO2 99%   BMI 26.83 kg/m²     Physical Exam:    Pt on mechanical vent   HEENT: ET tube in placeAbdomen: soft,  non distended   Ext: no edema   CNS- unconscious     Laboratory Data:    Lab Results   Component Value Date    BUN 39 (H) 2025    BUN 43 (H) 2025    BUN 41 (H) 2025     (H) 2025     2025     2025    CO2 24 2025    CO2 22 2025    CO2 22 2025    GLUCOSE 130 (H) 2025    GLUCOSE 170 (H) 2025    GLUCOSE 217 (H) 2025     Lab Results   Component Value Date    WBC 9.7 2025    HGB 10.9 (L) 2025    HCT 34.7 (L) 2025     Lab Results   Component Value Date    CALCIUM 9.6 2025     Lab Results   Component Value Date    HDL 31 (L) 2025     No results found for: \"TURBIDITY\"    Imaging Reveiwed:      EKG  Renal ultrasound  Xray  chest- IMPRESSION:     Satisfactory endotracheal tube and enteric tube placement. Low lung volumes with  mild perihilar opacities may represent atelectasis or vascular congestion.         Assessment:  Isidro Reardon is a 64 y.o. year old male with  CAD, AS s/p  TAVR, diabetes mellitus, hypertension, hypertensive heart disease, neuropathy presented to he ER for  lightheadedness dizziness.Later on had complete heart block, asystole cardiac arrest   Now has acute renal

## 2025-05-22 NOTE — PROGRESS NOTES
Gave 3 bolus given from the versed bag during case total of 6 mg given at   2mg @0730  2 mg @0800 2mg 0820 tried to chart in mar and it would not let me scan bolus .

## 2025-05-22 NOTE — PROGRESS NOTES
Awaiting labs results. Checked in with lab, no results ATT. Will update and notify when results are available.

## 2025-05-22 NOTE — PROGRESS NOTES
1040:attempted pt on SBT with PS 8 cm. Pt with increased WOB, increased RR, RSBI greater than 105.increased to 10cm PS. Pt still not tolerating well. Increased to 12cm of PS, pt RR in the upper 30's, RSBI greater than 105. Dr aware. Pt placed back on AC/VC+ at previous settings. Total time @30 mins.   Will cont to monitor.     Electronically signed by Prabha Smyth RRT on 5/22/25 at 12:27 PM EDT

## 2025-05-22 NOTE — PROGRESS NOTES
Hospitalist Progress Note    Patient: Isidro Reardon MRN: [id number]  [facility]: [id number]    YOB: 1960  Age: 64 y.o.  Sex: male    DOA: 5/15/2025 LOS:  LOS: 7 days          Chief Complain :  Dizziness, lightheadedness.  64 y.o. male with CAD, aortic stenosis status post TAVR, diabetes mellitus, hypertension, hypertensive heart disease, neuropathy presents to ER with concerns of lightheadedness dizziness. In the ER he was noted to have heart rate in 30s to 40s. EKG showed complete heart block. He was given epinephrine and started on dopamine drip which improved his heart rate. He actually went into sinus tachycardia. Patient was subsequently admitted to intensive care unit. Later he suddenly became bradycardic and went into asystole. CPR initiated and ACLS protocol initiated. ROSC obtained. Patient intubated external pacers placed. Cardiology contacted s/pTVP.   Subjective:   Patient orally intubated, opens eyes, follows occasional commands. Off sedation since morning.     Assessment/Plan     Active Hospital Problems    Diagnosis     Coma (HCC) [R40.20]     Hypotension [I95.9]     Chronic kidney disease (CKD), stage II (mild) [N18.2]     S/P TAVR (transcatheter aortic valve replacement) [Z95.2]     Cardiac arrest (HCC) [I46.9]     Acute respiratory failure with hypoxia (HCC) [J96.01]     Hyperkalemia [E87.5]     Complete heart block (HCC) [I44.2]     Heart block [I45.9]     Acute renal failure [N17.9]     Type 2 diabetes with nephropathy (HCC) [E11.21]     Essential hypertension [I10]     COPD (chronic obstructive pulmonary disease) (HCC) [J44.9]     Aspiration pneumonia (HCC) [J69.0]     Coronary atherosclerosis of native

## 2025-05-22 NOTE — PROGRESS NOTES
Verified Lovenox order with Dr. Zamora. Will start Lovenox tomorrow morning. Caro alcala/ Pharmacy updated.

## 2025-05-23 ENCOUNTER — APPOINTMENT (OUTPATIENT)
Facility: HOSPITAL | Age: 65
DRG: 242 | End: 2025-05-23
Payer: MEDICARE

## 2025-05-23 LAB
ANION GAP SERPL CALC-SCNC: 14 MMOL/L (ref 3–18)
ARTERIAL PATENCY WRIST A: POSITIVE
BACTERIA SPEC CULT: NORMAL
BACTERIA SPEC CULT: NORMAL
BASE EXCESS BLD CALC-SCNC: 1.1 MMOL/L
BASOPHILS # BLD: 0.07 K/UL (ref 0–0.1)
BASOPHILS NFR BLD: 0.6 % (ref 0–2)
BDY SITE: NORMAL
BUN SERPL-MCNC: 45 MG/DL (ref 6–23)
BUN/CREAT SERPL: 23 (ref 12–20)
CALCIUM SERPL-MCNC: 10.6 MG/DL (ref 8.5–10.1)
CHLORIDE SERPL-SCNC: 112 MMOL/L (ref 98–107)
CO2 SERPL-SCNC: 23 MMOL/L (ref 21–32)
CREAT SERPL-MCNC: 1.94 MG/DL (ref 0.6–1.3)
DIFFERENTIAL METHOD BLD: ABNORMAL
EOSINOPHIL # BLD: 0.08 K/UL (ref 0–0.4)
EOSINOPHIL NFR BLD: 0.7 % (ref 0–5)
ERYTHROCYTE [DISTWIDTH] IN BLOOD BY AUTOMATED COUNT: 14.7 % (ref 11.6–14.5)
GAS FLOW.O2 O2 DELIVERY SYS: NORMAL
GAS FLOW.O2 SETTING OXYMISER: 15 BPM
GLUCOSE BLD STRIP.AUTO-MCNC: 139 MG/DL (ref 70–110)
GLUCOSE BLD STRIP.AUTO-MCNC: 140 MG/DL (ref 70–110)
GLUCOSE BLD STRIP.AUTO-MCNC: 167 MG/DL (ref 70–110)
GLUCOSE BLD STRIP.AUTO-MCNC: 179 MG/DL (ref 70–110)
GLUCOSE BLD STRIP.AUTO-MCNC: 187 MG/DL (ref 70–110)
GLUCOSE SERPL-MCNC: 174 MG/DL (ref 74–108)
GRAM STN SPEC: NORMAL
GRAM STN SPEC: NORMAL
HCO3 BLD-SCNC: 25.5 MMOL/L (ref 21–28)
HCT VFR BLD AUTO: 35.5 % (ref 36–48)
HGB BLD-MCNC: 10.7 G/DL (ref 13–16)
IMM GRANULOCYTES # BLD AUTO: 0.05 K/UL (ref 0–0.04)
IMM GRANULOCYTES NFR BLD AUTO: 0.4 % (ref 0–0.5)
LYMPHOCYTES # BLD: 1.49 K/UL (ref 0.9–3.3)
LYMPHOCYTES NFR BLD: 13.2 % (ref 21–52)
MAGNESIUM SERPL-MCNC: 2.2 MG/DL (ref 1.6–2.6)
MCH RBC QN AUTO: 27.7 PG (ref 24–34)
MCHC RBC AUTO-ENTMCNC: 30.1 G/DL (ref 31–37)
MCV RBC AUTO: 92 FL (ref 78–100)
MONOCYTES # BLD: 1.14 K/UL (ref 0.05–1.2)
MONOCYTES NFR BLD: 10.1 % (ref 3–10)
NEUTS SEG # BLD: 8.47 K/UL (ref 1.8–8)
NEUTS SEG NFR BLD: 75 % (ref 40–73)
NRBC # BLD: 0 K/UL (ref 0–0.01)
NRBC BLD-RTO: 0 PER 100 WBC
O2/TOTAL GAS SETTING VFR VENT: 45 %
PCO2 BLD: 39.1 MMHG (ref 35–48)
PEEP RESPIRATORY: 5 CMH2O
PH BLD: 7.42 (ref 7.35–7.45)
PLATELET # BLD AUTO: 267 K/UL (ref 135–420)
PMV BLD AUTO: 10.5 FL (ref 9.2–11.8)
PO2 BLD: 87 MMHG (ref 83–108)
POTASSIUM SERPL-SCNC: 4.1 MMOL/L (ref 3.5–5.5)
RBC # BLD AUTO: 3.86 M/UL (ref 4.35–5.65)
RESPIRATORY RATE, POC: 20 (ref 5–40)
SAO2 % BLD: 96.8 % (ref 92–97)
SERVICE CMNT-IMP: NORMAL
SERVICE CMNT-IMP: NORMAL
SODIUM SERPL-SCNC: 149 MMOL/L (ref 136–145)
SPECIMEN TYPE: NORMAL
VENTILATION MODE VENT: NORMAL
VT SETTING VENT: 500 ML
WBC # BLD AUTO: 11.3 K/UL (ref 4.6–13.2)

## 2025-05-23 PROCEDURE — 82803 BLOOD GASES ANY COMBINATION: CPT

## 2025-05-23 PROCEDURE — 85025 COMPLETE CBC W/AUTO DIFF WBC: CPT

## 2025-05-23 PROCEDURE — 2500000003 HC RX 250 WO HCPCS: Performed by: INTERNAL MEDICINE

## 2025-05-23 PROCEDURE — 80048 BASIC METABOLIC PNL TOTAL CA: CPT

## 2025-05-23 PROCEDURE — 2580000003 HC RX 258: Performed by: INTERNAL MEDICINE

## 2025-05-23 PROCEDURE — 2000000000 HC ICU R&B

## 2025-05-23 PROCEDURE — 2700000000 HC OXYGEN THERAPY PER DAY

## 2025-05-23 PROCEDURE — 6360000002 HC RX W HCPCS: Performed by: INTERNAL MEDICINE

## 2025-05-23 PROCEDURE — 51702 INSERT TEMP BLADDER CATH: CPT

## 2025-05-23 PROCEDURE — 94003 VENT MGMT INPAT SUBQ DAY: CPT

## 2025-05-23 PROCEDURE — 71045 X-RAY EXAM CHEST 1 VIEW: CPT

## 2025-05-23 PROCEDURE — 92526 ORAL FUNCTION THERAPY: CPT

## 2025-05-23 PROCEDURE — 6370000000 HC RX 637 (ALT 250 FOR IP): Performed by: INTERNAL MEDICINE

## 2025-05-23 PROCEDURE — 36600 WITHDRAWAL OF ARTERIAL BLOOD: CPT

## 2025-05-23 PROCEDURE — 6370000000 HC RX 637 (ALT 250 FOR IP): Performed by: HOSPITALIST

## 2025-05-23 PROCEDURE — 94640 AIRWAY INHALATION TREATMENT: CPT

## 2025-05-23 PROCEDURE — 92610 EVALUATE SWALLOWING FUNCTION: CPT

## 2025-05-23 PROCEDURE — 83735 ASSAY OF MAGNESIUM: CPT

## 2025-05-23 PROCEDURE — 82962 GLUCOSE BLOOD TEST: CPT

## 2025-05-23 RX ORDER — FUROSEMIDE 10 MG/ML
20 INJECTION INTRAMUSCULAR; INTRAVENOUS ONCE
Status: DISCONTINUED | OUTPATIENT
Start: 2025-05-23 | End: 2025-05-23

## 2025-05-23 RX ORDER — HYDRALAZINE HYDROCHLORIDE 20 MG/ML
10 INJECTION INTRAMUSCULAR; INTRAVENOUS EVERY 6 HOURS PRN
Status: DISCONTINUED | OUTPATIENT
Start: 2025-05-23 | End: 2025-05-29 | Stop reason: HOSPADM

## 2025-05-23 RX ORDER — FAMOTIDINE 20 MG/1
20 TABLET, FILM COATED ORAL DAILY
Status: DISCONTINUED | OUTPATIENT
Start: 2025-05-24 | End: 2025-05-29

## 2025-05-23 RX ORDER — FUROSEMIDE 10 MG/ML
40 INJECTION INTRAMUSCULAR; INTRAVENOUS ONCE
Status: COMPLETED | OUTPATIENT
Start: 2025-05-23 | End: 2025-05-23

## 2025-05-23 RX ADMIN — FENTANYL CITRATE 50 MCG: 50 INJECTION INTRAMUSCULAR; INTRAVENOUS at 03:04

## 2025-05-23 RX ADMIN — CEFAZOLIN 2000 MG: 10 INJECTION, POWDER, FOR SOLUTION INTRAVENOUS at 00:00

## 2025-05-23 RX ADMIN — IPRATROPIUM BROMIDE AND ALBUTEROL SULFATE 1 DOSE: .5; 3 SOLUTION RESPIRATORY (INHALATION) at 09:56

## 2025-05-23 RX ADMIN — ROSUVASTATIN CALCIUM 20 MG: 10 TABLET, FILM COATED ORAL at 08:10

## 2025-05-23 RX ADMIN — BUDESONIDE 500 MCG: 0.5 INHALANT RESPIRATORY (INHALATION) at 20:15

## 2025-05-23 RX ADMIN — PIPERACILLIN AND TAZOBACTAM 3375 MG: 3; .375 INJECTION, POWDER, LYOPHILIZED, FOR SOLUTION INTRAVENOUS at 13:04

## 2025-05-23 RX ADMIN — INSULIN LISPRO 4 UNITS: 100 INJECTION, SOLUTION INTRAVENOUS; SUBCUTANEOUS at 00:06

## 2025-05-23 RX ADMIN — MINERAL OIL, PETROLATUM: 425; 568 OINTMENT OPHTHALMIC at 08:07

## 2025-05-23 RX ADMIN — Medication 1 G: at 08:10

## 2025-05-23 RX ADMIN — INSULIN GLARGINE 5 UNITS: 100 INJECTION, SOLUTION SUBCUTANEOUS at 23:35

## 2025-05-23 RX ADMIN — FAMOTIDINE 20 MG: 10 INJECTION, SOLUTION INTRAVENOUS at 08:11

## 2025-05-23 RX ADMIN — MINERAL OIL, PETROLATUM: 425; 568 OINTMENT OPHTHALMIC at 22:01

## 2025-05-23 RX ADMIN — ENOXAPARIN SODIUM 40 MG: 100 INJECTION SUBCUTANEOUS at 08:12

## 2025-05-23 RX ADMIN — HYDRALAZINE HYDROCHLORIDE 10 MG: 20 INJECTION INTRAMUSCULAR; INTRAVENOUS at 17:00

## 2025-05-23 RX ADMIN — PIPERACILLIN AND TAZOBACTAM 3375 MG: 3; .375 INJECTION, POWDER, LYOPHILIZED, FOR SOLUTION INTRAVENOUS at 21:59

## 2025-05-23 RX ADMIN — BUDESONIDE 500 MCG: 0.5 INHALANT RESPIRATORY (INHALATION) at 07:41

## 2025-05-23 RX ADMIN — MINERAL OIL, PETROLATUM: 425; 568 OINTMENT OPHTHALMIC at 04:11

## 2025-05-23 RX ADMIN — FUROSEMIDE 40 MG: 10 INJECTION, SOLUTION INTRAMUSCULAR; INTRAVENOUS at 10:17

## 2025-05-23 RX ADMIN — PIPERACILLIN AND TAZOBACTAM 3375 MG: 3; .375 INJECTION, POWDER, LYOPHILIZED, FOR SOLUTION INTRAVENOUS at 05:51

## 2025-05-23 RX ADMIN — CHLORHEXIDINE GLUCONATE 15 ML: 1.2 RINSE BUCCAL at 08:23

## 2025-05-23 RX ADMIN — SODIUM CHLORIDE, PRESERVATIVE FREE 10 ML: 5 INJECTION INTRAVENOUS at 08:12

## 2025-05-23 RX ADMIN — FENTANYL CITRATE 50 MCG: 50 INJECTION INTRAMUSCULAR; INTRAVENOUS at 18:55

## 2025-05-23 RX ADMIN — LACTULOSE 20 G: 10 SOLUTION ORAL at 08:10

## 2025-05-23 RX ADMIN — SODIUM CHLORIDE, PRESERVATIVE FREE 10 ML: 5 INJECTION INTRAVENOUS at 22:02

## 2025-05-23 RX ADMIN — GABAPENTIN 300 MG: 300 CAPSULE ORAL at 08:10

## 2025-05-23 ASSESSMENT — PAIN - FUNCTIONAL ASSESSMENT: PAIN_FUNCTIONAL_ASSESSMENT: PREVENTS OR INTERFERES WITH ALL ACTIVE AND SOME PASSIVE ACTIVITIES

## 2025-05-23 ASSESSMENT — PULMONARY FUNCTION TESTS
PIF_VALUE: 22
PIF_VALUE: 22

## 2025-05-23 ASSESSMENT — PAIN SCALES - WONG BAKER
WONGBAKER_NUMERICALRESPONSE: NO HURT

## 2025-05-23 ASSESSMENT — PAIN DESCRIPTION - ORIENTATION: ORIENTATION: POSTERIOR

## 2025-05-23 ASSESSMENT — PAIN DESCRIPTION - DESCRIPTORS: DESCRIPTORS: ACHING;DISCOMFORT;GNAWING

## 2025-05-23 ASSESSMENT — PAIN SCALES - GENERAL
PAINLEVEL_OUTOF10: 0
PAINLEVEL_OUTOF10: 8
PAINLEVEL_OUTOF10: 5

## 2025-05-23 ASSESSMENT — PAIN DESCRIPTION - LOCATION: LOCATION: BACK;HIP

## 2025-05-23 NOTE — CARE COORDINATION
Writer spoke with patient and daughter. Patient extubated today. Writer discussed possibility of SNF vs HH. Both state they are willing to do whatever is needed. SNF referrals initiated.

## 2025-05-23 NOTE — PROGRESS NOTES
Hospitalist Progress Note    Patient: Isidro Reardon MRN: [id number]  [facility]: [id number]    YOB: 1960  Age: 64 y.o.  Sex: male    DOA: 5/15/2025 LOS:  LOS: 8 days          Chief Complain :  Dizziness, lightheadedness.  64 y.o. male with CAD, aortic stenosis status post TAVR, diabetes mellitus, hypertension, hypertensive heart disease, neuropathy presents to ER with concerns of lightheadedness dizziness. In the ER he was noted to have heart rate in 30s to 40s. EKG showed complete heart block. He was given epinephrine and started on dopamine drip which improved his heart rate. He actually went into sinus tachycardia. Patient was subsequently admitted to intensive care unit. Later he suddenly became bradycardic and went into asystole. CPR initiated and ACLS protocol initiated. ROSC obtained. Patient intubated external pacers placed. Cardiology contacted s/pTVP.   Subjective:   Patient orally intubated, opens eyes, follows occasional commands. Nodding to questions    Assessment/Plan     Active Hospital Problems    Diagnosis     Coma (HCC) [R40.20]     Hypotension [I95.9]     Chronic kidney disease (CKD), stage II (mild) [N18.2]     S/P TAVR (transcatheter aortic valve replacement) [Z95.2]     Cardiac arrest (HCC) [I46.9]     Acute respiratory failure with hypoxia (HCC) [J96.01]     Hyperkalemia [E87.5]     Complete heart block (HCC) [I44.2]     Heart block [I45.9]     Acute renal failure [N17.9]     Type 2 diabetes with nephropathy (HCC) [E11.21]     Essential hypertension [I10]     COPD (chronic obstructive pulmonary disease) (HCC) [J44.9]     Aspiration pneumonia (HCC) [J69.0]     Coronary atherosclerosis of native coronary

## 2025-05-23 NOTE — PLAN OF CARE
Problem: SLP Adult - Impaired Swallowing  Goal: By Discharge: Advance to least restrictive diet without signs or symptoms of aspiration for planned discharge setting.  See evaluation for individualized goals.  Description: Patient will:  1. Tolerate PO trials with 0 s/s overt distress in 4/5 trials  2. Utilize compensatory swallow strategies/maneuvers (decrease bite/sip, size/rate, alt. liq/sol) with min cues in 4/5 trials  3. Perform oral-motor/laryngeal exercises to increase oropharyngeal swallow function with min cues  4. Complete an objective swallow study (i.e., MBSS) to assess swallow integrity, r/o aspiration, and determine of safest LRD, min A as indicated/ordered by MD     Rec:     NPO w/ ice chips following oral care for comfort pending SLP reassessment   Assistance w/ po  Aspiration precautions  HOB >45 during po intake, remain >30 for 30-45 minutes after po   Small bites/sips; alternate liquid/solid with slow feeding rate   Oral care TID  Meds via IV/non oral  Outcome: Progressing  SPEECH LANGUAGE PATHOLOGY BEDSIDE SWALLOW EVALUATION/TREATMENT    Patient: Isidro Reardon (64 y.o. male)  Date: 5/23/2025  Primary Diagnosis: Complete heart block (HCC) [I44.2]  Procedure(s) (LRB):  Insert PPM dual (N/A) 1 Day Post-Op   Precautions: Aspiration  PLOF: As per H&P  ASSESSMENT:  Based on the objective data described below, the pt presents with mild-mod oropharyngeal dysphagia that requires further evaluation. Pt seen today for a bedside swallow evaluation. PMHx of DM, HTN, CAD, GERD, and COPD. Pt presents to [facility] d/t dizziness/lightheadedness. Pt suddenly became bradycardic and went into asystole, requiring CPR and intubation. Intubated 5/15-5/23 and extubated to 2L O2 at ~9:45 today. Currently on 4L O2 via NC. Daughter at bedside. Pt demo'ing some confusion and requiring cues. Daughter reports pt consumes a regular solid diet and thin liquids at home. An OME found structures of the orofacial musculature to appear  (gastroesophageal reflux disease)     H/O: pneumonia     Heartburn     Hypertension     essential systemic     Indigestion     Left inguinal hernia     Neuropathy involving both lower extremities     Pneumonia 8/12    \"walking\"    Pure hypercholesterolemia     S/P cardiac cath 01/26/2006    LM mild.  RCA mild.  LAD tortuous.  CX mild.  CX erin 100%.  S/P balloon angioplasty of CX erin.  Residual 40%.  LVEDP 20.  EF 65%.    Tobacco abuse      Past Surgical History:   Procedure Laterality Date    CARDIAC CATHETERIZATION  01/26/06    CARDIAC PROCEDURE N/A 5/15/2025    Insert temporary pacemaker performed by Kirit Giraldo MD at [facility] CARDIAC CATH LAB    CARDIAC PROCEDURE N/A 5/16/2025    Insert temporary pacemaker performed by Kirit Giraldo MD at [facility] CARDIAC CATH LAB    EP DEVICE PROCEDURE N/A 5/22/2025    Insert PPM dual performed by Jean-Claude Zamora MD at [facility] CARDIAC CATH LAB    ORTHOPEDIC SURGERY  1989    fracture of bilateral hands     PTCA      of anomalous circumflex marginal artery    TONGUE AND MOUTH SURG UNLISTED  04/04    tongue surgery    TONGUE TO LIP SURGERY  04/04    VASCULAR SURGERY      ptca     Prior Level of Function/Home Situation:       Daily Assessment:  Baseline Assessment  Temperature Spikes Noted: No  Respiratory Status: O2 via nasual cannula  O2 Device: Nasal cannula  Liters of Oxygen: 4 L  Communication Observation: Functional  Follows Directions: Simple  Current Diet : NPO  Current Liquid Diet : NPO  Dentition: Edentulous  Patient Positioning: Upright in bed  Baseline Vocal Quality: Weak  Volitional Cough: Congested    Orientation:  Person    Oral Assessment:  Oral Motor   Labial: No impairment  Dentition: Edentulous  Oral Hygiene: Clean, Moist  Lingual: No impairment  Velum: Unable to visualize  Mandible: No impairment        P.O. Trials:  PO Trials  Neuromuscular Estim Used: No  Assessment Method(s): Palpation, Observation  Vocal Quality: Low volume  Consistency Presented: Easy to chew,

## 2025-05-23 NOTE — PROGRESS NOTES
Pt placed on SBT at 0745 at 10cm of PS. FiO2 45% Peep 5cm  Initial Vitals HR 67 SpO2 99% /99   pt tolerating well.  Vt 446, RR 23, Mve 8.65 RSBI 60-65    Decreased PS to 8cm at 0815  Vt 399 RR 22 Mve 9.75 RSBI 50-60.     Will cont to monitor.  Electronically signed by Prabha Smyth RRT on 5/23/25 at 8:42 AM EDT

## 2025-05-23 NOTE — PROGRESS NOTES
RENAL CONSULT  PROGRESS NOTE   2025    Patient:  Isidro Reardon  :  1960  Gender:  male  MRN #:  [id number]    Reason for Consult: Acute renal failure and Hyperkalemia requested by intensivist Dr Barrientos     Relevant events:    Patient was extubated this morning   BP stable, urinating fine , not on vasopressors, awake    Labs, overnight events and consultant noted reviewed       Review of Symptoms:     Unable to obtained from patient due to his condition     Objective:    BP (!) 145/84   Pulse 94   Temp 97.9 °F (36.6 °C) (Oral)   Resp 29   Ht 1.778 m (5' 10\")   Wt 84.8 kg (187 lb)   SpO2 100%   BMI 26.83 kg/m²     Physical Exam:    Pt awake   HEENT: no neck swelling   Abdomen: soft,  non distended   Ext: no edema     Laboratory Data:    Lab Results   Component Value Date    BUN 45 (H) 2025    BUN 39 (H) 2025    BUN 43 (H) 2025     (H) 2025     (H) 2025     2025    CO2 23 2025    CO2 24 2025    CO2 22 2025    GLUCOSE 174 (H) 2025    GLUCOSE 130 (H) 2025    GLUCOSE 170 (H) 2025     Lab Results   Component Value Date    WBC 11.3 2025    HGB 10.7 (L) 2025    HCT 35.5 (L) 2025     Lab Results   Component Value Date    CALCIUM 10.6 (H) 2025     Lab Results   Component Value Date    HDL 31 (L) 2025     No results found for: \"TURBIDITY\"    Imaging Reveiwed:      EKG  Renal ultrasound  Xray  chest- IMPRESSION:     Satisfactory endotracheal tube and enteric tube placement. Low lung volumes with  mild perihilar opacities may represent atelectasis or vascular congestion.         Assessment:  Isidro Reardon is a 64 y.o. year old male with  CAD, AS s/p  TAVR, diabetes mellitus, hypertension, hypertensive heart disease, neuropathy presented to he ER for  lightheadedness dizziness.Later on had complete heart block, asystole cardiac arrest   Now has acute renal failure due to cardiac

## 2025-05-23 NOTE — PROGRESS NOTES
Cardiology Progress Note        Patient: Isidro Reardon        Sex: male          DOA: 5/15/2025  YOB: 1960      Age:  64 y.o.        LOS:  LOS: 8 days   Assessment/Plan     Principal Problem:    Complete heart block (HCC)  Active Problems:    Coronary atherosclerosis of native coronary artery    Aspiration pneumonia (HCC)    COPD (chronic obstructive pulmonary disease) (HCC)    Essential hypertension    Type 2 diabetes with nephropathy (HCC)    Acute renal failure    Heart block    S/P TAVR (transcatheter aortic valve replacement)    Cardiac arrest (HCC)    Acute respiratory failure with hypoxia (HCC)    Hyperkalemia    Hypotension    Chronic kidney disease (CKD), stage II (mild)    Coma (HCC)  Resolved Problems:    * No resolved hospital problems. *      Plan:    5/23/2025  Patient is status post extubated  Patient had no complaints  Hemodynamically stable  Swallow evaluation is pending  Discussed with patient's daughter  Continue with current meds                      5/22/2025  Status post permanent pacemaker without any problem  Patient is on SBT protocol for possible extubation tomorrow  Hemodynamically stable blood pressure is trending up we will hold the midodrine  Discussed with patient's daughter  Discussed with RN                        5/21/2025    FRIDA today  Risk benefits and alternatives reviewed with the patient' daughter  Discussed with Dr. Coates  Patient is off dopamine                  5/20/2020  Patient remained intubated  Dopamine requirement is reducing  I will add midodrine 5 mg 3 times daily  Discussed with Dr. Pawel FLEMING, I will do transesophageal echocardiogram tomorrow to assess prosthetic aortic valve  If prosthetic aortic valve without vegetation then will clear for permanent pacemaker  Discussed with Dr. Coates  Discussed with patient's daughter who have consented for the procedure.                          5/18/2025  Patient remained intubated  Good urine  Daily    potassium chloride (KLOR-CON M) extended release tablet 40 mEq  40 mEq Oral PRN    Or    potassium bicarb-citric acid (EFFER-K) effervescent tablet 40 mEq  40 mEq Oral PRN    Or    potassium chloride 10 mEq/100 mL IVPB (Peripheral Line)  10 mEq IntraVENous PRN    magnesium sulfate 2000 mg in 50 mL IVPB premix  2,000 mg IntraVENous PRN    sodium phosphate 15 mmol in sodium chloride 0.9 % 250 mL IVPB  15 mmol IntraVENous PRN    dextrose 50 % IV solution  25 g IntraVENous PRN    insulin glargine (LANTUS) injection vial 5 Units  5 Units SubCUTAneous Nightly    Polyvinyl Alcohol-Povidone PF (REFRESH) 1.4-0.6 % ophthalmic solution 1 drop  1 drop Both Eyes Q4H    Or    lubrifresh P.M. (artificial tears) ophthalmic ointment   Both Eyes Q4H    [Held by provider] aspirin chewable tablet 81 mg  81 mg Oral Daily    [Held by provider] DULoxetine (CYMBALTA) extended release capsule 60 mg  60 mg Oral Daily    gabapentin (NEURONTIN) capsule 300 mg  300 mg Oral TID    rosuvastatin (CRESTOR) tablet 20 mg  20 mg Oral Daily    potassium chloride 20 mEq/50 mL IVPB (Central Line)  20 mEq IntraVENous PRN    ondansetron (ZOFRAN-ODT) disintegrating tablet 4 mg  4 mg Oral Q8H PRN    Or    ondansetron (ZOFRAN) injection 4 mg  4 mg IntraVENous Q6H PRN    polyethylene glycol (GLYCOLAX) packet 17 g  17 g Oral Daily PRN    acetaminophen (TYLENOL) tablet 650 mg  650 mg Oral Q6H PRN    Or    acetaminophen (TYLENOL) suppository 650 mg  650 mg Rectal Q6H PRN    glucose chewable tablet 16 g  4 tablet Oral PRN    dextrose bolus 10% 125 mL  125 mL IntraVENous PRN    Or    dextrose bolus 10% 250 mL  250 mL IntraVENous PRN    glucagon injection 1 mg  1 mg SubCUTAneous PRN    dextrose 10 % infusion   IntraVENous Continuous PRN               Lab/Data Reviewed:  Procedures/imaging: see electronic medical records for all procedures/Xrays   and details which were not copied into this note but were reviewed prior to creation of Plan        Recent Labs

## 2025-05-23 NOTE — PROGRESS NOTES
[facility]  Pulmonary, Critical Care, and Sleep Medicine    Name: Isidro Reardon MRN: [id number]   : 1960 Hospital: [facility]    Date: 2025  Room: [address] [place] [address] Note                                              Consult requesting physician: Dr. Pak  Reason for Consult: Respiratory failure    IMPRESSION:   Acute respiratory failure with hypoxia   J90.00  Cardiac arrest   I 46.9  Complete heart block   I45 .9  Hypotension I95.9  Aspiration pneumonia J69.0  Acute renal failure  CKD stage II  COPD J44.9  CAD I25.10  Status post TAVR  GERD        Active Hospital Problems    Diagnosis Date Noted    Coma (HCC) [R40.20] 2025    Hypotension [I95.9] 2025    Chronic kidney disease (CKD), stage II (mild) [N18.2] 2025    S/P TAVR (transcatheter aortic valve replacement) [Z95.2] 2025    Cardiac arrest (HCC) [I46.9] 2025    Acute respiratory failure with hypoxia (HCC) [J96.01] 2025    Hyperkalemia [E87.5] 2025    Complete heart block (HCC) [I44.2] 05/15/2025    Heart block [I45.9] 05/15/2025    Acute renal failure [N17.9] 2024    Type 2 diabetes with nephropathy (HCC) [E11.21] 2018    Essential hypertension [I10] 2018    COPD (chronic obstructive pulmonary disease) (HCC) [J44.9]     Aspiration pneumonia (HCC) [J69.0]     Coronary atherosclerosis of native coronary artery [I25.10] 2011        Code status: Full Code       RECOMMENDATIONS:     Respiratory: Patient remains on ventilator support.  ABG-stable oxygenation and ventilation; FiO2 45%, PEEP 5.  Chest x-ray done today reviewed images and report-ET tube, OG tube in good position; left chest wall pacemaker seen; right infrahilar atelectasis; no focal consolidation; no pleural effusions; no pneumothorax.  CT chest -findings suggestive of right upper lobe, and bibasilar aspiration pneumonia.  Ventilator sedation bundles reviewed.  Sedation-midazolam  cardiomediastinal contours are stable. There are low lung volumes with mild perihilar opacities. There is no pleural effusion or pneumothorax. The bones and upper abdomen are stable.     Satisfactory endotracheal tube and enteric tube placement. Low lung volumes with mild perihilar opacities may represent atelectasis or vascular congestion. Electronically signed by Hema Orozco    Cardiac procedure  Result Date: 5/16/2025  Successful placement of transvenous pacemaker under fluoroscopic guidance without any complication.     XR CHEST PORTABLE  Result Date: 5/15/2025  EXAM: XR CHEST PORTABLE INDICATION: sob COMPARISON: 1/31/2017 FINDINGS: A portable AP radiograph of the chest was obtained at 2008 hours. Cardiac monitoring leads.. The lungs are clear. Mild cardiomegaly..  The bones and soft tissues are grossly within normal limits.     No acute cardiopulmonary process. Electronically signed by Rolf Serrano         Please note: Voice-recognition software may have been used to generate this report, which may have resulted in some phonetic-based errors in grammar and contents. Even though attempts were made to correct all the mistakes, some may have been missed, and remained in the body of the document.    Arturo Coates MD  5/23/2025

## 2025-05-23 NOTE — PROGRESS NOTES
Care  assisting Amanda Weston RN Care Mgr with Discharge Planning needs for patient.  Updates sent to area [facility] Term Acute Care (LT[facility]) for review and consideration for placement.    Will follow along for further discharge planning needs.

## 2025-05-23 NOTE — PROGRESS NOTES
Care  assisting Care Mgr Amanda Weston RN with Discharge Planning needs for patient.  Referrals sent to area Skilled Nursing Facilities (SNF's - patient's preference) for review and consideration for placement.    Will follow along for further discharge planning needs.

## 2025-05-23 NOTE — PLAN OF CARE
Problem: Anxiety  Goal: Will report anxiety at manageable levels  Description: INTERVENTIONS:1. Administer medication as ordered2. Teach and rehearse alternative coping skills3. Provide emotional support with 1:1 interaction with staff  Flowsheets (Taken 5/23/2025 1632)  Will report anxiety at manageable levels:   Teach and rehearse alternative coping skills   Provide emotional support with 1:1 interaction with staff     Problem: Behavior  Goal: Pt/Family maintain appropriate behavior and adhere to behavioral management agreement, if implemented  Description: INTERVENTIONS:1. Assess patient/family's coping skills and  non-compliant behavior (including use of illegal substances)2. Notify security of behavior or suspected illegal substances which indicate the need for search of the family and/or belongings3. Encourage verbalization of thoughts and concerns in a socially appropriate manner4. Utilize positive, consistent limit setting strategies supporting safety of patient, staff and others5. Encourage participation in the decision making process about the behavioral management agreement6. If a visitor's behavior poses a threat to safety call refer to organization policy.7. Initiate consult with , Psychosocial CNS, Spiritual Care as appropriate  Flowsheets (Taken 5/23/2025 1632)  Patient/family maintains appropriate behavior and adheres to behavioral management agreement, if implemented:   Assess patient/family’s coping skills and  non-compliant behavior (including use of illegal substances)   Encourage verbalization of thoughts and concerns in a socially appropriate manner   Utilize positive, consistent limit setting strategies supporting safety of patient, staff and others   Encourage participation in the decision making process about the behavioral management agreement

## 2025-05-23 NOTE — PLAN OF CARE
Problem: Chronic Conditions and Co-morbidities  Goal: Patient's chronic conditions and co-morbidity symptoms are monitored and maintained or improved  5/23/2025 1624 by Garrison Wilhelm RN  Flowsheets (Taken 5/22/2025 2000 by Troy Aguila RN)  Care Plan - Patient's Chronic Conditions and Co-Morbidity Symptoms are Monitored and Maintained or Improved:   Monitor and assess patient's chronic conditions and comorbid symptoms for stability, deterioration, or improvement   Collaborate with multidisciplinary team to address chronic and comorbid conditions and prevent exacerbation or deterioration   Update acute care plan with appropriate goals if chronic or comorbid symptoms are exacerbated and prevent overall improvement and discharge  5/23/2025 0539 by Troy Aguila RN  Outcome: Progressing  Flowsheets (Taken 5/22/2025 2000)  Care Plan - Patient's Chronic Conditions and Co-Morbidity Symptoms are Monitored and Maintained or Improved:   Monitor and assess patient's chronic conditions and comorbid symptoms for stability, deterioration, or improvement   Collaborate with multidisciplinary team to address chronic and comorbid conditions and prevent exacerbation or deterioration   Update acute care plan with appropriate goals if chronic or comorbid symptoms are exacerbated and prevent overall improvement and discharge     Problem: Discharge Planning  Goal: Discharge to home or other facility with appropriate resources  5/23/2025 1624 by Garrison Wilhelm RN  Flowsheets (Taken 5/22/2025 2000 by Troy Aguila RN)  Discharge to home or other facility with appropriate resources:   Identify barriers to discharge with patient and caregiver   Arrange for needed discharge resources and transportation as appropriate   Identify discharge learning needs (meds, wound care, etc)   Arrange for interpreters to assist at discharge as needed   Refer to discharge planning if patient needs post-hospital services based on physician

## 2025-05-23 NOTE — PROGRESS NOTES
Neurotin dose pulled. Realized patient was still NPO after dose was pulled, dose of neurontin given to JAI Wilhelm to return to [facility].

## 2025-05-23 NOTE — PROGRESS NOTES
[facility]  (A Division of [facility])  Fidelina Armas MD   Office Tel:  [phone] Option #8                                                             Date of Admission: 5/15/2025       ID FU for antimicrobial management antibiotic and comment regarding PPM PLACEMENT requested by Dr Coates/Dr Giraldo.      PCP: Phillip Rizzo MD    C/C: Presented to ED with bradycardia    Current Antimicrobials:    Prior Antimicrobials:  Pip-tazo 5/18 to date  Doxycycline 5/18 to date  Vancomycin 5/18 to date       Ceftriaxone 5/16-17     Allergy antibiotic: Non recorded     Assessment:     Critically ill patient with:    Post PPM placement 5/22  Work up for Lyme and FRIDA negative on work up. Bacteremia- likely false flag by instrument.    Bacteremia? 1 bottle flagged + from 5/16- by instrument but no growth so far- from [facility].   Suspect procurement contamination given delay in growth.    RUL opacity and tree -in bud appearance on CT-Possible PNA with leucocytosis on admission  Underlying COPD- mod to severe  Resp cutlures- no pathogenic bacteria, or MRSA X2  Procal 1.54-> 1.17    Acute respiratory failure, with complete heart block/bradycardia -- temp pacer in place  CAD history/TAVR in place on Echo- no mention of mass/vegetation  5/21- FRIDA- no vegetation    RENAE on CKD    5/16-blood culture x2: 1/4- flagged + by MICROLAB-- further info pending        -UA clean         -Viral test /PCR for COVID 19/Influenza A and B -negative.   5/17- resp cx: normal kateryna  5/18- MRSA screen- negative         -resp culture- yeast growth    History of Flu A 02/2025    History of MSSA Infection of Olecranon Fossa    Diffuse hepatic steatosis- on CT    Diagnosis Date    Acute coronary syndrome (HCC)     with non-ST-elevation myocardial infarction, s/p direct angioplasty of anomolous circumflex marginal coming off the right coronary cusp on 1/26/06    Arthritis     in hands    ASHD

## 2025-05-23 NOTE — PROGRESS NOTES
Pt extubated per Dr orders at 0945 after 120 mins successful SBT. Positive for cuff leak.  Placed on 4L.   Will cont to monitor.  Electronically signed by Prabha Smyth RRT on 5/23/25 at 4:14 PM EDT

## 2025-05-23 NOTE — PROGRESS NOTES
Comprehensive High Risk Nutrition Assessment Follow-Up    Type and Reason for Visit:  Reassess    Nutrition Recommendations/Plan:   Check pending swallow SLP eval - Diet consistency recs per SLP  Overall appears inadequate nutrition since admit x 7 days but wt appears stable if correct  If takes thin liq add ONS as edwin   Consider adding Wilma-rocio daily   Monitor high Ca 10.6  Cont to monitor wt trends diuresing, renal fx, lytes, UOP, bowel fx, skin integrity, and adjust recs as needed     Malnutrition Assessment:  Malnutrition Status:  At risk for malnutrition (05/23/25 1147)      Nutrition Assessment:    65yo M remains in ICU extubated today 5/23 on 2LNC, renal fx stable, good UOP, overall inadequate nutrition since admit x 7 days was on some trickle feeds while intubated mostly NPO. pending SLP eval.  wt stable since admit if correct. on admit slight variable wt hx trends likely fluid vs accuracy overall wt appears relatively stable if correct. no recent sig wt loss or poor PO PTA noted. wt hx trends 84-86kg Feb 2025 and 89kg Nov 2024. some wts appeared slight down ?accuracy vs fluid. diuresing w/lasix.    Nutrition Related Findings:      Wound Type: None       Current Nutrition Intake & Therapies:    Average Meal Intake: NPO  Average Supplements Intake: NPO  Diet NPO Exceptions are: Sips of Water with Meds    Anthropometric Measures:  Height: 177.8 cm (5' 10\")  Ideal Body Weight (IBW): 166 lbs (75 kg)    Admission Body Weight: 86 kg (189 lb 9.5 oz)  Current Body Weight: 85 kg (187 lb 6.3 oz), 110.2 % IBW. Weight Source: Bed scale  Current BMI (kg/m2): 26.9  Usual Body Weight: 89 kg (196 lb 3.4 oz)  % Weight Change (Calculated): -3.4  BMI Categories: Overweight (BMI 25.0-29.9)    Estimated Daily Nutrient Needs:  Energy Requirements Based On: Kcal/kg  Weight Used for Energy Requirements: Current  Energy (kcal/day): 2100  Weight Used for Protein Requirements: Current  Protein (g/day): 85  Method Used for Fluid

## 2025-05-24 ENCOUNTER — APPOINTMENT (OUTPATIENT)
Facility: HOSPITAL | Age: 65
DRG: 242 | End: 2025-05-24
Payer: MEDICARE

## 2025-05-24 LAB
GLUCOSE BLD STRIP.AUTO-MCNC: 145 MG/DL (ref 70–110)
GLUCOSE BLD STRIP.AUTO-MCNC: 165 MG/DL (ref 70–110)
GLUCOSE BLD STRIP.AUTO-MCNC: 176 MG/DL (ref 70–110)
GLUCOSE BLD STRIP.AUTO-MCNC: 234 MG/DL (ref 70–110)
PROCALCITONIN SERPL-MCNC: 0.26 NG/ML

## 2025-05-24 PROCEDURE — 2000000000 HC ICU R&B

## 2025-05-24 PROCEDURE — 84145 PROCALCITONIN (PCT): CPT

## 2025-05-24 PROCEDURE — 2700000000 HC OXYGEN THERAPY PER DAY

## 2025-05-24 PROCEDURE — 71045 X-RAY EXAM CHEST 1 VIEW: CPT

## 2025-05-24 PROCEDURE — 6370000000 HC RX 637 (ALT 250 FOR IP): Performed by: INTERNAL MEDICINE

## 2025-05-24 PROCEDURE — 6360000002 HC RX W HCPCS: Performed by: INTERNAL MEDICINE

## 2025-05-24 PROCEDURE — 92526 ORAL FUNCTION THERAPY: CPT

## 2025-05-24 PROCEDURE — 94640 AIRWAY INHALATION TREATMENT: CPT

## 2025-05-24 PROCEDURE — 2580000003 HC RX 258: Performed by: INTERNAL MEDICINE

## 2025-05-24 PROCEDURE — 94669 MECHANICAL CHEST WALL OSCILL: CPT

## 2025-05-24 PROCEDURE — 2500000003 HC RX 250 WO HCPCS: Performed by: INTERNAL MEDICINE

## 2025-05-24 PROCEDURE — 82962 GLUCOSE BLOOD TEST: CPT

## 2025-05-24 RX ORDER — AMLODIPINE BESYLATE 5 MG/1
10 TABLET ORAL DAILY
Status: DISCONTINUED | OUTPATIENT
Start: 2025-05-24 | End: 2025-05-29 | Stop reason: HOSPADM

## 2025-05-24 RX ORDER — INSULIN GLARGINE 100 [IU]/ML
8 INJECTION, SOLUTION SUBCUTANEOUS NIGHTLY
Status: DISCONTINUED | OUTPATIENT
Start: 2025-05-24 | End: 2025-05-29 | Stop reason: HOSPADM

## 2025-05-24 RX ADMIN — INSULIN GLARGINE 8 UNITS: 100 INJECTION, SOLUTION SUBCUTANEOUS at 21:20

## 2025-05-24 RX ADMIN — SODIUM CHLORIDE, PRESERVATIVE FREE 10 ML: 5 INJECTION INTRAVENOUS at 20:16

## 2025-05-24 RX ADMIN — HYDRALAZINE HYDROCHLORIDE 10 MG: 20 INJECTION INTRAMUSCULAR; INTRAVENOUS at 14:45

## 2025-05-24 RX ADMIN — GABAPENTIN 300 MG: 300 CAPSULE ORAL at 21:20

## 2025-05-24 RX ADMIN — BUDESONIDE 500 MCG: 0.5 INHALANT RESPIRATORY (INHALATION) at 20:48

## 2025-05-24 RX ADMIN — PIPERACILLIN AND TAZOBACTAM 3375 MG: 3; .375 INJECTION, POWDER, LYOPHILIZED, FOR SOLUTION INTRAVENOUS at 21:16

## 2025-05-24 RX ADMIN — SODIUM CHLORIDE, PRESERVATIVE FREE 10 ML: 5 INJECTION INTRAVENOUS at 08:25

## 2025-05-24 RX ADMIN — BUDESONIDE 500 MCG: 0.5 INHALANT RESPIRATORY (INHALATION) at 07:39

## 2025-05-24 RX ADMIN — MINERAL OIL, PETROLATUM: 425; 568 OINTMENT OPHTHALMIC at 08:24

## 2025-05-24 RX ADMIN — INSULIN LISPRO 4 UNITS: 100 INJECTION, SOLUTION INTRAVENOUS; SUBCUTANEOUS at 01:00

## 2025-05-24 RX ADMIN — MINERAL OIL, PETROLATUM: 425; 568 OINTMENT OPHTHALMIC at 00:23

## 2025-05-24 RX ADMIN — GABAPENTIN 300 MG: 300 CAPSULE ORAL at 14:18

## 2025-05-24 RX ADMIN — PIPERACILLIN AND TAZOBACTAM 3375 MG: 3; .375 INJECTION, POWDER, LYOPHILIZED, FOR SOLUTION INTRAVENOUS at 05:23

## 2025-05-24 RX ADMIN — AMLODIPINE BESYLATE 10 MG: 5 TABLET ORAL at 15:41

## 2025-05-24 RX ADMIN — INSULIN LISPRO 4 UNITS: 100 INJECTION, SOLUTION INTRAVENOUS; SUBCUTANEOUS at 11:36

## 2025-05-24 RX ADMIN — ENOXAPARIN SODIUM 40 MG: 100 INJECTION SUBCUTANEOUS at 08:24

## 2025-05-24 RX ADMIN — MINERAL OIL, PETROLATUM: 425; 568 OINTMENT OPHTHALMIC at 05:31

## 2025-05-24 RX ADMIN — PIPERACILLIN AND TAZOBACTAM 3375 MG: 3; .375 INJECTION, POWDER, LYOPHILIZED, FOR SOLUTION INTRAVENOUS at 11:26

## 2025-05-24 RX ADMIN — MINERAL OIL, PETROLATUM: 425; 568 OINTMENT OPHTHALMIC at 20:16

## 2025-05-24 ASSESSMENT — PAIN SCALES - WONG BAKER
WONGBAKER_NUMERICALRESPONSE: NO HURT

## 2025-05-24 ASSESSMENT — PAIN SCALES - GENERAL
PAINLEVEL_OUTOF10: 0

## 2025-05-24 NOTE — PROGRESS NOTES
Patient's unopened neurontin given to me by MARK Lopez to waste. Patient is NPO and unable to take medication. I was unable to waste med as I did not pull it. Medication given to pharmacy Nayla power.

## 2025-05-24 NOTE — PROGRESS NOTES
Hospitalist Progress Note    Patient: Isidro Reardon MRN: [id number]  [facility]: [id number]    YOB: 1960  Age: 64 y.o.  Sex: male    DOA: 5/15/2025 LOS:  LOS: 9 days          Chief Complain :  Dizziness, lightheadedness.  64 y.o. male with CAD, aortic stenosis status post TAVR, diabetes mellitus, hypertension, hypertensive heart disease, neuropathy presents to ER with concerns of lightheadedness dizziness. In the ER he was noted to have heart rate in 30s to 40s. EKG showed complete heart block. He was given epinephrine and started on dopamine drip which improved his heart rate. He actually went into sinus tachycardia. Patient was subsequently admitted to intensive care unit. Later he suddenly became bradycardic and went into asystole. CPR initiated and ACLS protocol initiated. ROSC obtained. Patient intubated external pacers placed. Cardiology contacted s/pTVP.   Subjective:   Patient laying in bed, awake, alert, answers to all questions.    Assessment/Plan     Active Hospital Problems    Diagnosis     Coma (HCC) [R40.20]     Hypotension [I95.9]     Chronic kidney disease (CKD), stage II (mild) [N18.2]     S/P TAVR (transcatheter aortic valve replacement) [Z95.2]     Cardiac arrest (HCC) [I46.9]     Acute respiratory failure with hypoxia (HCC) [J96.01]     Hyperkalemia [E87.5]     Complete heart block (HCC) [I44.2]     Heart block [I45.9]     Acute renal failure [N17.9]     Type 2 diabetes with nephropathy (HCC) [E11.21]     Essential hypertension [I10]     COPD (chronic obstructive pulmonary disease) (HCC) [J44.9]     Aspiration pneumonia (HCC) [J69.0]     Coronary atherosclerosis of native coronary artery [I25.10]            CRITICAL CARE PLAN     Resp   Acute respiratory failure  After cardiac arrest  Extubated 05/23/2025     ID   Leukocytosis  Wbc elevated but trending down  CT chest with tree in bud nodularity in the lungs with some consolidative opacities in the right upper lobe compatible with infectious process. No infectious process in abdomen and pelvis  COVID 19, influenza A&B negative.  blood and resp cultures no growth  On  zosyn.  FRIDA negative for vegetations.     CVS   Monitor HD.   Complete heart block  Cardiac arrest  S/p ACLS protocol. Received epinephrine, CPR with ROSC.   External pacers placed  Off dopamine drip.  S/p pacemaker.  Echo with normal left ventricular systolic function, EF of 55 to 60% normal wall motion abnormality.     Heme/onc   Follow H&H, plts.     Renal   Trend BUN, Cr, follow I/O. Check and replace Mg, K, phos.  RENAE - pre renal.   Hyperkalemia - resolved received lokelma, sodium bicarb.  Nephrology following     Endocrine   Follow FSG     CNS  Awake alert, answers to questions    GI   SLP eval and diet per SLP      DVT Prophylaxis:  [x]Lovenox SQ  [] Heparin SQ  [] SCDs  [] Coumadin, Eliquis, Xarelto,   [] On Heparin gtt or therapeutic Lovenox. [] Patient refused.     [id number]  35 minutes of critical care time spent in the direct evaluation and treatment of this high risk patient. The reason for providing this level of medical care for this critically ill patient was due a critical illness that impaired one or more vital organ systems such that there was a high probability of imminent or life threatening deterioration in the patients condition. This care involved high complexity decision making to assess, manipulate, and support vital system functions, discuss with specialists to treat this degreee vital organ system failure and to prevent further life threatening deterioration of the patient’s condition. This time does not include any procedures.        Case discussed with:  []Patient

## 2025-05-24 NOTE — PROGRESS NOTES
0825: held Po medication r/t failed swallow screen by speech therapy from yesterday Speech to reevaluate today..

## 2025-05-24 NOTE — PROGRESS NOTES
Cardiology Progress Note        Patient: Isidro Reardon        Sex: male          DOA: 5/15/2025  YOB: 1960      Age:  64 y.o.        LOS:  LOS: 9 days   Assessment/Plan     Principal Problem:    Complete heart block (HCC)  Active Problems:    Coronary atherosclerosis of native coronary artery    Aspiration pneumonia (HCC)    COPD (chronic obstructive pulmonary disease) (HCC)    Essential hypertension    Type 2 diabetes with nephropathy (HCC)    Acute renal failure    Heart block    S/P TAVR (transcatheter aortic valve replacement)    Cardiac arrest (HCC)    Acute respiratory failure with hypoxia (HCC)    Hyperkalemia    Hypotension    Chronic kidney disease (CKD), stage II (mild)    Coma (HCC)  Resolved Problems:    * No resolved hospital problems. *      Plan:    5/24/25  Medic telemetry sinus rhythm  Low paced complexes  Pacemaker site have mild subcutaneous hematoma  Otherwise hemodynamically stable  Patient is awake alert and oriented  Discussed with patient and family  Discussed with RN  Discussed with Dr. Coates                    5/23/2025  Patient is status post extubated  Patient had no complaints  Hemodynamically stable  Swallow evaluation is pending  Discussed with patient's daughter  Continue with current meds                      5/22/2025  Status post permanent pacemaker without any problem  Patient is on SBT protocol for possible extubation tomorrow  Hemodynamically stable blood pressure is trending up we will hold the midodrine  Discussed with patient's daughter  Discussed with RN                        5/21/2025    FRIDA today  Risk benefits and alternatives reviewed with the patient' daughter  Discussed with Dr. Coates  Patient is off dopamine                  5/20/2020  Patient remained intubated  Dopamine requirement is reducing  I will add midodrine 5 mg 3 times daily  Discussed with Dr. Armas ID, I will do transesophageal echocardiogram tomorrow to assess prosthetic aortic  pacemaker  Discussed with patient's daughter  Critical care time spent more than 45-minute      Transient complete heart block  Cardiac arrest  Leukocytosis  TAVR  Acute kidney injury      Patient heart rate is above 88 bpm with intrinsic sinus rhythm and right bundle branch block  Heart block have resolved  Continue supportive treatment with antibiotic  Patient will need CT scan of chest and abdomen                  Subjective:    cc:  Fatigue and tiredness        REVIEW OF SYSTEMS:   Extubated no complaints      Objective:      Visit Vitals  BP (!) 186/87   Pulse 97   Temp 98.4 °F (36.9 °C) (Oral)   Resp 21   Ht 1.778 m (5' 10\")   Wt 84.8 kg (187 lb)   SpO2 97%   BMI 26.83 kg/m²     Body mass index is 26.83 kg/m².    Physical Exam:  General Appearance: Extubated  NECK: No JVD, no thyroidomeglay.   LUNGS:  bilateral air entry positive   HEART: S1+S2 audible,    ABD: Non-tender, BS Audible    EXT: No edema, and no cysnosis.  VASCULAR EXAM: Pulses are intact.    PSYCHIATRIC EXAM: Extubated, comfortable    Medication:  Current Facility-Administered Medications   Medication Dose Route Frequency    hydrALAZINE (APRESOLINE) injection 10 mg  10 mg IntraVENous Q6H PRN    piperacillin-tazobactam (ZOSYN) 3,375 mg in sodium chloride 0.9 % 50 mL extended IVPB (addEASE)  3,375 mg IntraVENous Q8H    famotidine (PEPCID) tablet 20 mg  20 mg Oral Daily    sod chloride IRR soln 0.9 % 1,000 mL with gentamicin (GARAMYCIN) 80 mg   Irrigation Once    sodium chloride flush 0.9 % injection 5-40 mL  5-40 mL IntraVENous 2 times per day    sodium chloride flush 0.9 % injection 5-40 mL  5-40 mL IntraVENous PRN    0.9 % sodium chloride infusion   IntraVENous PRN    enoxaparin (LOVENOX) injection 40 mg  40 mg SubCUTAneous Daily    fentaNYL (SUBLIMAZE) injection 50 mcg  50 mcg IntraVENous Q4H PRN    ipratropium 0.5 mg-albuterol 2.5 mg (DUONEB) nebulizer solution 1 Dose  1 Dose Inhalation Q4H PRN    insulin lispro (HUMALOG,ADMELOG) injection vial

## 2025-05-24 NOTE — PROGRESS NOTES
tube is in satisfactory position. Patient is status post TAVR. The NG tube tip is in the proximal aspect of the stomach. Heart size is normal. The lungs are clear. No effusion or pneumothorax is seen.     1. ET tube is in satisfactory position. 2. No lung consolidation or edema. Electronically signed by Johnnie Bocanegra    FRIDA (PRN contrast/bubble/3D)  Result Date: 5/21/2025    Left Ventricle: Normal left ventricular systolic function with a visually estimated EF of 55 - 60%. Normal wall motion.   Aortic Valve: Appropriately positioned transcatheter bioprosthetic valve that is well-seated. There is no vegetation present.   Mitral Valve: There is no vegetation present.   Image quality is adequate.     XR CHEST PORTABLE  Result Date: 5/21/2025  EXAM:  XR CHEST PORTABLE INDICATION: et tube Comparison to 5/20/2025. Portable exam obtained at 530 demonstrates interval retraction of the ET tube, now projecting in satisfactory position. Lungs are clear.     ET tube satisfactory position. Lungs are clear. Electronically signed by GERARDO FALK    CT HEAD WO CONTRAST  Result Date: 5/20/2025  EXAM: CT HEAD WO CONTRAST CLINICAL HISTORY: coma INDICATION: coma COMPARISON: None. CT dose reduction was achieved through use of a standardized protocol tailored for this examination and automatic exposure control for dose modulation. TECHNIQUE: Serial axial images with a collimation of 5 mm were obtained from the skull base through the vertex.  Serial axial images with a collimation of 5 mm were obtained from the skull base through the vertex.  Sagittal and coronal reformatted images also obtained.  FINDINGS: The sulci and ventricles are within normal limits for patient age. There is no evidence of an acute infarction, hemorrhage, or mass-effect. There is no evidence of midline shift or hydrocephalus. Posterior fossa structures are unremarkable. No extra-axial collections are seen. Mastoid air cells are well pneumatized and clear.  Sphenoid  chest was obtained at 1150 hours. Endotracheal tube nasogastric tube and cardiac monitoring leads.. Decreased interstitial opacities.. Transvenous pacemaker lead overlying the right heart. The cardiac and mediastinal contours and pulmonary vascularity are normal.  The bones and soft tissues are grossly within normal limits.     Decreased pulmonary edema.. Electronically signed by Rolf Serrano    Echo (TTE) complete (PRN contrast/bubble/strain/3D)  Result Date: 5/16/2025    Left Ventricle: Normal left ventricular systolic function with a visually estimated EF of 55 - 60%. EF by 2D Simpsons Biplane is 55%. Left ventricle size is normal. Normal wall thickness. No regional wall motion abnormalities identified. Decreased sensitivity due to poor endocardial definition in short axis view. Diastolic dysfunction present with normal LV EF.   Right Ventricle: Right ventricle size is normal. Normal systolic function. TAPSE is 2.2 cm.   Aortic Valve: Appropriately positioned transcatheter bioprosthetic valve that is well-seated. AV mean gradient is 7 mmHg. No paravalvular regurgitation. No stenosis. AV Mean Gradient is 7 mmHg.   Tricuspid Valve: Mild regurgitation. Estimated RVSP is 44 mmHg (patient is ventilated).   Right Atrium: Single lead present in the right atrium. Right atrium size is normal.   IVC/SVC: Patient is ventilated, cannot use IVC diameter to estimate right atrial pressure.   Image quality is fair. Procedure performed with the patient in a supine position.     XR ABDOMEN (KUB) (SINGLE AP VIEW)  Result Date: 5/16/2025  EXAM:  XR ABDOMEN (KUB) (SINGLE AP VIEW) INDICATION: Orogastric tube placement COMPARISON: None. TECHNIQUE: Supine frontal abdomen (KUB). FINDINGS: No dilated small bowel. Stool and gas are present in the colon. No pathologic calcification. Osseous structures are age appropriate. Orogastric tube tip projects with its sidehole over the GE junction and its tip over the gastric fundus. Numerous

## 2025-05-24 NOTE — PROGRESS NOTES
Detail Level: Detailed RENAL CONSULT  PROGRESS NOTE   2025    Patient:  Isidro Reardon  :  1960  Gender:  male  MRN #:  [id number]    Reason for Consult: Acute renal failure and Hyperkalemia requested by intensivist Dr Barrientos     Relevant events:    Patient was extubated now awake and alert   BP high , urinating fine ,   Labs, overnight events and consultant noted reviewed          Objective:    BP (!) 142/81   Pulse 89   Temp 99.2 °F (37.3 °C) (Oral)   Resp 22   Ht 1.778 m (5' 10\")   Wt 84.8 kg (187 lb)   SpO2 97%   BMI 26.83 kg/m²     Physical Exam:    Pt awake   HEENT: no neck swelling   Abdomen: soft,  non distended   Ext: no edema     Laboratory Data:    Lab Results   Component Value Date    BUN 45 (H) 2025    BUN 39 (H) 2025    BUN 43 (H) 2025     (H) 2025     (H) 2025     2025    CO2 23 2025    CO2 24 2025    CO2 22 2025    GLUCOSE 174 (H) 2025    GLUCOSE 130 (H) 2025    GLUCOSE 170 (H) 2025     Lab Results   Component Value Date    WBC 11.3 2025    HGB 10.7 (L) 2025    HCT 35.5 (L) 2025     Lab Results   Component Value Date    CALCIUM 10.6 (H) 2025     Lab Results   Component Value Date    HDL 31 (L) 2025     No results found for: \"TURBIDITY\"    Imaging Reveiwed:      EKG  Renal ultrasound  Xray  chest- IMPRESSION:     Satisfactory endotracheal tube and enteric tube placement. Low lung volumes with  mild perihilar opacities may represent atelectasis or vascular congestion.         Assessment:  Isidro Reardon is a 64 y.o. year old male with  CAD, AS s/p  TAVR, diabetes mellitus, hypertension, hypertensive heart disease, neuropathy presented to he ER for  lightheadedness dizziness.Later on had complete heart block, asystole cardiac arrest   Now has acute renal failure due to cardiac arrest/bradycardia and hyperkalemia is due to RENAE and CPR .    Acute renal failure  Hyperkalemia  Hypocalcemia  Add 78647 Cpt? (Important Note: In 2017 The Use Of 94659 Is Being Tracked By Cms To Determine Future Global Period Reimbursement For Global Periods): no

## 2025-05-24 NOTE — PLAN OF CARE
Problem: SLP Adult - Impaired Swallowing  Goal: By Discharge: Advance to least restrictive diet without signs or symptoms of aspiration for planned discharge setting.  See evaluation for individualized goals.  Description: Patient will:  1. Tolerate PO trials with 0 s/s overt distress in 4/5 trials  2. Utilize compensatory swallow strategies/maneuvers (decrease bite/sip, size/rate, alt. liq/sol) with min cues in 4/5 trials  3. Perform oral-motor/laryngeal exercises to increase oropharyngeal swallow function with min cues  4. Complete an objective swallow study (i.e., MBSS) to assess swallow integrity, r/o aspiration, and determine of safest LRD, min A as indicated/ordered by MD     Rec:     Minced and moist solid diet and mildly (nectar) thick liquids; alternate small bites/small single sips  Assistance w/ po; ensure small sips size and oral clearance  Aspiration precautions  HOB >45 during po intake, remain >30 for 30-45 minutes after po   Small bites/sips; alternate liquid/solid with slow feeding rate   Oral care TID  Meds as tolerated  Outcome: Progressing  SPEECH LANGUAGE PATHOLOGY DYSPHAGIA TREATMENT    Patient: Isidro Reardon (64 y.o. male)  Date: 5/24/2025  Diagnosis: Complete heart block (HCC) [I44.2] Complete heart block (HCC)  Procedure(s) (LRB):  Insert PPM dual (N/A) 2 Days Post-Op  Precautions: Standard, aspiration  PLOF: As per H&P      ASSESSMENT:  Pt seen today for a dysphagia management follow up. Significant other at bedside. Currently on 4L O2 via NC. Pt demo'd delayed throat clearing/weak cough s/p ice chips and thin via teaspoon/cup sip. No other overt s/sx aspiration/penetration observed w/ trials of small single sips of mildly thick via teaspoon/cup sip/straw, puree, and regular solids. Mastication/bolus manipulation was somewhat slow w/ mild-mod R and L lingual statis w/ regular solids requiring a cued liquid wash to clear. 1 x anterior spillage w/ thin via cup edge. Pt continues to require

## 2025-05-24 NOTE — PROGRESS NOTES
Some bleeding noted to pacemaker dsg. Drainage outlined for monitoring. Minor bruising outside of the dsg. No swelling present.

## 2025-05-25 ENCOUNTER — APPOINTMENT (OUTPATIENT)
Facility: HOSPITAL | Age: 65
DRG: 242 | End: 2025-05-25
Payer: MEDICARE

## 2025-05-25 LAB
ANION GAP SERPL CALC-SCNC: 16 MMOL/L (ref 3–18)
ANION GAP SERPL CALC-SCNC: 17 MMOL/L (ref 3–18)
BASOPHILS # BLD: 0.14 K/UL (ref 0–0.1)
BASOPHILS # BLD: 0.2 K/UL (ref 0–0.1)
BASOPHILS NFR BLD: 0.7 % (ref 0–2)
BASOPHILS NFR BLD: 1 % (ref 0–2)
BUN SERPL-MCNC: 42 MG/DL (ref 6–23)
BUN SERPL-MCNC: 45 MG/DL (ref 6–23)
BUN/CREAT SERPL: 25 (ref 12–20)
BUN/CREAT SERPL: 27 (ref 12–20)
CALCIUM SERPL-MCNC: 10.1 MG/DL (ref 8.5–10.1)
CALCIUM SERPL-MCNC: 10.7 MG/DL (ref 8.5–10.1)
CHLORIDE SERPL-SCNC: 117 MMOL/L (ref 98–107)
CHLORIDE SERPL-SCNC: 117 MMOL/L (ref 98–107)
CO2 SERPL-SCNC: 20 MMOL/L (ref 21–32)
CO2 SERPL-SCNC: 22 MMOL/L (ref 21–32)
CREAT SERPL-MCNC: 1.66 MG/DL (ref 0.6–1.3)
CREAT SERPL-MCNC: 1.69 MG/DL (ref 0.6–1.3)
DIFFERENTIAL METHOD BLD: ABNORMAL
DIFFERENTIAL METHOD BLD: ABNORMAL
EOSINOPHIL # BLD: 0 K/UL (ref 0–0.4)
EOSINOPHIL # BLD: 0.13 K/UL (ref 0–0.4)
EOSINOPHIL NFR BLD: 0 % (ref 0–5)
EOSINOPHIL NFR BLD: 0.6 % (ref 0–5)
ERYTHROCYTE [DISTWIDTH] IN BLOOD BY AUTOMATED COUNT: 14.6 % (ref 11.6–14.5)
ERYTHROCYTE [DISTWIDTH] IN BLOOD BY AUTOMATED COUNT: 14.7 % (ref 11.6–14.5)
GLUCOSE BLD STRIP.AUTO-MCNC: 138 MG/DL (ref 70–110)
GLUCOSE BLD STRIP.AUTO-MCNC: 172 MG/DL (ref 70–110)
GLUCOSE BLD STRIP.AUTO-MCNC: 182 MG/DL (ref 70–110)
GLUCOSE BLD STRIP.AUTO-MCNC: 187 MG/DL (ref 70–110)
GLUCOSE BLD STRIP.AUTO-MCNC: 193 MG/DL (ref 70–110)
GLUCOSE SERPL-MCNC: 213 MG/DL (ref 74–108)
GLUCOSE SERPL-MCNC: 225 MG/DL (ref 74–108)
HCT VFR BLD AUTO: 41.7 % (ref 36–48)
HCT VFR BLD AUTO: 43 % (ref 36–48)
HGB BLD-MCNC: 12.7 G/DL (ref 13–16)
HGB BLD-MCNC: 13.1 G/DL (ref 13–16)
IMM GRANULOCYTES # BLD AUTO: 0 K/UL
IMM GRANULOCYTES # BLD AUTO: 0.19 K/UL (ref 0–0.04)
IMM GRANULOCYTES NFR BLD AUTO: 0 %
IMM GRANULOCYTES NFR BLD AUTO: 0.9 % (ref 0–0.5)
LYMPHOCYTES # BLD: 2.06 K/UL (ref 0.9–3.3)
LYMPHOCYTES # BLD: 2.22 K/UL (ref 0.9–3.6)
LYMPHOCYTES NFR BLD: 10 % (ref 21–52)
LYMPHOCYTES NFR BLD: 11 % (ref 21–52)
MAGNESIUM SERPL-MCNC: 2.2 MG/DL (ref 1.6–2.6)
MCH RBC QN AUTO: 28 PG (ref 24–34)
MCH RBC QN AUTO: 28.5 PG (ref 24–34)
MCHC RBC AUTO-ENTMCNC: 30.5 G/DL (ref 31–37)
MCHC RBC AUTO-ENTMCNC: 30.5 G/DL (ref 31–37)
MCV RBC AUTO: 91.9 FL (ref 78–100)
MCV RBC AUTO: 93.5 FL (ref 78–100)
MONOCYTES # BLD: 0.81 K/UL (ref 0.05–1.2)
MONOCYTES # BLD: 1.54 K/UL (ref 0.05–1.2)
MONOCYTES NFR BLD: 4 % (ref 3–10)
MONOCYTES NFR BLD: 7.5 % (ref 3–10)
NEUTS SEG # BLD: 16.56 K/UL (ref 1.8–8)
NEUTS SEG # BLD: 16.97 K/UL (ref 1.8–8)
NEUTS SEG NFR BLD: 80.3 % (ref 40–73)
NEUTS SEG NFR BLD: 84 % (ref 40–73)
NRBC # BLD: 0 K/UL (ref 0–0.01)
NRBC # BLD: 0 K/UL (ref 0–0.01)
NRBC BLD-RTO: 0 PER 100 WBC
NRBC BLD-RTO: 0 PER 100 WBC
NT PRO BNP: 797 PG/ML (ref 36–900)
PLATELET # BLD AUTO: 412 K/UL (ref 135–420)
PLATELET # BLD AUTO: 439 K/UL (ref 135–420)
PLATELET COMMENT: ABNORMAL
PMV BLD AUTO: 10.6 FL (ref 9.2–11.8)
PMV BLD AUTO: 10.6 FL (ref 9.2–11.8)
POTASSIUM SERPL-SCNC: 3.7 MMOL/L (ref 3.5–5.5)
POTASSIUM SERPL-SCNC: 3.9 MMOL/L (ref 3.5–5.5)
RBC # BLD AUTO: 4.46 M/UL (ref 4.35–5.65)
RBC # BLD AUTO: 4.68 M/UL (ref 4.35–5.65)
RBC MORPH BLD: ABNORMAL
SODIUM SERPL-SCNC: 154 MMOL/L (ref 136–145)
SODIUM SERPL-SCNC: 156 MMOL/L (ref 136–145)
WBC # BLD AUTO: 20.2 K/UL (ref 4.6–13.2)
WBC # BLD AUTO: 20.6 K/UL (ref 4.6–13.2)

## 2025-05-25 PROCEDURE — 6360000002 HC RX W HCPCS: Performed by: INTERNAL MEDICINE

## 2025-05-25 PROCEDURE — 82962 GLUCOSE BLOOD TEST: CPT

## 2025-05-25 PROCEDURE — 94667 MNPJ CHEST WALL 1ST: CPT

## 2025-05-25 PROCEDURE — 2580000003 HC RX 258: Performed by: STUDENT IN AN ORGANIZED HEALTH CARE EDUCATION/TRAINING PROGRAM

## 2025-05-25 PROCEDURE — 2500000003 HC RX 250 WO HCPCS: Performed by: INTERNAL MEDICINE

## 2025-05-25 PROCEDURE — 92526 ORAL FUNCTION THERAPY: CPT

## 2025-05-25 PROCEDURE — 80048 BASIC METABOLIC PNL TOTAL CA: CPT

## 2025-05-25 PROCEDURE — 2700000000 HC OXYGEN THERAPY PER DAY

## 2025-05-25 PROCEDURE — 83880 ASSAY OF NATRIURETIC PEPTIDE: CPT

## 2025-05-25 PROCEDURE — 1100000003 HC PRIVATE W/ TELEMETRY

## 2025-05-25 PROCEDURE — 83735 ASSAY OF MAGNESIUM: CPT

## 2025-05-25 PROCEDURE — 6370000000 HC RX 637 (ALT 250 FOR IP): Performed by: INTERNAL MEDICINE

## 2025-05-25 PROCEDURE — 97162 PT EVAL MOD COMPLEX 30 MIN: CPT

## 2025-05-25 PROCEDURE — 97530 THERAPEUTIC ACTIVITIES: CPT

## 2025-05-25 PROCEDURE — 85025 COMPLETE CBC W/AUTO DIFF WBC: CPT

## 2025-05-25 PROCEDURE — 71045 X-RAY EXAM CHEST 1 VIEW: CPT

## 2025-05-25 PROCEDURE — 94664 DEMO&/EVAL PT USE INHALER: CPT

## 2025-05-25 PROCEDURE — 94640 AIRWAY INHALATION TREATMENT: CPT

## 2025-05-25 PROCEDURE — 36415 COLL VENOUS BLD VENIPUNCTURE: CPT

## 2025-05-25 PROCEDURE — 2580000003 HC RX 258: Performed by: INTERNAL MEDICINE

## 2025-05-25 PROCEDURE — 6370000000 HC RX 637 (ALT 250 FOR IP): Performed by: HOSPITALIST

## 2025-05-25 RX ORDER — DEXTROSE MONOHYDRATE 50 MG/ML
INJECTION, SOLUTION INTRAVENOUS CONTINUOUS
Status: DISCONTINUED | OUTPATIENT
Start: 2025-05-25 | End: 2025-05-28

## 2025-05-25 RX ADMIN — POLYVINYL ALCOHOL, POVIDONE 1 DROP: 14; 6 SOLUTION/ DROPS OPHTHALMIC at 13:01

## 2025-05-25 RX ADMIN — ENOXAPARIN SODIUM 40 MG: 100 INJECTION SUBCUTANEOUS at 12:55

## 2025-05-25 RX ADMIN — INSULIN LISPRO 4 UNITS: 100 INJECTION, SOLUTION INTRAVENOUS; SUBCUTANEOUS at 06:05

## 2025-05-25 RX ADMIN — MINERAL OIL, PETROLATUM: 425; 568 OINTMENT OPHTHALMIC at 00:30

## 2025-05-25 RX ADMIN — BUDESONIDE 500 MCG: 0.5 INHALANT RESPIRATORY (INHALATION) at 20:30

## 2025-05-25 RX ADMIN — POLYVINYL ALCOHOL, POVIDONE 1 DROP: 14; 6 SOLUTION/ DROPS OPHTHALMIC at 22:35

## 2025-05-25 RX ADMIN — DEXTROSE: 5 SOLUTION INTRAVENOUS at 12:52

## 2025-05-25 RX ADMIN — ROSUVASTATIN CALCIUM 20 MG: 10 TABLET, FILM COATED ORAL at 12:58

## 2025-05-25 RX ADMIN — PIPERACILLIN AND TAZOBACTAM 3375 MG: 3; .375 INJECTION, POWDER, LYOPHILIZED, FOR SOLUTION INTRAVENOUS at 06:01

## 2025-05-25 RX ADMIN — GABAPENTIN 300 MG: 300 CAPSULE ORAL at 22:34

## 2025-05-25 RX ADMIN — INSULIN GLARGINE 8 UNITS: 100 INJECTION, SOLUTION SUBCUTANEOUS at 22:34

## 2025-05-25 RX ADMIN — INSULIN LISPRO 4 UNITS: 100 INJECTION, SOLUTION INTRAVENOUS; SUBCUTANEOUS at 18:55

## 2025-05-25 RX ADMIN — BUDESONIDE 500 MCG: 0.5 INHALANT RESPIRATORY (INHALATION) at 07:38

## 2025-05-25 RX ADMIN — SODIUM CHLORIDE, PRESERVATIVE FREE 10 ML: 5 INJECTION INTRAVENOUS at 22:37

## 2025-05-25 RX ADMIN — FAMOTIDINE 20 MG: 20 TABLET, FILM COATED ORAL at 12:55

## 2025-05-25 RX ADMIN — SODIUM CHLORIDE, PRESERVATIVE FREE 10 ML: 5 INJECTION INTRAVENOUS at 09:00

## 2025-05-25 RX ADMIN — AMLODIPINE BESYLATE 10 MG: 5 TABLET ORAL at 12:55

## 2025-05-25 ASSESSMENT — PAIN SCALES - GENERAL
PAINLEVEL_OUTOF10: 0

## 2025-05-25 ASSESSMENT — PAIN SCALES - WONG BAKER: WONGBAKER_NUMERICALRESPONSE: NO HURT

## 2025-05-25 NOTE — PROGRESS NOTES
RENAL CONSULT  PROGRESS NOTE   2025    Patient:  Isidro Reardon  :  1960  Gender:  male  MRN #:  [id number]    Reason for Consult: Acute renal failure and Hyperkalemia requested by intensivist Dr Barrientos     Relevant events:    Awake and alert   BP mostly improving  , urinating fine ,   Labs, overnight events and consultant noted reviewed          Objective:    BP (!) 132/93   Pulse (!) 112   Temp 98.4 °F (36.9 °C) (Oral)   Resp 20   Ht 1.778 m (5' 10\")   Wt 84.8 kg (187 lb)   SpO2 98%   BMI 26.83 kg/m²     Physical Exam:    Pt awake   HEENT: no neck swelling   Abdomen: soft,  non distended   Ext: no edema     Laboratory Data:    Lab Results   Component Value Date    BUN 42 (H) 2025    BUN 45 (H) 2025    BUN 39 (H) 2025     (H) 2025     (H) 2025     (H) 2025    CO2 22 2025    CO2 23 2025    CO2 24 2025    GLUCOSE 213 (H) 2025    GLUCOSE 174 (H) 2025    GLUCOSE 130 (H) 2025     Lab Results   Component Value Date    WBC 20.6 (H) 2025    HGB 13.1 2025    HCT 43.0 2025     Lab Results   Component Value Date    CALCIUM 10.7 (H) 2025     Lab Results   Component Value Date    HDL 31 (L) 2025     No results found for: \"TURBIDITY\"    Imaging Reveiwed:      EKG  Renal ultrasound  Xray  chest- IMPRESSION:     Satisfactory endotracheal tube and enteric tube placement. Low lung volumes with  mild perihilar opacities may represent atelectasis or vascular congestion.         Assessment:  Isidro Reardon is a 64 y.o. year old male with  CAD, AS s/p  TAVR, diabetes mellitus, hypertension, hypertensive heart disease, neuropathy presented to he ER for  lightheadedness dizziness.Later on had complete heart block, asystole cardiac arrest   Now has acute renal failure due to cardiac arrest/bradycardia and hyperkalemia is due to RENAE and CPR .    Acute renal failure  Hyperkalemia  Hypocalcemia   Cardiac

## 2025-05-25 NOTE — PROGRESS NOTES
PORTABLE  Result Date: 5/16/2025  EXAM:  XR CHEST PORTABLE INDICATION: Intubated COMPARISON: 5/15/2025 TECHNIQUE: Portable AP chest view at 0416 hours FINDINGS: The endotracheal tube terminates above the doc. The enteric tube terminates in the stomach. The cardiomediastinal contours are stable. There are low lung volumes with mild perihilar opacities. There is no pleural effusion or pneumothorax. The bones and upper abdomen are stable.     Satisfactory endotracheal tube and enteric tube placement. Low lung volumes with mild perihilar opacities may represent atelectasis or vascular congestion. Electronically signed by Hema Orozco    Cardiac procedure  Result Date: 5/16/2025  Successful placement of transvenous pacemaker under fluoroscopic guidance without any complication.     XR CHEST PORTABLE  Result Date: 5/15/2025  EXAM: XR CHEST PORTABLE INDICATION: sob COMPARISON: 1/31/2017 FINDINGS: A portable AP radiograph of the chest was obtained at 2008 hours. Cardiac monitoring leads.. The lungs are clear. Mild cardiomegaly..  The bones and soft tissues are grossly within normal limits.     No acute cardiopulmonary process. Electronically signed by Rolf Serrano         Please note: Voice-recognition software may have been used to generate this report, which may have resulted in some phonetic-based errors in grammar and contents. Even though attempts were made to correct all the mistakes, some may have been missed, and remained in the body of the document.    Arturo Coates MD  5/25/2025

## 2025-05-25 NOTE — PROGRESS NOTES
Physical Therapy Goals:  Initiated 5/25/2025 to be met within 7-10 days.  Short Term Goals  Short Term Goal 1: Pt will be able to transfer supine<>sit CGA to improve functional mobility.  Short Term Goal 2: Pt will be able to transfer sit<>stand CGA to improve independence.  Short Term Goal 3: Pt will be able to ambulate >50' w/ RW, Eulogio to improve ability to negotiate environment.  Short Term Goal 4: Pt will be able to improve sitting balance to fair+ and able to sit >20' to improve posture for meals.      PHYSICAL THERAPY EVALUATION    Patient: Isidro Reardon (64 y.o. male)  Date: 5/25/2025  Primary Diagnosis: Complete heart block (HCC) [I44.2]  Procedure(s) (LRB):  Insert PPM dual (N/A) 3 Days Post-Op   Precautions: Surgical Protocols, Fall Risk, Bed Alarm,  ,  ,  ,  ,  ,  ,    PLOF:  independent; lives alone; active   ASSESSMENT :  Introduced self, ed on PT role in acute setting and pt agreeable to PT evaluation.  Based on objective findings as described below pt presents w/ dec functional mobility and independence in regards to bed mobility, transfers, gait quality/tolerance, balance, stair negotiation and safety.  Pt reports fall OOB-\"bumped my head.\"  Generalized weakness, dec sensation of B feet (chronic neuropathy), pain reported as generalized and B LE also impacting functional mobility.  Pt supine in bed upon arrival, on 4L O2 via NC.  Pt reports pain in B LE/generalized, rating pain 10/10 using numerical pain scale.  Pt A&O to self only.  Pt ed on importance of mobility, safe mobility, need to use call bell for any activity.  Transfers supine<>sit mod A.  Sitting posture is poor w/ tendency for forward flexion and requiring mod/min A for improved posturing.  Needs vc for holding head upright.  Sit<>stand mod A additional time.  Posterior lean during poor standing trials x2 and again forward flexion at trunk and head.  Pt needs continued vc for safety and proper techniques.  Pt returned to supine in

## 2025-05-25 NOTE — PLAN OF CARE
Problem: SLP Adult - Impaired Swallowing  Goal: By Discharge: Advance to least restrictive diet without signs or symptoms of aspiration for planned discharge setting.  See evaluation for individualized goals.  Description: Patient will:  1. Tolerate PO trials with 0 s/s overt distress in 4/5 trials  2. Utilize compensatory swallow strategies/maneuvers (decrease bite/sip, size/rate, alt. liq/sol) with min cues in 4/5 trials  3. Perform oral-motor/laryngeal exercises to increase oropharyngeal swallow function with min cues  4. Complete an objective swallow study (i.e., MBSS) to assess swallow integrity, r/o aspiration, and determine of safest LRD, min A as indicated/ordered by MD     Rec:     Minced and moist solid diet and thin liquids; alternate small bites/small single sips  Assistance w/ po; ensure small sips size and oral clearance  Aspiration precautions  HOB >45 during po intake, remain >30 for 30-45 minutes after po   Small bites/sips; alternate liquid/solid with slow feeding rate   Oral care TID  Meds in puree  Outcome: Progressing  SPEECH LANGUAGE PATHOLOGY DYSPHAGIA TREATMENT    Patient: Isidro Reardon (64 y.o. male)  Date: 5/25/2025  Diagnosis: Complete heart block (HCC) [I44.2] Complete heart block (HCC)  Procedure(s) (LRB):  Insert PPM dual (N/A) 3 Days Post-Op  Precautions: Standard, aspiration  PLOF: As per H&P      ASSESSMENT:  Pt seen today for a dysphagia management follow up. Daughter at bedside. Per RN and daughter, pt displeased w/ food at the hospital and current liquid recommendation and refusing to drink thickened liquids. Pt continues to demo some disinhibition, however, w/ slightly reduced confusion and impulsivity. No overt s/sx aspiration/penetration observed w/ trials of ice chips, single sips of thin via teaspoon and straw, puree, and regular solids. Mastication/bolus manipulation of regular was slow and disorganized w/ pt requiring multiple cued liquid washes to clear. Clear vocal  dysphagia    PAIN:  Intensity Pre-treatment: 0/10   Intensity Post-treatment: 0/10    After treatment:   [x]              Patient left in no apparent distress in same position as upon arrival  [x]              Call bell left within reach  [x]              Nursing notified  [x]              Family/caregiver present    COMMUNICATION/EDUCATION:   [x]            Aspiration precautions; swallow safety; compensatory techniques provided via demonstration, verbalization and teach back of comprehension  [x]         Patient/family have participated as able in goal setting and plan of care.  []            Patient/family agree to work toward stated goals and plan of care.  []            Patient understands intent and goals of therapy, neutral about participation.  [x]            Patient unable to participate in goal setting/plan of care secondary to cognition, hearing/vision deficits; education ongoing with interdisciplinary staff   []            Handout regarding diet recommendations and thickener instructions provided.  []         Posted safety precautions in patient's room.    Thank you for this referral,    Yvrose Valdez M.S., CCC-SLP  Speech-Language Pathologist

## 2025-05-25 NOTE — PROGRESS NOTES
@0030 Dr. Varela updated on pt and hematoma at pacemaker site. In with patient and cleared pt to go to floor. Care continues.    @0100 report called to Elisha Espitia care continues.      @ 0103 Priyanka Craven listed on pt contact was called and updated on pt transfer as requested earlier . Care continues .

## 2025-05-25 NOTE — PROGRESS NOTES
Observed patient stand up on side of bed and squat down to floor kneeling down to a praying position on side of bed.

## 2025-05-25 NOTE — PROGRESS NOTES
Cardiology Progress Note        Patient: Isidro Reardon        Sex: male          DOA: 5/15/2025  YOB: 1960      Age:  64 y.o.        LOS:  LOS: 10 days   Assessment/Plan     Principal Problem:    Complete heart block (HCC)  Active Problems:    Coronary atherosclerosis of native coronary artery    Aspiration pneumonia (HCC)    COPD (chronic obstructive pulmonary disease) (HCC)    Essential hypertension    Type 2 diabetes with nephropathy (HCC)    Acute renal failure    Heart block    S/P TAVR (transcatheter aortic valve replacement)    Cardiac arrest (HCC)    Acute respiratory failure with hypoxia (HCC)    Hyperkalemia    Hypotension    Chronic kidney disease (CKD), stage II (mild)    Coma (HCC)  Resolved Problems:    * No resolved hospital problems. *      Plan:  Today early in the morning have fall possible slides from the bed due to weakness when he was trying to go to the bathroom    No complaints  Mild sinus tachycardia, will monitor, if persistent and no other causes may consider low-dose beta-blocker    CBC have shown leukocytosis and increased in hemoglobin and platelets counts, discussed with Dr. Coates, patient will need repeat CBC  Continue gentle hydration  Discussed with patient and daughter in the room                        5/24/25  Medic telemetry sinus rhythm  Low paced complexes  Pacemaker site have mild subcutaneous hematoma  Otherwise hemodynamically stable  Patient is awake alert and oriented  Discussed with patient and family  Discussed with RN  Discussed with Dr. Coates                    5/23/2025  Patient is status post extubated  Patient had no complaints  Hemodynamically stable  Swallow evaluation is pending  Discussed with patient's daughter  Continue with current meds                      5/22/2025  Status post permanent pacemaker without any problem  Patient is on SBT protocol for possible extubation tomorrow  Hemodynamically stable blood pressure is trending up we  1,000 mL with gentamicin (GARAMYCIN) 80 mg   Irrigation Once    sodium chloride flush 0.9 % injection 5-40 mL  5-40 mL IntraVENous 2 times per day    sodium chloride flush 0.9 % injection 5-40 mL  5-40 mL IntraVENous PRN    0.9 % sodium chloride infusion   IntraVENous PRN    enoxaparin (LOVENOX) injection 40 mg  40 mg SubCUTAneous Daily    fentaNYL (SUBLIMAZE) injection 50 mcg  50 mcg IntraVENous Q4H PRN    ipratropium 0.5 mg-albuterol 2.5 mg (DUONEB) nebulizer solution 1 Dose  1 Dose Inhalation Q4H PRN    insulin lispro (HUMALOG,ADMELOG) injection vial 0-16 Units  0-16 Units SubCUTAneous Q6H    budesonide (PULMICORT) nebulizer suspension 500 mcg  0.5 mg Nebulization BID RT    docusate sodium (COLACE) capsule 100 mg  100 mg Oral Daily    lactulose (CHRONULAC) 10 GM/15ML solution 20 g  20 g Oral Daily    magnesium sulfate 2000 mg in 50 mL IVPB premix  2,000 mg IntraVENous PRN    sodium phosphate 15 mmol in sodium chloride 0.9 % 250 mL IVPB  15 mmol IntraVENous PRN    dextrose 50 % IV solution  25 g IntraVENous PRN    Polyvinyl Alcohol-Povidone PF (REFRESH) 1.4-0.6 % ophthalmic solution 1 drop  1 drop Both Eyes Q4H    Or    lubrifresh P.M. (artificial tears) ophthalmic ointment   Both Eyes Q4H    [Held by provider] aspirin chewable tablet 81 mg  81 mg Oral Daily    [Held by provider] DULoxetine (CYMBALTA) extended release capsule 60 mg  60 mg Oral Daily    gabapentin (NEURONTIN) capsule 300 mg  300 mg Oral TID    rosuvastatin (CRESTOR) tablet 20 mg  20 mg Oral Daily    ondansetron (ZOFRAN-ODT) disintegrating tablet 4 mg  4 mg Oral Q8H PRN    Or    ondansetron (ZOFRAN) injection 4 mg  4 mg IntraVENous Q6H PRN    polyethylene glycol (GLYCOLAX) packet 17 g  17 g Oral Daily PRN    acetaminophen (TYLENOL) tablet 650 mg  650 mg Oral Q6H PRN    Or    acetaminophen (TYLENOL) suppository 650 mg  650 mg Rectal Q6H PRN    glucose chewable tablet 16 g  4 tablet Oral PRN    dextrose bolus 10% 125 mL  125 mL IntraVENous PRN    Or

## 2025-05-25 NOTE — PROGRESS NOTES
Called to room by CNA, patient noticed to be kneeling on the ground in praying position. Event witnessed by Aspen MATTHEW, Dr. Varela notified, Avasure placed, patient will be moved to the front of unit when space is available. Neuro checks q4hr.

## 2025-05-25 NOTE — SIGNIFICANT EVENT
Significant Event Notation    Isidro Reardon  339/01    Made aware of event with concern for a fall from bed; however, was witnessed by staff.  Pt got out of bed and kneeled down to floor on knees, where pt was found as staff arrived responding to bed alarm.    Plan:    Maximal fall precautions, bed alarm, move closer to nurses station when bed available to do so and remote video monitoring of pt.     Pt seen at bedside.      DO Naga, PhD  [facility] Medicine

## 2025-05-26 ENCOUNTER — APPOINTMENT (OUTPATIENT)
Facility: HOSPITAL | Age: 65
DRG: 242 | End: 2025-05-26
Payer: MEDICARE

## 2025-05-26 LAB
ANION GAP SERPL CALC-SCNC: 13 MMOL/L (ref 3–18)
BUN SERPL-MCNC: 36 MG/DL (ref 6–23)
BUN/CREAT SERPL: 25 (ref 12–20)
CALCIUM SERPL-MCNC: 9.7 MG/DL (ref 8.5–10.1)
CHLORIDE SERPL-SCNC: 110 MMOL/L (ref 98–107)
CO2 SERPL-SCNC: 25 MMOL/L (ref 21–32)
CREAT SERPL-MCNC: 1.48 MG/DL (ref 0.6–1.3)
GLUCOSE BLD STRIP.AUTO-MCNC: 162 MG/DL (ref 70–110)
GLUCOSE BLD STRIP.AUTO-MCNC: 199 MG/DL (ref 70–110)
GLUCOSE BLD STRIP.AUTO-MCNC: 217 MG/DL (ref 70–110)
GLUCOSE BLD STRIP.AUTO-MCNC: 262 MG/DL (ref 70–110)
GLUCOSE SERPL-MCNC: 205 MG/DL (ref 74–108)
MAGNESIUM SERPL-MCNC: 3.2 MG/DL (ref 1.6–2.6)
NT PRO BNP: 384 PG/ML (ref 36–900)
POTASSIUM SERPL-SCNC: 3.6 MMOL/L (ref 3.5–5.5)
SODIUM SERPL-SCNC: 148 MMOL/L (ref 136–145)

## 2025-05-26 PROCEDURE — 36415 COLL VENOUS BLD VENIPUNCTURE: CPT

## 2025-05-26 PROCEDURE — 97535 SELF CARE MNGMENT TRAINING: CPT

## 2025-05-26 PROCEDURE — 97530 THERAPEUTIC ACTIVITIES: CPT

## 2025-05-26 PROCEDURE — 80048 BASIC METABOLIC PNL TOTAL CA: CPT

## 2025-05-26 PROCEDURE — 6370000000 HC RX 637 (ALT 250 FOR IP): Performed by: HOSPITALIST

## 2025-05-26 PROCEDURE — 71045 X-RAY EXAM CHEST 1 VIEW: CPT

## 2025-05-26 PROCEDURE — 94640 AIRWAY INHALATION TREATMENT: CPT

## 2025-05-26 PROCEDURE — 6360000002 HC RX W HCPCS: Performed by: INTERNAL MEDICINE

## 2025-05-26 PROCEDURE — 83735 ASSAY OF MAGNESIUM: CPT

## 2025-05-26 PROCEDURE — 2700000000 HC OXYGEN THERAPY PER DAY

## 2025-05-26 PROCEDURE — 97116 GAIT TRAINING THERAPY: CPT

## 2025-05-26 PROCEDURE — 83880 ASSAY OF NATRIURETIC PEPTIDE: CPT

## 2025-05-26 PROCEDURE — 97166 OT EVAL MOD COMPLEX 45 MIN: CPT

## 2025-05-26 PROCEDURE — 6370000000 HC RX 637 (ALT 250 FOR IP): Performed by: INTERNAL MEDICINE

## 2025-05-26 PROCEDURE — 2580000003 HC RX 258: Performed by: STUDENT IN AN ORGANIZED HEALTH CARE EDUCATION/TRAINING PROGRAM

## 2025-05-26 PROCEDURE — 82962 GLUCOSE BLOOD TEST: CPT

## 2025-05-26 PROCEDURE — 1100000003 HC PRIVATE W/ TELEMETRY

## 2025-05-26 PROCEDURE — 2500000003 HC RX 250 WO HCPCS: Performed by: INTERNAL MEDICINE

## 2025-05-26 RX ADMIN — SODIUM CHLORIDE, PRESERVATIVE FREE 10 ML: 5 INJECTION INTRAVENOUS at 08:28

## 2025-05-26 RX ADMIN — SODIUM CHLORIDE, PRESERVATIVE FREE 10 ML: 5 INJECTION INTRAVENOUS at 21:48

## 2025-05-26 RX ADMIN — POLYVINYL ALCOHOL, POVIDONE 1 DROP: 14; 6 SOLUTION/ DROPS OPHTHALMIC at 08:29

## 2025-05-26 RX ADMIN — POLYVINYL ALCOHOL, POVIDONE 1 DROP: 14; 6 SOLUTION/ DROPS OPHTHALMIC at 02:44

## 2025-05-26 RX ADMIN — DEXTROSE: 5 SOLUTION INTRAVENOUS at 08:24

## 2025-05-26 RX ADMIN — GABAPENTIN 300 MG: 300 CAPSULE ORAL at 08:28

## 2025-05-26 RX ADMIN — INSULIN GLARGINE 8 UNITS: 100 INJECTION, SOLUTION SUBCUTANEOUS at 22:02

## 2025-05-26 RX ADMIN — POLYVINYL ALCOHOL, POVIDONE 1 DROP: 14; 6 SOLUTION/ DROPS OPHTHALMIC at 17:19

## 2025-05-26 RX ADMIN — BUDESONIDE 500 MCG: 0.5 INHALANT RESPIRATORY (INHALATION) at 20:59

## 2025-05-26 RX ADMIN — IPRATROPIUM BROMIDE AND ALBUTEROL SULFATE 1 DOSE: .5; 3 SOLUTION RESPIRATORY (INHALATION) at 21:00

## 2025-05-26 RX ADMIN — INSULIN LISPRO 4 UNITS: 100 INJECTION, SOLUTION INTRAVENOUS; SUBCUTANEOUS at 17:18

## 2025-05-26 RX ADMIN — BUDESONIDE 500 MCG: 0.5 INHALANT RESPIRATORY (INHALATION) at 08:38

## 2025-05-26 RX ADMIN — GABAPENTIN 300 MG: 300 CAPSULE ORAL at 22:02

## 2025-05-26 ASSESSMENT — PAIN SCALES - GENERAL
PAINLEVEL_OUTOF10: 0
PAINLEVEL_OUTOF10: 0

## 2025-05-26 NOTE — PROGRESS NOTES
Hospitalist Progress Note    Patient: Isidro Reardon MRN: [id number]  [facility]: [id number]    YOB: 1960  Age: 64 y.o.  Sex: male    DOA: 5/15/2025 LOS:  LOS: 10 days          Chief Complain :  Dizziness, lightheadedness.  64 y.o. male with CAD, aortic stenosis status post TAVR, diabetes mellitus, hypertension, hypertensive heart disease, neuropathy presents to ER with concerns of lightheadedness dizziness. In the ER he was noted to have heart rate in 30s to 40s. EKG showed complete heart block. He was given epinephrine and started on dopamine drip which improved his heart rate. He actually went into sinus tachycardia. Patient was subsequently admitted to intensive care unit. Later he suddenly became bradycardic and went into asystole. CPR initiated and ACLS protocol initiated. ROSC obtained. Patient intubated external pacers placed. Cardiology contacted s/pTVP.   Subjective:   Patient laying in bed, awake, alert, answers to all questions.    Assessment/Plan     Active Hospital Problems    Diagnosis     Coma (HCC) [R40.20]     Hypotension [I95.9]     Chronic kidney disease (CKD), stage II (mild) [N18.2]     S/P TAVR (transcatheter aortic valve replacement) [Z95.2]     Cardiac arrest (HCC) [I46.9]     Acute respiratory failure with hypoxia (HCC) [J96.01]     Hyperkalemia [E87.5]     Complete heart block (HCC) [I44.2]     Heart block [I45.9]     Acute renal failure [N17.9]     Type 2 diabetes with nephropathy (HCC) [E11.21]     Essential hypertension [I10]     COPD (chronic obstructive pulmonary disease) (HCC) [J44.9]     Aspiration pneumonia (HCC) [J69.0]     Coronary atherosclerosis of native coronary artery [I25.10]

## 2025-05-26 NOTE — PROGRESS NOTES
RENAL CONSULT  PROGRESS NOTE   2025    Patient:  Isidro Reardon  :  1960  Gender:  male  MRN #:  [id number]    Reason for Consult: Acute renal failure and Hyperkalemia requested by intensivist Dr Barrientos     Relevant events:    Awake and alert   BP mostly stable   , urinating fine ,   Denied any symptoms  Labs, overnight events and consultant noted reviewed          Objective:    /83   Pulse 78   Temp 97.9 °F (36.6 °C) (Oral)   Resp 16   Ht 1.778 m (5' 10\")   Wt 84.8 kg (187 lb)   SpO2 98%   BMI 26.83 kg/m²     Physical Exam:    Pt awake   HEENT: no neck swelling   Abdomen: soft,  non distended   Ext: no edema     Laboratory Data:    Lab Results   Component Value Date    BUN 36 (H) 2025    BUN 45 (H) 2025    BUN 42 (H) 2025     (H) 2025     (H) 2025     (H) 2025    CO2 25 2025    CO2 20 (L) 2025    CO2 22 2025    GLUCOSE 205 (H) 2025    GLUCOSE 225 (H) 2025    GLUCOSE 213 (H) 2025     Lab Results   Component Value Date    WBC 20.2 (H) 2025    HGB 12.7 (L) 2025    HCT 41.7 2025     Lab Results   Component Value Date    CALCIUM 9.7 2025     Lab Results   Component Value Date    HDL 31 (L) 2025     No results found for: \"TURBIDITY\"    Imaging Reveiwed:      EKG  Renal ultrasound  Xray  chest- IMPRESSION:     Satisfactory endotracheal tube and enteric tube placement. Low lung volumes with  mild perihilar opacities may represent atelectasis or vascular congestion.         Assessment:  Isidro Reardon is a 64 y.o. year old male with  CAD, AS s/p  TAVR, diabetes mellitus, hypertension, hypertensive heart disease, neuropathy presented to he ER for  lightheadedness dizziness.Later on had complete heart block, asystole cardiac arrest   Now has acute renal failure due to cardiac arrest/bradycardia and hyperkalemia is due to RENAE and CPR .    Acute renal  Today your INR was 2.9. Your goal INR is  2.0-3.0 . You have a 2 mg tablet of Coumadin (warfarin). Take Coumadin (warfarin) as follows:    Continue warfarin 2 mg daily. Double check to see what dose of trazodone you currently have at home. Consider a trial off of Astelin nasal spray. Use flonase. Come back in  4  week(s) for your next finger stick/INR blood test.        Avoid any over the counter items containing aspirin or ibuprofen, and avoid great swings in general diet. Avoid alcohol consumption. Please notify the INR nurse if you are started on any new medication including over the counter or herbal supplements. Also, please notify your INR nurse if any of your other prescription or over the counter medications have been discontinued. Call [facility] at [phone] if you have any signs of abnormal bleeding/blood clot.  ------------------------------------------------------------------------------------------------------------------  Taking Warfarin Safely: Care Instructions    Your Care Instructions  Warfarin is a medicine that you take to prevent blood clots. It is often called a blood thinner. Doctors give warfarin (such as Coumadin) to reduce the risk of blood clots. You may be at risk for blood clots if you have atrial fibrillation or deep vein thrombosis. Some other health problems may also put you at risk. Warfarin slows the amount of time it takes for your blood to clot. It can cause bleeding problems. Even if you've been taking warfarin for a while, it's important to know how to take it safely. Foods and other medicines can affect the way warfarin works. Some can make warfarin work too well. This can cause bleeding problems. And some can make it work poorly, so that it does not prevent blood clots very well. You will need regular blood tests to check how long it takes for your blood to form a clot.  This test is called a PT or prothrombin time test. The result of the test is called an INR level. Depending on the test results, your doctor or anticoagulation clinic may adjust your dose of warfarin. Follow-up care is a key part of your treatment and safety. Be sure to make and go to all appointments, and call your doctor if you are having problems. It's also a good idea to know your test results and keep a list of the medicines you take. How can you care for yourself at home? Take warfarin safely  · Take your warfarin at the same time each day. · If you miss a dose of warfarin, don't take an extra dose to make up for it. Your doctor can tell you exactly what to do so you don't take too much or too little. · Wear medical alert jewelry that lets others know that you take warfarin. You can buy this at most drugstores. · Don't take warfarin if you are pregnant or planning to get pregnant. Talk to your doctor about how you can prevent getting pregnant while you are taking it. · Don't change your dose or stop taking warfarin unless your doctor tells you to. Effects of medicines and food on warfarin  · Don't start or stop taking any medicines, vitamins, or natural remedies unless you first talk to your doctor. Many medicines can affect how warfarin works. These include aspirin and other pain relievers, over-the-counter medicines, multivitamins, dietary supplements, and herbal products. · Tell all of your doctors and pharmacists that you take warfarin. Some prescription medicines can affect how warfarin works. · Keep the amount of vitamin K in your diet about the same from day to day. Do not suddenly eat a lot more or a lot less food that is rich in vitamin K than you usually do. Vitamin K affects how warfarin works and how your blood clots. Talk with your doctor before making big changes in your diet. Vitamin K is in many foods, such as:  ¨ Leafy greens, such as kale, cabbage, spinach, Swiss chard, and lettuce. ¨ Canola and soybean oils.   ¨ Green vegetables, such as asparagus, broccoli, and [place] sprouts. ¨ Vegetable drinks, green tea leaves, and some dietary supplement drinks. · Avoid cranberry juice and other cranberry products. They can increase the effects of warfarin. · Limit your use of alcohol. Avoid bleeding by preventing falls and injuries  · Wear slippers or shoes with nonskid soles. · Remove throw rugs and clutter. · Rearrange furniture and electrical cords to keep them out of walking paths. · Keep stairways, porches, and outside walkways well lit. Use night-lights in hallways and bathrooms. · Be extra careful when you work with sharp tools or knives. When should you call for help? Call 911 anytime you think you may need emergency care. For example, call if:  · You have a sudden, severe headache that is different from past headaches. Call your doctor now or seek immediate medical care if:  · You have any abnormal bleeding, such as:  ¨ Nosebleeds. ¨ Vaginal bleeding that is different (heavier, more frequent, at a different time of the month) than what you are used to. ¨ Bloody or black stools, or rectal bleeding. ¨ Bloody or pink urine. Watch closely for changes in your health, and be sure to contact your doctor if you have any problems. Where can you learn more? Go to http://www.gray.com/. Enter I268 in the search box to learn more about \"Taking Warfarin Safely: Care Instructions. \"  Current as of: January 27, 2016  Content Version: 11.1  © 2227-9317 [employer]. Care instructions adapted under license by [employer] (which disclaims liability or warranty for this information). If you have questions about a medical condition or this instruction, always ask your healthcare professional. [place] [address] any warranty or liability for your use of this information.

## 2025-05-26 NOTE — PROGRESS NOTES
Hospitalist Progress Note    Patient: Isidro Reardon MRN: [id number]  [facility]: [id number]    YOB: 1960  Age: 64 y.o.  Sex: male    DOA: 5/15/2025 LOS:  LOS: 11 days          Chief Complain :  Dizziness, lightheadedness.  64 y.o. male with CAD, aortic stenosis status post TAVR, diabetes mellitus, hypertension, hypertensive heart disease, neuropathy presents to ER with concerns of lightheadedness dizziness. In the ER he was noted to have heart rate in 30s to 40s. EKG showed complete heart block. He was given epinephrine and started on dopamine drip which improved his heart rate. He actually went into sinus tachycardia. Patient was subsequently admitted to intensive care unit. Later he suddenly became bradycardic and went into asystole. CPR initiated and ACLS protocol initiated. ROSC obtained. Patient intubated external pacers placed. Cardiology contacted s/pTVP.   Subjective:   Patient laying in bed, awake, alert, answers to all questions.    Assessment/Plan     Active Hospital Problems    Diagnosis     Coma (HCC) [R40.20]     Hypotension [I95.9]     Chronic kidney disease (CKD), stage II (mild) [N18.2]     S/P TAVR (transcatheter aortic valve replacement) [Z95.2]     Cardiac arrest (HCC) [I46.9]     Acute respiratory failure with hypoxia (HCC) [J96.01]     Hyperkalemia [E87.5]     Complete heart block (HCC) [I44.2]     Heart block [I45.9]     Acute renal failure [N17.9]     Type 2 diabetes with nephropathy (HCC) [E11.21]     Essential hypertension [I10]     COPD (chronic obstructive pulmonary disease) (HCC) [J44.9]     Aspiration pneumonia (HCC) [J69.0]     Coronary atherosclerosis of native coronary artery [I25.10]

## 2025-05-26 NOTE — PROGRESS NOTES
@1613 received communication from Primary RN that medication Gabapentin 300 mg was \"found\" unopened on computer in patients room.  Medication hand delivered to [facility] in patient pharmacy/Pharmacist on shift for verification and restock.

## 2025-05-26 NOTE — PROGRESS NOTES
seen in telemetry unit  S/p extubation 5/23  Awake and alert  Patient has trouble remembering things  Denies any chest pains or shortness of breath  No cough or wheezing  No vomiting or diarrhea  Left chest wall pains at pacemaker site  Afebrile  Blood pressure-stable    Status post permanent pacemaker placement 5/22      Review of Systems:   - No fever or chills  - No chest pain or palpitations  - No cough or hemoptysis   - No vomiting or diarrhea  - No rash  - Small hematoma left chest wall at pacemaker site      No Known Allergies   Past Medical History:   Diagnosis Date    Acute coronary syndrome (HCC)     with non-ST-elevation myocardial infarction, s/p direct angioplasty of anomolous circumflex marginal coming off the right coronary cusp on 1/26/06    Arthritis     in hands    ASHD (arteriosclerotic heart disease)     Benign hypertensive heart disease without heart failure     CAD (coronary artery disease)     chronic underlying    Calculus of kidney     Chronic cough     coughing up sputum    Chronic lung disease     COPD (chronic obstructive pulmonary disease) ([facility])     moderate to severe    Coronary artery disease     Diabetes ([facility])     Diabetes mellitus ([facility])     FERRARI (dyspnea on exertion)     GERD (gastroesophageal reflux disease)     H/O: pneumonia     Heartburn     Hypertension     essential systemic     Indigestion     Left inguinal hernia     Neuropathy involving both lower extremities     Pneumonia 8/12    \"walking\"    Pure hypercholesterolemia     S/P cardiac cath 01/26/2006    LM mild.  RCA mild.  LAD tortuous.  CX mild.  CX erin 100%.  S/P balloon angioplasty of CX erin.  Residual 40%.  LVEDP 20.  EF 65%.    Tobacco abuse       Past Surgical History:   Procedure Laterality Date    CARDIAC CATHETERIZATION  01/26/06    CARDIAC PROCEDURE N/A 5/15/2025    Insert temporary pacemaker performed by Kirit Giraldo MD at [place] CARDIAC CATH LAB    CARDIAC PROCEDURE N/A 5/16/2025    Insert temporary pacemaker  lb)   02/11/25 86.2 kg (190 lb)          Physical Exam:     Patient awake and alert; appears comfortable; acyanotic; on nasal cannula oxygen  HEENT: pupils not dilated, reactive, no scleral jaundice, moist oral mucosa  Neck: No adenopathy or thyroid swelling  CVS: S1-S2; no murmur; JVD not elevated    Left chest wall pacemaker; small hematoma-improved; no active bleeding  RS: Good air entry bilateral; no wheezing; few bibasal crackles; not tachypneic       Abd: Soft, nontender, not distended         Neuro: Nonfocal, awake and alert   Extrm: No leg edema or swelling or clubbing  Skin: no rash  Lymphatic: no cervical or supraclavicular adenopathy   Psych: Cooperative    Data:       Recent Results (from the past 24 hours)   POCT Glucose    Collection Time: 05/25/25  6:42 PM   Result Value Ref Range    POC Glucose 193 (H) 70 - 110 mg/dL   POCT Glucose    Collection Time: 05/25/25 10:02 PM   Result Value Ref Range    POC Glucose 138 (H) 70 - 110 mg/dL   POCT Glucose    Collection Time: 05/26/25 12:43 AM   Result Value Ref Range    POC Glucose 162 (H) 70 - 110 mg/dL   Basic Metabolic Panel    Collection Time: 05/26/25  5:56 AM   Result Value Ref Range    Sodium 148 (H) 136 - 145 mmol/L    Potassium 3.6 3.5 - 5.5 mmol/L    Chloride 110 (H) 98 - 107 mmol/L    CO2 25 21 - 32 mmol/L    Anion Gap 13 3 - 18 mmol/L    Glucose 205 (H) 74 - 108 mg/dL    BUN 36 (H) 6 - 23 MG/DL    Creatinine 1.48 (H) 0.60 - 1.30 MG/DL    BUN/Creatinine Ratio 25 (H) 12 - 20      Est, Glom Filt Rate 52 (L) >60 ml/min/1.73m2    Calcium 9.7 8.5 - 10.1 MG/DL   Magnesium    Collection Time: 05/26/25  5:56 AM   Result Value Ref Range    Magnesium 3.2 (H) 1.6 - 2.6 mg/dL   Brain Natriuretic Peptide    Collection Time: 05/26/25  5:56 AM   Result Value Ref Range    NT Pro- 36 - 900 PG/ML   POCT Glucose    Collection Time: 05/26/25  6:05 AM   Result Value Ref Range    POC Glucose 199 (H) 70 - 110 mg/dL   POCT Glucose    Collection Time: 05/26/25  2:48

## 2025-05-26 NOTE — PROGRESS NOTES
Reached out to rehab manager to get in touch with Speech. She will call to see what time she would be here.

## 2025-05-26 NOTE — PLAN OF CARE
Problem: Cardiovascular - Adult  Goal: Maintains optimal cardiac output and hemodynamic stability  Outcome: Progressing  Note: Pt is s/p pacemaker placement. Pt is being monitored for any s/s of any cardiac issues. Vitals and telemetry is being closely monitored along with labs.      Problem: Musculoskeletal - Adult  Goal: Return mobility to safest level of function  5/26/2025 1050 by Melissa Weinberg RN  Outcome: Progressing  Note: Pt is weak and requires 2 person assist to turn.   5/26/2025 0246 by Mitul Mata RN  Outcome: Progressing  Flowsheets (Taken 5/25/2025 0855 by Nan Corado, RN)  Return Mobility to Safest Level of Function:   Assess patient stability and activity tolerance for standing, transferring and ambulating with or without assistive devices   Assist with transfers and ambulation using safe patient handling equipment as needed   Ensure adequate protection for wounds/incisions during mobilization   Obtain physical therapy/occupational therapy consults as needed   Instruct patient/family in ordered activity level   Apply continuous passive motion per provider or physical therapy orders to increase flexion toward goal

## 2025-05-26 NOTE — PROGRESS NOTES
Cardiology Progress Note        Patient: Isidro Reardon        Sex: male          DOA: 5/15/2025  YOB: 1960      Age:  64 y.o.        LOS:  LOS: 11 days   Assessment/Plan     Principal Problem:    Complete heart block (HCC)  Active Problems:    Coronary atherosclerosis of native coronary artery    Aspiration pneumonia (HCC)    COPD (chronic obstructive pulmonary disease) (HCC)    Essential hypertension    Type 2 diabetes with nephropathy (HCC)    Acute renal failure    Heart block    S/P TAVR (transcatheter aortic valve replacement)    Cardiac arrest (HCC)    Acute respiratory failure with hypoxia (HCC)    Hyperkalemia    Hypotension    Chronic kidney disease (CKD), stage II (mild)    Coma (HCC)  Resolved Problems:    * No resolved hospital problems. *      Plan:  5/26/2025  Cardiac telemetry normal sinus rhythm  Pacemaker site diffuse mild to moderate hematoma  WBC count is elevated  Patient have somewhat difficult swallowing  Discussed with patient and daughter  I have notified Dr. Armas abnormal elevated WBC count she will review and see the patient  Discussed with patient and daughter                            Today early in the morning have fall possible slides from the bed due to weakness when he was trying to go to the bathroom    No complaints  Mild sinus tachycardia, will monitor, if persistent and no other causes may consider low-dose beta-blocker    CBC have shown leukocytosis and increased in hemoglobin and platelets counts, discussed with Dr. Coates, patient will need repeat CBC  Continue gentle hydration  Discussed with patient and daughter in the room                        5/24/25  Medic telemetry sinus rhythm  Low paced complexes  Pacemaker site have mild subcutaneous hematoma  Otherwise hemodynamically stable  Patient is awake alert and oriented  Discussed with patient and family  Discussed with RN  Discussed with Dr. Coates                    5/23/2025  Patient is status

## 2025-05-26 NOTE — PLAN OF CARE
Function:   Assess patient stability and activity tolerance for standing, transferring and ambulating with or without assistive devices   Assist with transfers and ambulation using safe patient handling equipment as needed   Ensure adequate protection for wounds/incisions during mobilization   Obtain physical therapy/occupational therapy consults as needed   Instruct patient/family in ordered activity level   Apply continuous passive motion per provider or physical therapy orders to increase flexion toward goal     Problem: Gastrointestinal - Adult  Goal: Maintains or returns to baseline bowel function  Outcome: Progressing     Problem: Genitourinary - Adult  Goal: Absence of urinary retention  Outcome: Progressing     Problem: Infection - Adult  Goal: Absence of infection at discharge  Outcome: Progressing     Problem: SLP Adult - Impaired Swallowing  Goal: By Discharge: Advance to least restrictive diet without signs or symptoms of aspiration for planned discharge setting.  See evaluation for individualized goals.  Description: Patient will:  1. Tolerate PO trials with 0 s/s overt distress in 4/5 trials  2. Utilize compensatory swallow strategies/maneuvers (decrease bite/sip, size/rate, alt. liq/sol) with min cues in 4/5 trials  3. Perform oral-motor/laryngeal exercises to increase oropharyngeal swallow function with min cues  4. Complete an objective swallow study (i.e., MBSS) to assess swallow integrity, r/o aspiration, and determine of safest LRD, min A as indicated/ordered by MD     Rec:     Minced and moist solid diet and thin liquids; alternate small bites/small single sips  Assistance w/ po; ensure small sips size and oral clearance  Aspiration precautions  HOB >45 during po intake, remain >30 for 30-45 minutes after po   Small bites/sips; alternate liquid/solid with slow feeding rate   Oral care TID  Meds in [facility]  5/25/2025 1541 by Yvrose Valdez, SLP  Outcome: Progressing

## 2025-05-26 NOTE — PLAN OF CARE
Problem: SLP Adult - Impaired Swallowing  Goal: By Discharge: Advance to least restrictive diet without signs or symptoms of aspiration for planned discharge setting.  See evaluation for individualized goals.  Description: Patient will:  1. Tolerate PO trials with 0 s/s overt distress in 4/5 trials  2. Utilize compensatory swallow strategies/maneuvers (decrease bite/sip, size/rate, alt. liq/sol) with min cues in 4/5 trials  3. Perform oral-motor/laryngeal exercises to increase oropharyngeal swallow function with min cues  4. Complete an objective swallow study (i.e., MBSS) to assess swallow integrity, r/o aspiration, and determine of safest LRD, min A as indicated/ordered by MD     Rec:     Minced and moist solid diet and mildly thick liquids; alternate small bites/small single sips  Assistance w/ po; ensure small sips size and oral clearance  Aspiration precautions  HOB >45 during po intake, remain >30 for 30-45 minutes after po   Small bites/sips; alternate liquid/solid with slow feeding rate   Oral care TID  Meds in puree  Outcome: Not Progressing.      SPEECH LANGUAGE PATHOLOGY DYSPHAGIA TREATMENT    Patient: Isidro Reardon (64 y.o. male)  Date: 5/26/2025  Diagnosis: Complete heart block (HCC) [I44.2] Complete heart block (HCC)  Procedure(s) (LRB):  Insert PPM dual (N/A) 4 Days Post-Op  Precautions: Standard, aspiration  PLOF: As per H&P      ASSESSMENT:  Pt seen for dysphagia management follow up tx as nursing reports pt with coughing with thin liquids over lunch. Pt adjusted by SLP for 90* position in bed for safe po trial assessment. Thin via straw facilitated with delayed cough with wet vocal quality requiring verbal cues for clear vocal quality. Trials continued with cup with chest congestion and coughing immediate after the swallow. NTL trials via cup with no overt s/s asp/pen.  MD arrive in the middle of session and discussed noted s/s asp/pen. Recommend MBS on next available date to objectively assess  oropharyngeal function of the swallow. Recommend diet downgrade to nectar thick/mildly thick liquids. Educated pt in noted s/s asp/pen, risk of aspiration with advanced textures with questionable comprehension as pt A&Ox1-2. Discussed results with RNJaylyn. Continue ST POC as indicated.     Progression toward goals:  []         Improving appropriately and progressing toward goals  []         Improving slowly and progressing toward goals  [x]         Not making progress toward goals and plan of care will be adjusted     PLAN:  Recommendations and Planned Interventions:  See above.    Patient continues to benefit from skilled intervention to address the above impairments. Continue treatment per established plan of care.    Frequency/Duration: Patient will be followed by speech-language pathology 1-2 times per day/3-5 days per week to address goals.    Discharge Recommendations: D/C Recommendations: Ongoing speech therapy is recommended during this hospitalization     SUBJECTIVE:   Patient stated “I don't like this” re: NTL.    OBJECTIVE:   Daily Assessment:  Baseline Assessment  Temperature Spikes Noted: No  Respiratory Status: O2 via nasual cannula  O2 Device: Nasal cannula  Liters of Oxygen: 4 L  Communication Observation: Functional  Follows Directions: Simple  Current Diet : NPO  Current Liquid Diet : NPO  Dentition: Edentulous  Patient Positioning: Upright in bed  Baseline Vocal Quality: Weak  Volitional Cough: Congested    Orientation:  Person    Dysphagia Treatment:  Oral Assessment:  Oral Motor   Labial: No impairment  Dentition: Edentulous  Oral Hygiene: Clean, Moist  Lingual: No impairment  Velum: Unable to visualize  Mandible: No impairment        P.O. Trials:   PO Trials  Neuromuscular Estim Used: No  Assessment Method(s): Observation, Palpation  Vocal Quality: Low volume  Consistency Presented: Regular, Pureed, Thin, Mildly Thick, Ice Chips  How Presented: SLP-fed/Presented, Cup/sip, Spoon, Straw  Bolus

## 2025-05-26 NOTE — PROGRESS NOTES
Physical Therapy Goals:  Initiated 5/25/2025 to be met within 7-10 days.  Short Term Goals  Short Term Goal 1: Pt will be able to transfer supine<>sit CGA to improve functional mobility.  Short Term Goal 2: Pt will be able to transfer sit<>stand CGA to improve independence.  Short Term Goal 3: Pt will be able to ambulate >50' w/ RW, Eulogio to improve ability to negotiate environment.  Short Term Goal 4: Pt will be able to improve sitting balance to fair+ and able to sit >20' to improve posture for meals.       PHYSICAL THERAPY TREATMENT    Patient: Isidro Reardon (64 y.o. male)  Date: 5/26/2025  Diagnosis: Complete heart block (HCC) [I44.2] Complete heart block (HCC)  Procedure(s) (LRB):  Insert PPM dual (N/A) 4 Days Post-Op  Precautions: Surgical Protocols, Fall Risk,  ,  ,  ,  ,  ,  ,        ASSESSMENT:  Introduced self to pt and family and pt agreeable to PT treatment.  Pt supine in bed upon arrival.  Pt eager to participate and sit EOB/stand.  Pt required initially mod A for supine>sit but once 75% task completed min A to finish last 25%.  Initially forward flexed positioning requiring frequent vc for cervical neutral position in sitting.  Additional time required for obtaining sitting balance requiring mod A for scooting and positioning.  Pt able to sit EOB and perform therapeutic exercises including ankle circles (20 reps), LAQ (10 reps) and seated marches (10 reps).  Noted pt sounding wet while sitting respiratory but improved as pt sat.  Sit>stand mod A.  Pt able to obtain erect position and then able to obtain balance-much improved since yesterday.  Noted pt had BM and bed change required.  Gait training w/ RW, GB and min/mod A w/ dec step clearance, dec safety w/ RW requiring A, NBOS and cues for proper WS short distance x2.  Pt sat in recliner while bed cleaned and applied clean linens.  Pt required A for hygiene and able to have this performed while standing w/ RW.  After sitting in recliner for ~10', pt  sessions per week of Physical Therapy at d/c to increase the patient's independence.    At this time and based on an AM-PAC score, no further PT is recommended upon discharge due to (i.e. patient at baseline functional status…etc…).  Recommend patient returns to prior setting with prior services.    This [facility] score should be considered in conjunction with interdisciplinary team recommendations to determine the most appropriate discharge setting. Patient's social support, diagnosis, medical stability, and prior level of function should also be taken into consideration.     SUBJECTIVE:   Patient stated, \"Subjective: I just want to have some ice water.\"    OBJECTIVE DATA SUMMARY:   Critical Behavior:  Orientation  Orientation Level: Oriented to person  Cognition  Overall Cognitive Status: Exceptions  Arousal/Alertness: Appears intact  Following Commands: Impaired  Attention Span: Difficulty dividing attention;Attends with cues to redirect  Memory: Impaired  Safety Judgement: Impaired  Problem Solving: Impaired  Insights: Impaired  Initiation: Impaired  Sequencing: Impaired  Functional Mobility Training:  Bed Mobility:  Bed Mobility Training  Bed Mobility Training: Yes  Rolling: Contact guard assistance  Supine to Sit: Minimal assistance;Partial/Moderate assistance  Scooting: Partial/Moderate assistance  Transfers:  Transfer Training  Transfer Training: Yes  Interventions: Safety awareness training;Verbal cues;Tactile cues;Weight shifting training/pressure relief  Sit to Stand: Partial/Moderate assistance  Stand to Sit: Minimal assistance  Balance:  Balance  Sitting: Impaired  Sitting - Static: Fair (occasional)  Sitting - Dynamic: Fair (occasional)  Standing: Impaired  Standing - Static: Occasional;Fair;Constant support  Standing - Dynamic: Fair;Constant support;Occasional   Wheelchair Mobility:   Ambulation/Gait Training:  Gait  Gait Training: Yes  Overall Level of Assistance: Minimal assistance;Partial/Moderate  1.7

## 2025-05-26 NOTE — PROGRESS NOTES
Occupational Therapy Goals:  Initiated 5/26/2025 to be met within 7-10 days.  Short Term Goals  Time Frame for Short Term Goals: 7 days  Short Term Goal 1: Patient will complete grooming EOB with setup A  Short Term Goal 2: Patient will complete bed to chair/bsc transfer with min A  Short Term Goal 3: Patient will complete LBD with min A and adaptive equipment as needed  Short Term Goal 4: Patient will complete bathing with min A  Short Term Goal 5: Patient will complete toileting with min A    OCCUPATIONAL THERAPY EVALUATION/TREATMENT    Patient: Isidro Reardon (64 y.o. male)  Date: 5/26/2025  Primary Diagnosis: Complete heart block (HCC) [I44.2]  Procedure(s) (LRB):  Insert PPM dual (N/A) 4 Days Post-Op   Precautions: Surgical Protocols, Fall Risk,  ,  ,  ,  ,  ,  ,               PLOF: Patient reports prior to onset of issues was independent getting to bathroom for ADLs    ASSESSMENT :  Patient presented supine in bed with O2 via NC and cardiac monitor. Patient with visitor present for part of session. Patient completed assessment of ADLs and BUE strength, ROM (see details below). Due to deficits (listed below) patient has decreased independence with ADLs and functional mobility and would benefit from continued occupational therapy services.  ,    TREATMENT:  Patient spO2 was 94% on NC at bed level. Patient able to lift legs to assist in gripper sock don, in long sitting. Patient completed grooming tasks at bedlevel with HOB elevated including preparing dentures to soak setup (increased time to complete) and hand hygiene with  wipe (max A). Patient declining to sit EOB at this time despite encouragement from visitor and therapist.     DEFICITS/IMPAIRMENTS:  Performance deficits / Impairments: Decreased functional mobility ;Decreased cognition;Decreased high-level IADLs;Decreased ADL status;Decreased endurance;Decreased fine motor control;Decreased ROM;Decreased coordination;Decreased strength;Decreased

## 2025-05-26 NOTE — PROGRESS NOTES
Pt is coughing with thin liquids. Assisted pt with small sips and he coughed with eat sip. Daughter is at the bedside. Reached out to MD. Daughter is still asking for ice water. Pt was given thickened liquids. Family is asking for thin liquids only. Speech recommendations was for thickened but switched back to thin and stated that the pt can do small sips. Pt was given small sips and was unable to tolerate. He coughed with 4 diff attempts of sips.

## 2025-05-26 NOTE — PROGRESS NOTES
Speech-Language Pathology      Chart reviewed. [x]      D/w RNMelissa, SLP Chante Turk arriving to provide patient care within an hour.      Phone call received from patient's RN, Melissa, regarding patient coughing with thin liquids.  ST had initially recommended mildly thick liquids, but switched to thin liquids with single sips after patient with complaint of displeasure.  RN reporting patient coughing at bedside with thin liquids, even small single sips and desired SLP re-assessment for patient safety.      Thank you so much!  HUBER Gibbs.Gilberto, CCC-SLP

## 2025-05-26 NOTE — PROGRESS NOTES
[facility]  (A Division of [facility])  Fidelina Armas MD   Office Tel:  [phone] Option #8                                                             Date of Admission: 5/15/2025       ID FU for antimicrobial management  for patient is having leucocytosis from 5/25/25 requested by Dr Giraldo/cardiology      PCP: Phillip Rizzo MD    C/C: Presented to ED with bradycardia    Current Antimicrobials:    Prior Antimicrobials:    None today Ceftriaxone 5/16-17    Pip-tazo 5/18 to 5/25  Doxycycline 5/18 to 5/23  Vancomycin 5/18 to 5/21     Allergy antibiotic: Non recorded     Assessment:     Acutely ill patient with:    Leucocytosis to 20K-5/25- No WBC today--etiology unclear. No gi/gu complaint. Hematoma PPM site without calor, but HB drop not marked. NO acute CPD on CXR. He was on Pip tazo until 5/25    Post PPM placement 5/22-- PPM site with hematoma  Work up for Lyme and FRIDA negative on work up. Bacteremia- likely false flag by instrument.    Bacteremia? 1 bottle flagged + from 5/16- none isolated on multiple sub-cultures.     RUL opacity and tree -in bud appearance on CT-Possible PNA with leucocytosis on admission  Underlying COPD- mod to severe  Resp cutlures- no pathogenic bacteria, or MRSA X2  Procal 1.54-> 1.17    Acute respiratory failure, with complete heart block/bradycardia. Extubated  5/23  CAD history/TAVR in place on Echo- no mention of mass/vegetation  5/21- FRIDA- no vegetation         -Lyme serology negative         -PPM Placed left chest 5/22/25    RENAE on CKD--improving    5/16-blood culture x2: 1/4- flagged + by MICROLAB-- further info pending        -UA clean         -Viral test /PCR for COVID 19/Influenza A and B -negative.   5/17- resp cx: normal kateryna  5/18- MRSA screen- negative         -resp culture- yeast growth    History of Flu A 02/2025    History of MSSA Infection of Olecranon Fossa    Diffuse hepatic steatosis- on CT    Diagnosis Date     valve in place, similar to prior. Heart, joão, mediastinum are within normal limits. MSK: No acute osseous abnormalities. Upper abdomen: Redemonstration of positive enteric contrast within the stomach.     No significant change since prior study. Endotracheal tube and orogastric tube positions unchanged. Electronically signed by ALEXANDRA STRANGE    XR CHEST PORTABLE  Result Date: 5/26/2025  EXAM: XR CHEST PORTABLE INDICATION: s/p cardiac arrest and PPM COMPARISON: 5/25/2025 FINDINGS: A portable AP radiograph of the chest was obtained at 748 hours. Cardiac monitoring leads.. The lungs are clear. The cardiac and mediastinal contours and pulmonary vascularity are normal.  The bones and soft tissues are grossly within normal limits.     No acute cardiopulmonary process. Electronically signed by Rolf Serrano    XR CHEST PORTABLE  Result Date: 5/25/2025  EXAM: XR CHEST PORTABLE ACC#: [id number]  INDICATION: leucocytosis, []  COMPARISON: Chest radiograph May 24, 2025 FINDINGS: AP portable chest radiograph. Exam is limited by the portable technique and positioning. Patient is status post TAVR. There is an implanted pacemaker. The heart size is normal. No lung consolidation is seen. The vascular clarity is normal. No effusion or pneumothorax is identified.     No acute cardiopulmonary findings. Electronically signed by Johnnie Bocanegra        Note:  Medical notes are meant to be a communication between medical professionals.  Additionally, portion of this note were created using voice recognition function, syntax and phonetic over may have escaped proofreading.   If you are patient or family reviewing this note and have questions, please ask during rounds or bring it with you to your next doctors appointment.

## 2025-05-26 NOTE — PROGRESS NOTES
Patient attempting to get out of bed. Staff was alerted by bed alarm. This RN found patient with one leg thrown over the rail of the bed. Patient states \"I'm getting the hell out of here\". Attempted to orient patient to situation. Patient agreed to put legs back in bed. Bed alarm set and standard safety measures are in place.     This RN followed up with virtual safety monitor coordinator to ensure that the patient was being monitored virtually as the alarm on the virtual safety monitor did not sound.

## 2025-05-27 ENCOUNTER — APPOINTMENT (OUTPATIENT)
Facility: HOSPITAL | Age: 65
DRG: 242 | End: 2025-05-27
Payer: MEDICARE

## 2025-05-27 LAB
ALBUMIN SERPL-MCNC: 2.8 G/DL (ref 3.4–5)
ALBUMIN/GLOB SERPL: 0.6 (ref 0.8–1.7)
ALP SERPL-CCNC: 62 U/L (ref 45–117)
ALT SERPL-CCNC: 11 U/L (ref 10–50)
ANION GAP SERPL CALC-SCNC: 11 MMOL/L (ref 3–18)
AST SERPL-CCNC: 18 U/L (ref 10–38)
BASOPHILS # BLD: 0.09 K/UL (ref 0–0.1)
BASOPHILS NFR BLD: 0.4 % (ref 0–2)
BILIRUB SERPL-MCNC: 0.6 MG/DL (ref 0.2–1)
BUN SERPL-MCNC: 30 MG/DL (ref 6–23)
BUN/CREAT SERPL: 23 (ref 12–20)
CALCIUM SERPL-MCNC: 9.9 MG/DL (ref 8.5–10.1)
CHLORIDE SERPL-SCNC: 108 MMOL/L (ref 98–107)
CO2 SERPL-SCNC: 27 MMOL/L (ref 21–32)
CREAT SERPL-MCNC: 1.32 MG/DL (ref 0.6–1.3)
DIFFERENTIAL METHOD BLD: ABNORMAL
EOSINOPHIL # BLD: 0.58 K/UL (ref 0–0.4)
EOSINOPHIL NFR BLD: 2.8 % (ref 0–5)
ERYTHROCYTE [DISTWIDTH] IN BLOOD BY AUTOMATED COUNT: 14 % (ref 11.6–14.5)
GLOBULIN SER CALC-MCNC: 4.3 G/DL (ref 2–4)
GLUCOSE BLD STRIP.AUTO-MCNC: 151 MG/DL (ref 70–110)
GLUCOSE BLD STRIP.AUTO-MCNC: 198 MG/DL (ref 70–110)
GLUCOSE SERPL-MCNC: 180 MG/DL (ref 74–108)
HCT VFR BLD AUTO: 40.7 % (ref 36–48)
HGB BLD-MCNC: 12.3 G/DL (ref 13–16)
IMM GRANULOCYTES # BLD AUTO: 0.17 K/UL (ref 0–0.04)
IMM GRANULOCYTES NFR BLD AUTO: 0.8 % (ref 0–0.5)
LYMPHOCYTES # BLD: 2.62 K/UL (ref 0.9–3.3)
LYMPHOCYTES NFR BLD: 12.7 % (ref 21–52)
MAGNESIUM SERPL-MCNC: 2.1 MG/DL (ref 1.6–2.6)
MCH RBC QN AUTO: 27.9 PG (ref 24–34)
MCHC RBC AUTO-ENTMCNC: 30.2 G/DL (ref 31–37)
MCV RBC AUTO: 92.3 FL (ref 78–100)
MONOCYTES # BLD: 0.84 K/UL (ref 0.05–1.2)
MONOCYTES NFR BLD: 4.1 % (ref 3–10)
NEUTS SEG # BLD: 16.36 K/UL (ref 1.8–8)
NEUTS SEG NFR BLD: 79.2 % (ref 40–73)
NRBC # BLD: 0 K/UL (ref 0–0.01)
NRBC BLD-RTO: 0 PER 100 WBC
NT PRO BNP: 308 PG/ML (ref 36–900)
PLATELET # BLD AUTO: 372 K/UL (ref 135–420)
PMV BLD AUTO: 10.4 FL (ref 9.2–11.8)
POTASSIUM SERPL-SCNC: 4.2 MMOL/L (ref 3.5–5.5)
PROT SERPL-MCNC: 7.1 G/DL (ref 6.4–8.2)
RBC # BLD AUTO: 4.41 M/UL (ref 4.35–5.65)
SODIUM SERPL-SCNC: 146 MMOL/L (ref 136–145)
WBC # BLD AUTO: 20.7 K/UL (ref 4.6–13.2)

## 2025-05-27 PROCEDURE — 1100000003 HC PRIVATE W/ TELEMETRY

## 2025-05-27 PROCEDURE — 6360000002 HC RX W HCPCS: Performed by: INTERNAL MEDICINE

## 2025-05-27 PROCEDURE — 94640 AIRWAY INHALATION TREATMENT: CPT

## 2025-05-27 PROCEDURE — 92526 ORAL FUNCTION THERAPY: CPT

## 2025-05-27 PROCEDURE — 2500000003 HC RX 250 WO HCPCS: Performed by: INTERNAL MEDICINE

## 2025-05-27 PROCEDURE — 6370000000 HC RX 637 (ALT 250 FOR IP): Performed by: HOSPITALIST

## 2025-05-27 PROCEDURE — 2580000003 HC RX 258: Performed by: INTERNAL MEDICINE

## 2025-05-27 PROCEDURE — 80053 COMPREHEN METABOLIC PANEL: CPT

## 2025-05-27 PROCEDURE — 6370000000 HC RX 637 (ALT 250 FOR IP): Performed by: INTERNAL MEDICINE

## 2025-05-27 PROCEDURE — 36415 COLL VENOUS BLD VENIPUNCTURE: CPT

## 2025-05-27 PROCEDURE — 94669 MECHANICAL CHEST WALL OSCILL: CPT

## 2025-05-27 PROCEDURE — 97116 GAIT TRAINING THERAPY: CPT

## 2025-05-27 PROCEDURE — 83735 ASSAY OF MAGNESIUM: CPT

## 2025-05-27 PROCEDURE — 2580000003 HC RX 258: Performed by: HOSPITALIST

## 2025-05-27 PROCEDURE — 2500000003 HC RX 250 WO HCPCS

## 2025-05-27 PROCEDURE — 85025 COMPLETE CBC W/AUTO DIFF WBC: CPT

## 2025-05-27 PROCEDURE — 74230 X-RAY XM SWLNG FUNCJ C+: CPT

## 2025-05-27 PROCEDURE — 83880 ASSAY OF NATRIURETIC PEPTIDE: CPT

## 2025-05-27 PROCEDURE — 6360000002 HC RX W HCPCS: Performed by: HOSPITALIST

## 2025-05-27 PROCEDURE — 2700000000 HC OXYGEN THERAPY PER DAY

## 2025-05-27 PROCEDURE — 82962 GLUCOSE BLOOD TEST: CPT

## 2025-05-27 PROCEDURE — 92611 MOTION FLUOROSCOPY/SWALLOW: CPT

## 2025-05-27 PROCEDURE — 97530 THERAPEUTIC ACTIVITIES: CPT

## 2025-05-27 RX ORDER — METOPROLOL TARTRATE 25 MG/1
12.5 TABLET, FILM COATED ORAL 2 TIMES DAILY
Status: DISCONTINUED | OUTPATIENT
Start: 2025-05-27 | End: 2025-05-29 | Stop reason: HOSPADM

## 2025-05-27 RX ORDER — METOPROLOL TARTRATE 25 MG/1
25 TABLET, FILM COATED ORAL 2 TIMES DAILY
Status: DISCONTINUED | OUTPATIENT
Start: 2025-05-27 | End: 2025-05-27

## 2025-05-27 RX ADMIN — INSULIN GLARGINE 8 UNITS: 100 INJECTION, SOLUTION SUBCUTANEOUS at 21:48

## 2025-05-27 RX ADMIN — BUDESONIDE 500 MCG: 0.5 INHALANT RESPIRATORY (INHALATION) at 08:21

## 2025-05-27 RX ADMIN — POLYVINYL ALCOHOL, POVIDONE 1 DROP: 14; 6 SOLUTION/ DROPS OPHTHALMIC at 13:40

## 2025-05-27 RX ADMIN — POLYVINYL ALCOHOL, POVIDONE 1 DROP: 14; 6 SOLUTION/ DROPS OPHTHALMIC at 00:15

## 2025-05-27 RX ADMIN — INSULIN LISPRO 4 UNITS: 100 INJECTION, SOLUTION INTRAVENOUS; SUBCUTANEOUS at 18:07

## 2025-05-27 RX ADMIN — GABAPENTIN 300 MG: 300 CAPSULE ORAL at 09:16

## 2025-05-27 RX ADMIN — METOPROLOL TARTRATE 12.5 MG: 25 TABLET, FILM COATED ORAL at 18:07

## 2025-05-27 RX ADMIN — BARIUM SULFATE 20 ML: 400 PASTE ORAL at 13:35

## 2025-05-27 RX ADMIN — POLYVINYL ALCOHOL, POVIDONE 1 DROP: 14; 6 SOLUTION/ DROPS OPHTHALMIC at 18:10

## 2025-05-27 RX ADMIN — BARIUM SULFATE 176 G: 960 POWDER, FOR SUSPENSION ORAL at 13:35

## 2025-05-27 RX ADMIN — BARIUM SULFATE 60 ML: 400 SUSPENSION ORAL at 13:35

## 2025-05-27 RX ADMIN — FAMOTIDINE 20 MG: 20 TABLET, FILM COATED ORAL at 09:16

## 2025-05-27 RX ADMIN — GABAPENTIN 300 MG: 300 CAPSULE ORAL at 21:48

## 2025-05-27 RX ADMIN — SODIUM CHLORIDE, PRESERVATIVE FREE 10 ML: 5 INJECTION INTRAVENOUS at 21:49

## 2025-05-27 RX ADMIN — POLYVINYL ALCOHOL, POVIDONE 1 DROP: 14; 6 SOLUTION/ DROPS OPHTHALMIC at 04:15

## 2025-05-27 RX ADMIN — BUDESONIDE 500 MCG: 0.5 INHALANT RESPIRATORY (INHALATION) at 21:03

## 2025-05-27 RX ADMIN — PIPERACILLIN AND TAZOBACTAM 3375 MG: 3; .375 INJECTION, POWDER, LYOPHILIZED, FOR SOLUTION INTRAVENOUS at 09:25

## 2025-05-27 RX ADMIN — ROSUVASTATIN CALCIUM 20 MG: 10 TABLET, FILM COATED ORAL at 09:16

## 2025-05-27 RX ADMIN — SODIUM CHLORIDE, PRESERVATIVE FREE 10 ML: 5 INJECTION INTRAVENOUS at 09:16

## 2025-05-27 RX ADMIN — AMPICILLIN SODIUM AND SULBACTAM SODIUM 3000 MG: 2; 1 INJECTION, POWDER, FOR SOLUTION INTRAMUSCULAR; INTRAVENOUS at 21:48

## 2025-05-27 RX ADMIN — DOCUSATE SODIUM 100 MG: 100 CAPSULE, LIQUID FILLED ORAL at 09:16

## 2025-05-27 RX ADMIN — ENOXAPARIN SODIUM 40 MG: 100 INJECTION SUBCUTANEOUS at 09:16

## 2025-05-27 RX ADMIN — AMPICILLIN SODIUM AND SULBACTAM SODIUM 3000 MG: 2; 1 INJECTION, POWDER, FOR SOLUTION INTRAMUSCULAR; INTRAVENOUS at 15:09

## 2025-05-27 RX ADMIN — AMLODIPINE BESYLATE 10 MG: 5 TABLET ORAL at 09:16

## 2025-05-27 RX ADMIN — POLYVINYL ALCOHOL, POVIDONE 1 DROP: 14; 6 SOLUTION/ DROPS OPHTHALMIC at 09:16

## 2025-05-27 RX ADMIN — IPRATROPIUM BROMIDE AND ALBUTEROL SULFATE 1 DOSE: .5; 3 SOLUTION RESPIRATORY (INHALATION) at 21:03

## 2025-05-27 RX ADMIN — GABAPENTIN 300 MG: 300 CAPSULE ORAL at 13:40

## 2025-05-27 ASSESSMENT — PAIN SCALES - GENERAL
PAINLEVEL_OUTOF10: 0

## 2025-05-27 NOTE — PROGRESS NOTES
Cardiology Progress Note        Patient: Isidro Reardon        Sex: male          DOA: 5/15/2025  YOB: 1960      Age:  64 y.o.        LOS:  LOS: 12 days   Assessment/Plan     Principal Problem:    Complete heart block (HCC)  Active Problems:    Coronary atherosclerosis of native coronary artery    Aspiration pneumonia (HCC)    COPD (chronic obstructive pulmonary disease) (HCC)    Essential hypertension    Type 2 diabetes with nephropathy (HCC)    Acute renal failure    Heart block    S/P TAVR (transcatheter aortic valve replacement)    Cardiac arrest (HCC)    Acute respiratory failure with hypoxia (HCC)    Hyperkalemia    Hypotension    Chronic kidney disease (CKD), stage II (mild)    Coma (HCC)  Resolved Problems:    * No resolved hospital problems. *      Plan:  5/27/2025  Pacemaker site hematoma is slightly better  Cardiac telemetry sinus tachycardia  Patient is feeling better  Discussed with Dr. Meade  I will add low-dose beta-blocker  Will continue to follow.                      5/26/2025  Cardiac telemetry normal sinus rhythm  Pacemaker site diffuse mild to moderate hematoma  WBC count is elevated  Patient have somewhat difficult swallowing  Discussed with patient and daughter  I have notified Dr. Armas abnormal elevated WBC count she will review and see the patient  Discussed with patient and daughter                            Today early in the morning have fall possible slides from the bed due to weakness when he was trying to go to the bathroom    No complaints  Mild sinus tachycardia, will monitor, if persistent and no other causes may consider low-dose beta-blocker    CBC have shown leukocytosis and increased in hemoglobin and platelets counts, discussed with Dr. Coates, patient will need repeat CBC  Continue gentle hydration  Discussed with patient and daughter in the room                        5/24/25  Medic telemetry sinus rhythm  Low paced complexes  Pacemaker site have mild  [facility]          Cardiology Procedures:   No results found for this or any previous visit. 05/15/25    ECHO (TTE) COMPLETE (PRN CONTRAST/BUBBLE/STRAIN/3D) 05/16/2025 10:48 AM (Final)    Interpretation Summary    Left Ventricle: Normal left ventricular systolic function with a visually estimated EF of 55 - 60%. EF by 2D Simpsons Biplane is 55%. Left ventricle size is normal. Normal wall thickness. No regional wall motion abnormalities identified. Decreased sensitivity due to poor endocardial definition in short axis view. Diastolic dysfunction present with normal LV EF.    Right Ventricle: Right ventricle size is normal. Normal systolic function. TAPSE is 2.2 cm.    Aortic Valve: Appropriately positioned transcatheter bioprosthetic valve that is well-seated. AV mean gradient is 7 mmHg. No paravalvular regurgitation. No stenosis. AV Mean Gradient is 7 mmHg.    Tricuspid Valve: Mild regurgitation. Estimated RVSP is 44 mmHg (patient is ventilated).    Right Atrium: Single lead present in the right atrium. Right atrium size is normal.    IVC/SVC: Patient is ventilated, cannot use IVC diameter to estimate right atrial pressure.    Image quality is fair. Procedure performed with the patient in a supine position.    Signed by: Kirit Giraldo MD on 5/16/2025 10:48 AM    No results found for this or any previous visit.                Signed By: KIRIT GIRALDO MD     May 27, 2025

## 2025-05-27 NOTE — CARE COORDINATION
SNF Authorization Request  5/27/2025, 10:47 AM    Patient Name: Isidro Reardon                   YOB: 1960    Patient has been provided with freedom of choice and has chosen to go to [facility].     SNF has confirmed that they can accept the patient and they have a bed Yes  SNF has confirmed that they are in network with the patient's insurance Yes    Please request authorization.    Estimated date of discharge is 5/27/25.    Skilled Need    PT Yes   OT Yes   Speech therapy No   Wound Care No  IV MedicationsNo  Trach No   Peg No     If PT/ OT/ Speech is required, evaluations/ notes have been updated within the last 48 hours No       Additional information N/A    Payor: Payor: [employer] / Plan: [employer] CSNP / Product Type: *No Product type* /   Attending physician: Yael Pak*    Taylor Bernabe  Case Management Department

## 2025-05-27 NOTE — PROGRESS NOTES
Pre-cert Request   5/27/2025, 11:56 AM    Patient Name: Isidro Reardon                   YOB: 1960      *ALERT - Authorization is pending review by the insurance company.  This is not an authorization.*    Submitted via Fax.  Requested Facility:  [facility]   Anticipated Admit Date to SNF:  05/27/2025  Pending Auth #:  [id number]  Pending Plan Auth ID: na    Phoebe Zaragoza  Case Management Department

## 2025-05-27 NOTE — PROGRESS NOTES
Spoke with family about SNF placement. Family would like patient to go to [facility]. [facility] accepted patient and auth will be started.

## 2025-05-27 NOTE — PROGRESS NOTES
Speech-Language Pathology    MBSS completed w/ recs of mildly thick liquids and minced and moist solids; alternate small bites/single sips, assistance w/ po to ensure safety, meds in puree, oral care TID, and aspiration precautions. 1 x silent aspiration of thin liquids, occasional trace penetration of thin, vallecular residue w/ mildly thick, puree, and regula solids. Pt w/ swallow delay w/ thins, lack of cough response to aspirated material, and impulsivity/reduced safety awareness. Pt remains at high risk of aspiration w/ thin liquids at this time. D/w RN. Full report to follow.    Thank you for this referral,     Yvrose Valdez M.S., CCC-SLP

## 2025-05-27 NOTE — PLAN OF CARE
Problem: Chronic Conditions and Co-morbidities  Goal: Patient's chronic conditions and co-morbidity symptoms are monitored and maintained or improved  Outcome: Progressing     Problem: Discharge Planning  Goal: Discharge to home or other facility with appropriate resources  Outcome: Progressing     Problem: Safety - Adult  Goal: Free from fall injury  Outcome: Progressing     Problem: Pain  Goal: Verbalizes/displays adequate comfort level or baseline comfort level  Outcome: Progressing     Problem: Skin/Tissue Integrity  Goal: Skin integrity remains intact  Description: 1.  Monitor for areas of redness and/or skin breakdown2.  Assess vascular access sites hourly3.  Every 4-6 hours minimum:  Change oxygen saturation probe site4.  Every 4-6 hours:  If on nasal continuous positive airway pressure, respiratory therapy assess nares and determine need for appliance change or resting period  Outcome: Progressing     Problem: Neurosensory - Adult  Goal: Achieves stable or improved neurological status  Outcome: Progressing  Goal: Achieves maximal functionality and self care  Outcome: Progressing     Problem: Respiratory - Adult  Goal: Achieves optimal ventilation and oxygenation  Outcome: Progressing     Problem: Cardiovascular - Adult  Goal: Maintains optimal cardiac output and hemodynamic stability  Outcome: Progressing  Goal: Absence of cardiac dysrhythmias or at baseline  Outcome: Progressing     Problem: Skin/Tissue Integrity - Adult  Goal: Skin integrity remains intact  Description: 1.  Monitor for areas of redness and/or skin breakdown2.  Assess vascular access sites hourly3.  Every 4-6 hours minimum:  Change oxygen saturation probe site4.  Every 4-6 hours:  If on nasal continuous positive airway pressure, respiratory therapy assess nares and determine need for appliance change or resting period  Outcome: Progressing     Problem: Musculoskeletal - Adult  Goal: Return mobility to safest level of function  Outcome:

## 2025-05-27 NOTE — PROGRESS NOTES
[facility]  (A Division of [facility])  Fidelina Armas MD   Office Tel:  [phone] Option #8                                                             Date of Admission: 5/15/2025       ID FU for antimicrobial management  for patient is having leucocytosis from 5/25/25 requested by Dr Giraldo/cardiology      PCP: Phillip Rizzo MD    C/C: Presented to ED with bradycardia    Current Antimicrobials:    Prior Antimicrobials:    Unasyn Ceftriaxone 5/16-17    Pip-tazo 5/18 to 5/25  Doxycycline 5/18 to 5/23  Vancomycin 5/18 to 5/21     Allergy antibiotic: Non recorded     Assessment:     Acutely ill patient with:    Leucocytosis to 20K from 5/25=- Persist  Possible etiology asp pneumonia-- formal speech eval pending. No gi/gu complaint. Hematoma PPM site without calor, no HB drop not marked. NO acute CPD on CXR. He was on Pip tazo until 5/25    Post PPM placement 5/22-- PPM site with hematoma  Work up for Lyme and FRIDA negative on work up. Bacteremia- likely false flag by instrument.    NO true bacteremia from 5/16.     RUL opacity and tree -in bud appearance on CT-Possible PNA with leucocytosis on admission  Underlying COPD- mod to severe  Resp cutlures- no pathogenic bacteria, or MRSA X2  Procal 1.54-> 1.17    Acute respiratory failure, with complete heart block/bradycardia. Extubated  5/23  CAD history/TAVR in place on Echo- no mention of mass/vegetation  5/21- FRIDA- no vegetation         -Lyme serology negative         -PPM Placed left chest 5/22/25    RENAE on CKD--improving    5/16-blood culture x2: 1/4- flagged + by MICROLAB-- further info pending        -UA clean         -Viral test /PCR for COVID 19/Influenza A and B -negative.   5/17- resp cx: normal kateryna  5/18- MRSA screen- negative         -resp culture- yeast growth    History of Flu A 02/2025    History of MSSA Infection of Olecranon Fossa    Diffuse hepatic steatosis- on CT    Diagnosis Date    Acute

## 2025-05-27 NOTE — CARE COORDINATION
Patient has received, reviewed and signed the 2nd Important Message from Medicare letter. Acknowledged understanding of observation status. Copy left at patient's bedside and original placed in chart.

## 2025-05-27 NOTE — PLAN OF CARE
Problem: SLP Adult - Impaired Swallowing  Goal: By Discharge: Advance to least restrictive diet without signs or symptoms of aspiration for planned discharge setting.  See evaluation for individualized goals.  Description: Patient will:  1. Tolerate PO trials with 0 s/s overt distress in 4/5 trials  2. Utilize compensatory swallow strategies/maneuvers (decrease bite/sip, size/rate, alt. liq/sol) with min cues in 4/5 trials  3. Perform oral-motor/laryngeal exercises to increase oropharyngeal swallow function with min cues  4. Complete an objective swallow study (i.e., MBSS) to assess swallow integrity, r/o aspiration, and determine of safest LRD, min A as indicated/ordered by MD - met 5/27/25    Rec:     Minced and moist solid diet and mildly thick liquids; alternate small bites/small single sips  Assistance w/ po; ensure small sips size and oral clearance  Aspiration precautions  HOB >45 during po intake, remain >30 for 30-45 minutes after po   Small bites/sips; alternate liquid/solid with slow feeding rate   Oral care TID  Meds in puree  Outcome: Progressing  SPEECH PATHOLOGY MODIFIED BARIUM SWALLOW STUDY & TREATMENT    Patient: Isidro Reardon (64 y.o. male)  Date: 5/27/2025  Primary Diagnosis: Complete heart block (HCC) [I44.2]  Procedure(s) (LRB):  Insert PPM dual (N/A) 5 Days Post-Op   Precautions: Aspiration    ASSESSMENT :  Based on the objective data described below, the pt presents w/ moderate oropharyngeal dysphagia c/b occasional silent aspiration of thin, trace penetration of thin, mild-mod vallecular residue, increasing w/ advancing textures, significantly prolonged and ineffective mastication, bolus formation w/ regular solids, and reduced A-P transit w/ puree and regular solids. Pt seen today for a modified barium swallow study to objectively assess oropharyngeal swallow function. Dx trials included thin and mildly-thick via teaspoon, cup sip, + straw, puree, and regular solids.     The oral phase was

## 2025-05-27 NOTE — PROGRESS NOTES
RENAL CONSULT  PROGRESS NOTE   2025    Patient:  Isidro Reardon  :  1960  Gender:  male  MRN #:  [id number]    Reason for Consult: Acute renal failure and Hyperkalemia requested by intensivist Dr Barrientos     Relevant events: -  Awake and alert   BP stable   , urinating fine/output is not much    Denied any symptoms  Labs, overnight events and consultant noted reviewed          Objective:    /74   Pulse 79   Temp 97.9 °F (36.6 °C) (Oral)   Resp 16   Ht 1.778 m (5' 10\")   Wt 84.8 kg (187 lb)   SpO2 96%   BMI 26.83 kg/m²     Physical Exam:    Pt awake   HEENT: no neck swelling   Abdomen: soft,  non distended   Ext: no edema     Laboratory Data:    Lab Results   Component Value Date    BUN 30 (H) 2025    BUN 36 (H) 2025    BUN 45 (H) 2025     (H) 2025     (H) 2025     (H) 2025    CO2 27 2025    CO2 25 2025    CO2 20 (L) 2025    GLUCOSE 180 (H) 2025    GLUCOSE 205 (H) 2025    GLUCOSE 225 (H) 2025     Lab Results   Component Value Date    WBC 20.7 (H) 2025    HGB 12.3 (L) 2025    HCT 40.7 2025     Lab Results   Component Value Date    CALCIUM 9.9 2025     Lab Results   Component Value Date    HDL 31 (L) 2025     No results found for: \"TURBIDITY\"    Imaging Reveiwed:      EKG  Renal ultrasound  Xray  chest- IMPRESSION:     Satisfactory endotracheal tube and enteric tube placement. Low lung volumes with  mild perihilar opacities may represent atelectasis or vascular congestion.         Assessment:  Isidro Reardon is a 64 y.o. year old male with  CAD, AS s/p  TAVR, diabetes mellitus, hypertension, hypertensive heart disease, neuropathy presented to he ER for  lightheadedness dizziness.Later on had complete heart block, asystole cardiac arrest   Now has acute renal failure due to cardiac arrest/bradycardia and hyperkalemia is due to RENAE and CPR .    Acute renal

## 2025-05-27 NOTE — PROGRESS NOTES
Physical Therapy Goals:  Initiated 5/25/2025 to be met within 7-10 days.  Short Term Goals  Short Term Goal 1: Pt will be able to transfer supine<>sit CGA to improve functional mobility.  Short Term Goal 2: Pt will be able to transfer sit<>stand CGA to improve independence.  Short Term Goal 3: Pt will be able to ambulate >50' w/ RW, Eulogio to improve ability to negotiate environment.  Short Term Goal 4: Pt will be able to improve sitting balance to fair+ and able to sit >20' to improve posture for meals.       PHYSICAL THERAPY TREATMENT    Patient: Isidro Reardon (64 y.o. male)  Date: 5/27/2025  Diagnosis: Complete heart block (HCC) [I44.2] Complete heart block (HCC)  Procedure(s) (LRB):  Insert PPM dual (N/A) 5 Days Post-Op  Precautions: Surgical Protocols, Fall Risk,  ,  ,  ,  ,  ,  ,        ASSESSMENT:  Introduced self to pt and family and pt agreeable to PT treatment.  Pt supine in bed upon arrival.  Improved effort for supine>sit, using bed rail and min A, vc for sequencing and technique.  Sit<>stand min A w/ vc for hand placement and safety.  Gait training using RW, GB and min A, vc for staying close to RW and improved posture despite tendency for forward flexion.  Pt able to sit for ~5' prior to second trial of gait distance.  Pt remained on 3L O2 via NC throughout session.  Pt cleaned after BM and applied clean brief, new linen to bed.  Pt sitting balance improved to good/fair w/ less tendency for forward flexion and ability to hold head upright.  Pt making good progress in all mobility aspects.  Cont to respond well to skilled PT.  Transport arrived and pt assisted to w/c.  Daughter present throughout session and encouraged by pt progress.    Progression toward goals:   [x]      Improving appropriately and progressing toward goals  []      Improving slowly and progressing toward goals  []      Not making progress toward goals and plan of care will be adjusted     PLAN:  Patient continues to benefit from

## 2025-05-27 NOTE — PROGRESS NOTES
Hospitalist Progress Note    Patient: Isidro Reardon MRN: [id number]  [facility]: [id number]    YOB: 1960  Age: 64 y.o.  Sex: male    DOA: 5/15/2025 LOS:  LOS: 12 days          Chief Complain :  Dizziness, lightheadedness.  64 y.o. male with CAD, aortic stenosis status post TAVR, diabetes mellitus, hypertension, hypertensive heart disease, neuropathy presents to ER with concerns of lightheadedness dizziness. In the ER he was noted to have heart rate in 30s to 40s. EKG showed complete heart block. He was given epinephrine and started on dopamine drip which improved his heart rate. He actually went into sinus tachycardia. Patient was subsequently admitted to intensive care unit. Later he suddenly became bradycardic and went into asystole. CPR initiated and ACLS protocol initiated. ROSC obtained. Patient intubated external pacers placed. Cardiology contacted s/pTVP.   Subjective:   Patient laying in bed, awake, alert, answers to all questions.    Assessment/Plan     Active Hospital Problems    Diagnosis     Coma (HCC) [R40.20]     Hypotension [I95.9]     Chronic kidney disease (CKD), stage II (mild) [N18.2]     S/P TAVR (transcatheter aortic valve replacement) [Z95.2]     Cardiac arrest (HCC) [I46.9]     Acute respiratory failure with hypoxia (HCC) [J96.01]     Hyperkalemia [E87.5]     Complete heart block (HCC) [I44.2]     Heart block [I45.9]     Acute renal failure [N17.9]     Type 2 diabetes with nephropathy (HCC) [E11.21]     Essential hypertension [I10]     COPD (chronic obstructive pulmonary disease) (HCC) [J44.9]     Aspiration pneumonia (HCC) [J69.0]     Coronary atherosclerosis of native coronary artery [I25.10]

## 2025-05-28 ENCOUNTER — APPOINTMENT (OUTPATIENT)
Facility: HOSPITAL | Age: 65
DRG: 242 | End: 2025-05-28
Payer: MEDICARE

## 2025-05-28 PROBLEM — E46 MALNUTRITION: Status: ACTIVE | Noted: 2025-05-28

## 2025-05-28 LAB
ALBUMIN SERPL-MCNC: 2.7 G/DL (ref 3.4–5)
ALBUMIN/GLOB SERPL: 0.7 (ref 0.8–1.7)
ALP SERPL-CCNC: 68 U/L (ref 45–117)
ALT SERPL-CCNC: 8 U/L (ref 10–50)
ANION GAP SERPL CALC-SCNC: 12 MMOL/L (ref 3–18)
AST SERPL-CCNC: 13 U/L (ref 10–38)
BASOPHILS # BLD: 0.12 K/UL (ref 0–0.1)
BASOPHILS NFR BLD: 0.6 % (ref 0–2)
BILIRUB SERPL-MCNC: 0.5 MG/DL (ref 0.2–1)
BUN SERPL-MCNC: 26 MG/DL (ref 6–23)
BUN/CREAT SERPL: 17 (ref 12–20)
CALCIUM SERPL-MCNC: 9.6 MG/DL (ref 8.5–10.1)
CHLORIDE SERPL-SCNC: 109 MMOL/L (ref 98–107)
CO2 SERPL-SCNC: 24 MMOL/L (ref 21–32)
CREAT SERPL-MCNC: 1.52 MG/DL (ref 0.6–1.3)
DIFFERENTIAL METHOD BLD: ABNORMAL
EOSINOPHIL # BLD: 0.34 K/UL (ref 0–0.4)
EOSINOPHIL NFR BLD: 1.7 % (ref 0–5)
ERYTHROCYTE [DISTWIDTH] IN BLOOD BY AUTOMATED COUNT: 14 % (ref 11.6–14.5)
GLOBULIN SER CALC-MCNC: 4 G/DL (ref 2–4)
GLUCOSE BLD STRIP.AUTO-MCNC: 147 MG/DL (ref 70–110)
GLUCOSE BLD STRIP.AUTO-MCNC: 162 MG/DL (ref 70–110)
GLUCOSE BLD STRIP.AUTO-MCNC: 162 MG/DL (ref 70–110)
GLUCOSE SERPL-MCNC: 175 MG/DL (ref 74–108)
HCT VFR BLD AUTO: 38.9 % (ref 36–48)
HGB BLD-MCNC: 11.8 G/DL (ref 13–16)
IMM GRANULOCYTES # BLD AUTO: 0.15 K/UL (ref 0–0.04)
IMM GRANULOCYTES NFR BLD AUTO: 0.7 % (ref 0–0.5)
LYMPHOCYTES # BLD: 2.52 K/UL (ref 0.9–3.3)
LYMPHOCYTES NFR BLD: 12.5 % (ref 21–52)
MCH RBC QN AUTO: 27.8 PG (ref 24–34)
MCHC RBC AUTO-ENTMCNC: 30.3 G/DL (ref 31–37)
MCV RBC AUTO: 91.7 FL (ref 78–100)
MONOCYTES # BLD: 0.83 K/UL (ref 0.05–1.2)
MONOCYTES NFR BLD: 4.1 % (ref 3–10)
NEUTS SEG # BLD: 16.2 K/UL (ref 1.8–8)
NEUTS SEG NFR BLD: 80.4 % (ref 40–73)
NRBC # BLD: 0 K/UL (ref 0–0.01)
NRBC BLD-RTO: 0 PER 100 WBC
PLATELET # BLD AUTO: 369 K/UL (ref 135–420)
PMV BLD AUTO: 11 FL (ref 9.2–11.8)
POTASSIUM SERPL-SCNC: 3.6 MMOL/L (ref 3.5–5.5)
PROCALCITONIN SERPL-MCNC: 0.17 NG/ML
PROT SERPL-MCNC: 6.6 G/DL (ref 6.4–8.2)
RBC # BLD AUTO: 4.24 M/UL (ref 4.35–5.65)
SODIUM SERPL-SCNC: 145 MMOL/L (ref 136–145)
WBC # BLD AUTO: 20.2 K/UL (ref 4.6–13.2)

## 2025-05-28 PROCEDURE — 6370000000 HC RX 637 (ALT 250 FOR IP): Performed by: INTERNAL MEDICINE

## 2025-05-28 PROCEDURE — 6370000000 HC RX 637 (ALT 250 FOR IP): Performed by: HOSPITALIST

## 2025-05-28 PROCEDURE — 97530 THERAPEUTIC ACTIVITIES: CPT

## 2025-05-28 PROCEDURE — 94640 AIRWAY INHALATION TREATMENT: CPT

## 2025-05-28 PROCEDURE — 85025 COMPLETE CBC W/AUTO DIFF WBC: CPT

## 2025-05-28 PROCEDURE — 6360000002 HC RX W HCPCS: Performed by: INTERNAL MEDICINE

## 2025-05-28 PROCEDURE — 36415 COLL VENOUS BLD VENIPUNCTURE: CPT

## 2025-05-28 PROCEDURE — 84145 PROCALCITONIN (PCT): CPT

## 2025-05-28 PROCEDURE — 80053 COMPREHEN METABOLIC PANEL: CPT

## 2025-05-28 PROCEDURE — 82962 GLUCOSE BLOOD TEST: CPT

## 2025-05-28 PROCEDURE — 6360000004 HC RX CONTRAST MEDICATION: Performed by: HOSPITALIST

## 2025-05-28 PROCEDURE — 2500000003 HC RX 250 WO HCPCS: Performed by: INTERNAL MEDICINE

## 2025-05-28 PROCEDURE — 74177 CT ABD & PELVIS W/CONTRAST: CPT

## 2025-05-28 PROCEDURE — 2580000003 HC RX 258: Performed by: INTERNAL MEDICINE

## 2025-05-28 PROCEDURE — 97116 GAIT TRAINING THERAPY: CPT

## 2025-05-28 PROCEDURE — 1100000003 HC PRIVATE W/ TELEMETRY

## 2025-05-28 PROCEDURE — 2580000003 HC RX 258: Performed by: STUDENT IN AN ORGANIZED HEALTH CARE EDUCATION/TRAINING PROGRAM

## 2025-05-28 PROCEDURE — 2700000000 HC OXYGEN THERAPY PER DAY

## 2025-05-28 PROCEDURE — 71260 CT THORAX DX C+: CPT

## 2025-05-28 RX ORDER — IOPAMIDOL 612 MG/ML
100 INJECTION, SOLUTION INTRAVASCULAR
Status: COMPLETED | OUTPATIENT
Start: 2025-05-28 | End: 2025-05-28

## 2025-05-28 RX ORDER — SODIUM CHLORIDE, SODIUM LACTATE, POTASSIUM CHLORIDE, CALCIUM CHLORIDE 600; 310; 30; 20 MG/100ML; MG/100ML; MG/100ML; MG/100ML
INJECTION, SOLUTION INTRAVENOUS CONTINUOUS
Status: DISCONTINUED | OUTPATIENT
Start: 2025-05-28 | End: 2025-05-29 | Stop reason: HOSPADM

## 2025-05-28 RX ADMIN — AMLODIPINE BESYLATE 10 MG: 5 TABLET ORAL at 09:27

## 2025-05-28 RX ADMIN — METOPROLOL TARTRATE 12.5 MG: 25 TABLET, FILM COATED ORAL at 09:28

## 2025-05-28 RX ADMIN — IOPAMIDOL 100 ML: 612 INJECTION, SOLUTION INTRAVENOUS at 15:18

## 2025-05-28 RX ADMIN — POLYVINYL ALCOHOL, POVIDONE 1 DROP: 14; 6 SOLUTION/ DROPS OPHTHALMIC at 21:07

## 2025-05-28 RX ADMIN — METOPROLOL TARTRATE 12.5 MG: 25 TABLET, FILM COATED ORAL at 21:05

## 2025-05-28 RX ADMIN — ROSUVASTATIN CALCIUM 20 MG: 10 TABLET, FILM COATED ORAL at 09:28

## 2025-05-28 RX ADMIN — POLYVINYL ALCOHOL, POVIDONE 1 DROP: 14; 6 SOLUTION/ DROPS OPHTHALMIC at 09:51

## 2025-05-28 RX ADMIN — BUDESONIDE 500 MCG: 0.5 INHALANT RESPIRATORY (INHALATION) at 21:44

## 2025-05-28 RX ADMIN — AMPICILLIN SODIUM AND SULBACTAM SODIUM 3000 MG: 2; 1 INJECTION, POWDER, FOR SOLUTION INTRAMUSCULAR; INTRAVENOUS at 04:17

## 2025-05-28 RX ADMIN — GABAPENTIN 300 MG: 300 CAPSULE ORAL at 09:28

## 2025-05-28 RX ADMIN — SODIUM CHLORIDE, SODIUM LACTATE, POTASSIUM CHLORIDE, AND CALCIUM CHLORIDE: .6; .31; .03; .02 INJECTION, SOLUTION INTRAVENOUS at 10:17

## 2025-05-28 RX ADMIN — POLYVINYL ALCOHOL, POVIDONE 1 DROP: 14; 6 SOLUTION/ DROPS OPHTHALMIC at 18:34

## 2025-05-28 RX ADMIN — SODIUM CHLORIDE, PRESERVATIVE FREE 10 ML: 5 INJECTION INTRAVENOUS at 21:12

## 2025-05-28 RX ADMIN — GABAPENTIN 300 MG: 300 CAPSULE ORAL at 13:22

## 2025-05-28 RX ADMIN — BUDESONIDE 500 MCG: 0.5 INHALANT RESPIRATORY (INHALATION) at 07:56

## 2025-05-28 RX ADMIN — AMPICILLIN SODIUM AND SULBACTAM SODIUM 3000 MG: 2; 1 INJECTION, POWDER, FOR SOLUTION INTRAMUSCULAR; INTRAVENOUS at 21:11

## 2025-05-28 RX ADMIN — FAMOTIDINE 20 MG: 20 TABLET, FILM COATED ORAL at 09:29

## 2025-05-28 RX ADMIN — ENOXAPARIN SODIUM 40 MG: 100 INJECTION SUBCUTANEOUS at 09:34

## 2025-05-28 RX ADMIN — GABAPENTIN 300 MG: 300 CAPSULE ORAL at 21:05

## 2025-05-28 RX ADMIN — INSULIN GLARGINE 8 UNITS: 100 INJECTION, SOLUTION SUBCUTANEOUS at 21:04

## 2025-05-28 RX ADMIN — SODIUM CHLORIDE, PRESERVATIVE FREE 10 ML: 5 INJECTION INTRAVENOUS at 09:43

## 2025-05-28 RX ADMIN — AMPICILLIN SODIUM AND SULBACTAM SODIUM 3000 MG: 2; 1 INJECTION, POWDER, FOR SOLUTION INTRAMUSCULAR; INTRAVENOUS at 18:48

## 2025-05-28 RX ADMIN — AMPICILLIN SODIUM AND SULBACTAM SODIUM 3000 MG: 2; 1 INJECTION, POWDER, FOR SOLUTION INTRAMUSCULAR; INTRAVENOUS at 09:43

## 2025-05-28 NOTE — PROGRESS NOTES
RENAL CONSULT  PROGRESS NOTE   2025    Patient:  Isidro Reardon  :  1960  Gender:  male  MRN #:  [id number]    Reason for Consult: Acute renal failure and Hyperkalemia requested by intensivist Dr Barrientos     Relevant events: -  Awake and alert   BP stable   , urinating fine/output is not much , poor oral hydration    Denied any symptoms  Labs, overnight events and consultant noted reviewed          Objective:    /76   Pulse 90   Temp 98.8 °F (37.1 °C) (Oral)   Resp 17   Ht 1.778 m (5' 10\")   Wt 84.8 kg (187 lb)   SpO2 100%   BMI 26.83 kg/m²     Physical Exam:    Pt awake   HEENT: no neck swelling   Abdomen: soft,  non distended   Ext: no edema     Laboratory Data:    Lab Results   Component Value Date    BUN 26 (H) 2025    BUN 30 (H) 2025    BUN 36 (H) 2025     2025     (H) 2025     (H) 2025    CO2 24 2025    CO2 27 2025    CO2 25 2025    GLUCOSE 175 (H) 2025    GLUCOSE 180 (H) 2025    GLUCOSE 205 (H) 2025     Lab Results   Component Value Date    WBC 20.2 (H) 2025    HGB 11.8 (L) 2025    HCT 38.9 2025     Lab Results   Component Value Date    CALCIUM 9.6 2025     Lab Results   Component Value Date    HDL 31 (L) 2025     No results found for: \"TURBIDITY\"    Imaging Reveiwed:      EKG  Renal ultrasound  Xray  chest- IMPRESSION:     Satisfactory endotracheal tube and enteric tube placement. Low lung volumes with  mild perihilar opacities may represent atelectasis or vascular congestion.         Assessment:  Isidro Reardon is a 64 y.o. year old male with  CAD, AS s/p  TAVR, diabetes mellitus, hypertension, hypertensive heart disease, neuropathy presented to he ER for  lightheadedness dizziness.Later on had complete heart block, asystole cardiac arrest   Now has acute renal failure due to cardiac arrest/bradycardia and hyperkalemia is due to RENAE and CPR .    Acute renal

## 2025-05-28 NOTE — PROGRESS NOTES
Cardiology Progress Note        Patient: Isidro Reardon        Sex: male          DOA: 5/15/2025  YOB: 1960      Age:  64 y.o.        LOS:  LOS: 13 days   Assessment/Plan     Principal Problem:    Complete heart block (HCC)  Active Problems:    Coronary atherosclerosis of native coronary artery    Aspiration pneumonia (HCC)    COPD (chronic obstructive pulmonary disease) (HCC)    Essential hypertension    Type 2 diabetes with nephropathy (HCC)    Acute renal failure    Heart block    S/P TAVR (transcatheter aortic valve replacement)    Cardiac arrest (HCC)    Acute respiratory failure with hypoxia (HCC)    Hyperkalemia    Hypotension    Chronic kidney disease (CKD), stage II (mild)    Coma (HCC)    Malnutrition  Resolved Problems:    * No resolved hospital problems. *      Plan:  5/28/2025  Resolving hematoma over the pacemaker area, continue to improve  No chest pains  Heart rate is stable  Continue with low-dose metoprolol  Discussed with patient  Antibiotic as per ID                    5/27/2025  Pacemaker site hematoma is slightly better  Cardiac telemetry sinus tachycardia  Patient is feeling better  Discussed with Dr. Meade  I will add low-dose beta-blocker  Will continue to follow.                      5/26/2025  Cardiac telemetry normal sinus rhythm  Pacemaker site diffuse mild to moderate hematoma  WBC count is elevated  Patient have somewhat difficult swallowing  Discussed with patient and daughter  I have notified Dr. Armas abnormal elevated WBC count she will review and see the patient  Discussed with patient and daughter                            Today early in the morning have fall possible slides from the bed due to weakness when he was trying to go to the bathroom    No complaints  Mild sinus tachycardia, will monitor, if persistent and no other causes may consider low-dose beta-blocker    CBC have shown leukocytosis and increased in hemoglobin and platelets counts, discussed with

## 2025-05-28 NOTE — PROGRESS NOTES
PHYSICAL THERAPY TREATMENT    Patient: Isidro Reardon (64 y.o. male)  Date: 5/28/2025  Diagnosis: Complete heart block (HCC) [I44.2] Complete heart block (HCC)  Procedure(s) (LRB):  Insert PPM dual (N/A) 6 Days Post-Op  Precautions: Surgical Protocols, Fall Risk,  ,  ,  ,  ,  ,  ,        ASSESSMENT:  Pt found EOB with RN assisting with erik-care. Pt and RN agree with plan to stand for hygiene. Pt reports inability to tolerate L UE use for reach or assist with transition to EOB. R UE used fairly, but with physical assist. HHA use for initial standing trial. Sits after care from RN. Pt's balance requires addition support. RW provided. Short distances tolerated with frequent leaning rest breaks. SPO2 reading are very unreliable during session. Found to have 91% after session on 3L. Pt is optimistic that placment will help with his personal goal of amb without AD    Progression toward goals: Fair     []      Improving appropriately and progressing toward goals  [x]      Improving slowly and progressing toward goals  []      Not making progress toward goals and plan of care will be adjusted     PLAN:  Patient continues to benefit from skilled intervention to address the above impairments.  Continue treatment per established plan of care.    Further Equipment Recommendations for Discharge: gait belt, hospital bed, and rolling walker    AMPA:   12     Current research shows that an AM-PAC score of 17 (13 without stairs) or less is not associated with a discharge to the patient's home setting. Based on an AM-PAC score and their current functional mobility deficits, it is recommended that the patient have 5-7 sessions per week of Physical Therapy at d/c to increase the patient's independence.  Currently, this patient demonstrates the potential endurance, and/or tolerance for 3 hours of therapy each day at d/c.    Current research shows that an AM-PAC score of 17 (13 without stairs) or less is not associated with a

## 2025-05-28 NOTE — PROGRESS NOTES
[facility]  (A Division of [facility] [place])  Fidelina Armas MD   Office Tel:  [phone] Option #8                                                             Date of Admission: 5/15/2025       ID FU for antimicrobial management  for patient is having leucocytosis from 5/25/25 requested by Dr Giraldo/cardiology      PCP: Phillip Rizzo MD    C/C: Presented to ED with bradycardia    Current Antimicrobials:    Prior Antimicrobials:    Unasyn 5/27 to date Ceftriaxone 5/16-17    Pip-tazo 5/18 to 5/25  Doxycycline 5/18 to 5/23  Vancomycin 5/18 to 5/21     Allergy antibiotic: Non recorded     Assessment:     Acutely ill patient with:    Leucocytosis to 20K since 5/25, Procal remains normal. Presumed asp pneumonitis. No gi/gu complaint. Hematoma PPM site without calor, no HB drop not marked. NO acute CPD on CXR. He was on Pip tazo until 5/25    Post PPM placement 5/22-- PPM site with hematoma  Work up for Lyme and FRIDA negative on work up. Bacteremia- likely false flag by instrument.    NO true bacteremia from 5/16.     RUL opacity and tree -in bud appearance on CT-Possible PNA with leucocytosis on admission  Underlying COPD- mod to severe  Resp cutlures- no pathogenic bacteria, or MRSA X2  Procal 1.54-> 1.17    Acute respiratory failure, with complete heart block/bradycardia. Extubated  5/23  CAD history/TAVR in place on Echo- no mention of mass/vegetation  5/21- FRIDA- no vegetation         -Lyme serology negative         -PPM Placed left chest 5/22/25    RENAE on CKD--improving    5/16-blood culture x2: 1/4- flagged + by MICROLAB-- further info pending        -UA clean         -Viral test /PCR for COVID 19/Influenza A and B -negative.   5/17- resp cx: normal kateryna  5/18- MRSA screen- negative         -resp culture- yeast growth    History of Flu A 02/2025    History of MSSA Infection of Olecranon Fossa    Diffuse hepatic steatosis- on CT    Diagnosis Date    Acute coronary  OCCASIONAL WBCS SEEN         NO EPITHELIAL CELLS SEEN               RARE Gram positive cocci IN PAIRS           Culture       LIGHT NORMAL RESPIRATORY LINDA          COVID-19 & Influenza Combo [[id number]] Collected: 05/16/25 1158    Order Status: Completed Specimen: Nasopharyngeal Updated: 05/16/25 1237     Source Nasopharyngeal        SARS-CoV-2, PCR Not detected        Comment: Not Detected results do not preclude SARS-CoV-2 infection and should not be used as the sole basis for patient management decisions.Results must be combined with clinical observations, patient history, and epidemiological information.        Rapid Influenza A By PCR Not detected        Rapid Influenza B By PCR Not detected        Comment: Testing was performed using [employer] SARS-CoV-2 and Influenza A/B nucleic acid assay.  This test is a multiplex Real-Time Reverse Transcriptase Polymerase Chain Reaction (RT-PCR) based in vitro diagnostic test intended for the qualitative detection of nucleic acids from SARS-CoV-2, Influenza A, and Influenza B in nasopharyngeal specimens.         Culture, Respiratory [[id number]]     Order Status: Canceled Specimen: Sputum, Suctioned     Culture, Blood 2 [[id number]] Collected: 05/16/25 0904    Order Status: Completed Specimen: Blood Updated: 05/22/25 0733     Special Requests RIGHT        Culture NO GROWTH 6 DAYS       Culture, Blood 1 [[id number]] Collected: 05/16/25 0903    Order Status: Completed Specimen: Blood Updated: 05/23/25 0629     Special Requests LEFT        Gram Stain       ANAEROBIC BOTTLE Blood culture bottle flagged positive by instrumentation, no organisms seen on preliminary gram stain, subculture of bottle is being performed to rule out growth.                  SMEAR CALLED TO AND CORRECTLY REPEATED BY: MARBIN BUSH, RN ICU, TO JAF AT 1640 5/19/2025           Culture       ONE OF TWO BOTTLES HAS BEEN FLAGGED POSITIVE BY INSTRUMENT.  BOTTLE HAS BEEN SENT TO [facility] TO ASSESS FOR

## 2025-05-28 NOTE — PROGRESS NOTES
Comprehensive High Risk Nutrition Assessment Follow-Up    Type and Reason for Visit:  Reassess    Nutrition Recommendations/Plan:   Appears inadequate nutrition PO food and fluid intakes please record all %PO intakes in I&Os  Diet consistency recs per SLP  Add Gelatein PO as edwin provides 80kcal, 20g pro  Noted poor oral hydration however is on nectar thick liq so increases risk inadequate fluid intakes noted started LR today  ?need for NGT TF to meet nutrition and fluid needs + PO as edwin   Consider adding Wilma-rocio daily  Check Phos cont check Phos, Mg, K replete as needed   Please weigh pt no new wts suspect wt loss  Cont to monitor POC, wt trends, renal fx, lytes, UOP, bowel fx, skin integrity, and adjust recs as needed     Malnutrition Assessment:  Malnutrition Status:  Moderate malnutrition (05/28/25 1432)    Context:  Acute Illness     Findings of the 6 clinical characteristics of malnutrition:  Energy Intake:  50% or less of estimated energy requirements for 5 or more days  Weight Loss:  Unable to assess     Body Fat Loss:  Moderate body fat loss     Muscle Mass Loss:  Moderate muscle mass loss    Fluid Accumulation:  Unable to assess     Strength:  Not Performed    Nutrition Assessment:    63yo M out of ICU, extubated 5/23, nephrology following, awake and alert, low UOP, poor oral hydration, slight worsen renal fx start LR per MD, dysphagia MBSS dysphagia nectar thick liq diet appears poor PO. appears mostly inadequate nutritio since admit x 13 days. no new wts since 85kg 5/21. no recent sig wt loss or poor PO PTA noted. wt hx trends 84-86kg Feb 2025 and 89kg Nov 2024. some wts appeared slight down ?accuracy vs fluid. was diuresing w/lasix.    Nutrition Related Findings:      Wound Type: None       Current Nutrition Intake & Therapies:    Average Meal Intake: 0%, 1-25%  Average Supplements Intake: None Ordered  ADULT DIET; Dysphagia - Minced and Moist; 4 carb choices (60 gm/meal); Mildly Thick  (Nectar)    Anthropometric Measures:  Height: 177.8 cm (5' 10\")  Ideal Body Weight (IBW): 166 lbs (75 kg)    Admission Body Weight: 86 kg (189 lb 9.5 oz)  Current Body Weight: 85 kg (187 lb 6.3 oz), 110.2 % IBW. Weight Source: Bed scale  Current BMI (kg/m2): 26.9  Usual Body Weight: 89 kg (196 lb 3.4 oz)  % Weight Change (Calculated): -3.4  BMI Categories: Overweight (BMI 25.0-29.9)    Estimated Daily Nutrient Needs:  Energy Requirements Based On: Kcal/kg  Weight Used for Energy Requirements: Current  Energy (kcal/day): 2100  Weight Used for Protein Requirements: Current  Protein (g/day): 85  Method Used for Fluid Requirements: 1 ml/kcal  Fluid (ml/day): 2100    Nutrition Diagnosis:   Moderate malnutrition related to increase demand for energy/nutrients, renal dysfunction, cardiac dysfunction, swallowing difficulty as evidenced by intake 0-25%, intake 26-50%, lab values, swallow study results    Nutrition Interventions:   Food and/or Nutrient Delivery: Continue Current Diet, Vitamin Supplement, Start Oral Nutrition Supplement (diet consistency recs per SLP)  Nutrition Education/Counseling: Education/Counseling not indicated  Coordination of Nutrition Care: Continue to monitor while inpatient, Speech Therapy    Goals:  Goals: Meet at least 75% of estimated needs (prevent wt loss and skin breakdown)  Type of Goal: Continue current goal  Previous Goal Met: No Progress toward Goal(s)    Nutrition Monitoring and Evaluation:   Behavioral-Environmental Outcomes: None Identified  Food/Nutrient Intake Outcomes: Vitamin/Mineral Intake, Progression of Nutrition, Diet Advancement/Tolerance, Food and Nutrient Intake  Physical Signs/Symptoms Outcomes: Biochemical Data, Skin, Weight, Fluid Status or Edema, Chewing or Swallowing    Discharge Planning:    Too soon to determine     Thais Chacon MS, RD  Clinical Dietitian  E: [email or web address]  O:  [phone]  C:  [phone]

## 2025-05-28 NOTE — PROGRESS NOTES
SNF Authorization  5/28/2025, 10:38 AM    Patient Name: Isidro Reardon                   YOB: 1960      Received via: Fax  Auth ID: [id number]  Plan Auth ID: [id number]  Service:  [facility]   Approval Dates: 05/28/2025 - 06/03/2025  Next Review Date: 06/03/2025        Phoebe Zaragoza  Case Management Department

## 2025-05-28 NOTE — PROGRESS NOTES
Occupational Therapy Evaluation/Treatment Attempt    Chart reviewed. Attempted Occupational Therapy Evaluation/Treatment, however, patient unable to be seen due to:  []  Nausea/vomiting  []  Eating  []  Pain  []  Patient too lethargic  [x]  Off Unit for testing/procedure  []  Dialysis treatment in progress   []  Telemetry Results  []  WB status not in order set  []  Patient on bedrest per order set  []  Other :     Will follow up later as patient's schedule allows.   Thank you for this referral.  Lynn Callahan OTDAI, OTR/L

## 2025-05-29 VITALS
TEMPERATURE: 97.5 F | WEIGHT: 187 LBS | RESPIRATION RATE: 16 BRPM | SYSTOLIC BLOOD PRESSURE: 112 MMHG | HEIGHT: 70 IN | HEART RATE: 70 BPM | DIASTOLIC BLOOD PRESSURE: 69 MMHG | OXYGEN SATURATION: 97 % | BODY MASS INDEX: 26.77 KG/M2

## 2025-05-29 PROBLEM — R40.20 COMA (HCC): Status: RESOLVED | Noted: 2025-05-20 | Resolved: 2025-05-29

## 2025-05-29 PROBLEM — I95.9 HYPOTENSION: Status: RESOLVED | Noted: 2025-05-19 | Resolved: 2025-05-29

## 2025-05-29 PROBLEM — J96.01 ACUTE RESPIRATORY FAILURE WITH HYPOXIA (HCC): Status: RESOLVED | Noted: 2025-05-16 | Resolved: 2025-05-29

## 2025-05-29 PROBLEM — E87.5 HYPERKALEMIA: Status: RESOLVED | Noted: 2025-05-16 | Resolved: 2025-05-29

## 2025-05-29 LAB
ALBUMIN SERPL-MCNC: 2.7 G/DL (ref 3.4–5)
ALBUMIN/GLOB SERPL: 0.7 (ref 0.8–1.7)
ALP SERPL-CCNC: 77 U/L (ref 45–117)
ALT SERPL-CCNC: 10 U/L (ref 10–50)
ANION GAP SERPL CALC-SCNC: 11 MMOL/L (ref 3–18)
AST SERPL-CCNC: 17 U/L (ref 10–38)
BASOPHILS # BLD: 0.11 K/UL (ref 0–0.1)
BASOPHILS NFR BLD: 0.6 % (ref 0–2)
BILIRUB SERPL-MCNC: 0.5 MG/DL (ref 0.2–1)
BUN SERPL-MCNC: 23 MG/DL (ref 6–23)
BUN/CREAT SERPL: 16 (ref 12–20)
CALCIUM SERPL-MCNC: 9.8 MG/DL (ref 8.5–10.1)
CHLORIDE SERPL-SCNC: 107 MMOL/L (ref 98–107)
CO2 SERPL-SCNC: 25 MMOL/L (ref 21–32)
CREAT SERPL-MCNC: 1.45 MG/DL (ref 0.6–1.3)
DIFFERENTIAL METHOD BLD: ABNORMAL
EOSINOPHIL # BLD: 0.28 K/UL (ref 0–0.4)
EOSINOPHIL NFR BLD: 1.6 % (ref 0–5)
ERYTHROCYTE [DISTWIDTH] IN BLOOD BY AUTOMATED COUNT: 14.1 % (ref 11.6–14.5)
GLOBULIN SER CALC-MCNC: 4.2 G/DL (ref 2–4)
GLUCOSE BLD STRIP.AUTO-MCNC: 159 MG/DL (ref 70–110)
GLUCOSE BLD STRIP.AUTO-MCNC: 180 MG/DL (ref 70–110)
GLUCOSE SERPL-MCNC: 192 MG/DL (ref 74–108)
HCT VFR BLD AUTO: 38.9 % (ref 36–48)
HGB BLD-MCNC: 11.6 G/DL (ref 13–16)
IMM GRANULOCYTES # BLD AUTO: 0.1 K/UL (ref 0–0.04)
IMM GRANULOCYTES NFR BLD AUTO: 0.6 % (ref 0–0.5)
LYMPHOCYTES # BLD: 2.24 K/UL (ref 0.9–3.3)
LYMPHOCYTES NFR BLD: 12.8 % (ref 21–52)
MCH RBC QN AUTO: 27.8 PG (ref 24–34)
MCHC RBC AUTO-ENTMCNC: 29.8 G/DL (ref 31–37)
MCV RBC AUTO: 93.1 FL (ref 78–100)
MONOCYTES # BLD: 1 K/UL (ref 0.05–1.2)
MONOCYTES NFR BLD: 5.7 % (ref 3–10)
NEUTS SEG # BLD: 13.71 K/UL (ref 1.8–8)
NEUTS SEG NFR BLD: 78.7 % (ref 40–73)
NRBC # BLD: 0 K/UL (ref 0–0.01)
NRBC BLD-RTO: 0 PER 100 WBC
PLATELET # BLD AUTO: 352 K/UL (ref 135–420)
PMV BLD AUTO: 11.1 FL (ref 9.2–11.8)
POTASSIUM SERPL-SCNC: 4.3 MMOL/L (ref 3.5–5.5)
PROCALCITONIN SERPL-MCNC: 0.14 NG/ML
PROT SERPL-MCNC: 6.9 G/DL (ref 6.4–8.2)
RBC # BLD AUTO: 4.18 M/UL (ref 4.35–5.65)
SODIUM SERPL-SCNC: 143 MMOL/L (ref 136–145)
WBC # BLD AUTO: 17.4 K/UL (ref 4.6–13.2)

## 2025-05-29 PROCEDURE — 6360000002 HC RX W HCPCS: Performed by: INTERNAL MEDICINE

## 2025-05-29 PROCEDURE — 84145 PROCALCITONIN (PCT): CPT

## 2025-05-29 PROCEDURE — 6370000000 HC RX 637 (ALT 250 FOR IP): Performed by: INTERNAL MEDICINE

## 2025-05-29 PROCEDURE — 2500000003 HC RX 250 WO HCPCS: Performed by: INTERNAL MEDICINE

## 2025-05-29 PROCEDURE — 6360000002 HC RX W HCPCS: Performed by: HOSPITALIST

## 2025-05-29 PROCEDURE — 36415 COLL VENOUS BLD VENIPUNCTURE: CPT

## 2025-05-29 PROCEDURE — 82962 GLUCOSE BLOOD TEST: CPT

## 2025-05-29 PROCEDURE — 6370000000 HC RX 637 (ALT 250 FOR IP): Performed by: HOSPITALIST

## 2025-05-29 PROCEDURE — 2580000003 HC RX 258: Performed by: INTERNAL MEDICINE

## 2025-05-29 PROCEDURE — 80053 COMPREHEN METABOLIC PANEL: CPT

## 2025-05-29 PROCEDURE — 2580000003 HC RX 258: Performed by: STUDENT IN AN ORGANIZED HEALTH CARE EDUCATION/TRAINING PROGRAM

## 2025-05-29 PROCEDURE — 2580000003 HC RX 258: Performed by: HOSPITALIST

## 2025-05-29 PROCEDURE — 85025 COMPLETE CBC W/AUTO DIFF WBC: CPT

## 2025-05-29 RX ORDER — CALCIUM CITRATE/VITAMIN D3 200MG-6.25
TABLET ORAL
Qty: 50 STRIP | Refills: 4 | Status: SHIPPED | OUTPATIENT
Start: 2025-05-29

## 2025-05-29 RX ORDER — PANTOPRAZOLE SODIUM 40 MG/1
40 TABLET, DELAYED RELEASE ORAL 2 TIMES DAILY
Status: DISCONTINUED | OUTPATIENT
Start: 2025-05-29 | End: 2025-05-29 | Stop reason: HOSPADM

## 2025-05-29 RX ORDER — METOPROLOL TARTRATE 25 MG/1
12.5 TABLET, FILM COATED ORAL 2 TIMES DAILY
Qty: 60 TABLET | Refills: 3
Start: 2025-05-29

## 2025-05-29 RX ORDER — AMLODIPINE BESYLATE 10 MG/1
10 TABLET ORAL DAILY
Qty: 30 TABLET | Refills: 3
Start: 2025-05-30

## 2025-05-29 RX ADMIN — AMPICILLIN SODIUM AND SULBACTAM SODIUM 3000 MG: 2; 1 INJECTION, POWDER, FOR SOLUTION INTRAMUSCULAR; INTRAVENOUS at 09:27

## 2025-05-29 RX ADMIN — METOPROLOL TARTRATE 12.5 MG: 25 TABLET, FILM COATED ORAL at 09:21

## 2025-05-29 RX ADMIN — ROSUVASTATIN CALCIUM 20 MG: 10 TABLET, FILM COATED ORAL at 09:20

## 2025-05-29 RX ADMIN — BUDESONIDE 500 MCG: 0.5 INHALANT RESPIRATORY (INHALATION) at 08:53

## 2025-05-29 RX ADMIN — GABAPENTIN 300 MG: 300 CAPSULE ORAL at 15:40

## 2025-05-29 RX ADMIN — AMLODIPINE BESYLATE 10 MG: 5 TABLET ORAL at 09:20

## 2025-05-29 RX ADMIN — AMPICILLIN SODIUM AND SULBACTAM SODIUM 3000 MG: 2; 1 INJECTION, POWDER, FOR SOLUTION INTRAMUSCULAR; INTRAVENOUS at 04:20

## 2025-05-29 RX ADMIN — SODIUM CHLORIDE, SODIUM LACTATE, POTASSIUM CHLORIDE, AND CALCIUM CHLORIDE: .6; .31; .03; .02 INJECTION, SOLUTION INTRAVENOUS at 10:36

## 2025-05-29 RX ADMIN — SODIUM CHLORIDE, PRESERVATIVE FREE 10 ML: 5 INJECTION INTRAVENOUS at 09:22

## 2025-05-29 RX ADMIN — ENOXAPARIN SODIUM 40 MG: 100 INJECTION SUBCUTANEOUS at 09:23

## 2025-05-29 RX ADMIN — GABAPENTIN 300 MG: 300 CAPSULE ORAL at 09:20

## 2025-05-29 RX ADMIN — SODIUM CHLORIDE 40 MG: 9 INJECTION INTRAMUSCULAR; INTRAVENOUS; SUBCUTANEOUS at 10:29

## 2025-05-29 NOTE — CARE COORDINATION
Patient scheduled for discharge today. [employer] contacted for transportation. Medicaid stated the patient does not have any Medicaid transportation benefits. [facility] contacted and will  the patient today at 1530 for transport to [facility]. Phone number for report is [phone]. Patients daughter updated.

## 2025-05-29 NOTE — PROGRESS NOTES
IV to PO Conversion Recommendation     Patient: Isidro Reardon   MRN#: [id number]   Admission Date: 051525     IV TO PO  Medication(s) Pantoprazole   Oral Meds  Yes   Diet:   Adult diet: Minced and Moist   TEMP 97.5 °F (36.4 °C) (Axillary)   WBC Lab Results   Component Value Date/Time    WBC 17.4 05/29/2025 02:10 AM           Pharmacy Dosing Services:  Summary:   Does the patient meet all criteria for IV to PO switch as listed below? Yes    Is the patient excluded from IV to PO automatic switch based on criteria listed below? No    Criteria for IV to PO switch - Nonantibiotics   Functioning GI tract   Taking scheduled oral medications  Tolerating diet more advanced than clear liquids  2. No signs/symptoms of shock  No vasopressor support        3.   No seizures for >72 hours (antiepileptic medications only)    Exclusion Criteria   Patient is being treated for an infection that requires IV therapy such as: endocarditis, osteomyelitis, meningitis, pancreatitis (antibiotics only)   NG tube with continous suction   Nausea and/or vomiting or severe diarrhea within past 24 hours  Malabsorption syndromes, partial or total gastrectomy, ileus, gastric outlet or bowel obstruction  Active GI bleeding   Significant painful oral ulceration  Unable to swallow  On total parenteral nutrition with a NPO order  NPO  Febrile in last 24 hours (antibiotics only)  Clinically deteriorating or unstable (antibiotics only)    Assessment/Recommendation:    Pantoprazole 40mg IV BID has been converted to Pantoprazole 40mg PO BID    This IV to PO conversion is based on the [facility] P&[employer] approved automatic conversion policy for eligible patients.      Please call with questions.    Yosef Galeas [facility]  Clinical Pharmacist  [phone]

## 2025-05-29 NOTE — PROGRESS NOTES
Pt refused PT due to:  [x]  Increased agitation \"I'm not going anywhere .\"  [x]  Visiting friend (2nd attempt)  []  Pain  []  Pt lethargic  []  Off Unit  Will f/u. Thank you.  Travis Bernard, PTA

## 2025-05-29 NOTE — PROGRESS NOTES
Palliative Medicine    CODE STATUS: FULL CODE    AMD Status: none on file. His sole daughter, Priyanka, is his closest living relative     1145 Seen today in room 339. Lying supine with eyes closed. Did not attempt to awaken. Mobile sitter in room. Respirations eupneic on room air. Pain --appears comfortable     Tolerated permanent pace maker well. Extubated on 5/23/2025 and was able to be transferred out of ICU.    Disposition plan: anticipate SNF--auth noted in medical record    After meeting with patient and his daughter/MPOA, Priyanka, the goals of care have been defined.  Code status remains: FULL CODE AMD status: none on file. Is sole child, Priyanka, is his closest living relative.  Will sign off but remain available for reconsult as needed/if appropriate.     Thank you for the Palliative Medicine consult and allowing us to participate in the care of Isidro Reardon      Krystle Waterman RN, MSN  Palliative Medicine   [phone]

## 2025-05-29 NOTE — PROGRESS NOTES
This writer sent email to [email or web address] requesting cardiology follow up appointment for patient. Awaiting response. Will add to AVS when information becomes available

## 2025-05-29 NOTE — DISCHARGE SUMMARY
Hospitalist Discharge Summary    Patient: Isidro Reardon MRN: [id number]  [facility]: [id number]    YOB: 1960  Age: 64 y.o.  Sex: male    DOA: 5/15/2025 LOS:  LOS: 14 days   Discharge Date:      Primary Care Provider:  Phillip Rizzo MD    Admission Diagnoses: Complete heart block (HCC) [I44.2]    Discharge Diagnoses:    Active Hospital Problems    Diagnosis     Malnutrition [E46]     Chronic kidney disease (CKD), stage II (mild) [N18.2]     S/P TAVR (transcatheter aortic valve replacement) [Z95.2]     Cardiac arrest (HCC) [I46.9]     Complete heart block (HCC) [I44.2]     Heart block [I45.9]     Acute renal failure [N17.9]     Type 2 diabetes with nephropathy (HCC) [E11.21]     Essential hypertension [I10]     COPD (chronic obstructive pulmonary disease) (HCC) [J44.9]     Coronary atherosclerosis of native coronary artery [I25.10]        Discharge Medications:        Medication List        START taking these medications      amLODIPine 10 MG tablet  Commonly known as: NORVASC  Take 1 tablet by mouth daily  Start taking on: May 30, 2025     amoxicillin-clavulanate 875-125 MG per tablet  Commonly known as: AUGMENTIN  Take 1 tablet by mouth 2 times daily for 5 days     metoprolol tartrate 25 MG tablet  Commonly known as: LOPRESSOR  Take 0.5 tablets by mouth 2 times daily            CHANGE how you take these medications      True Metrix Blood Glucose Test strip  Generic drug: blood glucose test strips  s needed.USE TO CHECK BLOOD SUGARS ONCE DAILY 50 DAY SUPPLY  What changed: See the new instructions.            CONTINUE taking these medications      aspirin 81 MG chewable tablet     buPROPion 300 MG extended release tablet  Commonly known as: Wellbutrin XL  Take 1 tablet by mouth every morning     DULoxetine 60 MG extended release capsule  Commonly known as: CYMBALTA  TAKE 1  in the lungs with some consolidative opacities in the right upper lobe compatible with an infectious process. 2. No findings of an infectious process in the abdomen or pelvis. 3. Endotracheal tube and bilateral femoral arterial lines and Moon catheter are in appropriate position. 4. Diffuse hepatic steatosis. Electronically signed by Augustine Olivares    XR CHEST PORTABLE  Result Date: 5/17/2025  EXAM:  XR CHEST PORTABLE INDICATION: et tube COMPARISON: Chest radiograph 5/16/2025, CT chest 12/2/2024 TECHNIQUE: Semierect portable chest AP view FINDINGS: Stable support apparatus. Cardiomediastinal silhouette is within normal limits. Stable mild to moderate edema pattern. Pleural spaces grossly clear.     Stable mild to moderate edema pattern. Electronically signed by JACLYN GARCIA    US RETROPERITONEAL COMPLETE  Result Date: 5/16/2025  EXAM: US RETROPERITONEAL COMPLETE INDICATION: mendoza COMPARISON: Abdominal radiograph 5/16/2025, CT abdomen pelvis 10/9/2021 TECHNIQUE: Ultrasound of the kidneys and urinary bladder. FINDINGS: The right and left kidneys measure 8.7 and 13.3 cm, respectively. No hydronephrosis. There is no nephrolithiasis or renal mass. Kidneys are echogenic. Left renal cyst. The bladder is underdistended by Moon catheter. Aorta and common iliac arteries not well visualized to bowel gas. Visualized portions of IVC are within normal limits.     Medical renal disease, without hydronephrosis. Electronically signed by JACLYN GARCIA    Cardiac procedure  Result Date: 5/16/2025  Successful replacement of new transvenous pacemaker with normal transvenous pacemaker function.     XR CHEST PORTABLE  Result Date: 5/16/2025  EXAM: XR CHEST PORTABLE INDICATION: tranvenous pace maker location? pacemker malfunction COMPARISON: 5/16/2025 FINDINGS: A portable AP radiograph of the chest was obtained at 1150 hours. Endotracheal tube nasogastric tube and cardiac monitoring leads.. Decreased interstitial opacities.. Transvenous

## 2025-05-29 NOTE — PROGRESS NOTES
Cardiology Progress Note        Patient: Isidro Reardon        Sex: male          DOA: 5/15/2025  YOB: 1960      Age:  64 y.o.        LOS:  LOS: 14 days   Assessment/Plan     Principal Problem:    Complete heart block (HCC)  Active Problems:    Coronary atherosclerosis of native coronary artery    Aspiration pneumonia (HCC)    COPD (chronic obstructive pulmonary disease) (HCC)    Essential hypertension    Type 2 diabetes with nephropathy (HCC)    Acute renal failure    Heart block    S/P TAVR (transcatheter aortic valve replacement)    Cardiac arrest (HCC)    Acute respiratory failure with hypoxia (HCC)    Hyperkalemia    Hypotension    Chronic kidney disease (CKD), stage II (mild)    Coma (HCC)    Malnutrition  Resolved Problems:    * No resolved hospital problems. *      Plan:  5/29/2025  Patient continues to do well from cardiac standpoint  Cardiac telemetry stable  Patient have no cardiac complaints  Pacemaker site swelling is improving no pain at the site of pacemaker  Continue with the rest of the treatment  Discussed with patient                    5/28/2025  Resolving hematoma over the pacemaker area, continue to improve  No chest pains  Heart rate is stable  Continue with low-dose metoprolol  Discussed with patient  Antibiotic as per ID                    5/27/2025  Pacemaker site hematoma is slightly better  Cardiac telemetry sinus tachycardia  Patient is feeling better  Discussed with Dr. Meade  I will add low-dose beta-blocker  Will continue to follow.                      5/26/2025  Cardiac telemetry normal sinus rhythm  Pacemaker site diffuse mild to moderate hematoma  WBC count is elevated  Patient have somewhat difficult swallowing  Discussed with patient and daughter  I have notified Dr. Armas abnormal elevated WBC count she will review and see the patient  Discussed with patient and daughter                            Today early in the morning have fall possible slides from the bed  CRISTHIAN NUNEZ MD     May 29, 2025

## 2025-05-29 NOTE — PROGRESS NOTES
RENAL CONSULT  PROGRESS NOTE   2025    Patient:  Isidro Reardon  :  1960  Gender:  male  MRN #:  [id number]    Reason for Consult: Acute renal failure and Hyperkalemia requested by intensivist Dr Barrientos     Relevant events: -  Awake and alert   BP stable   , urinating fine  Denied any symptoms  Labs, overnight events and consultant noted reviewed          Objective:    /78   Pulse 78   Temp 98.4 °F (36.9 °C) (Oral)   Resp 16   Ht 1.778 m (5' 10\")   Wt 84.8 kg (187 lb)   SpO2 98%   BMI 26.83 kg/m²     Physical Exam:    Pt awake   HEENT: no neck swelling   Abdomen: soft,  non distended   Ext: no edema     Laboratory Data:    Lab Results   Component Value Date    BUN 23 2025    BUN 26 (H) 2025    BUN 30 (H) 2025     2025     2025     (H) 2025    CO2 25 2025    CO2 24 2025    CO2 27 2025    GLUCOSE 192 (H) 2025    GLUCOSE 175 (H) 2025    GLUCOSE 180 (H) 2025     Lab Results   Component Value Date    WBC 17.4 (H) 2025    HGB 11.6 (L) 2025    HCT 38.9 2025     Lab Results   Component Value Date    CALCIUM 9.8 2025     Lab Results   Component Value Date    HDL 31 (L) 2025     No results found for: \"TURBIDITY\"    Imaging Reveiwed:      EKG  Renal ultrasound  Xray  chest- IMPRESSION:     Satisfactory endotracheal tube and enteric tube placement. Low lung volumes with  mild perihilar opacities may represent atelectasis or vascular congestion.         Assessment:  Isidro Reardon is a 64 y.o. year old male with  CAD, AS s/p  TAVR, diabetes mellitus, hypertension, hypertensive heart disease, neuropathy presented to he ER for  lightheadedness dizziness.Later on had complete heart block, asystole cardiac arrest   Now has acute renal failure due to cardiac arrest/bradycardia and hyperkalemia is due to RENAE and CPR .    Acute renal failure  Hyperkalemia  Hypocalcemia   Cardiac

## 2025-06-02 NOTE — PROGRESS NOTES
Physician Progress Note      PATIENT:               SERA FOUNTAIN  CSN #:                  [id number]  :                       1960  ADMIT DATE:       5/15/2025 6:45 PM  DISCH DATE:        2025 4:38 PM  RESPONDING  PROVIDER #:        Yael Pak MD          QUERY TEXT:    Malnutrition is documented in the medical record  .  Please specify the   degree/type:    The clinical indicators include:  -   Malnutrition Status:  Moderate malnutrition (25 1432)  Context:  Acute Illness  Findings of the 6 clinical characteristics of malnutrition:  Energy Intake:  50% or less of estimated energy requirements for 5 or more   days  Weight Loss:  Unable to assess  Body Fat Loss:  Moderate body fat loss  Muscle Mass Loss:  Moderate muscle mass loss  Fluid Accumulation:  Unable to assess   Strength:  Not Performed    1. Diet consistency recs per SLP  2. Add Gelatein PO as edwin provides 80kcal, 20g pro    -  discharge  summary-  malnutrition  Options provided:  -- Moderate Malnutrition  -- Moderate Protein calorie malnutrition  -- Other - I will add my own diagnosis  -- Disagree - Not applicable / Not valid  -- Disagree - Clinically unable to determine / Unknown  -- Refer to Clinical Documentation Reviewer    PROVIDER RESPONSE TEXT:    This patient has moderate malnutrition.    Query created by: Shanda Samuels on 2025 3:03 PM      Electronically signed by:  Yael Pak MD 2025 12:13 PM

## 2025-06-05 ENCOUNTER — CLINICAL SUPPORT (OUTPATIENT)
Age: 65
End: 2025-06-05

## 2025-06-05 DIAGNOSIS — Z95.0 PACEMAKER: Primary | ICD-10-CM

## 2025-06-05 NOTE — PROGRESS NOTES
Patient came into the office for a 1 week post Dual chamber [employer] pacemaker placement for a wound check. Removed the bandage and there was no redness or draining but there was a resolving hematoma with steri-strips and jono. Dr. Joshi assessed the site and removed the staples. He ordered for the patient to hold Aspirin for the next week. Patient was advised to call if the hematoma got worse. Pt denies any pain.

## 2025-06-11 ENCOUNTER — RESULTS FOLLOW-UP (OUTPATIENT)
Age: 65
End: 2025-06-11

## 2025-06-13 ENCOUNTER — TELEPHONE (OUTPATIENT)
Facility: CLINIC | Age: 65
End: 2025-06-13

## 2025-06-13 NOTE — TELEPHONE ENCOUNTER
Dell from [facility] called asking if Dr carney will signpt's POC please advise       [phone]

## 2025-06-27 ENCOUNTER — LAB (OUTPATIENT)
Facility: CLINIC | Age: 65
End: 2025-06-27

## 2025-06-27 ENCOUNTER — OFFICE VISIT (OUTPATIENT)
Facility: CLINIC | Age: 65
End: 2025-06-27
Payer: MEDICARE

## 2025-06-27 ENCOUNTER — HOSPITAL ENCOUNTER (OUTPATIENT)
Facility: HOSPITAL | Age: 65
Setting detail: SPECIMEN
Discharge: HOME | End: 2025-06-30

## 2025-06-27 VITALS
HEART RATE: 80 BPM | HEIGHT: 70 IN | OXYGEN SATURATION: 96 % | WEIGHT: 181 LBS | SYSTOLIC BLOOD PRESSURE: 128 MMHG | BODY MASS INDEX: 25.91 KG/M2 | TEMPERATURE: 98.7 F | DIASTOLIC BLOOD PRESSURE: 84 MMHG | RESPIRATION RATE: 18 BRPM

## 2025-06-27 DIAGNOSIS — I10 ESSENTIAL (PRIMARY) HYPERTENSION: ICD-10-CM

## 2025-06-27 DIAGNOSIS — D50.0 IRON DEFICIENCY ANEMIA DUE TO CHRONIC BLOOD LOSS: ICD-10-CM

## 2025-06-27 DIAGNOSIS — E55.9 VITAMIN D DEFICIENCY: ICD-10-CM

## 2025-06-27 DIAGNOSIS — E78.2 MIXED HYPERLIPIDEMIA: ICD-10-CM

## 2025-06-27 DIAGNOSIS — I44.2 CHB (COMPLETE HEART BLOCK) (HCC): ICD-10-CM

## 2025-06-27 DIAGNOSIS — E11.42 TYPE 2 DIABETES MELLITUS WITH DIABETIC POLYNEUROPATHY, WITHOUT LONG-TERM CURRENT USE OF INSULIN (HCC): Primary | ICD-10-CM

## 2025-06-27 DIAGNOSIS — E11.42 TYPE 2 DIABETES MELLITUS WITH DIABETIC POLYNEUROPATHY, WITHOUT LONG-TERM CURRENT USE OF INSULIN (HCC): ICD-10-CM

## 2025-06-27 LAB
A/G RATIO: 1.4 RATIO (ref 1.1–2.6)
ALBUMIN: 4.2 G/DL (ref 3.5–5)
ALP BLD-CCNC: 114 U/L (ref 40–125)
ALT SERPL-CCNC: 9 U/L (ref 5–40)
ANION GAP SERPL CALCULATED.3IONS-SCNC: 14 MMOL/L (ref 3–15)
AST SERPL-CCNC: 19 U/L (ref 10–37)
BASOPHILS # BLD: 1 % (ref 0–2)
BASOPHILS ABSOLUTE: 0.1 K/UL (ref 0–0.2)
BILIRUB SERPL-MCNC: 0.3 MG/DL (ref 0.2–1.2)
BUN BLDV-MCNC: 22 MG/DL (ref 6–22)
CALCIUM SERPL-MCNC: 10.2 MG/DL (ref 8.4–10.5)
CHLORIDE BLD-SCNC: 101 MMOL/L (ref 98–110)
CHOLESTEROL, TOTAL: 162 MG/DL (ref 110–200)
CHOLESTEROL/HDL RATIO: 3.5 (ref 0–5)
CO2: 25 MMOL/L (ref 20–32)
CREAT SERPL-MCNC: 1.4 MG/DL (ref 0.8–1.6)
EOSINOPHIL # BLD: 3 % (ref 0–6)
EOSINOPHILS ABSOLUTE: 0.2 K/UL (ref 0–0.5)
ESTIMATED AVERAGE GLUCOSE: 177 MG/DL (ref 91–123)
GFR, ESTIMATED: 54.2 ML/MIN/1.73 SQ.M.
GLOBULIN: 3.1 G/DL (ref 2–4)
GLUCOSE: 114 MG/DL (ref 70–99)
HBA1C MFR BLD: 7.5 %
HBA1C MFR BLD: 7.8 % (ref 4.8–5.6)
HCT VFR BLD CALC: 40.4 % (ref 37.8–52.2)
HDLC SERPL-MCNC: 46 MG/DL
HEMOGLOBIN: 12.5 G/DL (ref 12.6–17.1)
LDL CHOLESTEROL: 88 MG/DL (ref 50–99)
LDL/HDL RATIO: 1.9
LYMPHOCYTES # BLD: 24 % (ref 20–45)
LYMPHOCYTES ABSOLUTE: 2 K/UL (ref 1–4.8)
MCH RBC QN AUTO: 28 PG (ref 26–34)
MCHC RBC AUTO-ENTMCNC: 31 G/DL (ref 31–36)
MCV RBC AUTO: 90 FL (ref 80–95)
MONOCYTES ABSOLUTE: 0.7 K/UL (ref 0.1–1)
MONOCYTES: 9 % (ref 3–12)
NEUTROPHILS ABSOLUTE: 5.2 K/UL (ref 1.8–7.7)
NEUTROPHILS SEGMENTED: 63 % (ref 40–75)
NON-HDL CHOLESTEROL: 116 MG/DL
PDW BLD-RTO: 15 % (ref 10–15.5)
PLATELET # BLD: 314 K/UL (ref 140–440)
PMV BLD AUTO: 9.4 FL (ref 9–13)
POTASSIUM SERPL-SCNC: 4.3 MMOL/L (ref 3.5–5.5)
RBC # BLD: 4.51 M/UL (ref 3.8–5.8)
SODIUM BLD-SCNC: 140 MMOL/L (ref 133–145)
TOTAL PROTEIN: 7.3 G/DL (ref 6.2–8.1)
TRIGL SERPL-MCNC: 139 MG/DL (ref 40–149)
VITAMIN D 25-HYDROXY: 28.4 NG/ML (ref 32–100)
VLDLC SERPL CALC-MCNC: 28 MG/DL (ref 8–30)
WBC # BLD: 8.2 K/UL (ref 4–11)

## 2025-06-27 PROCEDURE — 99214 OFFICE O/P EST MOD 30 MIN: CPT | Performed by: INTERNAL MEDICINE

## 2025-06-27 PROCEDURE — 1123F ACP DISCUSS/DSCN MKR DOCD: CPT | Performed by: INTERNAL MEDICINE

## 2025-06-27 PROCEDURE — 3074F SYST BP LT 130 MM HG: CPT | Performed by: INTERNAL MEDICINE

## 2025-06-27 PROCEDURE — 3051F HG A1C>EQUAL 7.0%<8.0%: CPT | Performed by: INTERNAL MEDICINE

## 2025-06-27 PROCEDURE — 3079F DIAST BP 80-89 MM HG: CPT | Performed by: INTERNAL MEDICINE

## 2025-06-27 PROCEDURE — 83036 HEMOGLOBIN GLYCOSYLATED A1C: CPT | Performed by: INTERNAL MEDICINE

## 2025-06-27 PROCEDURE — 99001 SPECIMEN HANDLING PT-LAB: CPT

## 2025-06-27 NOTE — PROGRESS NOTES
Isidro Reardon presents today for   Chief Complaint   Patient presents with    Diabetes    Hypertension    Cholesterol Problem     6 month follow up            \"Have you been to the ER, urgent care clinic since your last visit?  Hospitalized since your last visit?\"    Yes, [place]     “Have you seen or consulted any other health care providers outside of [facility] since your last visit?”    NO             
   Lancets MISC Check BS 1x/day E 11.42     No current facility-administered medications for this visit.                 Review of Systems  Respiratory: negative for dyspnea on exertion  Cardiovascular: negative for chest pain    Objective:     Visit Vitals  Visit Vitals  /84 (BP Site: Right Upper Arm, Patient Position: Sitting, BP Cuff Size: Large Adult)   Pulse 80   Temp 98.7 °F (37.1 °C) (Temporal)   Resp 18   Ht 1.778 m (5' 10\")   Wt 82.1 kg (181 lb)   SpO2 96%   BMI 25.97 kg/m²         Chest - clear to auscultation, no wheezes, rales or rhonchi, symmetric air entry  Heart - normal rate, regular rhythm, normal S1, S2, 2/6 systolic murmur RSB, rubs, clicks or gallops  Extremities - peripheral pulses normal, no pedal edema, no clubbing or cyanosis    CMP:    Lab Results   Component Value Date/Time     06/27/2025 10:51 AM    K 4.3 06/27/2025 10:51 AM     06/27/2025 10:51 AM    CO2 25 06/27/2025 10:51 AM    BUN 22 06/27/2025 10:51 AM    CREATININE 1.4 06/27/2025 10:51 AM    GFRAA >60 10/29/2021 10:58 AM    AGRATIO 1.4 06/27/2025 10:51 AM    LABGLOM 54.2 06/27/2025 10:51 AM    LABGLOM 62 12/14/2023 09:36 AM    GLUCOSE 114 06/27/2025 10:51 AM    LABALBU 36.9 05/07/2024 11:05 AM    CALCIUM 10.2 06/27/2025 10:51 AM    BILITOT 0.3 06/27/2025 10:51 AM    ALKPHOS 114 06/27/2025 10:51 AM    AST 19 06/27/2025 10:51 AM    ALT 9 06/27/2025 10:51 AM     HgBA1c:    Lab Results   Component Value Date/Time    LABA1C 7.8 06/27/2025 10:51 AM     FLP:    Lab Results   Component Value Date/Time    TRIG 139 06/27/2025 10:51 AM    HDL 46 06/27/2025 10:51 AM    LDLDIRECT 154 02/05/2025 09:42 AM     PSA:   Lab Results   Component Value Date/Time    PSA 1.29 02/11/2025 03:07 PM    PSA 0.7 08/19/2019 08:05 AM             Assessment / Plan     Diabetes uncontrolled, will continue  metformin 1 gm daily and Lantus insulin 12 units twice daily. Pt may benefit from a GLP-1 through pharmaceutical program.   .  Hypertension well

## 2025-06-28 LAB — SENTARA SPECIMEN COLLECTION: NORMAL

## 2025-07-30 ENCOUNTER — TELEPHONE (OUTPATIENT)
Facility: CLINIC | Age: 65
End: 2025-07-30

## 2025-07-31 ENCOUNTER — OFFICE VISIT (OUTPATIENT)
Age: 65
End: 2025-07-31
Payer: MEDICARE

## 2025-07-31 VITALS
WEIGHT: 183 LBS | DIASTOLIC BLOOD PRESSURE: 98 MMHG | HEIGHT: 70 IN | OXYGEN SATURATION: 96 % | BODY MASS INDEX: 26.2 KG/M2 | HEART RATE: 91 BPM | SYSTOLIC BLOOD PRESSURE: 152 MMHG

## 2025-07-31 DIAGNOSIS — Z95.2 HISTORY OF TRANSCATHETER AORTIC VALVE REPLACEMENT (TAVR): ICD-10-CM

## 2025-07-31 DIAGNOSIS — I35.0 NONRHEUMATIC AORTIC VALVE STENOSIS: ICD-10-CM

## 2025-07-31 DIAGNOSIS — I25.10 ATHEROSCLEROSIS OF NATIVE CORONARY ARTERY, UNSPECIFIED WHETHER ANGINA PRESENT, UNSPECIFIED WHETHER NATIVE OR TRANSPLANTED HEART: Primary | ICD-10-CM

## 2025-07-31 PROCEDURE — 3077F SYST BP >= 140 MM HG: CPT | Performed by: INTERNAL MEDICINE

## 2025-07-31 PROCEDURE — 1123F ACP DISCUSS/DSCN MKR DOCD: CPT | Performed by: INTERNAL MEDICINE

## 2025-07-31 PROCEDURE — 93000 ELECTROCARDIOGRAM COMPLETE: CPT | Performed by: INTERNAL MEDICINE

## 2025-07-31 PROCEDURE — 3080F DIAST BP >= 90 MM HG: CPT | Performed by: INTERNAL MEDICINE

## 2025-07-31 PROCEDURE — 99214 OFFICE O/P EST MOD 30 MIN: CPT | Performed by: INTERNAL MEDICINE

## 2025-07-31 RX ORDER — CLOPIDOGREL BISULFATE 75 MG/1
75 TABLET ORAL DAILY
COMMUNITY
Start: 2025-07-30 | End: 2025-08-20

## 2025-07-31 ASSESSMENT — PATIENT HEALTH QUESTIONNAIRE - PHQ9
SUM OF ALL RESPONSES TO PHQ QUESTIONS 1-9: 0
SUM OF ALL RESPONSES TO PHQ QUESTIONS 1-9: 0
5. POOR APPETITE OR OVEREATING: NOT AT ALL
3. TROUBLE FALLING OR STAYING ASLEEP: NOT AT ALL
4. FEELING TIRED OR HAVING LITTLE ENERGY: NOT AT ALL
7. TROUBLE CONCENTRATING ON THINGS, SUCH AS READING THE NEWSPAPER OR WATCHING TELEVISION: NOT AT ALL
10. IF YOU CHECKED OFF ANY PROBLEMS, HOW DIFFICULT HAVE THESE PROBLEMS MADE IT FOR YOU TO DO YOUR WORK, TAKE CARE OF THINGS AT HOME, OR GET ALONG WITH OTHER PEOPLE: NOT DIFFICULT AT ALL
8. MOVING OR SPEAKING SO SLOWLY THAT OTHER PEOPLE COULD HAVE NOTICED. OR THE OPPOSITE, BEING SO FIGETY OR RESTLESS THAT YOU HAVE BEEN MOVING AROUND A LOT MORE THAN USUAL: NOT AT ALL
2. FEELING DOWN, DEPRESSED OR HOPELESS: NOT AT ALL
6. FEELING BAD ABOUT YOURSELF - OR THAT YOU ARE A FAILURE OR HAVE LET YOURSELF OR YOUR FAMILY DOWN: NOT AT ALL
SUM OF ALL RESPONSES TO PHQ QUESTIONS 1-9: 0
9. THOUGHTS THAT YOU WOULD BE BETTER OFF DEAD, OR OF HURTING YOURSELF: NOT AT ALL
SUM OF ALL RESPONSES TO PHQ QUESTIONS 1-9: 0
1. LITTLE INTEREST OR PLEASURE IN DOING THINGS: NOT AT ALL

## 2025-08-05 ENCOUNTER — OFFICE VISIT (OUTPATIENT)
Facility: CLINIC | Age: 65
End: 2025-08-05
Payer: MEDICARE

## 2025-08-05 VITALS
HEIGHT: 70 IN | WEIGHT: 180 LBS | RESPIRATION RATE: 20 BRPM | BODY MASS INDEX: 25.77 KG/M2 | TEMPERATURE: 98.1 F | HEART RATE: 105 BPM | DIASTOLIC BLOOD PRESSURE: 79 MMHG | SYSTOLIC BLOOD PRESSURE: 116 MMHG | OXYGEN SATURATION: 95 %

## 2025-08-05 DIAGNOSIS — N18.31 STAGE 3A CHRONIC KIDNEY DISEASE (HCC): ICD-10-CM

## 2025-08-05 DIAGNOSIS — E78.2 MIXED HYPERLIPIDEMIA: ICD-10-CM

## 2025-08-05 DIAGNOSIS — I10 ESSENTIAL (PRIMARY) HYPERTENSION: ICD-10-CM

## 2025-08-05 DIAGNOSIS — E11.42 TYPE 2 DIABETES MELLITUS WITH DIABETIC POLYNEUROPATHY, WITHOUT LONG-TERM CURRENT USE OF INSULIN (HCC): Primary | ICD-10-CM

## 2025-08-05 DIAGNOSIS — G47.33 OBSTRUCTIVE SLEEP APNEA SYNDROME: ICD-10-CM

## 2025-08-05 DIAGNOSIS — Z09 HOSPITAL DISCHARGE FOLLOW-UP: ICD-10-CM

## 2025-08-05 DIAGNOSIS — I44.2 CHB (COMPLETE HEART BLOCK) (HCC): ICD-10-CM

## 2025-08-05 PROCEDURE — 3074F SYST BP LT 130 MM HG: CPT | Performed by: INTERNAL MEDICINE

## 2025-08-05 PROCEDURE — 99214 OFFICE O/P EST MOD 30 MIN: CPT | Performed by: INTERNAL MEDICINE

## 2025-08-05 PROCEDURE — 3051F HG A1C>EQUAL 7.0%<8.0%: CPT | Performed by: INTERNAL MEDICINE

## 2025-08-05 PROCEDURE — 1123F ACP DISCUSS/DSCN MKR DOCD: CPT | Performed by: INTERNAL MEDICINE

## 2025-08-05 PROCEDURE — 1111F DSCHRG MED/CURRENT MED MERGE: CPT | Performed by: INTERNAL MEDICINE

## 2025-08-05 PROCEDURE — 3078F DIAST BP <80 MM HG: CPT | Performed by: INTERNAL MEDICINE

## 2025-08-12 ENCOUNTER — HOSPITAL ENCOUNTER (OUTPATIENT)
Facility: HOSPITAL | Age: 65
Setting detail: SPECIMEN
Discharge: HOME | End: 2025-08-15

## 2025-08-12 ENCOUNTER — LAB (OUTPATIENT)
Facility: CLINIC | Age: 65
End: 2025-08-12

## 2025-08-12 DIAGNOSIS — I10 ESSENTIAL (PRIMARY) HYPERTENSION: ICD-10-CM

## 2025-08-12 DIAGNOSIS — N18.31 STAGE 3A CHRONIC KIDNEY DISEASE (HCC): ICD-10-CM

## 2025-08-12 LAB
BASOPHILS # BLD: 1 % (ref 0–2)
BASOPHILS ABSOLUTE: 0.1 K/UL (ref 0–0.2)
EOSINOPHIL # BLD: 3 % (ref 0–6)
EOSINOPHILS ABSOLUTE: 0.3 K/UL (ref 0–0.5)
HCT VFR BLD CALC: 45.7 % (ref 37.8–52.2)
HEMOGLOBIN: 14 G/DL (ref 12.6–17.1)
LYMPHOCYTES # BLD: 21 % (ref 20–45)
LYMPHOCYTES ABSOLUTE: 2 K/UL (ref 1–4.8)
MCH RBC QN AUTO: 28 PG (ref 26–34)
MCHC RBC AUTO-ENTMCNC: 31 G/DL (ref 31–36)
MCV RBC AUTO: 90 FL (ref 80–95)
MONOCYTES ABSOLUTE: 0.8 K/UL (ref 0.1–1)
MONOCYTES: 8 % (ref 3–12)
NEUTROPHILS ABSOLUTE: 6.5 K/UL (ref 1.8–7.7)
NEUTROPHILS SEGMENTED: 66 % (ref 40–75)
PDW BLD-RTO: 15.8 % (ref 10–15.5)
PLATELET # BLD: 266 K/UL (ref 140–440)
PMV BLD AUTO: 9.8 FL (ref 9–13)
RBC # BLD: 5.07 M/UL (ref 3.8–5.8)
SENTARA SPECIMEN COLLECTION: NORMAL
WBC # BLD: 9.8 K/UL (ref 4–11)

## 2025-08-12 PROCEDURE — 99001 SPECIMEN HANDLING PT-LAB: CPT

## 2025-08-13 LAB
A/G RATIO: 1.5 RATIO (ref 1.1–2.6)
ALBUMIN: 4.4 G/DL (ref 3.5–5)
ALP BLD-CCNC: 85 U/L (ref 40–125)
ALT SERPL-CCNC: 12 U/L (ref 5–40)
ANION GAP SERPL CALCULATED.3IONS-SCNC: 12 MMOL/L (ref 3–15)
AST SERPL-CCNC: 15 U/L (ref 10–37)
BILIRUB SERPL-MCNC: 0.4 MG/DL (ref 0.2–1.2)
BUN BLDV-MCNC: 21 MG/DL (ref 6–22)
CALCIUM SERPL-MCNC: 10.2 MG/DL (ref 8.4–10.5)
CHLORIDE BLD-SCNC: 100 MMOL/L (ref 98–110)
CO2: 29 MMOL/L (ref 20–32)
CREAT SERPL-MCNC: 1.5 MG/DL (ref 0.8–1.6)
GFR, ESTIMATED: 50.1 ML/MIN/1.73 SQ.M.
GLOBULIN: 2.9 G/DL (ref 2–4)
GLUCOSE: 77 MG/DL (ref 70–99)
POTASSIUM SERPL-SCNC: 4.7 MMOL/L (ref 3.5–5.5)
SODIUM BLD-SCNC: 141 MMOL/L (ref 133–145)
TOTAL PROTEIN: 7.3 G/DL (ref 6.2–8.1)

## 2025-09-02 ENCOUNTER — OFFICE VISIT (OUTPATIENT)
Facility: CLINIC | Age: 65
End: 2025-09-02
Payer: MEDICARE

## 2025-09-02 VITALS
BODY MASS INDEX: 25.2 KG/M2 | DIASTOLIC BLOOD PRESSURE: 78 MMHG | SYSTOLIC BLOOD PRESSURE: 138 MMHG | HEIGHT: 71 IN | WEIGHT: 180 LBS | OXYGEN SATURATION: 95 % | HEART RATE: 97 BPM | TEMPERATURE: 99 F | RESPIRATION RATE: 20 BRPM

## 2025-09-02 DIAGNOSIS — F32.1 CURRENT MODERATE EPISODE OF MAJOR DEPRESSIVE DISORDER WITHOUT PRIOR EPISODE (HCC): ICD-10-CM

## 2025-09-02 DIAGNOSIS — E78.2 MIXED HYPERLIPIDEMIA: ICD-10-CM

## 2025-09-02 DIAGNOSIS — I10 ESSENTIAL (PRIMARY) HYPERTENSION: ICD-10-CM

## 2025-09-02 DIAGNOSIS — E11.42 TYPE 2 DIABETES MELLITUS WITH DIABETIC POLYNEUROPATHY, WITHOUT LONG-TERM CURRENT USE OF INSULIN (HCC): Primary | ICD-10-CM

## 2025-09-02 DIAGNOSIS — Z09 HOSPITAL DISCHARGE FOLLOW-UP: ICD-10-CM

## 2025-09-02 DIAGNOSIS — I48.0 PAROXYSMAL ATRIAL FIBRILLATION (HCC): ICD-10-CM

## 2025-09-02 PROCEDURE — 1111F DSCHRG MED/CURRENT MED MERGE: CPT | Performed by: INTERNAL MEDICINE

## 2025-09-02 PROCEDURE — 3078F DIAST BP <80 MM HG: CPT | Performed by: INTERNAL MEDICINE

## 2025-09-02 PROCEDURE — 3075F SYST BP GE 130 - 139MM HG: CPT | Performed by: INTERNAL MEDICINE

## 2025-09-02 PROCEDURE — 1123F ACP DISCUSS/DSCN MKR DOCD: CPT | Performed by: INTERNAL MEDICINE

## 2025-09-02 PROCEDURE — 3051F HG A1C>EQUAL 7.0%<8.0%: CPT | Performed by: INTERNAL MEDICINE

## 2025-09-02 PROCEDURE — 99214 OFFICE O/P EST MOD 30 MIN: CPT | Performed by: INTERNAL MEDICINE

## 2025-09-02 RX ORDER — CHOLECALCIFEROL (VITAMIN D3) 1250 MCG
CAPSULE ORAL
COMMUNITY

## 2025-09-02 RX ORDER — FLECAINIDE ACETATE 50 MG/1
50 TABLET ORAL 2 TIMES DAILY
COMMUNITY

## 2025-09-02 RX ORDER — ROSUVASTATIN CALCIUM 20 MG/1
20 TABLET, COATED ORAL DAILY
Qty: 90 TABLET | Refills: 2 | Status: SHIPPED | OUTPATIENT
Start: 2025-09-02

## 2025-09-02 RX ORDER — REPAGLINIDE 0.5 MG/1
0.5 TABLET ORAL
Qty: 90 TABLET | Refills: 3 | Status: SHIPPED | OUTPATIENT
Start: 2025-09-02

## 2025-09-02 RX ORDER — DULOXETIN HYDROCHLORIDE 30 MG/1
30 CAPSULE, DELAYED RELEASE ORAL DAILY
Qty: 90 CAPSULE | Refills: 3 | Status: SHIPPED | OUTPATIENT
Start: 2025-09-02

## 2025-09-02 RX ORDER — BUPROPION HYDROCHLORIDE 150 MG/1
150 TABLET ORAL EVERY MORNING
Qty: 90 TABLET | Refills: 3 | Status: SHIPPED | OUTPATIENT
Start: 2025-09-02

## (undated) DEVICE — INTRODUCER SHTH L13CM OD7FR SH ORNG HUB SEAMLESS SAFSHTH

## (undated) DEVICE — Device

## (undated) DEVICE — SUTURE PERMAHAND SZ 0 L30IN NONABSORBABLE BLK L26MM SH 1/2 K834H

## (undated) DEVICE — KIT MICROINTRODUCER 4FR GWIRE L40CM DIA0.018IN ECHOGENIC NDL

## (undated) DEVICE — SENSOR PLSE OXMTR AD CBL L36IN ADH FRM FIT SPO2 DISP

## (undated) DEVICE — STOPCOCK TRNSDUC 500PSI 3 W ROT M LUER LT BLU OFF HNDL R

## (undated) DEVICE — DRAPE EQUIP PACEMKR BIV LT SIDE ORNG

## (undated) DEVICE — SPLINT WR VELC FOAM NEUT POS DISP FOR RAD ART ACC SFT STRP

## (undated) DEVICE — CATHETER PACE 5FR L110CM 6FR INTRO D10CM 1MM SPACE POLYUR

## (undated) DEVICE — PINNACLE PRECISION ACCESS SYSTEM INTRODUCER SHEATH: Brand: PINNACLE PRECISION ACCESS SYSTEM

## (undated) DEVICE — CAUTERY TIP POLISHER: Brand: DEVON

## (undated) DEVICE — 3M™ IOBAN™ 2 ANTIMICROBIAL INCISE DRAPE 6650EZ: Brand: IOBAN™ 2

## (undated) DEVICE — DRAPE,ANGIO,BRACH,STERILE,38X44: Brand: MEDLINE

## (undated) DEVICE — PENCIL ES L3M ROCK SWCH S STL HEX LOK BLDE ELECTRD HOLSTER

## (undated) DEVICE — SUTURE VICRYL ABSORBABLE BRAIDED 2-0 CT 36 IN DA UD  VCP957H

## (undated) DEVICE — VALVED PEELABLE PTFE INTRODUCER: Brand: OPTISEAL™

## (undated) DEVICE — DRAPE EP LT SUBCLAV ENTRY SHLD SORBX